# Patient Record
Sex: MALE | Race: BLACK OR AFRICAN AMERICAN | Employment: UNEMPLOYED | URBAN - METROPOLITAN AREA
[De-identification: names, ages, dates, MRNs, and addresses within clinical notes are randomized per-mention and may not be internally consistent; named-entity substitution may affect disease eponyms.]

---

## 2018-12-26 ENCOUNTER — HOSPITAL ENCOUNTER (INPATIENT)
Age: 23
LOS: 2 days | Discharge: HOME | DRG: 053 | End: 2018-12-29
Attending: EMERGENCY MEDICINE | Admitting: FAMILY MEDICINE
Payer: MEDICAID

## 2018-12-26 DIAGNOSIS — T14.90XA TRAUMA: ICD-10-CM

## 2018-12-26 DIAGNOSIS — S09.90XA CLOSED HEAD INJURY, INITIAL ENCOUNTER: Primary | ICD-10-CM

## 2018-12-26 DIAGNOSIS — R56.9 SEIZURE (HCC): ICD-10-CM

## 2018-12-26 PROCEDURE — 6810039000 HC L1 TRAUMA ALERT

## 2018-12-26 PROCEDURE — 99285 EMERGENCY DEPT VISIT HI MDM: CPT

## 2018-12-26 PROCEDURE — 6360000002 HC RX W HCPCS

## 2018-12-27 ENCOUNTER — APPOINTMENT (OUTPATIENT)
Dept: CT IMAGING | Age: 23
DRG: 053 | End: 2018-12-27
Payer: MEDICAID

## 2018-12-27 ENCOUNTER — APPOINTMENT (OUTPATIENT)
Dept: GENERAL RADIOLOGY | Age: 23
DRG: 053 | End: 2018-12-27
Payer: MEDICAID

## 2018-12-27 ENCOUNTER — APPOINTMENT (OUTPATIENT)
Dept: MRI IMAGING | Age: 23
DRG: 053 | End: 2018-12-27
Payer: MEDICAID

## 2018-12-27 ENCOUNTER — APPOINTMENT (OUTPATIENT)
Dept: NEUROLOGY | Age: 23
DRG: 053 | End: 2018-12-27
Payer: MEDICAID

## 2018-12-27 PROBLEM — R56.9 NEW ONSET SEIZURE (HCC): Status: ACTIVE | Noted: 2018-12-27

## 2018-12-27 LAB
% INHIBITION AA: 57.3 %
% INHIBITION ADP: 61.1 %
ABO/RH: NORMAL
ACETAMINOPHEN LEVEL: <5 MCG/ML (ref 10–30)
ALBUMIN SERPL-MCNC: 4.9 G/DL (ref 3.5–5.2)
ALP BLD-CCNC: 69 U/L (ref 40–129)
ALT SERPL-CCNC: 19 U/L (ref 0–40)
AMPHETAMINE SCREEN, URINE: NOT DETECTED
ANGLE (CLOT STRENGTH): 70.4 DEGREE (ref 59–74)
ANION GAP SERPL CALCULATED.3IONS-SCNC: 10 MMOL/L (ref 7–16)
ANION GAP SERPL CALCULATED.3IONS-SCNC: 11 MMOL/L (ref 7–16)
ANION GAP SERPL CALCULATED.3IONS-SCNC: 33 MMOL/L (ref 7–16)
ANTIBODY SCREEN: NORMAL
APTT: 30.5 SEC (ref 24.5–35.1)
AST SERPL-CCNC: 26 U/L (ref 0–39)
BARBITURATE SCREEN URINE: NOT DETECTED
BASOPHILS ABSOLUTE: 0.03 E9/L (ref 0–0.2)
BASOPHILS RELATIVE PERCENT: 0.2 % (ref 0–2)
BENZODIAZEPINE SCREEN, URINE: NOT DETECTED
BILIRUB SERPL-MCNC: 0.3 MG/DL (ref 0–1.2)
BUN BLDV-MCNC: 13 MG/DL (ref 6–20)
BUN BLDV-MCNC: 17 MG/DL (ref 6–20)
BUN BLDV-MCNC: 17 MG/DL (ref 6–20)
CALCIUM SERPL-MCNC: 9 MG/DL (ref 8.6–10.2)
CALCIUM SERPL-MCNC: 9.4 MG/DL (ref 8.6–10.2)
CALCIUM SERPL-MCNC: 9.7 MG/DL (ref 8.6–10.2)
CANNABINOID SCREEN URINE: NOT DETECTED
CHLORIDE BLD-SCNC: 102 MMOL/L (ref 98–107)
CHLORIDE BLD-SCNC: 103 MMOL/L (ref 98–107)
CHLORIDE BLD-SCNC: 95 MMOL/L (ref 98–107)
CO2: 10 MMOL/L (ref 22–29)
CO2: 24 MMOL/L (ref 22–29)
CO2: 25 MMOL/L (ref 22–29)
COCAINE METABOLITE SCREEN URINE: NOT DETECTED
COHB: 3.1 % (ref 0–1.5)
COMMENT: ABNORMAL
CREAT SERPL-MCNC: 1.6 MG/DL (ref 0.7–1.2)
CREAT SERPL-MCNC: 2.3 MG/DL (ref 0.7–1.2)
CREAT SERPL-MCNC: 2.5 MG/DL (ref 0.7–1.2)
CRITICAL: ABNORMAL
DATE ANALYZED: ABNORMAL
DATE OF COLLECTION: ABNORMAL
EKG ATRIAL RATE: 70 BPM
EKG P AXIS: 70 DEGREES
EKG P-R INTERVAL: 156 MS
EKG Q-T INTERVAL: 380 MS
EKG QRS DURATION: 92 MS
EKG QTC CALCULATION (BAZETT): 410 MS
EKG R AXIS: 71 DEGREES
EKG T AXIS: 61 DEGREES
EKG VENTRICULAR RATE: 70 BPM
EOSINOPHILS ABSOLUTE: 0.01 E9/L (ref 0.05–0.5)
EOSINOPHILS RELATIVE PERCENT: 0.1 % (ref 0–6)
ETHANOL: <10 MG/DL (ref 0–0.08)
G-TEG: 6.2 K D/SC (ref 4.5–11)
GFR AFRICAN AMERICAN: 38
GFR AFRICAN AMERICAN: 42
GFR AFRICAN AMERICAN: >60
GFR NON-AFRICAN AMERICAN: 38 ML/MIN/1.73
GFR NON-AFRICAN AMERICAN: 42 ML/MIN/1.73
GFR NON-AFRICAN AMERICAN: >60 ML/MIN/1.73
GLUCOSE BLD-MCNC: 101 MG/DL (ref 74–99)
GLUCOSE BLD-MCNC: 175 MG/DL (ref 74–99)
GLUCOSE BLD-MCNC: 92 MG/DL (ref 74–99)
HBA1C MFR BLD: 4.8 % (ref 4–5.6)
HCT VFR BLD CALC: 46.6 % (ref 37–54)
HCT VFR BLD CALC: 48.3 % (ref 37–54)
HEMOGLOBIN: 15.4 G/DL (ref 12.5–16.5)
HEMOGLOBIN: 15.8 G/DL (ref 12.5–16.5)
HHB: 0.5 % (ref 0–5)
IMMATURE GRANULOCYTES #: 0.05 E9/L
IMMATURE GRANULOCYTES %: 0.3 % (ref 0–5)
INR BLD: 1
K (CLOTTING TIME): 1.3 MIN (ref 1–3)
LAB: ABNORMAL
LACTIC ACID: 1 MMOL/L (ref 0.5–2.2)
LACTIC ACID: 17.3 MMOL/L (ref 0.5–2.2)
LYMPHOCYTES ABSOLUTE: 1.4 E9/L (ref 1.5–4)
LYMPHOCYTES RELATIVE PERCENT: 8.6 % (ref 20–42)
Lab: ABNORMAL
MA (MAX AMPLITUDE): 55.2 MM (ref 50–70)
MA-AA: 25.3 MM
MA-ACTIVATED: 3 MM
MA-ADP: 23.3 MM
MA-TEG BASELINE: 55.2 MM
MCH RBC QN AUTO: 29.9 PG (ref 26–35)
MCH RBC QN AUTO: 30.3 PG (ref 26–35)
MCHC RBC AUTO-ENTMCNC: 32.7 % (ref 32–34.5)
MCHC RBC AUTO-ENTMCNC: 33 % (ref 32–34.5)
MCV RBC AUTO: 90.5 FL (ref 80–99.9)
MCV RBC AUTO: 92.5 FL (ref 80–99.9)
METHADONE SCREEN, URINE: POSITIVE
METHB: 0.5 % (ref 0–1.5)
MODE: ABNORMAL
MONOCYTES ABSOLUTE: 1.52 E9/L (ref 0.1–0.95)
MONOCYTES RELATIVE PERCENT: 9.4 % (ref 2–12)
NEUTROPHILS ABSOLUTE: 13.2 E9/L (ref 1.8–7.3)
NEUTROPHILS RELATIVE PERCENT: 81.4 % (ref 43–80)
O2 CONTENT: 23.2 ML/DL
O2 SATURATION: 99.5 % (ref 92–98.5)
O2HB: 95.9 % (ref 94–97)
OPERATOR ID: ABNORMAL
OPIATE SCREEN URINE: NOT DETECTED
PATIENT TEMP: 37 C
PCO2: <15 MMHG (ref 35–45)
PDW BLD-RTO: 13.7 FL (ref 11.5–15)
PDW BLD-RTO: 14.1 FL (ref 11.5–15)
PH BLOOD GAS: 7.35 (ref 7.35–7.45)
PHENCYCLIDINE SCREEN URINE: NOT DETECTED
PLATELET # BLD: 229 E9/L (ref 130–450)
PLATELET # BLD: 259 E9/L (ref 130–450)
PMV BLD AUTO: 10.9 FL (ref 7–12)
PMV BLD AUTO: 11.1 FL (ref 7–12)
PO2: 286.6 MMHG (ref 60–100)
POTASSIUM REFLEX MAGNESIUM: 4.9 MMOL/L (ref 3.5–5)
POTASSIUM REFLEX MAGNESIUM: 5.1 MMOL/L (ref 3.5–5)
POTASSIUM SERPL-SCNC: 3.8 MMOL/L (ref 3.5–5)
POTASSIUM SERPL-SCNC: 3.82 MMOL/L (ref 3.3–5.1)
PROLACTIN: 35.34 NG/ML
PROPOXYPHENE SCREEN: NOT DETECTED
PROTHROMBIN TIME: 11.7 SEC (ref 9.3–12.4)
R (REACTION TIME): 3 MIN (ref 5–10)
RBC # BLD: 5.15 E12/L (ref 3.8–5.8)
RBC # BLD: 5.22 E12/L (ref 3.8–5.8)
SALICYLATE, SERUM: <0.3 MG/DL (ref 0–30)
SODIUM BLD-SCNC: 137 MMOL/L (ref 132–146)
SODIUM BLD-SCNC: 138 MMOL/L (ref 132–146)
SODIUM BLD-SCNC: 138 MMOL/L (ref 132–146)
SOURCE, BLOOD GAS: ABNORMAL
THB: 16.7 G/DL (ref 11.5–16.5)
TIME ANALYZED: 5
TOTAL CK: 3647 U/L (ref 20–200)
TOTAL CK: 3879 U/L (ref 20–200)
TOTAL PROTEIN: 8 G/DL (ref 6.4–8.3)
TRICYCLIC ANTIDEPRESSANTS SCREEN SERUM: NEGATIVE NG/ML
WBC # BLD: 14 E9/L (ref 4.5–11.5)
WBC # BLD: 16.2 E9/L (ref 4.5–11.5)

## 2018-12-27 PROCEDURE — 80053 COMPREHEN METABOLIC PANEL: CPT

## 2018-12-27 PROCEDURE — G8979 MOBILITY GOAL STATUS: HCPCS

## 2018-12-27 PROCEDURE — 86900 BLOOD TYPING SEROLOGIC ABO: CPT

## 2018-12-27 PROCEDURE — G0480 DRUG TEST DEF 1-7 CLASSES: HCPCS

## 2018-12-27 PROCEDURE — 85730 THROMBOPLASTIN TIME PARTIAL: CPT

## 2018-12-27 PROCEDURE — 6360000002 HC RX W HCPCS: Performed by: EMERGENCY MEDICINE

## 2018-12-27 PROCEDURE — 86901 BLOOD TYPING SEROLOGIC RH(D): CPT

## 2018-12-27 PROCEDURE — 2580000003 HC RX 258: Performed by: FAMILY MEDICINE

## 2018-12-27 PROCEDURE — 86850 RBC ANTIBODY SCREEN: CPT

## 2018-12-27 PROCEDURE — 2580000003 HC RX 258: Performed by: EMERGENCY MEDICINE

## 2018-12-27 PROCEDURE — 99253 IP/OBS CNSLTJ NEW/EST LOW 45: CPT | Performed by: PSYCHIATRY & NEUROLOGY

## 2018-12-27 PROCEDURE — 85347 COAGULATION TIME ACTIVATED: CPT

## 2018-12-27 PROCEDURE — 70553 MRI BRAIN STEM W/O & W/DYE: CPT

## 2018-12-27 PROCEDURE — 6360000004 HC RX CONTRAST MEDICATION: Performed by: FAMILY MEDICINE

## 2018-12-27 PROCEDURE — 93005 ELECTROCARDIOGRAM TRACING: CPT | Performed by: NURSE PRACTITIONER

## 2018-12-27 PROCEDURE — 94150 VITAL CAPACITY TEST: CPT

## 2018-12-27 PROCEDURE — 84146 ASSAY OF PROLACTIN: CPT

## 2018-12-27 PROCEDURE — 2140000000 HC CCU INTERMEDIATE R&B

## 2018-12-27 PROCEDURE — G8978 MOBILITY CURRENT STATUS: HCPCS

## 2018-12-27 PROCEDURE — 80048 BASIC METABOLIC PNL TOTAL CA: CPT

## 2018-12-27 PROCEDURE — 87040 BLOOD CULTURE FOR BACTERIA: CPT

## 2018-12-27 PROCEDURE — 70450 CT HEAD/BRAIN W/O DYE: CPT

## 2018-12-27 PROCEDURE — 83605 ASSAY OF LACTIC ACID: CPT

## 2018-12-27 PROCEDURE — 85610 PROTHROMBIN TIME: CPT

## 2018-12-27 PROCEDURE — 6370000000 HC RX 637 (ALT 250 FOR IP): Performed by: NURSE PRACTITIONER

## 2018-12-27 PROCEDURE — 72170 X-RAY EXAM OF PELVIS: CPT

## 2018-12-27 PROCEDURE — 85384 FIBRINOGEN ACTIVITY: CPT

## 2018-12-27 PROCEDURE — 82550 ASSAY OF CK (CPK): CPT

## 2018-12-27 PROCEDURE — 2580000003 HC RX 258

## 2018-12-27 PROCEDURE — A9579 GAD-BASE MR CONTRAST NOS,1ML: HCPCS | Performed by: FAMILY MEDICINE

## 2018-12-27 PROCEDURE — 96375 TX/PRO/DX INJ NEW DRUG ADDON: CPT

## 2018-12-27 PROCEDURE — 99406 BEHAV CHNG SMOKING 3-10 MIN: CPT

## 2018-12-27 PROCEDURE — 97161 PT EVAL LOW COMPLEX 20 MIN: CPT

## 2018-12-27 PROCEDURE — 85025 COMPLETE CBC W/AUTO DIFF WBC: CPT

## 2018-12-27 PROCEDURE — 85576 BLOOD PLATELET AGGREGATION: CPT

## 2018-12-27 PROCEDURE — 83036 HEMOGLOBIN GLYCOSYLATED A1C: CPT

## 2018-12-27 PROCEDURE — G8980 MOBILITY D/C STATUS: HCPCS

## 2018-12-27 PROCEDURE — 80307 DRUG TEST PRSMV CHEM ANLYZR: CPT

## 2018-12-27 PROCEDURE — 6360000002 HC RX W HCPCS: Performed by: STUDENT IN AN ORGANIZED HEALTH CARE EDUCATION/TRAINING PROGRAM

## 2018-12-27 PROCEDURE — 96365 THER/PROPH/DIAG IV INF INIT: CPT

## 2018-12-27 PROCEDURE — 82805 BLOOD GASES W/O2 SATURATION: CPT

## 2018-12-27 PROCEDURE — 72125 CT NECK SPINE W/O DYE: CPT

## 2018-12-27 PROCEDURE — 85027 COMPLETE CBC AUTOMATED: CPT

## 2018-12-27 PROCEDURE — 2700000000 HC OXYGEN THERAPY PER DAY

## 2018-12-27 PROCEDURE — 36415 COLL VENOUS BLD VENIPUNCTURE: CPT

## 2018-12-27 PROCEDURE — 71045 X-RAY EXAM CHEST 1 VIEW: CPT

## 2018-12-27 PROCEDURE — 95819 EEG AWAKE AND ASLEEP: CPT | Performed by: PSYCHIATRY & NEUROLOGY

## 2018-12-27 PROCEDURE — 84132 ASSAY OF SERUM POTASSIUM: CPT

## 2018-12-27 PROCEDURE — 95819 EEG AWAKE AND ASLEEP: CPT

## 2018-12-27 PROCEDURE — 99231 SBSQ HOSP IP/OBS SF/LOW 25: CPT | Performed by: SURGERY

## 2018-12-27 RX ORDER — ONDANSETRON 2 MG/ML
INJECTION INTRAMUSCULAR; INTRAVENOUS DAILY PRN
Status: COMPLETED | OUTPATIENT
Start: 2018-12-27 | End: 2018-12-27

## 2018-12-27 RX ORDER — DEXTROSE MONOHYDRATE 25 G/50ML
12.5 INJECTION, SOLUTION INTRAVENOUS PRN
Status: DISCONTINUED | OUTPATIENT
Start: 2018-12-27 | End: 2018-12-29 | Stop reason: HOSPADM

## 2018-12-27 RX ORDER — DEXTROSE MONOHYDRATE 50 MG/ML
100 INJECTION, SOLUTION INTRAVENOUS PRN
Status: DISCONTINUED | OUTPATIENT
Start: 2018-12-27 | End: 2018-12-29 | Stop reason: HOSPADM

## 2018-12-27 RX ORDER — SODIUM CHLORIDE 0.9 % (FLUSH) 0.9 %
SYRINGE (ML) INJECTION
Status: COMPLETED
Start: 2018-12-27 | End: 2018-12-27

## 2018-12-27 RX ORDER — NICOTINE POLACRILEX 4 MG
15 LOZENGE BUCCAL PRN
Status: DISCONTINUED | OUTPATIENT
Start: 2018-12-27 | End: 2018-12-29 | Stop reason: HOSPADM

## 2018-12-27 RX ORDER — LORAZEPAM 2 MG/ML
1 INJECTION INTRAMUSCULAR EVERY 4 HOURS PRN
Status: DISCONTINUED | OUTPATIENT
Start: 2018-12-27 | End: 2018-12-29 | Stop reason: HOSPADM

## 2018-12-27 RX ORDER — 0.9 % SODIUM CHLORIDE 0.9 %
10 VIAL (ML) INJECTION 2 TIMES DAILY
Status: DISCONTINUED | OUTPATIENT
Start: 2018-12-27 | End: 2018-12-29 | Stop reason: HOSPADM

## 2018-12-27 RX ORDER — SODIUM CHLORIDE 9 MG/ML
INJECTION, SOLUTION INTRAVENOUS CONTINUOUS
Status: DISCONTINUED | OUTPATIENT
Start: 2018-12-27 | End: 2018-12-29 | Stop reason: HOSPADM

## 2018-12-27 RX ORDER — LEVETIRACETAM 500 MG/1
500 TABLET ORAL 2 TIMES DAILY
Status: DISCONTINUED | OUTPATIENT
Start: 2018-12-27 | End: 2018-12-27

## 2018-12-27 RX ORDER — 0.9 % SODIUM CHLORIDE 0.9 %
1000 INTRAVENOUS SOLUTION INTRAVENOUS ONCE
Status: COMPLETED | OUTPATIENT
Start: 2018-12-27 | End: 2018-12-27

## 2018-12-27 RX ORDER — LEVETIRACETAM 5 MG/ML
500 INJECTION INTRAVASCULAR EVERY 12 HOURS
Status: DISCONTINUED | OUTPATIENT
Start: 2018-12-27 | End: 2018-12-27

## 2018-12-27 RX ORDER — LEVETIRACETAM 10 MG/ML
1000 INJECTION INTRAVASCULAR ONCE
Status: COMPLETED | OUTPATIENT
Start: 2018-12-27 | End: 2018-12-27

## 2018-12-27 RX ADMIN — ONDANSETRON HYDROCHLORIDE 4 MG: 2 INJECTION, SOLUTION INTRAMUSCULAR; INTRAVENOUS at 00:07

## 2018-12-27 RX ADMIN — SODIUM CHLORIDE: 9 INJECTION, SOLUTION INTRAVENOUS at 17:55

## 2018-12-27 RX ADMIN — SODIUM CHLORIDE: 9 INJECTION, SOLUTION INTRAVENOUS at 16:50

## 2018-12-27 RX ADMIN — GADOTERIDOL 15 ML: 279.3 INJECTION, SOLUTION INTRAVENOUS at 07:36

## 2018-12-27 RX ADMIN — Medication 10 ML: at 09:44

## 2018-12-27 RX ADMIN — LEVETIRACETAM 500 MG: 500 TABLET, FILM COATED ORAL at 13:00

## 2018-12-27 RX ADMIN — SODIUM CHLORIDE: 9 INJECTION, SOLUTION INTRAVENOUS at 10:00

## 2018-12-27 RX ADMIN — SODIUM CHLORIDE 1000 ML: 9 INJECTION, SOLUTION INTRAVENOUS at 04:23

## 2018-12-27 RX ADMIN — LEVETIRACETAM 1000 MG: 10 INJECTION INTRAVENOUS at 01:15

## 2018-12-27 ASSESSMENT — PAIN SCALES - GENERAL
PAINLEVEL_OUTOF10: 0
PAINLEVEL_OUTOF10: 0

## 2018-12-28 PROBLEM — S09.90XA CLOSED HEAD INJURY: Status: ACTIVE | Noted: 2018-12-28

## 2018-12-28 LAB
ANION GAP SERPL CALCULATED.3IONS-SCNC: 11 MMOL/L (ref 7–16)
BASOPHILS ABSOLUTE: 0.03 E9/L (ref 0–0.2)
BASOPHILS RELATIVE PERCENT: 0.3 % (ref 0–2)
BUN BLDV-MCNC: 13 MG/DL (ref 6–20)
CALCIUM SERPL-MCNC: 8.5 MG/DL (ref 8.6–10.2)
CHLORIDE BLD-SCNC: 108 MMOL/L (ref 98–107)
CO2: 21 MMOL/L (ref 22–29)
CREAT SERPL-MCNC: 2.4 MG/DL (ref 0.7–1.2)
EOSINOPHILS ABSOLUTE: 0.08 E9/L (ref 0.05–0.5)
EOSINOPHILS RELATIVE PERCENT: 0.7 % (ref 0–6)
GFR AFRICAN AMERICAN: 40
GFR NON-AFRICAN AMERICAN: 40 ML/MIN/1.73
GLUCOSE BLD-MCNC: 103 MG/DL (ref 74–99)
HCT VFR BLD CALC: 39.2 % (ref 37–54)
HEMOGLOBIN: 13.1 G/DL (ref 12.5–16.5)
IMMATURE GRANULOCYTES #: 0.04 E9/L
IMMATURE GRANULOCYTES %: 0.3 % (ref 0–5)
LYMPHOCYTES ABSOLUTE: 2.66 E9/L (ref 1.5–4)
LYMPHOCYTES RELATIVE PERCENT: 22.4 % (ref 20–42)
MCH RBC QN AUTO: 30.4 PG (ref 26–35)
MCHC RBC AUTO-ENTMCNC: 33.4 % (ref 32–34.5)
MCV RBC AUTO: 91 FL (ref 80–99.9)
MONOCYTES ABSOLUTE: 1.29 E9/L (ref 0.1–0.95)
MONOCYTES RELATIVE PERCENT: 10.8 % (ref 2–12)
NEUTROPHILS ABSOLUTE: 7.8 E9/L (ref 1.8–7.3)
NEUTROPHILS RELATIVE PERCENT: 65.5 % (ref 43–80)
PDW BLD-RTO: 13.9 FL (ref 11.5–15)
PLATELET # BLD: 198 E9/L (ref 130–450)
PMV BLD AUTO: 11.1 FL (ref 7–12)
POTASSIUM REFLEX MAGNESIUM: 3.9 MMOL/L (ref 3.5–5)
RBC # BLD: 4.31 E12/L (ref 3.8–5.8)
SODIUM BLD-SCNC: 140 MMOL/L (ref 132–146)
TOTAL CK: 3679 U/L (ref 20–200)
WBC # BLD: 11.9 E9/L (ref 4.5–11.5)

## 2018-12-28 PROCEDURE — 2580000003 HC RX 258: Performed by: FAMILY MEDICINE

## 2018-12-28 PROCEDURE — 85025 COMPLETE CBC W/AUTO DIFF WBC: CPT

## 2018-12-28 PROCEDURE — 80048 BASIC METABOLIC PNL TOTAL CA: CPT

## 2018-12-28 PROCEDURE — 97165 OT EVAL LOW COMPLEX 30 MIN: CPT

## 2018-12-28 PROCEDURE — G8989 SELF CARE D/C STATUS: HCPCS

## 2018-12-28 PROCEDURE — G8988 SELF CARE GOAL STATUS: HCPCS

## 2018-12-28 PROCEDURE — 82550 ASSAY OF CK (CPK): CPT

## 2018-12-28 PROCEDURE — 1200000000 HC SEMI PRIVATE

## 2018-12-28 PROCEDURE — G8987 SELF CARE CURRENT STATUS: HCPCS

## 2018-12-28 PROCEDURE — 36415 COLL VENOUS BLD VENIPUNCTURE: CPT

## 2018-12-28 RX ADMIN — SODIUM CHLORIDE: 9 INJECTION, SOLUTION INTRAVENOUS at 06:41

## 2018-12-28 RX ADMIN — SODIUM CHLORIDE: 9 INJECTION, SOLUTION INTRAVENOUS at 13:11

## 2018-12-28 RX ADMIN — SODIUM CHLORIDE: 9 INJECTION, SOLUTION INTRAVENOUS at 19:40

## 2018-12-28 ASSESSMENT — PAIN SCALES - GENERAL
PAINLEVEL_OUTOF10: 0

## 2018-12-29 VITALS
HEART RATE: 61 BPM | TEMPERATURE: 98.7 F | HEIGHT: 74 IN | OXYGEN SATURATION: 99 % | RESPIRATION RATE: 16 BRPM | BODY MASS INDEX: 21.28 KG/M2 | WEIGHT: 165.8 LBS | DIASTOLIC BLOOD PRESSURE: 60 MMHG | SYSTOLIC BLOOD PRESSURE: 120 MMHG

## 2018-12-29 LAB
ANION GAP SERPL CALCULATED.3IONS-SCNC: 8 MMOL/L (ref 7–16)
BASOPHILS ABSOLUTE: 0.03 E9/L (ref 0–0.2)
BASOPHILS RELATIVE PERCENT: 0.3 % (ref 0–2)
BUN BLDV-MCNC: 8 MG/DL (ref 6–20)
CALCIUM SERPL-MCNC: 8.4 MG/DL (ref 8.6–10.2)
CHLORIDE BLD-SCNC: 112 MMOL/L (ref 98–107)
CO2: 24 MMOL/L (ref 22–29)
CREAT SERPL-MCNC: 1.8 MG/DL (ref 0.7–1.2)
EOSINOPHILS ABSOLUTE: 0.15 E9/L (ref 0.05–0.5)
EOSINOPHILS RELATIVE PERCENT: 1.5 % (ref 0–6)
GFR AFRICAN AMERICAN: 56
GFR NON-AFRICAN AMERICAN: 56 ML/MIN/1.73
GLUCOSE BLD-MCNC: 94 MG/DL (ref 74–99)
HCT VFR BLD CALC: 40.9 % (ref 37–54)
HEMOGLOBIN: 13.5 G/DL (ref 12.5–16.5)
IMMATURE GRANULOCYTES #: 0.03 E9/L
IMMATURE GRANULOCYTES %: 0.3 % (ref 0–5)
LYMPHOCYTES ABSOLUTE: 2.21 E9/L (ref 1.5–4)
LYMPHOCYTES RELATIVE PERCENT: 22.5 % (ref 20–42)
MCH RBC QN AUTO: 30 PG (ref 26–35)
MCHC RBC AUTO-ENTMCNC: 33 % (ref 32–34.5)
MCV RBC AUTO: 90.9 FL (ref 80–99.9)
MONOCYTES ABSOLUTE: 0.94 E9/L (ref 0.1–0.95)
MONOCYTES RELATIVE PERCENT: 9.6 % (ref 2–12)
NEUTROPHILS ABSOLUTE: 6.46 E9/L (ref 1.8–7.3)
NEUTROPHILS RELATIVE PERCENT: 65.8 % (ref 43–80)
PDW BLD-RTO: 13.9 FL (ref 11.5–15)
PLATELET # BLD: 198 E9/L (ref 130–450)
PMV BLD AUTO: 11.3 FL (ref 7–12)
POTASSIUM REFLEX MAGNESIUM: 4 MMOL/L (ref 3.5–5)
RBC # BLD: 4.5 E12/L (ref 3.8–5.8)
SODIUM BLD-SCNC: 144 MMOL/L (ref 132–146)
TOTAL CK: 2801 U/L (ref 20–200)
WBC # BLD: 9.8 E9/L (ref 4.5–11.5)

## 2018-12-29 PROCEDURE — 80048 BASIC METABOLIC PNL TOTAL CA: CPT

## 2018-12-29 PROCEDURE — 85025 COMPLETE CBC W/AUTO DIFF WBC: CPT

## 2018-12-29 PROCEDURE — 82550 ASSAY OF CK (CPK): CPT

## 2018-12-29 PROCEDURE — 36415 COLL VENOUS BLD VENIPUNCTURE: CPT

## 2018-12-29 PROCEDURE — 93010 ELECTROCARDIOGRAM REPORT: CPT | Performed by: INTERNAL MEDICINE

## 2018-12-30 LAB
EDDP, URINE: <10 NG/ML
METHADONE, URINE: <10 NG/ML

## 2019-01-01 LAB
BLOOD CULTURE, ROUTINE: NORMAL
CULTURE, BLOOD 2: NORMAL

## 2019-03-09 ENCOUNTER — APPOINTMENT (OUTPATIENT)
Dept: CT IMAGING | Age: 24
End: 2019-03-09
Payer: COMMERCIAL

## 2019-03-09 ENCOUNTER — HOSPITAL ENCOUNTER (EMERGENCY)
Age: 24
Discharge: HOME | End: 2019-03-09
Attending: EMERGENCY MEDICINE
Payer: COMMERCIAL

## 2019-03-09 VITALS
HEART RATE: 75 BPM | OXYGEN SATURATION: 97 % | HEIGHT: 74 IN | TEMPERATURE: 97.8 F | WEIGHT: 165 LBS | RESPIRATION RATE: 14 BRPM | BODY MASS INDEX: 21.17 KG/M2 | SYSTOLIC BLOOD PRESSURE: 123 MMHG | DIASTOLIC BLOOD PRESSURE: 70 MMHG

## 2019-03-09 DIAGNOSIS — E87.6 HYPOKALEMIA: ICD-10-CM

## 2019-03-09 DIAGNOSIS — T50.905A DRUG-INDUCED SEIZURE (HCC): Primary | ICD-10-CM

## 2019-03-09 DIAGNOSIS — R56.9 DRUG-INDUCED SEIZURE (HCC): Primary | ICD-10-CM

## 2019-03-09 LAB
ACETAMINOPHEN LEVEL: <5 MCG/ML (ref 10–30)
ALBUMIN SERPL-MCNC: 4.4 G/DL (ref 3.5–5.2)
ALP BLD-CCNC: 75 U/L (ref 40–129)
ALT SERPL-CCNC: 19 U/L (ref 0–40)
AMPHETAMINE SCREEN, URINE: NOT DETECTED
ANION GAP SERPL CALCULATED.3IONS-SCNC: 21 MMOL/L (ref 7–16)
AST SERPL-CCNC: 26 U/L (ref 0–39)
BACTERIA: NORMAL /HPF
BARBITURATE SCREEN URINE: NOT DETECTED
BASOPHILS ABSOLUTE: 0.05 E9/L (ref 0–0.2)
BASOPHILS RELATIVE PERCENT: 0.3 % (ref 0–2)
BENZODIAZEPINE SCREEN, URINE: NOT DETECTED
BILIRUB SERPL-MCNC: <0.2 MG/DL (ref 0–1.2)
BILIRUBIN URINE: NEGATIVE
BLOOD, URINE: ABNORMAL
BUN BLDV-MCNC: 10 MG/DL (ref 6–20)
CALCIUM SERPL-MCNC: 9.2 MG/DL (ref 8.6–10.2)
CANNABINOID SCREEN URINE: NOT DETECTED
CHLORIDE BLD-SCNC: 100 MMOL/L (ref 98–107)
CHP ED QC CHECK: YES
CLARITY: CLEAR
CO2: 16 MMOL/L (ref 22–29)
COCAINE METABOLITE SCREEN URINE: NOT DETECTED
COLOR: YELLOW
CREAT SERPL-MCNC: 1.5 MG/DL (ref 0.7–1.2)
EKG ATRIAL RATE: 95 BPM
EKG P AXIS: 67 DEGREES
EKG P-R INTERVAL: 140 MS
EKG Q-T INTERVAL: 346 MS
EKG QRS DURATION: 106 MS
EKG QTC CALCULATION (BAZETT): 434 MS
EKG R AXIS: 72 DEGREES
EKG T AXIS: 27 DEGREES
EKG VENTRICULAR RATE: 95 BPM
EOSINOPHILS ABSOLUTE: 0.08 E9/L (ref 0.05–0.5)
EOSINOPHILS RELATIVE PERCENT: 0.5 % (ref 0–6)
ETHANOL: <10 MG/DL (ref 0–0.08)
GFR AFRICAN AMERICAN: >60
GFR NON-AFRICAN AMERICAN: >60 ML/MIN/1.73
GLUCOSE BLD-MCNC: 188 MG/DL
GLUCOSE BLD-MCNC: 229 MG/DL (ref 74–99)
GLUCOSE URINE: NEGATIVE MG/DL
HCT VFR BLD CALC: 45.3 % (ref 37–54)
HEMOGLOBIN: 15.3 G/DL (ref 12.5–16.5)
IMMATURE GRANULOCYTES #: 0.13 E9/L
IMMATURE GRANULOCYTES %: 0.8 % (ref 0–5)
KETONES, URINE: NEGATIVE MG/DL
LEUKOCYTE ESTERASE, URINE: NEGATIVE
LIPASE: 13 U/L (ref 13–60)
LYMPHOCYTES ABSOLUTE: 4.39 E9/L (ref 1.5–4)
LYMPHOCYTES RELATIVE PERCENT: 28.5 % (ref 20–42)
MAGNESIUM: 2.8 MG/DL (ref 1.6–2.6)
MCH RBC QN AUTO: 29.8 PG (ref 26–35)
MCHC RBC AUTO-ENTMCNC: 33.8 % (ref 32–34.5)
MCV RBC AUTO: 88.3 FL (ref 80–99.9)
METER GLUCOSE: 188 MG/DL (ref 74–99)
METHADONE SCREEN, URINE: POSITIVE
MONOCYTES ABSOLUTE: 0.92 E9/L (ref 0.1–0.95)
MONOCYTES RELATIVE PERCENT: 6 % (ref 2–12)
NEUTROPHILS ABSOLUTE: 9.85 E9/L (ref 1.8–7.3)
NEUTROPHILS RELATIVE PERCENT: 63.9 % (ref 43–80)
NITRITE, URINE: NEGATIVE
OPIATE SCREEN URINE: NOT DETECTED
PDW BLD-RTO: 13.5 FL (ref 11.5–15)
PH UA: 6.5 (ref 5–9)
PHENCYCLIDINE SCREEN URINE: NOT DETECTED
PLATELET # BLD: 270 E9/L (ref 130–450)
PMV BLD AUTO: 10.9 FL (ref 7–12)
POTASSIUM SERPL-SCNC: 3.2 MMOL/L (ref 3.5–5)
PROPOXYPHENE SCREEN: NOT DETECTED
PROTEIN UA: NEGATIVE MG/DL
RBC # BLD: 5.13 E12/L (ref 3.8–5.8)
RBC UA: NORMAL /HPF (ref 0–2)
SALICYLATE, SERUM: <0.3 MG/DL (ref 0–30)
SODIUM BLD-SCNC: 137 MMOL/L (ref 132–146)
SPECIFIC GRAVITY UA: 1.02 (ref 1–1.03)
TOTAL PROTEIN: 7.4 G/DL (ref 6.4–8.3)
TRICYCLIC ANTIDEPRESSANTS SCREEN SERUM: NEGATIVE NG/ML
UROBILINOGEN, URINE: 0.2 E.U./DL
WBC # BLD: 15.4 E9/L (ref 4.5–11.5)
WBC UA: NORMAL /HPF (ref 0–5)

## 2019-03-09 PROCEDURE — 80307 DRUG TEST PRSMV CHEM ANLYZR: CPT

## 2019-03-09 PROCEDURE — 81001 URINALYSIS AUTO W/SCOPE: CPT

## 2019-03-09 PROCEDURE — G0480 DRUG TEST DEF 1-7 CLASSES: HCPCS

## 2019-03-09 PROCEDURE — 96365 THER/PROPH/DIAG IV INF INIT: CPT

## 2019-03-09 PROCEDURE — 2580000003 HC RX 258: Performed by: EMERGENCY MEDICINE

## 2019-03-09 PROCEDURE — 99284 EMERGENCY DEPT VISIT MOD MDM: CPT

## 2019-03-09 PROCEDURE — 83690 ASSAY OF LIPASE: CPT

## 2019-03-09 PROCEDURE — 72125 CT NECK SPINE W/O DYE: CPT

## 2019-03-09 PROCEDURE — 80053 COMPREHEN METABOLIC PANEL: CPT

## 2019-03-09 PROCEDURE — 6370000000 HC RX 637 (ALT 250 FOR IP): Performed by: EMERGENCY MEDICINE

## 2019-03-09 PROCEDURE — 93005 ELECTROCARDIOGRAM TRACING: CPT | Performed by: EMERGENCY MEDICINE

## 2019-03-09 PROCEDURE — 83735 ASSAY OF MAGNESIUM: CPT

## 2019-03-09 PROCEDURE — 82962 GLUCOSE BLOOD TEST: CPT

## 2019-03-09 PROCEDURE — 85025 COMPLETE CBC W/AUTO DIFF WBC: CPT

## 2019-03-09 PROCEDURE — 6360000002 HC RX W HCPCS: Performed by: EMERGENCY MEDICINE

## 2019-03-09 PROCEDURE — 36415 COLL VENOUS BLD VENIPUNCTURE: CPT

## 2019-03-09 PROCEDURE — 70450 CT HEAD/BRAIN W/O DYE: CPT

## 2019-03-09 RX ORDER — 0.9 % SODIUM CHLORIDE 0.9 %
1000 INTRAVENOUS SOLUTION INTRAVENOUS ONCE
Status: COMPLETED | OUTPATIENT
Start: 2019-03-09 | End: 2019-03-09

## 2019-03-09 RX ORDER — POTASSIUM CHLORIDE 20 MEQ/1
40 TABLET, EXTENDED RELEASE ORAL ONCE
Status: COMPLETED | OUTPATIENT
Start: 2019-03-09 | End: 2019-03-09

## 2019-03-09 RX ORDER — LEVETIRACETAM 10 MG/ML
1000 INJECTION INTRAVASCULAR ONCE
Status: COMPLETED | OUTPATIENT
Start: 2019-03-09 | End: 2019-03-09

## 2019-03-09 RX ADMIN — SODIUM CHLORIDE 1000 ML: 9 INJECTION, SOLUTION INTRAVENOUS at 00:52

## 2019-03-09 RX ADMIN — LEVETIRACETAM 1000 MG: 10 INJECTION INTRAVENOUS at 02:01

## 2019-03-09 RX ADMIN — POTASSIUM CHLORIDE 40 MEQ: 20 TABLET, EXTENDED RELEASE ORAL at 05:47

## 2019-03-09 ASSESSMENT — ENCOUNTER SYMPTOMS
CHEST TIGHTNESS: 0
SHORTNESS OF BREATH: 0
SINUS PAIN: 0
EYE REDNESS: 0
COUGH: 0
TROUBLE SWALLOWING: 0
PHOTOPHOBIA: 0
CONSTIPATION: 0
EYE PAIN: 0
NAUSEA: 1
VOMITING: 1
DIARRHEA: 0
BLOOD IN STOOL: 0
VOICE CHANGE: 0
COLOR CHANGE: 0
SINUS PRESSURE: 0
ABDOMINAL PAIN: 0

## 2019-03-09 ASSESSMENT — PAIN DESCRIPTION - FREQUENCY: FREQUENCY: CONTINUOUS

## 2019-03-09 ASSESSMENT — PAIN DESCRIPTION - DESCRIPTORS: DESCRIPTORS: ACHING

## 2019-03-09 ASSESSMENT — PAIN DESCRIPTION - LOCATION: LOCATION: NECK

## 2019-03-15 LAB
EDDP, URINE: <10 NG/ML
METHADONE, URINE: <10 NG/ML

## 2019-11-09 ENCOUNTER — HOSPITAL ENCOUNTER (EMERGENCY)
Age: 24
Discharge: HOME | End: 2019-11-09
Payer: COMMERCIAL

## 2019-11-09 VITALS
RESPIRATION RATE: 18 BRPM | HEART RATE: 99 BPM | OXYGEN SATURATION: 95 % | DIASTOLIC BLOOD PRESSURE: 106 MMHG | SYSTOLIC BLOOD PRESSURE: 132 MMHG | WEIGHT: 195 LBS | BODY MASS INDEX: 25.04 KG/M2 | TEMPERATURE: 98.7 F

## 2019-11-09 DIAGNOSIS — S81.052A DOG BITE OF LEFT KNEE, INITIAL ENCOUNTER: Primary | ICD-10-CM
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
## 2019-11-09 PROCEDURE — 6360000002 HC RX W HCPCS: Performed by: NURSE PRACTITIONER

## 2019-11-09 PROCEDURE — 96372 THER/PROPH/DIAG INJ SC/IM: CPT

## 2019-11-09 PROCEDURE — 90472 IMMUNIZATION ADMIN EACH ADD: CPT | Performed by: NURSE PRACTITIONER

## 2019-11-09 PROCEDURE — 90675 RABIES VACCINE IM: CPT | Performed by: NURSE PRACTITIONER

## 2019-11-09 PROCEDURE — 90471 IMMUNIZATION ADMIN: CPT | Performed by: NURSE PRACTITIONER

## 2019-11-09 PROCEDURE — 99282 EMERGENCY DEPT VISIT SF MDM: CPT

## 2019-11-09 PROCEDURE — 90375 RABIES IG IM/SC: CPT | Performed by: NURSE PRACTITIONER

## 2019-11-09 PROCEDURE — 90715 TDAP VACCINE 7 YRS/> IM: CPT | Performed by: NURSE PRACTITIONER

## 2019-11-09 PROCEDURE — G0463 HOSPITAL OUTPT CLINIC VISIT: HCPCS

## 2019-11-09 RX ORDER — IBUPROFEN 800 MG/1
800 TABLET ORAL EVERY 6 HOURS PRN
Qty: 16 TABLET | Refills: 0 | Status: SHIPPED | OUTPATIENT
Start: 2019-11-09 | End: 2020-07-22 | Stop reason: SDUPTHER

## 2019-11-09 RX ORDER — AMOXICILLIN AND CLAVULANATE POTASSIUM 875; 125 MG/1; MG/1
1 TABLET, FILM COATED ORAL 2 TIMES DAILY
Qty: 20 TABLET | Refills: 0 | Status: SHIPPED | OUTPATIENT
Start: 2019-11-09 | End: 2019-11-19

## 2019-11-09 RX ADMIN — TETANUS TOXOID, REDUCED DIPHTHERIA TOXOID AND ACELLULAR PERTUSSIS VACCINE, ADSORBED 0.5 ML: 5; 2.5; 8; 8; 2.5 SUSPENSION INTRAMUSCULAR at 14:25

## 2019-11-09 RX ADMIN — RABIES VACCINE 1 ML: KIT at 16:12

## 2019-11-09 RX ADMIN — RABIES IMMUNE GLOBULIN (HUMAN) 1800 UNITS: 300 INJECTION, SOLUTION INFILTRATION; INTRAMUSCULAR at 16:13

## 2019-11-09 ASSESSMENT — PAIN DESCRIPTION - PAIN TYPE: TYPE: ACUTE PAIN

## 2019-11-09 ASSESSMENT — PAIN DESCRIPTION - ORIENTATION: ORIENTATION: LEFT

## 2019-11-09 ASSESSMENT — PAIN DESCRIPTION - LOCATION: LOCATION: KNEE

## 2019-11-09 ASSESSMENT — PAIN SCALES - GENERAL: PAINLEVEL_OUTOF10: 7

## 2020-04-21 ENCOUNTER — HOSPITAL ENCOUNTER (INPATIENT)
Age: 25
LOS: 35 days | Discharge: NURSING, REHAB OR LONG-TERM CARE | DRG: 004 | End: 2020-05-26
Attending: EMERGENCY MEDICINE | Admitting: SURGERY
Payer: MEDICAID

## 2020-04-21 ENCOUNTER — APPOINTMENT (OUTPATIENT)
Dept: CT IMAGING | Age: 25
DRG: 004 | End: 2020-04-21
Payer: MEDICAID

## 2020-04-21 ENCOUNTER — ANESTHESIA EVENT (OUTPATIENT)
Dept: OPERATING ROOM | Age: 25
DRG: 004 | End: 2020-04-21
Payer: MEDICAID

## 2020-04-21 ENCOUNTER — ANESTHESIA EVENT (OUTPATIENT)
Dept: EMERGENCY DEPT | Age: 25
DRG: 004 | End: 2020-04-21
Payer: MEDICAID

## 2020-04-21 ENCOUNTER — ANESTHESIA (OUTPATIENT)
Dept: EMERGENCY DEPT | Age: 25
DRG: 004 | End: 2020-04-21
Payer: MEDICAID

## 2020-04-21 ENCOUNTER — APPOINTMENT (OUTPATIENT)
Dept: GENERAL RADIOLOGY | Age: 25
DRG: 004 | End: 2020-04-21
Payer: MEDICAID

## 2020-04-21 ENCOUNTER — ANESTHESIA (OUTPATIENT)
Dept: OPERATING ROOM | Age: 25
DRG: 004 | End: 2020-04-21
Payer: MEDICAID
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
## 2020-04-21 LAB
AADO2: 329.5 MMHG
ABO/RH: NORMAL
ACETAMINOPHEN LEVEL: <5 MCG/ML (ref 10–30)
ALBUMIN SERPL-MCNC: 3.9 G/DL (ref 3.5–5.2)
ALP BLD-CCNC: 63 U/L (ref 40–129)
ALT SERPL-CCNC: 16 U/L (ref 0–40)
ANION GAP SERPL CALCULATED.3IONS-SCNC: 12 MMOL/L (ref 7–16)
ANTIBODY SCREEN: NORMAL
APTT: 22.2 SEC (ref 24.5–35.1)
AST SERPL-CCNC: 24 U/L (ref 0–39)
B.E.: -5.1 MMOL/L (ref -3–3)
BILIRUB SERPL-MCNC: 0.4 MG/DL (ref 0–1.2)
BUN BLDV-MCNC: 8 MG/DL (ref 6–20)
CALCIUM SERPL-MCNC: 8.8 MG/DL (ref 8.6–10.2)
CHLORIDE BLD-SCNC: 103 MMOL/L (ref 98–107)
CO2: 26 MMOL/L (ref 22–29)
COHB: 1.8 % (ref 0–1.5)
CREAT SERPL-MCNC: 1.7 MG/DL (ref 0.7–1.2)
CRITICAL: ABNORMAL
DATE ANALYZED: ABNORMAL
DATE OF COLLECTION: ABNORMAL
ETHANOL: <10 MG/DL (ref 0–0.08)
FIO2: 100 %
GFR AFRICAN AMERICAN: 59
GFR NON-AFRICAN AMERICAN: 59 ML/MIN/1.73
GLUCOSE BLD-MCNC: 156 MG/DL (ref 74–99)
HCO3: 21.5 MMOL/L (ref 22–26)
HCT VFR BLD CALC: 37.6 % (ref 37–54)
HEMOGLOBIN: 12.2 G/DL (ref 12.5–16.5)
HHB: 0.6 % (ref 0–5)
INR BLD: 1.2
LAB: ABNORMAL
LACTIC ACID: 3 MMOL/L (ref 0.5–2.2)
Lab: ABNORMAL
MCH RBC QN AUTO: 29.8 PG (ref 26–35)
MCHC RBC AUTO-ENTMCNC: 32.4 % (ref 32–34.5)
MCV RBC AUTO: 91.7 FL (ref 80–99.9)
METHB: 0.2 % (ref 0–1.5)
MODE: AC
O2 CONTENT: 17.6 ML/DL
O2 SATURATION: 99.4 % (ref 92–98.5)
O2HB: 97.4 % (ref 94–97)
OPERATOR ID: 187
PATIENT TEMP: 37 C
PCO2: 46 MMHG (ref 35–45)
PDW BLD-RTO: 14.8 FL (ref 11.5–15)
PEEP/CPAP: 5 CMH2O
PFO2: 3.13 MMHG/%
PH BLOOD GAS: 7.29 (ref 7.35–7.45)
PLATELET # BLD: 244 E9/L (ref 130–450)
PMV BLD AUTO: 10.6 FL (ref 7–12)
PO2: 312.5 MMHG (ref 75–100)
POTASSIUM SERPL-SCNC: 3.24 MMOL/L (ref 3.5–5)
POTASSIUM SERPL-SCNC: 3.9 MMOL/L (ref 3.5–5)
PROTHROMBIN TIME: 13 SEC (ref 9.3–12.4)
RBC # BLD: 4.1 E12/L (ref 3.8–5.8)
RI(T): 105 %
RR MECHANICAL: 16 B/MIN
SALICYLATE, SERUM: <0.3 MG/DL (ref 0–30)
SODIUM BLD-SCNC: 141 MMOL/L (ref 132–146)
SOURCE, BLOOD GAS: ABNORMAL
THB: 12.3 G/DL (ref 11.5–16.5)
TIME ANALYZED: 2242
TOTAL PROTEIN: 6.2 G/DL (ref 6.4–8.3)
TRICYCLIC ANTIDEPRESSANTS SCREEN SERUM: NEGATIVE NG/ML
VT MECHANICAL: 500 ML
WBC # BLD: 8.8 E9/L (ref 4.5–11.5)

## 2020-04-21 PROCEDURE — 2580000003 HC RX 258

## 2020-04-21 PROCEDURE — 71045 X-RAY EXAM CHEST 1 VIEW: CPT

## 2020-04-21 PROCEDURE — 2500000003 HC RX 250 WO HCPCS

## 2020-04-21 PROCEDURE — 86920 COMPATIBILITY TEST SPIN: CPT

## 2020-04-21 PROCEDURE — P9016 RBC LEUKOCYTES REDUCED: HCPCS

## 2020-04-21 PROCEDURE — 2709999900 HC NON-CHARGEABLE SUPPLY: Performed by: SURGERY

## 2020-04-21 PROCEDURE — 3600000012 HC SURGERY LEVEL 2 ADDTL 15MIN: Performed by: SURGERY

## 2020-04-21 PROCEDURE — 71275 CT ANGIOGRAPHY CHEST: CPT

## 2020-04-21 PROCEDURE — 31500 INSERT EMERGENCY AIRWAY: CPT

## 2020-04-21 PROCEDURE — 0KC30ZZ EXTIRPATION OF MATTER FROM LEFT NECK MUSCLE, OPEN APPROACH: ICD-10-PCS | Performed by: SURGERY

## 2020-04-21 PROCEDURE — 6360000002 HC RX W HCPCS

## 2020-04-21 PROCEDURE — 3700000001 HC ADD 15 MINUTES (ANESTHESIA): Performed by: SURGERY

## 2020-04-21 PROCEDURE — 2000000000 HC ICU R&B

## 2020-04-21 PROCEDURE — 3700000000 HC ANESTHESIA ATTENDED CARE: Performed by: SURGERY

## 2020-04-21 PROCEDURE — 82805 BLOOD GASES W/O2 SATURATION: CPT

## 2020-04-21 PROCEDURE — 6360000002 HC RX W HCPCS: Performed by: STUDENT IN AN ORGANIZED HEALTH CARE EDUCATION/TRAINING PROGRAM

## 2020-04-21 PROCEDURE — 31500 INSERT EMERGENCY AIRWAY: CPT | Performed by: NURSE ANESTHETIST, CERTIFIED REGISTERED

## 2020-04-21 PROCEDURE — 80307 DRUG TEST PRSMV CHEM ANLYZR: CPT

## 2020-04-21 PROCEDURE — 99285 EMERGENCY DEPT VISIT HI MDM: CPT

## 2020-04-21 PROCEDURE — G0480 DRUG TEST DEF 1-7 CLASSES: HCPCS

## 2020-04-21 PROCEDURE — 6810039001 HC L1 TRAUMA PRIORITY

## 2020-04-21 PROCEDURE — 36556 INSERT NON-TUNNEL CV CATH: CPT

## 2020-04-21 PROCEDURE — 0BH18EZ INSERTION OF ENDOTRACHEAL AIRWAY INTO TRACHEA, VIA NATURAL OR ARTIFICIAL OPENING ENDOSCOPIC: ICD-10-PCS | Performed by: ANESTHESIOLOGY

## 2020-04-21 PROCEDURE — 36600 WITHDRAWAL OF ARTERIAL BLOOD: CPT | Performed by: SURGERY

## 2020-04-21 PROCEDURE — 82803 BLOOD GASES ANY COMBINATION: CPT

## 2020-04-21 PROCEDURE — 0W9B00Z DRAINAGE OF LEFT PLEURAL CAVITY WITH DRAINAGE DEVICE, OPEN APPROACH: ICD-10-PCS | Performed by: SURGERY

## 2020-04-21 PROCEDURE — 3600000002 HC SURGERY LEVEL 2 BASE: Performed by: SURGERY

## 2020-04-21 PROCEDURE — 6360000004 HC RX CONTRAST MEDICATION: Performed by: RADIOLOGY

## 2020-04-21 PROCEDURE — 86850 RBC ANTIBODY SCREEN: CPT

## 2020-04-21 PROCEDURE — 83605 ASSAY OF LACTIC ACID: CPT

## 2020-04-21 PROCEDURE — 86901 BLOOD TYPING SEROLOGIC RH(D): CPT

## 2020-04-21 PROCEDURE — 86923 COMPATIBILITY TEST ELECTRIC: CPT

## 2020-04-21 PROCEDURE — 86900 BLOOD TYPING SEROLOGIC ABO: CPT

## 2020-04-21 PROCEDURE — 80053 COMPREHEN METABOLIC PANEL: CPT

## 2020-04-21 PROCEDURE — 70498 CT ANGIOGRAPHY NECK: CPT

## 2020-04-21 PROCEDURE — 5A1955Z RESPIRATORY VENTILATION, GREATER THAN 96 CONSECUTIVE HOURS: ICD-10-PCS | Performed by: ANESTHESIOLOGY

## 2020-04-21 PROCEDURE — 85610 PROTHROMBIN TIME: CPT

## 2020-04-21 PROCEDURE — 0W360ZZ CONTROL BLEEDING IN NECK, OPEN APPROACH: ICD-10-PCS | Performed by: SURGERY

## 2020-04-21 PROCEDURE — 0CBH0ZZ EXCISION OF LEFT SUBMAXILLARY GLAND, OPEN APPROACH: ICD-10-PCS | Performed by: SURGERY

## 2020-04-21 PROCEDURE — 84132 ASSAY OF SERUM POTASSIUM: CPT

## 2020-04-21 PROCEDURE — 85027 COMPLETE CBC AUTOMATED: CPT

## 2020-04-21 PROCEDURE — 0W9900Z DRAINAGE OF RIGHT PLEURAL CAVITY WITH DRAINAGE DEVICE, OPEN APPROACH: ICD-10-PCS | Performed by: SURGERY

## 2020-04-21 PROCEDURE — 99223 1ST HOSP IP/OBS HIGH 75: CPT | Performed by: SURGERY

## 2020-04-21 PROCEDURE — 85730 THROMBOPLASTIN TIME PARTIAL: CPT

## 2020-04-21 RX ORDER — SODIUM CHLORIDE 9 MG/ML
INJECTION, SOLUTION INTRAVENOUS CONTINUOUS PRN
Status: DISCONTINUED | OUTPATIENT
Start: 2020-04-21 | End: 2020-04-22 | Stop reason: SDUPTHER

## 2020-04-21 RX ORDER — ROCURONIUM BROMIDE 10 MG/ML
INJECTION, SOLUTION INTRAVENOUS PRN
Status: DISCONTINUED | OUTPATIENT
Start: 2020-04-21 | End: 2020-04-22 | Stop reason: SDUPTHER

## 2020-04-21 RX ORDER — PROPOFOL 10 MG/ML
INJECTION, EMULSION INTRAVENOUS
Status: COMPLETED
Start: 2020-04-21 | End: 2020-04-22

## 2020-04-21 RX ORDER — FENTANYL CITRATE 50 UG/ML
INJECTION, SOLUTION INTRAMUSCULAR; INTRAVENOUS
Status: DISCONTINUED
Start: 2020-04-21 | End: 2020-04-22 | Stop reason: WASHOUT

## 2020-04-21 RX ORDER — MORPHINE SULFATE 2 MG/ML
2 INJECTION, SOLUTION INTRAMUSCULAR; INTRAVENOUS EVERY 5 MIN PRN
Status: DISCONTINUED | OUTPATIENT
Start: 2020-04-21 | End: 2020-04-22 | Stop reason: HOSPADM

## 2020-04-21 RX ORDER — CEFAZOLIN SODIUM 2 G/50ML
2 SOLUTION INTRAVENOUS
Status: COMPLETED | OUTPATIENT
Start: 2020-04-21 | End: 2020-04-21

## 2020-04-21 RX ORDER — MORPHINE SULFATE 2 MG/ML
1 INJECTION, SOLUTION INTRAMUSCULAR; INTRAVENOUS EVERY 5 MIN PRN
Status: DISCONTINUED | OUTPATIENT
Start: 2020-04-21 | End: 2020-04-22 | Stop reason: HOSPADM

## 2020-04-21 RX ORDER — FENTANYL CITRATE 50 UG/ML
INJECTION, SOLUTION INTRAMUSCULAR; INTRAVENOUS PRN
Status: DISCONTINUED | OUTPATIENT
Start: 2020-04-21 | End: 2020-04-22 | Stop reason: SDUPTHER

## 2020-04-21 RX ADMIN — SODIUM CHLORIDE: 9 INJECTION, SOLUTION INTRAVENOUS at 23:18

## 2020-04-21 RX ADMIN — IOPAMIDOL 110 ML: 755 INJECTION, SOLUTION INTRAVENOUS at 22:55

## 2020-04-21 RX ADMIN — CEFAZOLIN SODIUM 2 G: 2 SOLUTION INTRAVENOUS at 23:42

## 2020-04-21 RX ADMIN — FENTANYL CITRATE 100 MCG: 50 INJECTION, SOLUTION INTRAMUSCULAR; INTRAVENOUS at 23:22

## 2020-04-21 RX ADMIN — SODIUM CHLORIDE: 9 INJECTION, SOLUTION INTRAVENOUS at 23:42

## 2020-04-21 RX ADMIN — ROCURONIUM BROMIDE 50 MG: 10 INJECTION, SOLUTION INTRAVENOUS at 23:22

## 2020-04-21 ASSESSMENT — PULMONARY FUNCTION TESTS
PIF_VALUE: 25
PIF_VALUE: 21
PIF_VALUE: 18
PIF_VALUE: 20
PIF_VALUE: 18
PIF_VALUE: 20
PIF_VALUE: 23
PIF_VALUE: 19
PIF_VALUE: 24
PIF_VALUE: 24
PIF_VALUE: 20
PIF_VALUE: 20
PIF_VALUE: 19
PIF_VALUE: 21
PIF_VALUE: 24
PIF_VALUE: 19
PIF_VALUE: 19
PIF_VALUE: 20
PIF_VALUE: 21
PIF_VALUE: 20
PIF_VALUE: 24
PIF_VALUE: 19
PIF_VALUE: 19
PIF_VALUE: 24
PIF_VALUE: 24
PIF_VALUE: 21
PIF_VALUE: 20
PIF_VALUE: 25
PIF_VALUE: 25
PIF_VALUE: 20
PIF_VALUE: 20
PIF_VALUE: 24
PIF_VALUE: 2
PIF_VALUE: 24
PIF_VALUE: 20
PIF_VALUE: 20
PIF_VALUE: 24
PIF_VALUE: 19

## 2020-04-22 ENCOUNTER — APPOINTMENT (OUTPATIENT)
Dept: GENERAL RADIOLOGY | Age: 25
DRG: 004 | End: 2020-04-22
Payer: MEDICAID

## 2020-04-22 VITALS
OXYGEN SATURATION: 100 % | SYSTOLIC BLOOD PRESSURE: 112 MMHG | DIASTOLIC BLOOD PRESSURE: 72 MMHG | RESPIRATION RATE: 14 BRPM

## 2020-04-22 PROBLEM — J94.2 HEMOTHORAX: Status: ACTIVE | Noted: 2020-04-22

## 2020-04-22 PROBLEM — J96.01 ACUTE RESPIRATORY FAILURE WITH HYPOXEMIA (HCC): Status: ACTIVE | Noted: 2020-04-22

## 2020-04-22 PROBLEM — S24.102A: Status: ACTIVE | Noted: 2020-04-22

## 2020-04-22 PROBLEM — G82.20 PARAPLEGIA (HCC): Status: ACTIVE | Noted: 2020-04-22

## 2020-04-22 PROBLEM — S27.322A CONTUSION OF BOTH LUNGS: Status: ACTIVE | Noted: 2020-04-22

## 2020-04-22 LAB
AADO2: 71.5 MMHG
ALBUMIN SERPL-MCNC: 3.2 G/DL (ref 3.5–5.2)
ALP BLD-CCNC: 56 U/L (ref 40–129)
ALT SERPL-CCNC: 16 U/L (ref 0–40)
ANION GAP SERPL CALCULATED.3IONS-SCNC: 12 MMOL/L (ref 7–16)
AST SERPL-CCNC: 26 U/L (ref 0–39)
B.E.: -1.5 MMOL/L (ref -3–0)
B.E.: -3.7 MMOL/L (ref -3–3)
BASOPHILS ABSOLUTE: 0.02 E9/L (ref 0–0.2)
BASOPHILS RELATIVE PERCENT: 0.2 % (ref 0–2)
BILIRUB SERPL-MCNC: 0.4 MG/DL (ref 0–1.2)
BLOOD BANK DISPENSE STATUS: NORMAL
BLOOD BANK PRODUCT CODE: NORMAL
BPU ID: NORMAL
BUN BLDV-MCNC: 8 MG/DL (ref 6–20)
CALCIUM IONIZED: 1.17 MMOL/L (ref 1.15–1.33)
CALCIUM SERPL-MCNC: 7.4 MG/DL (ref 8.6–10.2)
CARDIOPULMONARY BYPASS: NO
CHLORIDE BLD-SCNC: 109 MMOL/L (ref 98–107)
CO2: 19 MMOL/L (ref 22–29)
COHB: 0.3 % (ref 0–1.5)
CREAT SERPL-MCNC: 1.1 MG/DL (ref 0.7–1.2)
CRITICAL: ABNORMAL
DATE ANALYZED: ABNORMAL
DATE OF COLLECTION: ABNORMAL
DESCRIPTION BLOOD BANK: NORMAL
DEVICE: ABNORMAL
EOSINOPHILS ABSOLUTE: 0.05 E9/L (ref 0.05–0.5)
EOSINOPHILS RELATIVE PERCENT: 0.4 % (ref 0–6)
FIO2 ARTERIAL: 100
FIO2: 40 %
GFR AFRICAN AMERICAN: >60
GFR NON-AFRICAN AMERICAN: >60 ML/MIN/1.73
GLUCOSE BLD-MCNC: 147 MG/DL (ref 74–99)
HCO3 ARTERIAL: 24.1 MMOL/L (ref 22–26)
HCO3: 20.4 MMOL/L (ref 22–26)
HCT (EST): 27 % (ref 37–54)
HCT VFR BLD CALC: 33.1 % (ref 37–54)
HEMOGLOBIN: 11.1 G/DL (ref 12.5–16.5)
HGB, (EST): 9.1 G/DL (ref 12.5–15.5)
HHB: 1.8 % (ref 0–5)
IMMATURE GRANULOCYTES #: 0.04 E9/L
IMMATURE GRANULOCYTES %: 0.3 % (ref 0–5)
LAB: ABNORMAL
LACTIC ACID: 1.7 MMOL/L (ref 0.5–2.2)
LYMPHOCYTES ABSOLUTE: 2.24 E9/L (ref 1.5–4)
LYMPHOCYTES RELATIVE PERCENT: 18.5 % (ref 20–42)
Lab: ABNORMAL
MAGNESIUM: 1.4 MG/DL (ref 1.6–2.6)
MCH RBC QN AUTO: 29.4 PG (ref 26–35)
MCHC RBC AUTO-ENTMCNC: 33.5 % (ref 32–34.5)
MCV RBC AUTO: 87.8 FL (ref 80–99.9)
METER GLUCOSE: 130 MG/DL (ref 74–99)
METHB: 0.5 % (ref 0–1.5)
MODE: AC
MONOCYTES ABSOLUTE: 0.5 E9/L (ref 0.1–0.95)
MONOCYTES RELATIVE PERCENT: 4.1 % (ref 2–12)
NEUTROPHILS ABSOLUTE: 9.23 E9/L (ref 1.8–7.3)
NEUTROPHILS RELATIVE PERCENT: 76.5 % (ref 43–80)
O2 CONTENT: 14.5 ML/DL
O2 SATURATION: 98.2 % (ref 92–98.5)
O2 SATURATION: 99.9 % (ref 92–98.5)
O2HB: 97.4 % (ref 94–97)
OPERATOR ID: ABNORMAL
OPERATOR ID: ABNORMAL
PATIENT TEMP: 37 C
PCO2 ARTERIAL: 44 MMHG (ref 35–45)
PCO2: 33.3 MMHG (ref 35–45)
PDW BLD-RTO: 14.5 FL (ref 11.5–15)
PEEP/CPAP: 8 CMH2O
PFO2: 4.13 MMHG/%
PH BLOOD GAS: 7.35 (ref 7.35–7.45)
PH BLOOD GAS: 7.41 (ref 7.35–7.45)
PHOSPHORUS: 2 MG/DL (ref 2.5–4.5)
PLATELET # BLD: 203 E9/L (ref 130–450)
PMV BLD AUTO: 10.9 FL (ref 7–12)
PO2 ARTERIAL: 309.9 MMHG (ref 80–100)
PO2: 165.4 MMHG (ref 75–100)
POTASSIUM SERPL-SCNC: 3.7 MMOL/L (ref 3.5–5.5)
POTASSIUM SERPL-SCNC: 4.2 MMOL/L (ref 3.5–5)
RBC # BLD: 3.77 E12/L (ref 3.8–5.8)
RI(T): 0.43
RR MECHANICAL: 16 B/MIN
SODIUM BLD-SCNC: 140 MMOL/L (ref 132–146)
SOURCE, BLOOD GAS: ABNORMAL
SOURCE, BLOOD GAS: ABNORMAL
THB: 10.3 G/DL (ref 11.5–16.5)
TIME ANALYZED: 652
TOTAL PROTEIN: 5.2 G/DL (ref 6.4–8.3)
VT MECHANICAL: 500 ML
WBC # BLD: 12.1 E9/L (ref 4.5–11.5)

## 2020-04-22 PROCEDURE — 83605 ASSAY OF LACTIC ACID: CPT

## 2020-04-22 PROCEDURE — 36556 INSERT NON-TUNNEL CV CATH: CPT | Performed by: SURGERY

## 2020-04-22 PROCEDURE — 83735 ASSAY OF MAGNESIUM: CPT

## 2020-04-22 PROCEDURE — 80053 COMPREHEN METABOLIC PANEL: CPT

## 2020-04-22 PROCEDURE — 36415 COLL VENOUS BLD VENIPUNCTURE: CPT

## 2020-04-22 PROCEDURE — 88304 TISSUE EXAM BY PATHOLOGIST: CPT

## 2020-04-22 PROCEDURE — 84100 ASSAY OF PHOSPHORUS: CPT

## 2020-04-22 PROCEDURE — 71045 X-RAY EXAM CHEST 1 VIEW: CPT

## 2020-04-22 PROCEDURE — 2000000000 HC ICU R&B

## 2020-04-22 PROCEDURE — 2500000003 HC RX 250 WO HCPCS: Performed by: STUDENT IN AN ORGANIZED HEALTH CARE EDUCATION/TRAINING PROGRAM

## 2020-04-22 PROCEDURE — 82805 BLOOD GASES W/O2 SATURATION: CPT

## 2020-04-22 PROCEDURE — 6370000000 HC RX 637 (ALT 250 FOR IP): Performed by: STUDENT IN AN ORGANIZED HEALTH CARE EDUCATION/TRAINING PROGRAM

## 2020-04-22 PROCEDURE — 6360000002 HC RX W HCPCS: Performed by: STUDENT IN AN ORGANIZED HEALTH CARE EDUCATION/TRAINING PROGRAM

## 2020-04-22 PROCEDURE — 20100 EXPL PENTRG WOUND NECK: CPT | Performed by: SURGERY

## 2020-04-22 PROCEDURE — 2580000003 HC RX 258

## 2020-04-22 PROCEDURE — 2580000003 HC RX 258: Performed by: STUDENT IN AN ORGANIZED HEALTH CARE EDUCATION/TRAINING PROGRAM

## 2020-04-22 PROCEDURE — 99291 CRITICAL CARE FIRST HOUR: CPT | Performed by: SURGERY

## 2020-04-22 PROCEDURE — 7100000000 HC PACU RECOVERY - FIRST 15 MIN

## 2020-04-22 PROCEDURE — 2500000003 HC RX 250 WO HCPCS

## 2020-04-22 PROCEDURE — 85025 COMPLETE CBC W/AUTO DIFF WBC: CPT

## 2020-04-22 PROCEDURE — 94002 VENT MGMT INPAT INIT DAY: CPT

## 2020-04-22 PROCEDURE — 88311 DECALCIFY TISSUE: CPT

## 2020-04-22 PROCEDURE — 7100000001 HC PACU RECOVERY - ADDTL 15 MIN

## 2020-04-22 PROCEDURE — 99222 1ST HOSP IP/OBS MODERATE 55: CPT | Performed by: NEUROLOGICAL SURGERY

## 2020-04-22 PROCEDURE — 82330 ASSAY OF CALCIUM: CPT

## 2020-04-22 PROCEDURE — 90471 IMMUNIZATION ADMIN: CPT | Performed by: STUDENT IN AN ORGANIZED HEALTH CARE EDUCATION/TRAINING PROGRAM

## 2020-04-22 PROCEDURE — 32551 INSERTION OF CHEST TUBE: CPT | Performed by: SURGERY

## 2020-04-22 PROCEDURE — 88305 TISSUE EXAM BY PATHOLOGIST: CPT

## 2020-04-22 PROCEDURE — 87081 CULTURE SCREEN ONLY: CPT

## 2020-04-22 PROCEDURE — 06HY33Z INSERTION OF INFUSION DEVICE INTO LOWER VEIN, PERCUTANEOUS APPROACH: ICD-10-PCS | Performed by: SURGERY

## 2020-04-22 PROCEDURE — 6360000002 HC RX W HCPCS

## 2020-04-22 PROCEDURE — 90715 TDAP VACCINE 7 YRS/> IM: CPT | Performed by: STUDENT IN AN ORGANIZED HEALTH CARE EDUCATION/TRAINING PROGRAM

## 2020-04-22 PROCEDURE — 82962 GLUCOSE BLOOD TEST: CPT

## 2020-04-22 RX ORDER — SODIUM CHLORIDE 0.9 % (FLUSH) 0.9 %
10 SYRINGE (ML) INJECTION PRN
Status: DISCONTINUED | OUTPATIENT
Start: 2020-04-22 | End: 2020-04-23 | Stop reason: HOSPADM

## 2020-04-22 RX ORDER — SODIUM CHLORIDE 0.9 % (FLUSH) 0.9 %
10 SYRINGE (ML) INJECTION EVERY 12 HOURS SCHEDULED
Status: DISCONTINUED | OUTPATIENT
Start: 2020-04-22 | End: 2020-04-23 | Stop reason: HOSPADM

## 2020-04-22 RX ORDER — PROPOFOL 10 MG/ML
10 INJECTION, EMULSION INTRAVENOUS
Status: DISCONTINUED | OUTPATIENT
Start: 2020-04-22 | End: 2020-04-22 | Stop reason: SDUPTHER

## 2020-04-22 RX ORDER — 0.9 % SODIUM CHLORIDE 0.9 %
1000 INTRAVENOUS SOLUTION INTRAVENOUS ONCE
Status: COMPLETED | OUTPATIENT
Start: 2020-04-22 | End: 2020-04-22

## 2020-04-22 RX ORDER — SENNA AND DOCUSATE SODIUM 50; 8.6 MG/1; MG/1
1 TABLET, FILM COATED ORAL 2 TIMES DAILY
Status: DISCONTINUED | OUTPATIENT
Start: 2020-04-22 | End: 2020-04-24

## 2020-04-22 RX ORDER — SODIUM CHLORIDE, SODIUM LACTATE, POTASSIUM CHLORIDE, CALCIUM CHLORIDE 600; 310; 30; 20 MG/100ML; MG/100ML; MG/100ML; MG/100ML
INJECTION, SOLUTION INTRAVENOUS CONTINUOUS
Status: DISCONTINUED | OUTPATIENT
Start: 2020-04-22 | End: 2020-04-24

## 2020-04-22 RX ORDER — DEXAMETHASONE SODIUM PHOSPHATE 10 MG/ML
10 INJECTION INTRAMUSCULAR; INTRAVENOUS EVERY 8 HOURS
Status: DISCONTINUED | OUTPATIENT
Start: 2020-04-22 | End: 2020-04-24

## 2020-04-22 RX ORDER — MAGNESIUM SULFATE IN WATER 40 MG/ML
4 INJECTION, SOLUTION INTRAVENOUS ONCE
Status: COMPLETED | OUTPATIENT
Start: 2020-04-22 | End: 2020-04-22

## 2020-04-22 RX ORDER — CHLORHEXIDINE GLUCONATE 0.12 MG/ML
15 RINSE ORAL 2 TIMES DAILY
Status: DISCONTINUED | OUTPATIENT
Start: 2020-04-22 | End: 2020-05-26 | Stop reason: HOSPADM

## 2020-04-22 RX ORDER — ONDANSETRON 2 MG/ML
4 INJECTION INTRAMUSCULAR; INTRAVENOUS EVERY 6 HOURS PRN
Status: DISCONTINUED | OUTPATIENT
Start: 2020-04-22 | End: 2020-05-26 | Stop reason: HOSPADM

## 2020-04-22 RX ORDER — PROPOFOL 10 MG/ML
10 INJECTION, EMULSION INTRAVENOUS
Status: DISCONTINUED | OUTPATIENT
Start: 2020-04-22 | End: 2020-04-24

## 2020-04-22 RX ORDER — POLYETHYLENE GLYCOL 3350 17 G/17G
17 POWDER, FOR SOLUTION ORAL DAILY
Status: DISCONTINUED | OUTPATIENT
Start: 2020-04-22 | End: 2020-04-24

## 2020-04-22 RX ORDER — FAMOTIDINE 20 MG/1
20 TABLET, FILM COATED ORAL 2 TIMES DAILY
Status: DISCONTINUED | OUTPATIENT
Start: 2020-04-22 | End: 2020-05-01

## 2020-04-22 RX ORDER — SODIUM CHLORIDE 0.9 % (FLUSH) 0.9 %
10 SYRINGE (ML) INJECTION PRN
Status: DISCONTINUED | OUTPATIENT
Start: 2020-04-22 | End: 2020-04-23

## 2020-04-22 RX ORDER — SODIUM CHLORIDE 0.9 % (FLUSH) 0.9 %
10 SYRINGE (ML) INJECTION EVERY 12 HOURS SCHEDULED
Status: DISCONTINUED | OUTPATIENT
Start: 2020-04-22 | End: 2020-04-23

## 2020-04-22 RX ORDER — CEFAZOLIN SODIUM 2 G/50ML
2 SOLUTION INTRAVENOUS EVERY 8 HOURS
Status: COMPLETED | OUTPATIENT
Start: 2020-04-22 | End: 2020-04-24

## 2020-04-22 RX ORDER — SODIUM CHLORIDE, SODIUM LACTATE, POTASSIUM CHLORIDE, CALCIUM CHLORIDE 600; 310; 30; 20 MG/100ML; MG/100ML; MG/100ML; MG/100ML
INJECTION, SOLUTION INTRAVENOUS CONTINUOUS PRN
Status: DISCONTINUED | OUTPATIENT
Start: 2020-04-22 | End: 2020-04-22 | Stop reason: SDUPTHER

## 2020-04-22 RX ORDER — PROMETHAZINE HYDROCHLORIDE 25 MG/1
12.5 TABLET ORAL EVERY 6 HOURS PRN
Status: DISCONTINUED | OUTPATIENT
Start: 2020-04-22 | End: 2020-05-26 | Stop reason: HOSPADM

## 2020-04-22 RX ORDER — ACETAMINOPHEN 325 MG/1
650 TABLET ORAL EVERY 4 HOURS PRN
Status: DISCONTINUED | OUTPATIENT
Start: 2020-04-22 | End: 2020-05-02

## 2020-04-22 RX ADMIN — PROPOFOL 15 MCG/KG/MIN: 10 INJECTION, EMULSION INTRAVENOUS at 01:25

## 2020-04-22 RX ADMIN — Medication 25 MCG/HR: at 01:19

## 2020-04-22 RX ADMIN — Medication 100 MCG/HR: at 15:20

## 2020-04-22 RX ADMIN — DEXAMETHASONE SODIUM PHOSPHATE 10 MG: 10 INJECTION INTRAMUSCULAR; INTRAVENOUS at 10:44

## 2020-04-22 RX ADMIN — ENOXAPARIN SODIUM 30 MG: 30 INJECTION SUBCUTANEOUS at 20:42

## 2020-04-22 RX ADMIN — PROPOFOL INJECTABLE EMULSION 15 MCG/KG/MIN: 10 INJECTION, EMULSION INTRAVENOUS at 01:25

## 2020-04-22 RX ADMIN — DEXAMETHASONE SODIUM PHOSPHATE 10 MG: 10 INJECTION INTRAMUSCULAR; INTRAVENOUS at 17:50

## 2020-04-22 RX ADMIN — DOCUSATE SODIUM AND SENNOSIDES 1 TABLET: 50; 8.6 TABLET, FILM COATED ORAL at 20:36

## 2020-04-22 RX ADMIN — FENTANYL CITRATE 50 MCG: 50 INJECTION, SOLUTION INTRAMUSCULAR; INTRAVENOUS at 00:11

## 2020-04-22 RX ADMIN — FAMOTIDINE 20 MG: 20 TABLET, FILM COATED ORAL at 20:36

## 2020-04-22 RX ADMIN — TETANUS TOXOID, REDUCED DIPHTHERIA TOXOID AND ACELLULAR PERTUSSIS VACCINE, ADSORBED 0.5 ML: 5; 2.5; 8; 8; 2.5 SUSPENSION INTRAMUSCULAR at 04:45

## 2020-04-22 RX ADMIN — CHLORHEXIDINE GLUCONATE 0.12% ORAL RINSE 15 ML: 1.2 LIQUID ORAL at 20:36

## 2020-04-22 RX ADMIN — CEFAZOLIN SODIUM 2 G: 2 SOLUTION INTRAVENOUS at 07:47

## 2020-04-22 RX ADMIN — SODIUM CHLORIDE, POTASSIUM CHLORIDE, SODIUM LACTATE AND CALCIUM CHLORIDE: 600; 310; 30; 20 INJECTION, SOLUTION INTRAVENOUS at 02:17

## 2020-04-22 RX ADMIN — FENTANYL CITRATE 100 MCG: 50 INJECTION, SOLUTION INTRAMUSCULAR; INTRAVENOUS at 00:35

## 2020-04-22 RX ADMIN — SODIUM CHLORIDE 1000 ML: 9 INJECTION, SOLUTION INTRAVENOUS at 05:26

## 2020-04-22 RX ADMIN — CEFAZOLIN SODIUM 2 G: 2 SOLUTION INTRAVENOUS at 15:20

## 2020-04-22 RX ADMIN — CHLORHEXIDINE GLUCONATE 0.12% ORAL RINSE 15 ML: 1.2 LIQUID ORAL at 08:16

## 2020-04-22 RX ADMIN — FAMOTIDINE 20 MG: 20 TABLET, FILM COATED ORAL at 08:16

## 2020-04-22 RX ADMIN — SODIUM PHOSPHATE, MONOBASIC, MONOHYDRATE 30 MMOL: 276; 142 INJECTION, SOLUTION INTRAVENOUS at 04:45

## 2020-04-22 RX ADMIN — SODIUM CHLORIDE, PRESERVATIVE FREE 10 ML: 5 INJECTION INTRAVENOUS at 20:36

## 2020-04-22 RX ADMIN — SODIUM CHLORIDE 1000 ML: 9 INJECTION, SOLUTION INTRAVENOUS at 03:42

## 2020-04-22 RX ADMIN — POLYETHYLENE GLYCOL 3350 17 G: 17 POWDER, FOR SOLUTION ORAL at 08:16

## 2020-04-22 RX ADMIN — PROPOFOL INJECTABLE EMULSION 9.41 MCG/KG/MIN: 10 INJECTION, EMULSION INTRAVENOUS at 15:20

## 2020-04-22 RX ADMIN — SODIUM CHLORIDE, POTASSIUM CHLORIDE, SODIUM LACTATE AND CALCIUM CHLORIDE: 600; 310; 30; 20 INJECTION, SOLUTION INTRAVENOUS at 00:10

## 2020-04-22 RX ADMIN — MAGNESIUM SULFATE HEPTAHYDRATE 4 G: 40 INJECTION, SOLUTION INTRAVENOUS at 04:47

## 2020-04-22 RX ADMIN — Medication 4 MCG/MIN: at 05:16

## 2020-04-22 RX ADMIN — DOCUSATE SODIUM AND SENNOSIDES 1 TABLET: 50; 8.6 TABLET, FILM COATED ORAL at 08:16

## 2020-04-22 ASSESSMENT — PULMONARY FUNCTION TESTS
PIF_VALUE: 25
PIF_VALUE: 26
PIF_VALUE: 24
PIF_VALUE: 1
PIF_VALUE: 25
PIF_VALUE: 24
PIF_VALUE: 18
PIF_VALUE: 21
PIF_VALUE: 26
PIF_VALUE: 21
PIF_VALUE: 26
PIF_VALUE: 26
PIF_VALUE: 22
PIF_VALUE: 21
PIF_VALUE: 20
PIF_VALUE: 17
PIF_VALUE: 25
PIF_VALUE: 21
PIF_VALUE: 24
PIF_VALUE: 16
PIF_VALUE: 25
PIF_VALUE: 24
PIF_VALUE: 25
PIF_VALUE: 18
PIF_VALUE: 14
PIF_VALUE: 26
PIF_VALUE: 26
PIF_VALUE: 0
PIF_VALUE: 23
PIF_VALUE: 25
PIF_VALUE: 0
PIF_VALUE: 25
PIF_VALUE: 25
PIF_VALUE: 24
PIF_VALUE: 26
PIF_VALUE: 18
PIF_VALUE: 26
PIF_VALUE: 24
PIF_VALUE: 18
PIF_VALUE: 20
PIF_VALUE: 0
PIF_VALUE: 26
PIF_VALUE: 26
PIF_VALUE: 0
PIF_VALUE: 25
PIF_VALUE: 0
PIF_VALUE: 25
PIF_VALUE: 17
PIF_VALUE: 19
PIF_VALUE: 16
PIF_VALUE: 26
PIF_VALUE: 25
PIF_VALUE: 16
PIF_VALUE: 25
PIF_VALUE: 16
PIF_VALUE: 26
PIF_VALUE: 24
PIF_VALUE: 25
PIF_VALUE: 4
PIF_VALUE: 4
PIF_VALUE: 14
PIF_VALUE: 14
PIF_VALUE: 21
PIF_VALUE: 25
PIF_VALUE: 26
PIF_VALUE: 21
PIF_VALUE: 16
PIF_VALUE: 25
PIF_VALUE: 21
PIF_VALUE: 16
PIF_VALUE: 24
PIF_VALUE: 25
PIF_VALUE: 0
PIF_VALUE: 26
PIF_VALUE: 0
PIF_VALUE: 25
PIF_VALUE: 25
PIF_VALUE: 21
PIF_VALUE: 24
PIF_VALUE: 23
PIF_VALUE: 16
PIF_VALUE: 25
PIF_VALUE: 24
PIF_VALUE: 25
PIF_VALUE: 16
PIF_VALUE: 24
PIF_VALUE: 29

## 2020-04-22 ASSESSMENT — PAIN SCALES - GENERAL
PAINLEVEL_OUTOF10: 0
PAINLEVEL_OUTOF10: 0
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
## 2020-04-22 NOTE — PROGRESS NOTES
OCCUPATIONAL THERAPY    Date:2020  Patient Name: Silvio Mendoza  MRN: [id number]  : 1995  Room: 3802/3802-A     Chart reviewed; awaiting neurosurgery recommendations prior to evaluation. Will try back at later time.    Celestino Campbell, OTR/L [id number]
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
## 2020-04-22 NOTE — H&P
exploration  - Placement of left chest tube  - Will be admitted to ICU    Plan discussed with Dr. Layne Almonte at 4/21/2020 on 10:55 PM    Electronically signed by Emily Larsen MD on 4/21/2020 at 10:55 PM

## 2020-04-22 NOTE — ED NOTES
Sister Sneha Farah is here waiting in the ER parking lot for an update.   [address] Claude Dias, LIZA  [birth year] [id number]
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
## 2020-04-23 ENCOUNTER — APPOINTMENT (OUTPATIENT)
Dept: GENERAL RADIOLOGY | Age: 25
DRG: 004 | End: 2020-04-23
Payer: MEDICAID

## 2020-04-23 ENCOUNTER — ANESTHESIA EVENT (OUTPATIENT)
Dept: OPERATING ROOM | Age: 25
DRG: 004 | End: 2020-04-23
Payer: MEDICAID

## 2020-04-23 ENCOUNTER — ANESTHESIA (OUTPATIENT)
Dept: OPERATING ROOM | Age: 25
DRG: 004 | End: 2020-04-23
Payer: MEDICAID

## 2020-04-23 VITALS — OXYGEN SATURATION: 100 % | RESPIRATION RATE: 14 BRPM

## 2020-04-23 LAB
AADO2: 108.5 MMHG
ALBUMIN SERPL-MCNC: 2.9 G/DL (ref 3.5–5.2)
ALP BLD-CCNC: 46 U/L (ref 40–129)
ALT SERPL-CCNC: 14 U/L (ref 0–40)
ANION GAP SERPL CALCULATED.3IONS-SCNC: 7 MMOL/L (ref 7–16)
AST SERPL-CCNC: 29 U/L (ref 0–39)
B.E.: -0.7 MMOL/L (ref -3–3)
BASOPHILS ABSOLUTE: 0 E9/L (ref 0–0.2)
BASOPHILS RELATIVE PERCENT: 0 % (ref 0–2)
BILIRUB SERPL-MCNC: 0.2 MG/DL (ref 0–1.2)
BUN BLDV-MCNC: 10 MG/DL (ref 6–20)
CALCIUM IONIZED: 1.25 MMOL/L (ref 1.15–1.33)
CALCIUM SERPL-MCNC: 8.5 MG/DL (ref 8.6–10.2)
CHLORIDE BLD-SCNC: 105 MMOL/L (ref 98–107)
CO2: 24 MMOL/L (ref 22–29)
COHB: 0.3 % (ref 0–1.5)
CREAT SERPL-MCNC: 1.1 MG/DL (ref 0.7–1.2)
CRITICAL: ABNORMAL
DATE ANALYZED: ABNORMAL
DATE OF COLLECTION: ABNORMAL
EOSINOPHILS ABSOLUTE: 0 E9/L (ref 0.05–0.5)
EOSINOPHILS RELATIVE PERCENT: 0 % (ref 0–6)
FIO2: 40 %
GFR AFRICAN AMERICAN: >60
GFR NON-AFRICAN AMERICAN: >60 ML/MIN/1.73
GLUCOSE BLD-MCNC: 148 MG/DL (ref 74–99)
HCO3: 22.2 MMOL/L (ref 22–26)
HCT VFR BLD CALC: 27.8 % (ref 37–54)
HEMOGLOBIN: 9.4 G/DL (ref 12.5–16.5)
HHB: 1.8 % (ref 0–5)
IMMATURE GRANULOCYTES #: 0.07 E9/L
IMMATURE GRANULOCYTES %: 0.6 % (ref 0–5)
LAB: ABNORMAL
LYMPHOCYTES ABSOLUTE: 0.84 E9/L (ref 1.5–4)
LYMPHOCYTES RELATIVE PERCENT: 7.1 % (ref 20–42)
Lab: ABNORMAL
MAGNESIUM: 1.9 MG/DL (ref 1.6–2.6)
MCH RBC QN AUTO: 29.8 PG (ref 26–35)
MCHC RBC AUTO-ENTMCNC: 33.8 % (ref 32–34.5)
MCV RBC AUTO: 88.3 FL (ref 80–99.9)
METHB: 0.6 % (ref 0–1.5)
MODE: AC
MONOCYTES ABSOLUTE: 0.57 E9/L (ref 0.1–0.95)
MONOCYTES RELATIVE PERCENT: 4.8 % (ref 2–12)
MRSA CULTURE ONLY: NORMAL
NEUTROPHILS ABSOLUTE: 10.39 E9/L (ref 1.8–7.3)
NEUTROPHILS RELATIVE PERCENT: 87.5 % (ref 43–80)
O2 CONTENT: 14.6 ML/DL
O2 SATURATION: 98.2 % (ref 92–98.5)
O2HB: 97.3 % (ref 94–97)
OPERATOR ID: 2863
PATIENT TEMP: 37 C
PCO2: 30.3 MMHG (ref 35–45)
PDW BLD-RTO: 14.6 FL (ref 11.5–15)
PEEP/CPAP: 5 CMH2O
PFO2: 3.3 MMHG/%
PH BLOOD GAS: 7.48 (ref 7.35–7.45)
PHOSPHORUS: 1.3 MG/DL (ref 2.5–4.5)
PLATELET # BLD: 167 E9/L (ref 130–450)
PMV BLD AUTO: 10.4 FL (ref 7–12)
PO2: 131.9 MMHG (ref 75–100)
POTASSIUM SERPL-SCNC: 4.2 MMOL/L (ref 3.5–5)
RBC # BLD: 3.15 E12/L (ref 3.8–5.8)
RI(T): 0.82
RR MECHANICAL: 16 B/MIN
SODIUM BLD-SCNC: 136 MMOL/L (ref 132–146)
SOURCE, BLOOD GAS: ABNORMAL
THB: 10.5 G/DL (ref 11.5–16.5)
TIME ANALYZED: 441
TOTAL PROTEIN: 5.2 G/DL (ref 6.4–8.3)
VT MECHANICAL: 500 ML
WBC # BLD: 11.9 E9/L (ref 4.5–11.5)

## 2020-04-23 PROCEDURE — 6360000002 HC RX W HCPCS: Performed by: STUDENT IN AN ORGANIZED HEALTH CARE EDUCATION/TRAINING PROGRAM

## 2020-04-23 PROCEDURE — 3700000000 HC ANESTHESIA ATTENDED CARE: Performed by: OTOLARYNGOLOGY

## 2020-04-23 PROCEDURE — 6360000002 HC RX W HCPCS: Performed by: NURSE ANESTHETIST, CERTIFIED REGISTERED

## 2020-04-23 PROCEDURE — 02HV33Z INSERTION OF INFUSION DEVICE INTO SUPERIOR VENA CAVA, PERCUTANEOUS APPROACH: ICD-10-PCS | Performed by: SURGERY

## 2020-04-23 PROCEDURE — 2500000003 HC RX 250 WO HCPCS: Performed by: STUDENT IN AN ORGANIZED HEALTH CARE EDUCATION/TRAINING PROGRAM

## 2020-04-23 PROCEDURE — 0CJS8ZZ INSPECTION OF LARYNX, VIA NATURAL OR ARTIFICIAL OPENING ENDOSCOPIC: ICD-10-PCS | Performed by: OTOLARYNGOLOGY

## 2020-04-23 PROCEDURE — 3600000013 HC SURGERY LEVEL 3 ADDTL 15MIN: Performed by: OTOLARYNGOLOGY

## 2020-04-23 PROCEDURE — 71045 X-RAY EXAM CHEST 1 VIEW: CPT

## 2020-04-23 PROCEDURE — 2000000000 HC ICU R&B

## 2020-04-23 PROCEDURE — 2580000003 HC RX 258: Performed by: STUDENT IN AN ORGANIZED HEALTH CARE EDUCATION/TRAINING PROGRAM

## 2020-04-23 PROCEDURE — 82805 BLOOD GASES W/O2 SATURATION: CPT

## 2020-04-23 PROCEDURE — 99222 1ST HOSP IP/OBS MODERATE 55: CPT | Performed by: OTOLARYNGOLOGY

## 2020-04-23 PROCEDURE — 6360000002 HC RX W HCPCS

## 2020-04-23 PROCEDURE — 2500000003 HC RX 250 WO HCPCS: Performed by: OTOLARYNGOLOGY

## 2020-04-23 PROCEDURE — 6370000000 HC RX 637 (ALT 250 FOR IP): Performed by: STUDENT IN AN ORGANIZED HEALTH CARE EDUCATION/TRAINING PROGRAM

## 2020-04-23 PROCEDURE — 85025 COMPLETE CBC W/AUTO DIFF WBC: CPT

## 2020-04-23 PROCEDURE — 36556 INSERT NON-TUNNEL CV CATH: CPT | Performed by: SURGERY

## 2020-04-23 PROCEDURE — 84100 ASSAY OF PHOSPHORUS: CPT

## 2020-04-23 PROCEDURE — 83735 ASSAY OF MAGNESIUM: CPT

## 2020-04-23 PROCEDURE — 2709999900 HC NON-CHARGEABLE SUPPLY: Performed by: OTOLARYNGOLOGY

## 2020-04-23 PROCEDURE — 36415 COLL VENOUS BLD VENIPUNCTURE: CPT

## 2020-04-23 PROCEDURE — 0B110F4 BYPASS TRACHEA TO CUTANEOUS WITH TRACHEOSTOMY DEVICE, OPEN APPROACH: ICD-10-PCS | Performed by: OTOLARYNGOLOGY

## 2020-04-23 PROCEDURE — 94003 VENT MGMT INPAT SUBQ DAY: CPT

## 2020-04-23 PROCEDURE — 3700000001 HC ADD 15 MINUTES (ANESTHESIA): Performed by: OTOLARYNGOLOGY

## 2020-04-23 PROCEDURE — 2580000003 HC RX 258: Performed by: NURSE ANESTHETIST, CERTIFIED REGISTERED

## 2020-04-23 PROCEDURE — 99291 CRITICAL CARE FIRST HOUR: CPT | Performed by: SURGERY

## 2020-04-23 PROCEDURE — 80053 COMPREHEN METABOLIC PANEL: CPT

## 2020-04-23 PROCEDURE — 7100000001 HC PACU RECOVERY - ADDTL 15 MIN

## 2020-04-23 PROCEDURE — 7100000000 HC PACU RECOVERY - FIRST 15 MIN

## 2020-04-23 PROCEDURE — 82330 ASSAY OF CALCIUM: CPT

## 2020-04-23 PROCEDURE — 3600000003 HC SURGERY LEVEL 3 BASE: Performed by: OTOLARYNGOLOGY

## 2020-04-23 PROCEDURE — 99232 SBSQ HOSP IP/OBS MODERATE 35: CPT | Performed by: NEUROLOGICAL SURGERY

## 2020-04-23 PROCEDURE — 2500000003 HC RX 250 WO HCPCS: Performed by: NURSE ANESTHETIST, CERTIFIED REGISTERED

## 2020-04-23 RX ORDER — MIDAZOLAM HYDROCHLORIDE 1 MG/ML
INJECTION INTRAMUSCULAR; INTRAVENOUS
Status: DISPENSED
Start: 2020-04-23 | End: 2020-04-24

## 2020-04-23 RX ORDER — FENTANYL CITRATE 50 UG/ML
INJECTION, SOLUTION INTRAMUSCULAR; INTRAVENOUS PRN
Status: DISCONTINUED | OUTPATIENT
Start: 2020-04-23 | End: 2020-04-23 | Stop reason: SDUPTHER

## 2020-04-23 RX ORDER — LIDOCAINE HYDROCHLORIDE AND EPINEPHRINE 10; 10 MG/ML; UG/ML
INJECTION, SOLUTION INFILTRATION; PERINEURAL PRN
Status: DISCONTINUED | OUTPATIENT
Start: 2020-04-23 | End: 2020-04-23 | Stop reason: ALTCHOICE

## 2020-04-23 RX ORDER — MAGNESIUM SULFATE IN WATER 40 MG/ML
2 INJECTION, SOLUTION INTRAVENOUS ONCE
Status: COMPLETED | OUTPATIENT
Start: 2020-04-23 | End: 2020-04-23

## 2020-04-23 RX ORDER — ROCURONIUM BROMIDE 10 MG/ML
INJECTION, SOLUTION INTRAVENOUS PRN
Status: DISCONTINUED | OUTPATIENT
Start: 2020-04-23 | End: 2020-04-23 | Stop reason: SDUPTHER

## 2020-04-23 RX ORDER — PHENYLEPHRINE HYDROCHLORIDE 10 MG/ML
INJECTION INTRAVENOUS PRN
Status: DISCONTINUED | OUTPATIENT
Start: 2020-04-23 | End: 2020-04-23 | Stop reason: SDUPTHER

## 2020-04-23 RX ORDER — MEPERIDINE HYDROCHLORIDE 25 MG/ML
25 INJECTION INTRAMUSCULAR; INTRAVENOUS; SUBCUTANEOUS ONCE
Status: COMPLETED | OUTPATIENT
Start: 2020-04-23 | End: 2020-04-23

## 2020-04-23 RX ORDER — MIDAZOLAM HYDROCHLORIDE 1 MG/ML
INJECTION INTRAMUSCULAR; INTRAVENOUS
Status: COMPLETED
Start: 2020-04-23 | End: 2020-04-23

## 2020-04-23 RX ORDER — RISPERIDONE 1 MG/ML
1 SOLUTION ORAL 2 TIMES DAILY
Status: DISCONTINUED | OUTPATIENT
Start: 2020-04-23 | End: 2020-05-01

## 2020-04-23 RX ORDER — MIDAZOLAM HYDROCHLORIDE 1 MG/ML
2 INJECTION INTRAMUSCULAR; INTRAVENOUS ONCE
Status: COMPLETED | OUTPATIENT
Start: 2020-04-23 | End: 2020-04-23

## 2020-04-23 RX ORDER — MIDAZOLAM HYDROCHLORIDE 1 MG/ML
INJECTION INTRAMUSCULAR; INTRAVENOUS PRN
Status: DISCONTINUED | OUTPATIENT
Start: 2020-04-23 | End: 2020-04-23 | Stop reason: SDUPTHER

## 2020-04-23 RX ORDER — SODIUM CHLORIDE 9 MG/ML
INJECTION, SOLUTION INTRAVENOUS CONTINUOUS PRN
Status: DISCONTINUED | OUTPATIENT
Start: 2020-04-23 | End: 2020-04-23 | Stop reason: SDUPTHER

## 2020-04-23 RX ADMIN — Medication 125 MCG/HR: at 16:09

## 2020-04-23 RX ADMIN — SODIUM CHLORIDE, PRESERVATIVE FREE 10 ML: 5 INJECTION INTRAVENOUS at 08:22

## 2020-04-23 RX ADMIN — FAMOTIDINE 20 MG: 20 TABLET, FILM COATED ORAL at 20:35

## 2020-04-23 RX ADMIN — CEFAZOLIN SODIUM 2 G: 2 SOLUTION INTRAVENOUS at 16:32

## 2020-04-23 RX ADMIN — POLYETHYLENE GLYCOL 3350 17 G: 17 POWDER, FOR SOLUTION ORAL at 08:22

## 2020-04-23 RX ADMIN — MAGNESIUM SULFATE 2 G: 2 INJECTION INTRAVENOUS at 10:45

## 2020-04-23 RX ADMIN — POTASSIUM PHOSPHATE, MONOBASIC AND POTASSIUM PHOSPHATE, DIBASIC 30 MMOL: 224; 236 INJECTION, SOLUTION, CONCENTRATE INTRAVENOUS at 10:45

## 2020-04-23 RX ADMIN — CEFAZOLIN SODIUM 2 G: 2 SOLUTION INTRAVENOUS at 23:41

## 2020-04-23 RX ADMIN — CEFAZOLIN SODIUM 2 G: 2 SOLUTION INTRAVENOUS at 08:11

## 2020-04-23 RX ADMIN — SODIUM CHLORIDE: 9 INJECTION, SOLUTION INTRAVENOUS at 11:33

## 2020-04-23 RX ADMIN — RISPERIDONE 1 MG: 1 SOLUTION ORAL at 16:39

## 2020-04-23 RX ADMIN — SODIUM CHLORIDE, PRESERVATIVE FREE 10 ML: 5 INJECTION INTRAVENOUS at 08:12

## 2020-04-23 RX ADMIN — ROCURONIUM BROMIDE 50 MG: 10 INJECTION, SOLUTION INTRAVENOUS at 12:31

## 2020-04-23 RX ADMIN — RISPERIDONE 1 MG: 1 SOLUTION ORAL at 20:51

## 2020-04-23 RX ADMIN — PHENYLEPHRINE HYDROCHLORIDE 200 MCG: 10 INJECTION INTRAVENOUS at 12:23

## 2020-04-23 RX ADMIN — DEXAMETHASONE SODIUM PHOSPHATE 10 MG: 10 INJECTION INTRAMUSCULAR; INTRAVENOUS at 18:54

## 2020-04-23 RX ADMIN — DOCUSATE SODIUM AND SENNOSIDES 1 TABLET: 50; 8.6 TABLET, FILM COATED ORAL at 20:36

## 2020-04-23 RX ADMIN — DEXAMETHASONE SODIUM PHOSPHATE 10 MG: 10 INJECTION INTRAMUSCULAR; INTRAVENOUS at 02:59

## 2020-04-23 RX ADMIN — PHENYLEPHRINE HYDROCHLORIDE 100 MCG: 10 INJECTION INTRAVENOUS at 12:35

## 2020-04-23 RX ADMIN — Medication 125 MCG/HR: at 03:56

## 2020-04-23 RX ADMIN — SODIUM CHLORIDE, POTASSIUM CHLORIDE, SODIUM LACTATE AND CALCIUM CHLORIDE: 600; 310; 30; 20 INJECTION, SOLUTION INTRAVENOUS at 16:08

## 2020-04-23 RX ADMIN — CEFAZOLIN SODIUM 2 G: 2 SOLUTION INTRAVENOUS at 00:11

## 2020-04-23 RX ADMIN — MIDAZOLAM 2 MG: 1 INJECTION INTRAMUSCULAR; INTRAVENOUS at 12:40

## 2020-04-23 RX ADMIN — MIDAZOLAM HYDROCHLORIDE 2 MG: 1 INJECTION, SOLUTION INTRAMUSCULAR; INTRAVENOUS at 13:47

## 2020-04-23 RX ADMIN — CHLORHEXIDINE GLUCONATE 0.12% ORAL RINSE 15 ML: 1.2 LIQUID ORAL at 20:35

## 2020-04-23 RX ADMIN — MEPERIDINE HYDROCHLORIDE 25 MG: 25 INJECTION INTRAMUSCULAR; INTRAVENOUS; SUBCUTANEOUS at 17:24

## 2020-04-23 RX ADMIN — DEXAMETHASONE SODIUM PHOSPHATE 10 MG: 10 INJECTION INTRAMUSCULAR; INTRAVENOUS at 10:41

## 2020-04-23 RX ADMIN — FAMOTIDINE 20 MG: 20 TABLET, FILM COATED ORAL at 08:22

## 2020-04-23 RX ADMIN — ACETAMINOPHEN 650 MG: 325 TABLET, FILM COATED ORAL at 20:41

## 2020-04-23 RX ADMIN — PROPOFOL INJECTABLE EMULSION 20 MCG/KG/MIN: 10 INJECTION, EMULSION INTRAVENOUS at 15:09

## 2020-04-23 RX ADMIN — SODIUM CHLORIDE, POTASSIUM CHLORIDE, SODIUM LACTATE AND CALCIUM CHLORIDE: 600; 310; 30; 20 INJECTION, SOLUTION INTRAVENOUS at 01:52

## 2020-04-23 RX ADMIN — DOCUSATE SODIUM AND SENNOSIDES 1 TABLET: 50; 8.6 TABLET, FILM COATED ORAL at 08:22

## 2020-04-23 RX ADMIN — CHLORHEXIDINE GLUCONATE 0.12% ORAL RINSE 15 ML: 1.2 LIQUID ORAL at 08:21

## 2020-04-23 RX ADMIN — FENTANYL CITRATE 100 MCG: 50 INJECTION, SOLUTION INTRAMUSCULAR; INTRAVENOUS at 12:22

## 2020-04-23 RX ADMIN — MIDAZOLAM HYDROCHLORIDE 2 MG: 1 INJECTION INTRAMUSCULAR; INTRAVENOUS at 13:47

## 2020-04-23 RX ADMIN — ENOXAPARIN SODIUM 30 MG: 30 INJECTION SUBCUTANEOUS at 20:35

## 2020-04-23 RX ADMIN — PROPOFOL INJECTABLE EMULSION 20 MCG/KG/MIN: 10 INJECTION, EMULSION INTRAVENOUS at 06:07

## 2020-04-23 RX ADMIN — FENTANYL CITRATE 100 MCG: 50 INJECTION, SOLUTION INTRAMUSCULAR; INTRAVENOUS at 12:32

## 2020-04-23 ASSESSMENT — PULMONARY FUNCTION TESTS
PIF_VALUE: 21
PIF_VALUE: 20
PIF_VALUE: 21
PIF_VALUE: 20
PIF_VALUE: 26
PIF_VALUE: 20
PIF_VALUE: 20
PIF_VALUE: 25
PIF_VALUE: 26
PIF_VALUE: 24
PIF_VALUE: 20
PIF_VALUE: 23
PIF_VALUE: 20
PIF_VALUE: 23
PIF_VALUE: 23
PIF_VALUE: 20
PIF_VALUE: 19
PIF_VALUE: 23
PIF_VALUE: 20
PIF_VALUE: 21
PIF_VALUE: 26
PIF_VALUE: 19
PIF_VALUE: 23
PIF_VALUE: 23
PIF_VALUE: 20
PIF_VALUE: 20
PIF_VALUE: 21
PIF_VALUE: 23
PIF_VALUE: 20
PIF_VALUE: 26
PIF_VALUE: 33
PIF_VALUE: 20
PIF_VALUE: 21
PIF_VALUE: 20
PIF_VALUE: 23
PIF_VALUE: 37
PIF_VALUE: 41
PIF_VALUE: 27
PIF_VALUE: 26
PIF_VALUE: 20
PIF_VALUE: 23
PIF_VALUE: 20
PIF_VALUE: 41
PIF_VALUE: 20
PIF_VALUE: 12
PIF_VALUE: 23
PIF_VALUE: 20
PIF_VALUE: 23
PIF_VALUE: 20
PIF_VALUE: 23
PIF_VALUE: 27
PIF_VALUE: 20
PIF_VALUE: 23
PIF_VALUE: 23
PIF_VALUE: 20
PIF_VALUE: 13
PIF_VALUE: 23
PIF_VALUE: 19
PIF_VALUE: 29
PIF_VALUE: 19
PIF_VALUE: 21
PIF_VALUE: 20
PIF_VALUE: 38
PIF_VALUE: 20
PIF_VALUE: 19
PIF_VALUE: 21
PIF_VALUE: 23
PIF_VALUE: 23
PIF_VALUE: 26
PIF_VALUE: 20
PIF_VALUE: 19
PIF_VALUE: 5
PIF_VALUE: 20

## 2020-04-23 ASSESSMENT — PAIN SCALES - GENERAL
PAINLEVEL_OUTOF10: 0
PAINLEVEL_OUTOF10: 4

## 2020-04-23 NOTE — H&P
H&P reviewed, no changes. No issues. Questions and concerns were answered to the patient's satisfaction.  Will proceed with procedure    Will discuss with attending     Electronically signed by Perez Aaron DO on 4/23/20 at 11:33 AM EDT

## 2020-04-23 NOTE — PROGRESS NOTES
Therapies:  · Oral Diet Orders: NPO   · Additional Calories: propofol @ 9.6 ml/hr = 253 lipid kcal  · Anthropometric Measures:  · Ht: 6' (182.9 cm)   · Current Body Wt: 175 lb (79.4 kg)(bed scale 4/22)  · Usual Body Wt: (UTO, no wt hx per EMR)  · Weight Change: VERN wt changes at this time d/t lack of hx on file   · Ideal Body Wt: 178 lb (80.7 kg), % Ideal Body 98%  · BMI Classification: BMI 18.5 - 24.9 Normal Weight    Nutrition Interventions:   Continued Inpatient Monitoring, Education not appropriate at this time    Nutrition Evaluation:   · Evaluation: Goals set   · Goals: Initiation of TF w/ tolerance at goal rate   · Monitoring: Nutrition Progression, TF Intake, TF Tolerance, Skin Integrity, Wound Healing, I&O, Mental Status/Confusion, Weight, Pertinent Labs, Monitor Hemodynamic Status, Monitor Bowel Function      Electronically signed by Solomon Campo, MS, RD, LD on 4/23/20 at 1:22 PM EDT    Contact Number: [phone]

## 2020-04-23 NOTE — PLAN OF CARE
Problem: Restraint Use - Nonviolent/Non-Self-Destructive Behavior:  Goal: Absence of restraint indications  Description: Absence of restraint indications  Outcome: Not Met This Shift       Problem: Restraint Use - Nonviolent/Non-Self-Destructive Behavior:  Goal: Absence of restraint-related injury  Description: Absence of restraint-related injury  Outcome: Met This Shift
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
## 2020-04-23 NOTE — PROCEDURES
Vitals:    04/23/20 1105   BP:    Pulse: 115   Resp: (!) 35   Temp:    SpO2: 100%       Central Line  Date/Time: 4/23/2020 11:50 PM  Performed by: Ebonie Sol MD  Authorized by: Ebonie Sol MD   Consent: Verbal consent obtained. Written consent obtained. Consent given by: patient  Patient understanding: patient states understanding of the procedure being performed  Patient consent: the patient's understanding of the procedure matches consent given  Patient identity confirmed: verbally with patient and arm band  Indications: vascular access   Anesthesia:  Local Anesthetic: lidocaine 1% without epinephrine   Sedation:  Patient sedated: no       Preparation: skin prepped with ChloraPrep  Location details: right subclavian  Patient position: flat  Catheter type: triple lumen  Catheter size: 7 Fr  Ultrasound guidance: no  Sterile ultrasound techniques: sterile gel and sterile probe covers were used  Number of attempts: 3  Post-procedure: line sutured and dressing applied  Assessment: free fluid flow,  placement verified by x-ray and blood return through all ports  Comments: Performed by Indu Michaud and Ebonie Sol. Dr. Toby Colvin was present for the procedure.

## 2020-04-23 NOTE — PROGRESS NOTES
Open trach without complication  6-0 Cuffed Shiley in place   Trach change and trach sutures out 4/30
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
## 2020-04-23 NOTE — FLOWSHEET NOTE
Pt intubated and sedated. Continues to pull at lines and tubes upon agitation. Verbal re-direction unsuccessful at this time. Bilateral soft wrist restraints continued for patient safety. Will continue to monitor.

## 2020-04-23 NOTE — ANESTHESIA POSTPROCEDURE EVALUATION
Department of Anesthesiology  Postprocedure Note    Patient: Yfn Willett  MRN: [id number]  YOB: 1995  Date of evaluation: 4/24/2020  Time:  6:44 AM     Procedure Summary     Date:  04/23/20 Room / Location:  [facility] OR 08 / [facility]    Anesthesia Start:   Anesthesia Stop:      Procedure:  DIRECT LARYNGOSCOPY, OPEN TRACHEOTOMY (N/A ) Diagnosis:  (.)    Surgeon:  Jimmy Rahman DO Responsible Provider:      Anesthesia Type:  general ASA Status:  4          Anesthesia Type: general    Salina Phase I: Salina Score: 6    Salina Phase II:      Last vitals: Reviewed and per EMR flowsheets.        Anesthesia Post Evaluation    Patient location during evaluation: ICU  Level of consciousness: sedated and ventilated  Respiratory status: ventilator

## 2020-04-24 ENCOUNTER — APPOINTMENT (OUTPATIENT)
Dept: GENERAL RADIOLOGY | Age: 25
DRG: 004 | End: 2020-04-24
Payer: MEDICAID

## 2020-04-24 ENCOUNTER — APPOINTMENT (OUTPATIENT)
Dept: CT IMAGING | Age: 25
DRG: 004 | End: 2020-04-24
Payer: MEDICAID

## 2020-04-24 LAB
AADO2: 229.7 MMHG
ALBUMIN SERPL-MCNC: 2.7 G/DL (ref 3.5–5.2)
ALP BLD-CCNC: 38 U/L (ref 40–129)
ALT SERPL-CCNC: 15 U/L (ref 0–40)
ANION GAP SERPL CALCULATED.3IONS-SCNC: 10 MMOL/L (ref 7–16)
ANISOCYTOSIS: ABNORMAL
AST SERPL-CCNC: 35 U/L (ref 0–39)
B.E.: 4.4 MMOL/L (ref -3–3)
BASOPHILS ABSOLUTE: 0.01 E9/L (ref 0–0.2)
BASOPHILS RELATIVE PERCENT: 0.1 % (ref 0–2)
BILIRUB SERPL-MCNC: <0.2 MG/DL (ref 0–1.2)
BUN BLDV-MCNC: 13 MG/DL (ref 6–20)
CALCIUM IONIZED: 1.16 MMOL/L (ref 1.15–1.33)
CALCIUM SERPL-MCNC: 8 MG/DL (ref 8.6–10.2)
CHLORIDE BLD-SCNC: 101 MMOL/L (ref 98–107)
CO2: 25 MMOL/L (ref 22–29)
COHB: 0.3 % (ref 0–1.5)
CREAT SERPL-MCNC: 1.1 MG/DL (ref 0.7–1.2)
CRITICAL: ABNORMAL
DATE ANALYZED: ABNORMAL
DATE OF COLLECTION: ABNORMAL
EOSINOPHILS ABSOLUTE: 0 E9/L (ref 0.05–0.5)
EOSINOPHILS RELATIVE PERCENT: 0 % (ref 0–6)
FIO2: 50 %
GFR AFRICAN AMERICAN: >60
GFR NON-AFRICAN AMERICAN: >60 ML/MIN/1.73
GLUCOSE BLD-MCNC: 150 MG/DL (ref 74–99)
HCO3: 27.7 MMOL/L (ref 22–26)
HCT VFR BLD CALC: 23.5 % (ref 37–54)
HCT VFR BLD CALC: 25.5 % (ref 37–54)
HEMOGLOBIN: 7.7 G/DL (ref 12.5–16.5)
HEMOGLOBIN: 8.6 G/DL (ref 12.5–16.5)
HHB: 5.1 % (ref 0–5)
IMMATURE GRANULOCYTES #: 0.07 E9/L
IMMATURE GRANULOCYTES %: 0.5 % (ref 0–5)
LAB: ABNORMAL
LYMPHOCYTES ABSOLUTE: 0.59 E9/L (ref 1.5–4)
LYMPHOCYTES RELATIVE PERCENT: 4 % (ref 20–42)
Lab: ABNORMAL
MAGNESIUM: 2 MG/DL (ref 1.6–2.6)
MCH RBC QN AUTO: 29.4 PG (ref 26–35)
MCHC RBC AUTO-ENTMCNC: 32.8 % (ref 32–34.5)
MCV RBC AUTO: 89.7 FL (ref 80–99.9)
METHB: 0.7 % (ref 0–1.5)
MODE: AC
MONOCYTES ABSOLUTE: 0.49 E9/L (ref 0.1–0.95)
MONOCYTES RELATIVE PERCENT: 3.3 % (ref 2–12)
NEUTROPHILS ABSOLUTE: 13.59 E9/L (ref 1.8–7.3)
NEUTROPHILS RELATIVE PERCENT: 92.1 % (ref 43–80)
O2 CONTENT: 12 ML/DL
O2 SATURATION: 94.8 % (ref 92–98.5)
O2HB: 93.9 % (ref 94–97)
OPERATOR ID: 421
PATIENT TEMP: 37 C
PCO2: 36.2 MMHG (ref 35–45)
PDW BLD-RTO: 14.5 FL (ref 11.5–15)
PEEP/CPAP: 5 CMH2O
PFO2: 1.47 MMHG/%
PH BLOOD GAS: 7.5 (ref 7.35–7.45)
PHOSPHORUS: 2.2 MG/DL (ref 2.5–4.5)
PLATELET # BLD: 149 E9/L (ref 130–450)
PMV BLD AUTO: 10.7 FL (ref 7–12)
PO2: 73.6 MMHG (ref 75–100)
POIKILOCYTES: ABNORMAL
POLYCHROMASIA: ABNORMAL
POTASSIUM SERPL-SCNC: 4.2 MMOL/L (ref 3.5–5)
RBC # BLD: 2.62 E12/L (ref 3.8–5.8)
RI(T): 3.12
RR MECHANICAL: 16 B/MIN
SODIUM BLD-SCNC: 136 MMOL/L (ref 132–146)
SOURCE, BLOOD GAS: ABNORMAL
TARGET CELLS: ABNORMAL
THB: 9 G/DL (ref 11.5–16.5)
TIME ANALYZED: 452
TOTAL PROTEIN: 5.1 G/DL (ref 6.4–8.3)
VT MECHANICAL: 500 ML
WBC # BLD: 14.8 E9/L (ref 4.5–11.5)

## 2020-04-24 PROCEDURE — 85018 HEMOGLOBIN: CPT

## 2020-04-24 PROCEDURE — 6370000000 HC RX 637 (ALT 250 FOR IP): Performed by: STUDENT IN AN ORGANIZED HEALTH CARE EDUCATION/TRAINING PROGRAM

## 2020-04-24 PROCEDURE — 0BC38ZZ EXTIRPATION OF MATTER FROM RIGHT MAIN BRONCHUS, VIA NATURAL OR ARTIFICIAL OPENING ENDOSCOPIC: ICD-10-PCS | Performed by: SURGERY

## 2020-04-24 PROCEDURE — 80053 COMPREHEN METABOLIC PANEL: CPT

## 2020-04-24 PROCEDURE — 2709999900 HC NON-CHARGEABLE SUPPLY: Performed by: SURGERY

## 2020-04-24 PROCEDURE — 87206 SMEAR FLUORESCENT/ACID STAI: CPT

## 2020-04-24 PROCEDURE — 71045 X-RAY EXAM CHEST 1 VIEW: CPT

## 2020-04-24 PROCEDURE — 99291 CRITICAL CARE FIRST HOUR: CPT | Performed by: SURGERY

## 2020-04-24 PROCEDURE — 87070 CULTURE OTHR SPECIMN AEROBIC: CPT

## 2020-04-24 PROCEDURE — 85014 HEMATOCRIT: CPT

## 2020-04-24 PROCEDURE — 82330 ASSAY OF CALCIUM: CPT

## 2020-04-24 PROCEDURE — 6360000002 HC RX W HCPCS: Performed by: STUDENT IN AN ORGANIZED HEALTH CARE EDUCATION/TRAINING PROGRAM

## 2020-04-24 PROCEDURE — 0BJ08ZZ INSPECTION OF TRACHEOBRONCHIAL TREE, VIA NATURAL OR ARTIFICIAL OPENING ENDOSCOPIC: ICD-10-PCS | Performed by: SURGERY

## 2020-04-24 PROCEDURE — 71275 CT ANGIOGRAPHY CHEST: CPT

## 2020-04-24 PROCEDURE — 82805 BLOOD GASES W/O2 SATURATION: CPT

## 2020-04-24 PROCEDURE — 94003 VENT MGMT INPAT SUBQ DAY: CPT

## 2020-04-24 PROCEDURE — 31645 BRNCHSC W/THER ASPIR 1ST: CPT | Performed by: SURGERY

## 2020-04-24 PROCEDURE — 36430 TRANSFUSION BLD/BLD COMPNT: CPT

## 2020-04-24 PROCEDURE — 83735 ASSAY OF MAGNESIUM: CPT

## 2020-04-24 PROCEDURE — 87186 SC STD MICRODIL/AGAR DIL: CPT

## 2020-04-24 PROCEDURE — 31624 DX BRONCHOSCOPE/LAVAGE: CPT

## 2020-04-24 PROCEDURE — 6360000004 HC RX CONTRAST MEDICATION: Performed by: RADIOLOGY

## 2020-04-24 PROCEDURE — 31600 PLANNED TRACHEOSTOMY: CPT | Performed by: OTOLARYNGOLOGY

## 2020-04-24 PROCEDURE — 89220 SPUTUM SPECIMEN COLLECTION: CPT

## 2020-04-24 PROCEDURE — 36415 COLL VENOUS BLD VENIPUNCTURE: CPT

## 2020-04-24 PROCEDURE — 84100 ASSAY OF PHOSPHORUS: CPT

## 2020-04-24 PROCEDURE — 2000000000 HC ICU R&B

## 2020-04-24 PROCEDURE — 99152 MOD SED SAME PHYS/QHP 5/>YRS: CPT | Performed by: SURGERY

## 2020-04-24 PROCEDURE — 2500000003 HC RX 250 WO HCPCS: Performed by: STUDENT IN AN ORGANIZED HEALTH CARE EDUCATION/TRAINING PROGRAM

## 2020-04-24 PROCEDURE — 2580000003 HC RX 258: Performed by: STUDENT IN AN ORGANIZED HEALTH CARE EDUCATION/TRAINING PROGRAM

## 2020-04-24 PROCEDURE — 31525 DX LARYNGOSCOPY EXCL NB: CPT | Performed by: OTOLARYNGOLOGY

## 2020-04-24 PROCEDURE — 2500000003 HC RX 250 WO HCPCS: Performed by: SURGERY

## 2020-04-24 PROCEDURE — 87205 SMEAR GRAM STAIN: CPT

## 2020-04-24 PROCEDURE — 3609010900 HC BRONCHOSCOPY THERAPUTIC ASPIRATION INITIAL: Performed by: SURGERY

## 2020-04-24 PROCEDURE — 85025 COMPLETE CBC W/AUTO DIFF WBC: CPT

## 2020-04-24 PROCEDURE — 87077 CULTURE AEROBIC IDENTIFY: CPT

## 2020-04-24 PROCEDURE — 0BC78ZZ EXTIRPATION OF MATTER FROM LEFT MAIN BRONCHUS, VIA NATURAL OR ARTIFICIAL OPENING ENDOSCOPIC: ICD-10-PCS | Performed by: SURGERY

## 2020-04-24 PROCEDURE — 2580000003 HC RX 258

## 2020-04-24 RX ORDER — MIDAZOLAM HYDROCHLORIDE 1 MG/ML
4 INJECTION INTRAMUSCULAR; INTRAVENOUS ONCE
Status: COMPLETED | OUTPATIENT
Start: 2020-04-24 | End: 2020-04-24

## 2020-04-24 RX ORDER — FENTANYL CITRATE 50 UG/ML
100 INJECTION, SOLUTION INTRAMUSCULAR; INTRAVENOUS ONCE
Status: COMPLETED | OUTPATIENT
Start: 2020-04-24 | End: 2020-04-24

## 2020-04-24 RX ORDER — MIDAZOLAM HYDROCHLORIDE 1 MG/ML
INJECTION INTRAMUSCULAR; INTRAVENOUS
Status: DISPENSED
Start: 2020-04-24 | End: 2020-04-24

## 2020-04-24 RX ORDER — FENTANYL CITRATE 50 UG/ML
200 INJECTION, SOLUTION INTRAMUSCULAR; INTRAVENOUS ONCE
Status: COMPLETED | OUTPATIENT
Start: 2020-04-24 | End: 2020-04-24

## 2020-04-24 RX ORDER — OXYCODONE HCL 5 MG/5 ML
7.5 SOLUTION, ORAL ORAL
Status: DISCONTINUED | OUTPATIENT
Start: 2020-04-24 | End: 2020-04-26

## 2020-04-24 RX ORDER — PROPRANOLOL HYDROCHLORIDE 10 MG/1
10 TABLET ORAL 3 TIMES DAILY
Status: DISCONTINUED | OUTPATIENT
Start: 2020-04-24 | End: 2020-04-24

## 2020-04-24 RX ORDER — FENTANYL CITRATE 50 UG/ML
50 INJECTION, SOLUTION INTRAMUSCULAR; INTRAVENOUS
Status: DISCONTINUED | OUTPATIENT
Start: 2020-04-24 | End: 2020-04-25

## 2020-04-24 RX ORDER — SODIUM CHLORIDE 0.9 % (FLUSH) 0.9 %
SYRINGE (ML) INJECTION
Status: COMPLETED
Start: 2020-04-24 | End: 2020-04-24

## 2020-04-24 RX ORDER — 0.9 % SODIUM CHLORIDE 0.9 %
20 INTRAVENOUS SOLUTION INTRAVENOUS ONCE
Status: COMPLETED | OUTPATIENT
Start: 2020-04-24 | End: 2020-04-24

## 2020-04-24 RX ORDER — SODIUM CHLORIDE 0.9 % (FLUSH) 0.9 %
10 SYRINGE (ML) INJECTION
Status: ACTIVE | OUTPATIENT
Start: 2020-04-24 | End: 2020-04-24

## 2020-04-24 RX ORDER — VECURONIUM BROMIDE 1 MG/ML
10 INJECTION, POWDER, LYOPHILIZED, FOR SOLUTION INTRAVENOUS
Status: COMPLETED | OUTPATIENT
Start: 2020-04-24 | End: 2020-04-24

## 2020-04-24 RX ORDER — LORAZEPAM 2 MG/ML
1 CONCENTRATE ORAL EVERY 6 HOURS SCHEDULED
Status: DISCONTINUED | OUTPATIENT
Start: 2020-04-24 | End: 2020-04-27

## 2020-04-24 RX ORDER — SENNA AND DOCUSATE SODIUM 50; 8.6 MG/1; MG/1
2 TABLET, FILM COATED ORAL 2 TIMES DAILY
Status: DISCONTINUED | OUTPATIENT
Start: 2020-04-24 | End: 2020-05-23

## 2020-04-24 RX ORDER — POLYETHYLENE GLYCOL 3350 17 G/17G
17 POWDER, FOR SOLUTION ORAL 2 TIMES DAILY
Status: DISCONTINUED | OUTPATIENT
Start: 2020-04-24 | End: 2020-05-02

## 2020-04-24 RX ADMIN — OXYCODONE HYDROCHLORIDE 7.5 MG: 5 SOLUTION ORAL at 09:14

## 2020-04-24 RX ADMIN — CHLORHEXIDINE GLUCONATE 0.12% ORAL RINSE 15 ML: 1.2 LIQUID ORAL at 09:15

## 2020-04-24 RX ADMIN — ACETAMINOPHEN 650 MG: 325 TABLET, FILM COATED ORAL at 09:14

## 2020-04-24 RX ADMIN — MIDAZOLAM 4 MG: 1 INJECTION INTRAMUSCULAR; INTRAVENOUS at 21:51

## 2020-04-24 RX ADMIN — OXYCODONE HYDROCHLORIDE 7.5 MG: 5 SOLUTION ORAL at 20:23

## 2020-04-24 RX ADMIN — SENNOSIDES AND DOCUSATE SODIUM 2 TABLET: 8.6; 5 TABLET ORAL at 09:14

## 2020-04-24 RX ADMIN — Medication 50 MCG/HR: at 21:23

## 2020-04-24 RX ADMIN — SODIUM CHLORIDE 20 ML: 9 INJECTION, SOLUTION INTRAVENOUS at 08:30

## 2020-04-24 RX ADMIN — RISPERIDONE 1 MG: 1 SOLUTION ORAL at 09:15

## 2020-04-24 RX ADMIN — FENTANYL CITRATE 50 MCG: 50 INJECTION, SOLUTION INTRAMUSCULAR; INTRAVENOUS at 14:45

## 2020-04-24 RX ADMIN — DEXAMETHASONE SODIUM PHOSPHATE 10 MG: 10 INJECTION INTRAMUSCULAR; INTRAVENOUS at 03:15

## 2020-04-24 RX ADMIN — IOPAMIDOL 60 ML: 755 INJECTION, SOLUTION INTRAVENOUS at 21:12

## 2020-04-24 RX ADMIN — MIDAZOLAM HYDROCHLORIDE 2 MG: 1 INJECTION, SOLUTION INTRAMUSCULAR; INTRAVENOUS at 07:59

## 2020-04-24 RX ADMIN — FENTANYL CITRATE 100 MCG: 50 INJECTION, SOLUTION INTRAMUSCULAR; INTRAVENOUS at 09:45

## 2020-04-24 RX ADMIN — Medication 100 MCG/HR: at 07:01

## 2020-04-24 RX ADMIN — FAMOTIDINE 20 MG: 20 TABLET, FILM COATED ORAL at 09:14

## 2020-04-24 RX ADMIN — WATER 10 ML: 1 INJECTION INTRAMUSCULAR; INTRAVENOUS; SUBCUTANEOUS at 08:55

## 2020-04-24 RX ADMIN — OXYCODONE HYDROCHLORIDE 7.5 MG: 5 SOLUTION ORAL at 12:23

## 2020-04-24 RX ADMIN — FENTANYL CITRATE 50 MCG: 50 INJECTION, SOLUTION INTRAMUSCULAR; INTRAVENOUS at 09:27

## 2020-04-24 RX ADMIN — FAMOTIDINE 20 MG: 20 TABLET, FILM COATED ORAL at 20:23

## 2020-04-24 RX ADMIN — SENNOSIDES AND DOCUSATE SODIUM 2 TABLET: 8.6; 5 TABLET ORAL at 20:23

## 2020-04-24 RX ADMIN — LORAZEPAM 1 MG: 2 SOLUTION, CONCENTRATE ORAL at 12:23

## 2020-04-24 RX ADMIN — MAGNESIUM HYDROXIDE 30 ML: 2400 SUSPENSION ORAL at 10:00

## 2020-04-24 RX ADMIN — CHLORHEXIDINE GLUCONATE 0.12% ORAL RINSE 15 ML: 1.2 LIQUID ORAL at 20:28

## 2020-04-24 RX ADMIN — LORAZEPAM 1 MG: 2 SOLUTION, CONCENTRATE ORAL at 09:15

## 2020-04-24 RX ADMIN — RISPERIDONE 1 MG: 1 SOLUTION ORAL at 20:29

## 2020-04-24 RX ADMIN — MIDAZOLAM 4 MG: 1 INJECTION INTRAMUSCULAR; INTRAVENOUS at 22:08

## 2020-04-24 RX ADMIN — Medication: at 00:52

## 2020-04-24 RX ADMIN — ENOXAPARIN SODIUM 30 MG: 30 INJECTION SUBCUTANEOUS at 09:14

## 2020-04-24 RX ADMIN — FENTANYL CITRATE 200 MCG: 50 INJECTION INTRAMUSCULAR; INTRAVENOUS at 21:51

## 2020-04-24 RX ADMIN — LORAZEPAM 1 MG: 2 SOLUTION, CONCENTRATE ORAL at 18:24

## 2020-04-24 RX ADMIN — PROPOFOL INJECTABLE EMULSION 10 MCG/KG/MIN: 10 INJECTION, EMULSION INTRAVENOUS at 07:02

## 2020-04-24 RX ADMIN — VECURONIUM BROMIDE 10 MG: 10 INJECTION, POWDER, LYOPHILIZED, FOR SOLUTION INTRAVENOUS at 08:54

## 2020-04-24 RX ADMIN — POLYETHYLENE GLYCOL 3350 17 G: 17 POWDER, FOR SOLUTION ORAL at 09:14

## 2020-04-24 RX ADMIN — ENOXAPARIN SODIUM 30 MG: 30 INJECTION SUBCUTANEOUS at 20:23

## 2020-04-24 RX ADMIN — OXYCODONE HYDROCHLORIDE 7.5 MG: 5 SOLUTION ORAL at 16:33

## 2020-04-24 ASSESSMENT — PULMONARY FUNCTION TESTS
PIF_VALUE: 27
PIF_VALUE: 33
PIF_VALUE: 27
PIF_VALUE: 24
PIF_VALUE: 27
PIF_VALUE: 29
PIF_VALUE: 26
PIF_VALUE: 27
PIF_VALUE: 25
PIF_VALUE: 29
PIF_VALUE: 29
PIF_VALUE: 26
PIF_VALUE: 26
PIF_VALUE: 28
PIF_VALUE: 24
PIF_VALUE: 26
PIF_VALUE: 26
PIF_VALUE: 32
PIF_VALUE: 32
PIF_VALUE: 27
PIF_VALUE: 28
PIF_VALUE: 28
PIF_VALUE: 27
PIF_VALUE: 30

## 2020-04-24 ASSESSMENT — PAIN SCALES - GENERAL
PAINLEVEL_OUTOF10: 0

## 2020-04-24 NOTE — FLOWSHEET NOTE
Pt becomes agitated at times and reaches for trach and other critical medical equipment. Bilateral soft wrist restraints remain in place for pt safety.

## 2020-04-24 NOTE — PROCEDURES
DATE: 4/24/2020     PRE-OP DIAGNOSIS: Hypoxia, Respiratory failure, Mucus plug entire R Lung     POST-OP DIAGNOSIS: Same    SURGEON: Romayne Kobus MD    ASSISTANT: OMAR Hernandez DO PGY2    ANESTHESIA: General     OPERATION: Therapeutic Flexible Bronchoscopy - Pediatric Scope--aspiration    FINDINGS: Thick mucus plugging entire right lung    SPECIMEN: None    COMPLICATIONS: unable to suction adequately via #6 Shiley Trach requiring pediatric scope      DESCRIPTION OF PROCEDURE:     In the Surgical ICU the patient was sedated with fentanyl, propofol, versed. Heart rate, blood pressure, respiratory rate, and oxygen saturation were monitored. Due to the #6 Larna Canner a pediatric bronchoscope was required. The bronchoscope was inserted and passed through the trach. Immediately thick mucus plugging was evident. Attempted to suction but unable to adequately do so given small size of the pediatric bronchoscope even with irrigation. The bronchoscope was completely withdrawn. The patient tolerated the procedure well. Dr. Rin James was present for the procedure. PLAN:  Will need to upsize trach in order to adequately perform a therapeutic bronch. Given patient's respiratory failure and lung trauma he likely will require future bronch's. Upsizing trach to a #8 will be required.       Kim Weeks DO  Resident, PGY-2  4/24/2020  8:14 AM    Diomedes Lopes MD

## 2020-04-24 NOTE — PROGRESS NOTES
Physical Therapy    Facility/Department: [facility]      NAME: Merle Farr  : 1995  MRN: [id number]    Date of Service: 2020  Chart reviewed. Spoke with RN who reported pt is planned for procedures today and requested to hold initial evaluation. Will re-attempt as able.     Vearl Seip, PT, DPT  [id number]

## 2020-04-24 NOTE — PLAN OF CARE
Problem: Falls - Risk of:  Goal: Will remain free from falls  Description: Will remain free from falls  Outcome: Met This Shift  Goal: Absence of physical injury  Description: Absence of physical injury  Outcome: Met This Shift     Problem: Pain:  Goal: Pain level will decrease  Description: Pain level will decrease  Outcome: Met This Shift  Goal: Control of acute pain  Description: Control of acute pain  Outcome: Met This Shift     Problem: Airway Clearance - Ineffective:  Goal: Ability to maintain a clear airway will improve  Description: Ability to maintain a clear airway will improve  Outcome: Met This Shift     Problem: Anxiety/Stress:  Goal: Level of anxiety will decrease  Description: Level of anxiety will decrease  Outcome: Met This Shift     Problem: Cardiac Output - Decreased:  Goal: Hemodynamic stability will improve  Description: Hemodynamic stability will improve  Outcome: Met This Shift     Problem: Fluid Volume - Imbalance:  Goal: Absence of imbalanced fluid volume signs and symptoms  Description: Absence of imbalanced fluid volume signs and symptoms  Outcome: Met This Shift     Problem: Skin Integrity - Impaired:  Goal: Will show no infection signs and symptoms  Description: Will show no infection signs and symptoms  Outcome: Met This Shift  Goal: Absence of new skin breakdown  Description: Absence of new skin breakdown  Outcome: Met This Shift     Problem: Infection:  Goal: Will remain free from infection  Description: Will remain free from infection  Outcome: Met This Shift     Problem: Restraint Use - Nonviolent/Non-Self-Destructive Behavior:  Goal: Absence of restraint-related injury  Description: Absence of restraint-related injury  Outcome: Met This Shift    Problem: Restraint Use - Nonviolent/Non-Self-Destructive Behavior:  Goal: Absence of restraint indications  Description: Absence of restraint indications  Outcome: Not Met This Shift

## 2020-04-24 NOTE — PROGRESS NOTES
CXR this AM shows a large mucous plug          We were unable to clear the mucous plug on the right lung with a pediatric bronchoscope. the secretions were too thick. Yesterday ENT had placed a #6 Shiley. Patient needs his tracheostomy upsized a #8 Shiley in order to clear his mucous plug. We have called the ENT service. They will be in shortly in order to upsize the tracheostomy.         Electronically signed by Jesenia Quarles MD on 4/24/2020 at 8:28 AM

## 2020-04-24 NOTE — PROGRESS NOTES
OCCUPATIONAL THERAPY     Date:2020  Patient Name: Jerilyn Segovia  MRN: [id number]  : 1995  Room: [address] [place] [address]-A     Chart reviewed; per nursing, pt scheduled for procedures today. Will hold OT evaluation at this time. Will re-attempt as able.    Kandace Jara, OTR/L [id number]

## 2020-04-24 NOTE — PLAN OF CARE
restraint-related injury  Outcome: Met This Shift    Problem: Restraint Use - Nonviolent/Non-Self-Destructive Behavior:  Goal: Absence of restraint indications  Description: Absence of restraint indications   Outcome: Not Met This Shift

## 2020-04-24 NOTE — PLAN OF CARE
Problem: Restraint Use - Nonviolent/Non-Self-Destructive Behavior:  Goal: Absence of restraint-related injury  Description: Absence of restraint-related injury  4/24/2020 0651 by Margarita Enriquez RN  Outcome: Met This Shift  4/23/2020 1932 by Israel Luna RN  Outcome: Met This Shift     Problem: Restraint Use - Nonviolent/Non-Self-Destructive Behavior:  Goal: Absence of restraint indications  Description: Absence of restraint indications   4/24/2020 0651 by Margarita Enriquez RN  Outcome: Not Met This Shift  4/24/2020 0650 by Margarita Enriquez RN  Outcome: Not Met This Shift  4/23/2020 1932 by Israel Luna RN  Outcome: Not Met This Shift  4/23/2020 1932 by Israel Luna RN  Reactivated     Problem: Restraint Use - Nonviolent/Non-Self-Destructive Behavior:  Goal: Absence of restraint indications  Description: Absence of restraint indications  4/24/2020 0651 by Margarita Enriquez RN  Outcome: Not Met This Shift  4/24/2020 0650 by Margarita Enriquez RN  Outcome: Met This Shift  4/23/2020 1932 by Israel Luna RN  Outcome: Not Met This Shift
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
## 2020-04-24 NOTE — PROCEDURES
DATE: 4/24/2020     PRE-OP DIAGNOSIS: Hypoxia, Respiratory failure, mucus plug entire right lung    POST-OP DIAGNOSIS: Same    SURGEON: Suhas Barraza MD    ASSISTANT: Millie Ramirez DO PGY2    ANESTHESIA: General     OPERATION: Therapeutic Flexible Bronchoscopy with aspiration of mucous plug    FINDINGS: Large Right Main Stem Mucus Plug with Clot    SPECIMEN: None    COMPLICATIONS: None       DESCRIPTION OF PROCEDURE:   The patient was identified as Justyna Jacobsen and the procedure verified as Flexible Fiberoptic Bronchoscopy. A time-out was held and the above information confirmed. In the Surgical ICU the patient was sedated and paralyzed with fentanyl, propofol, versed. Heart rate, blood pressure, respiratory rate, and oxygen saturation were monitored. The bronchoscope was inserted and passed through the ETT, which was noted to be in good position above the jaky. First, the right main stem brochus and segmental bronchi were viewed. There was a large amount of mucus plugging and clot which was suctioned clear after thinning with saline. The secretions were suctioned until clear. Irrigation with saline was undertaken to thin out secretions. The scope was slowly withdrawn and passed into the left mainstem bronchus and segmental bronchi. There was minimal amount of thin secretions. Irrigation with saline was undertaken to thin out secretions. The bronchoscope was completely withdrawn. The patient tolerated the procedure well. Dr. Evelyn Zuniga was present for the procedure.     Melisa Ackerman DO  Resident, PGY-2  4/24/2020  9:54 AM    Shashi Bennett MD

## 2020-04-24 NOTE — FLOWSHEET NOTE
Pt becomes agitated and attempts to reach for trach/NG/critical medical equipment. Pt unable to follow verbal commands consistently, despite multiple attempts at reorientation. Bilateral soft wrist restraints remain in place for pt safety.
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
## 2020-04-25 ENCOUNTER — APPOINTMENT (OUTPATIENT)
Dept: GENERAL RADIOLOGY | Age: 25
DRG: 004 | End: 2020-04-25
Payer: MEDICAID

## 2020-04-25 LAB
AADO2: 292.5 MMHG
ALBUMIN SERPL-MCNC: 2.9 G/DL (ref 3.5–5.2)
ALP BLD-CCNC: 37 U/L (ref 40–129)
ALT SERPL-CCNC: 19 U/L (ref 0–40)
ANION GAP SERPL CALCULATED.3IONS-SCNC: 9 MMOL/L (ref 7–16)
AST SERPL-CCNC: 45 U/L (ref 0–39)
B.E.: 3.8 MMOL/L (ref -3–3)
BASOPHILS ABSOLUTE: 0.01 E9/L (ref 0–0.2)
BASOPHILS RELATIVE PERCENT: 0.1 % (ref 0–2)
BILIRUB SERPL-MCNC: 0.3 MG/DL (ref 0–1.2)
BLOOD BANK DISPENSE STATUS: NORMAL
BLOOD BANK PRODUCT CODE: NORMAL
BPU ID: NORMAL
BUN BLDV-MCNC: 16 MG/DL (ref 6–20)
CALCIUM IONIZED: 1.19 MMOL/L (ref 1.15–1.33)
CALCIUM SERPL-MCNC: 8.1 MG/DL (ref 8.6–10.2)
CHLORIDE BLD-SCNC: 99 MMOL/L (ref 98–107)
CO2: 27 MMOL/L (ref 22–29)
COHB: 0.3 % (ref 0–1.5)
CREAT SERPL-MCNC: 0.9 MG/DL (ref 0.7–1.2)
CRITICAL: ABNORMAL
DATE ANALYZED: ABNORMAL
DATE OF COLLECTION: ABNORMAL
DESCRIPTION BLOOD BANK: NORMAL
EOSINOPHILS ABSOLUTE: 0 E9/L (ref 0.05–0.5)
EOSINOPHILS RELATIVE PERCENT: 0 % (ref 0–6)
FIO2: 60 %
GFR AFRICAN AMERICAN: >60
GFR NON-AFRICAN AMERICAN: >60 ML/MIN/1.73
GLUCOSE BLD-MCNC: 119 MG/DL (ref 74–99)
GRAM STAIN ORDERABLE: NORMAL
HCO3: 28.4 MMOL/L (ref 22–26)
HCT VFR BLD CALC: 25.7 % (ref 37–54)
HEMOGLOBIN: 8.6 G/DL (ref 12.5–16.5)
HHB: 5.4 % (ref 0–5)
IMMATURE GRANULOCYTES #: 0.07 E9/L
IMMATURE GRANULOCYTES %: 0.4 % (ref 0–5)
LAB: ABNORMAL
LYMPHOCYTES ABSOLUTE: 1.41 E9/L (ref 1.5–4)
LYMPHOCYTES RELATIVE PERCENT: 8.7 % (ref 20–42)
Lab: ABNORMAL
MAGNESIUM: 2 MG/DL (ref 1.6–2.6)
MCH RBC QN AUTO: 29.9 PG (ref 26–35)
MCHC RBC AUTO-ENTMCNC: 33.5 % (ref 32–34.5)
MCV RBC AUTO: 89.2 FL (ref 80–99.9)
METHB: 0.4 % (ref 0–1.5)
MODE: AC
MONOCYTES ABSOLUTE: 1.2 E9/L (ref 0.1–0.95)
MONOCYTES RELATIVE PERCENT: 7.4 % (ref 2–12)
NEUTROPHILS ABSOLUTE: 13.59 E9/L (ref 1.8–7.3)
NEUTROPHILS RELATIVE PERCENT: 83.4 % (ref 43–80)
O2 CONTENT: 12.9 ML/DL
O2 SATURATION: 94.6 % (ref 92–98.5)
O2HB: 93.9 % (ref 94–97)
OPERATOR ID: 2593
PATIENT TEMP: 37 C
PCO2: 43.2 MMHG (ref 35–45)
PDW BLD-RTO: 14.6 FL (ref 11.5–15)
PEEP/CPAP: 10 CMH2O
PFO2: 1.21 MMHG/%
PH BLOOD GAS: 7.44 (ref 7.35–7.45)
PHOSPHORUS: 2.6 MG/DL (ref 2.5–4.5)
PLATELET # BLD: 150 E9/L (ref 130–450)
PMV BLD AUTO: 10.6 FL (ref 7–12)
PO2: 72.8 MMHG (ref 75–100)
POTASSIUM SERPL-SCNC: 3.9 MMOL/L (ref 3.5–5)
RBC # BLD: 2.88 E12/L (ref 3.8–5.8)
RI(T): 4.02
RR MECHANICAL: 16 B/MIN
SODIUM BLD-SCNC: 135 MMOL/L (ref 132–146)
SOURCE, BLOOD GAS: ABNORMAL
THB: 9.7 G/DL (ref 11.5–16.5)
TIME ANALYZED: 506
TOTAL PROTEIN: 5.3 G/DL (ref 6.4–8.3)
VT MECHANICAL: 500 ML
WBC # BLD: 16.3 E9/L (ref 4.5–11.5)

## 2020-04-25 PROCEDURE — 6360000002 HC RX W HCPCS: Performed by: STUDENT IN AN ORGANIZED HEALTH CARE EDUCATION/TRAINING PROGRAM

## 2020-04-25 PROCEDURE — 37799 UNLISTED PX VASCULAR SURGERY: CPT

## 2020-04-25 PROCEDURE — 87206 SMEAR FLUORESCENT/ACID STAI: CPT

## 2020-04-25 PROCEDURE — 85025 COMPLETE CBC W/AUTO DIFF WBC: CPT

## 2020-04-25 PROCEDURE — 6370000000 HC RX 637 (ALT 250 FOR IP): Performed by: STUDENT IN AN ORGANIZED HEALTH CARE EDUCATION/TRAINING PROGRAM

## 2020-04-25 PROCEDURE — 80053 COMPREHEN METABOLIC PANEL: CPT

## 2020-04-25 PROCEDURE — 87070 CULTURE OTHR SPECIMN AEROBIC: CPT

## 2020-04-25 PROCEDURE — 82330 ASSAY OF CALCIUM: CPT

## 2020-04-25 PROCEDURE — 87077 CULTURE AEROBIC IDENTIFY: CPT

## 2020-04-25 PROCEDURE — 71045 X-RAY EXAM CHEST 1 VIEW: CPT

## 2020-04-25 PROCEDURE — 99291 CRITICAL CARE FIRST HOUR: CPT | Performed by: SURGERY

## 2020-04-25 PROCEDURE — 2500000003 HC RX 250 WO HCPCS

## 2020-04-25 PROCEDURE — 32551 INSERTION OF CHEST TUBE: CPT

## 2020-04-25 PROCEDURE — 94003 VENT MGMT INPAT SUBQ DAY: CPT

## 2020-04-25 PROCEDURE — 31502 CHANGE OF WINDPIPE AIRWAY: CPT

## 2020-04-25 PROCEDURE — 0BC38ZZ EXTIRPATION OF MATTER FROM RIGHT MAIN BRONCHUS, VIA NATURAL OR ARTIFICIAL OPENING ENDOSCOPIC: ICD-10-PCS | Performed by: SURGERY

## 2020-04-25 PROCEDURE — 2500000003 HC RX 250 WO HCPCS: Performed by: STUDENT IN AN ORGANIZED HEALTH CARE EDUCATION/TRAINING PROGRAM

## 2020-04-25 PROCEDURE — 2580000003 HC RX 258: Performed by: STUDENT IN AN ORGANIZED HEALTH CARE EDUCATION/TRAINING PROGRAM

## 2020-04-25 PROCEDURE — 0W9930Z DRAINAGE OF RIGHT PLEURAL CAVITY WITH DRAINAGE DEVICE, PERCUTANEOUS APPROACH: ICD-10-PCS | Performed by: SURGERY

## 2020-04-25 PROCEDURE — 36415 COLL VENOUS BLD VENIPUNCTURE: CPT

## 2020-04-25 PROCEDURE — 2580000003 HC RX 258: Performed by: SURGERY

## 2020-04-25 PROCEDURE — 6360000002 HC RX W HCPCS: Performed by: SURGERY

## 2020-04-25 PROCEDURE — 87186 SC STD MICRODIL/AGAR DIL: CPT

## 2020-04-25 PROCEDURE — 6360000002 HC RX W HCPCS

## 2020-04-25 PROCEDURE — 84100 ASSAY OF PHOSPHORUS: CPT

## 2020-04-25 PROCEDURE — 32551 INSERTION OF CHEST TUBE: CPT | Performed by: SURGERY

## 2020-04-25 PROCEDURE — 83735 ASSAY OF MAGNESIUM: CPT

## 2020-04-25 PROCEDURE — 2000000000 HC ICU R&B

## 2020-04-25 PROCEDURE — 31646 BRNCHSC W/THER ASPIR SBSQ: CPT | Performed by: SURGERY

## 2020-04-25 PROCEDURE — 82805 BLOOD GASES W/O2 SATURATION: CPT

## 2020-04-25 RX ORDER — MIDAZOLAM HYDROCHLORIDE 1 MG/ML
INJECTION INTRAMUSCULAR; INTRAVENOUS
Status: COMPLETED
Start: 2020-04-25 | End: 2020-04-25

## 2020-04-25 RX ORDER — ACETAMINOPHEN 650 MG
TABLET, EXTENDED RELEASE ORAL
Status: COMPLETED
Start: 2020-04-25 | End: 2020-04-25

## 2020-04-25 RX ORDER — LIDOCAINE HYDROCHLORIDE AND EPINEPHRINE 10; 10 MG/ML; UG/ML
INJECTION, SOLUTION INFILTRATION; PERINEURAL
Status: COMPLETED
Start: 2020-04-25 | End: 2020-04-25

## 2020-04-25 RX ORDER — FENTANYL CITRATE 50 UG/ML
100 INJECTION, SOLUTION INTRAMUSCULAR; INTRAVENOUS ONCE
Status: COMPLETED | OUTPATIENT
Start: 2020-04-25 | End: 2020-04-25

## 2020-04-25 RX ORDER — MIDAZOLAM HYDROCHLORIDE 1 MG/ML
2 INJECTION INTRAMUSCULAR; INTRAVENOUS ONCE
Status: COMPLETED | OUTPATIENT
Start: 2020-04-25 | End: 2020-04-25

## 2020-04-25 RX ORDER — LIDOCAINE HYDROCHLORIDE AND EPINEPHRINE 10; 10 MG/ML; UG/ML
20 INJECTION, SOLUTION INFILTRATION; PERINEURAL ONCE
Status: COMPLETED | OUTPATIENT
Start: 2020-04-25 | End: 2020-04-25

## 2020-04-25 RX ORDER — MIDAZOLAM HYDROCHLORIDE 1 MG/ML
8 INJECTION INTRAMUSCULAR; INTRAVENOUS ONCE
Status: COMPLETED | OUTPATIENT
Start: 2020-04-25 | End: 2020-04-25

## 2020-04-25 RX ADMIN — ENOXAPARIN SODIUM 30 MG: 30 INJECTION SUBCUTANEOUS at 08:11

## 2020-04-25 RX ADMIN — FAMOTIDINE 20 MG: 20 TABLET, FILM COATED ORAL at 08:11

## 2020-04-25 RX ADMIN — POLYETHYLENE GLYCOL 3350 17 G: 17 POWDER, FOR SOLUTION ORAL at 08:11

## 2020-04-25 RX ADMIN — LORAZEPAM 1 MG: 2 SOLUTION, CONCENTRATE ORAL at 00:15

## 2020-04-25 RX ADMIN — LORAZEPAM 1 MG: 2 SOLUTION, CONCENTRATE ORAL at 12:11

## 2020-04-25 RX ADMIN — FENTANYL CITRATE 100 MCG: 50 INJECTION, SOLUTION INTRAMUSCULAR; INTRAVENOUS at 15:18

## 2020-04-25 RX ADMIN — POTASSIUM PHOSPHATE, MONOBASIC AND POTASSIUM PHOSPHATE, DIBASIC 15 MMOL: 224; 236 INJECTION, SOLUTION, CONCENTRATE INTRAVENOUS at 07:47

## 2020-04-25 RX ADMIN — LORAZEPAM 1 MG: 2 SOLUTION, CONCENTRATE ORAL at 18:18

## 2020-04-25 RX ADMIN — FENTANYL CITRATE 100 MCG: 50 INJECTION, SOLUTION INTRAMUSCULAR; INTRAVENOUS at 10:25

## 2020-04-25 RX ADMIN — ENOXAPARIN SODIUM 30 MG: 30 INJECTION SUBCUTANEOUS at 20:46

## 2020-04-25 RX ADMIN — MAGNESIUM HYDROXIDE 30 ML: 2400 SUSPENSION ORAL at 08:11

## 2020-04-25 RX ADMIN — LORAZEPAM 1 MG: 2 SOLUTION, CONCENTRATE ORAL at 06:02

## 2020-04-25 RX ADMIN — OXYCODONE HYDROCHLORIDE 7.5 MG: 5 SOLUTION ORAL at 05:05

## 2020-04-25 RX ADMIN — LIDOCAINE HYDROCHLORIDE,EPINEPHRINE BITARTRATE 20 ML: 10; .01 INJECTION, SOLUTION INFILTRATION; PERINEURAL at 15:16

## 2020-04-25 RX ADMIN — OXYCODONE HYDROCHLORIDE 7.5 MG: 5 SOLUTION ORAL at 12:11

## 2020-04-25 RX ADMIN — OXYCODONE HYDROCHLORIDE 7.5 MG: 5 SOLUTION ORAL at 00:14

## 2020-04-25 RX ADMIN — FAMOTIDINE 20 MG: 20 TABLET, FILM COATED ORAL at 20:47

## 2020-04-25 RX ADMIN — OXYCODONE HYDROCHLORIDE 7.5 MG: 5 SOLUTION ORAL at 16:43

## 2020-04-25 RX ADMIN — Medication: at 15:17

## 2020-04-25 RX ADMIN — ACETAMINOPHEN 650 MG: 325 TABLET, FILM COATED ORAL at 08:12

## 2020-04-25 RX ADMIN — MIDAZOLAM HYDROCHLORIDE 2 MG: 1 INJECTION, SOLUTION INTRAMUSCULAR; INTRAVENOUS at 15:30

## 2020-04-25 RX ADMIN — RISPERIDONE 1 MG: 1 SOLUTION ORAL at 08:12

## 2020-04-25 RX ADMIN — LIDOCAINE HYDROCHLORIDE AND EPINEPHRINE 20 ML: 10; 10 INJECTION, SOLUTION INFILTRATION; PERINEURAL at 15:16

## 2020-04-25 RX ADMIN — MIDAZOLAM HYDROCHLORIDE 2 MG: 1 INJECTION, SOLUTION INTRAMUSCULAR; INTRAVENOUS at 15:10

## 2020-04-25 RX ADMIN — SENNOSIDES AND DOCUSATE SODIUM 2 TABLET: 8.6; 5 TABLET ORAL at 20:47

## 2020-04-25 RX ADMIN — OXYCODONE HYDROCHLORIDE 7.5 MG: 5 SOLUTION ORAL at 20:46

## 2020-04-25 RX ADMIN — FENTANYL CITRATE 50 MCG: 50 INJECTION, SOLUTION INTRAMUSCULAR; INTRAVENOUS at 00:24

## 2020-04-25 RX ADMIN — SENNOSIDES AND DOCUSATE SODIUM 2 TABLET: 8.6; 5 TABLET ORAL at 08:11

## 2020-04-25 RX ADMIN — PIPERACILLIN AND TAZOBACTAM 3.38 G: 3; .375 INJECTION, POWDER, FOR SOLUTION INTRAVENOUS at 23:52

## 2020-04-25 RX ADMIN — PIPERACILLIN AND TAZOBACTAM 3.38 G: 3; .375 INJECTION, POWDER, FOR SOLUTION INTRAVENOUS at 13:29

## 2020-04-25 RX ADMIN — CHLORHEXIDINE GLUCONATE 0.12% ORAL RINSE 15 ML: 1.2 LIQUID ORAL at 08:11

## 2020-04-25 RX ADMIN — LORAZEPAM 1 MG: 2 SOLUTION, CONCENTRATE ORAL at 22:24

## 2020-04-25 RX ADMIN — POLYETHYLENE GLYCOL 3350 17 G: 17 POWDER, FOR SOLUTION ORAL at 20:47

## 2020-04-25 RX ADMIN — Medication 100 MCG/HR: at 20:52

## 2020-04-25 RX ADMIN — MIDAZOLAM HYDROCHLORIDE 2 MG: 1 INJECTION INTRAMUSCULAR; INTRAVENOUS at 15:10

## 2020-04-25 RX ADMIN — ACETAMINOPHEN 650 MG: 325 TABLET, FILM COATED ORAL at 22:24

## 2020-04-25 RX ADMIN — MIDAZOLAM 8 MG: 1 INJECTION INTRAMUSCULAR; INTRAVENOUS at 10:28

## 2020-04-25 RX ADMIN — RISPERIDONE 1 MG: 1 SOLUTION ORAL at 22:25

## 2020-04-25 RX ADMIN — CHLORHEXIDINE GLUCONATE 0.12% ORAL RINSE 15 ML: 1.2 LIQUID ORAL at 20:47

## 2020-04-25 RX ADMIN — MIDAZOLAM HYDROCHLORIDE 2 MG: 1 INJECTION INTRAMUSCULAR; INTRAVENOUS at 15:30

## 2020-04-25 RX ADMIN — OXYCODONE HYDROCHLORIDE 7.5 MG: 5 SOLUTION ORAL at 08:11

## 2020-04-25 ASSESSMENT — PAIN SCALES - GENERAL
PAINLEVEL_OUTOF10: 0
PAINLEVEL_OUTOF10: 3
PAINLEVEL_OUTOF10: 0
PAINLEVEL_OUTOF10: 3
PAINLEVEL_OUTOF10: 0

## 2020-04-25 ASSESSMENT — PULMONARY FUNCTION TESTS
PIF_VALUE: 26
PIF_VALUE: 26
PIF_VALUE: 25
PIF_VALUE: 25
PIF_VALUE: 28
PIF_VALUE: 25
PIF_VALUE: 25
PIF_VALUE: 26
PIF_VALUE: 25
PIF_VALUE: 37
PIF_VALUE: 26
PIF_VALUE: 24
PIF_VALUE: 26
PIF_VALUE: 27
PIF_VALUE: 27
PIF_VALUE: 32
PIF_VALUE: 26
PIF_VALUE: 28
PIF_VALUE: 24
PIF_VALUE: 28
PIF_VALUE: 23
PIF_VALUE: 27
PIF_VALUE: 27
PIF_VALUE: 23
PIF_VALUE: 25
PIF_VALUE: 26

## 2020-04-25 NOTE — PLAN OF CARE
This Shift  Goal: Control of acute pain  Description: Control of acute pain  4/25/2020 0044 by Khadijah Madrid RN  Outcome: Met This Shift  4/24/2020 2245 by Milady Benjamin RN  Outcome: Met This Shift  4/24/2020 1625 by Magno Huertas RN  Outcome: Met This Shift  4/24/2020 1117 by Magno Huertas RN  Outcome: Met This Shift  Goal: Control of chronic pain  Description: Control of chronic pain  Outcome: Met This Shift     Problem: Discharge Planning:  Goal: Participates in care planning  Description: Participates in care planning  Outcome: Not Met This Shift  Goal: Discharged to appropriate level of care  Description: Discharged to appropriate level of care  Outcome: Not Met This Shift     Problem: Airway Clearance - Ineffective:  Goal: Ability to maintain a clear airway will improve  Description: Ability to maintain a clear airway will improve  4/25/2020 0044 by Khadijah Madrid RN  Outcome: Not Met This Shift  4/24/2020 2245 by Milady Benjamin RN  Outcome: Not Met This Shift  4/24/2020 1625 by Magno Huertas RN  Outcome: Met This Shift  4/24/2020 1117 by Magno Huertas RN  Outcome: Met This Shift     Problem: Anxiety/Stress:  Goal: Level of anxiety will decrease  Description: Level of anxiety will decrease  4/25/2020 0044 by Khadijah Madrid RN  Outcome: Met This Shift  4/24/2020 2245 by Milady Benjamin RN  Outcome: Not Met This Shift  4/24/2020 1625 by Magno Huertas RN  Outcome: Met This Shift  4/24/2020 1117 by Magno Huertas RN  Outcome: Met This Shift     Problem: Aspiration:  Goal: Absence of aspiration  Description: Absence of aspiration  Outcome: Met This Shift     Problem: Fluid Volume - Imbalance:  Goal: Absence of imbalanced fluid volume signs and symptoms  Description: Absence of imbalanced fluid volume signs and symptoms  4/25/2020 0044 by Khadijah Madrid RN  Outcome: Met This Shift  4/24/2020 1625 by Magno Huertas RN  Outcome: Met This Shift  4/24/2020 1117 by Gaetano Aranda RN  Outcome: Met This Shift     Problem: Skin Integrity - Impaired:  Goal: Will show no infection signs and symptoms  Description: Will show no infection signs and symptoms  4/25/2020 0044 by Melissa Domínguez RN  Outcome: Met This Shift  4/24/2020 1625 by Gaetano Aranda RN  Outcome: Met This Shift  4/24/2020 1117 by Gaetano Aranda RN  Outcome: Met This Shift     Problem: Infection:  Goal: Will remain free from infection  Description: Will remain free from infection  4/25/2020 0044 by Melissa Domínguze RN  Outcome: Met This Shift  4/24/2020 1625 by Gaetano Aranda RN  Outcome: Met This Shift  4/24/2020 1117 by Gaetano Aranda RN  Outcome: Met This Shift     Problem: Daily Care:  Goal: Daily care needs are met  Description: Daily care needs are met  4/25/2020 0044 by Melissa Domínguez RN  Outcome: Met This Shift  4/24/2020 1625 by Gaetano Aranda RN  Outcome: Met This Shift     Problem: Restraint Use - Nonviolent/Non-Self-Destructive Behavior:  Goal: Absence of restraint indications  Description: Absence of restraint indications  4/25/2020 0044 by Meilssa Domínguez RN  Outcome: Not Met This Shift  4/24/2020 2245 by Giorgio Mesa RN  Outcome: Not Met This Shift  4/24/2020 1625 by Gaetano Aranda RN  Outcome: Not Met This Shift  4/24/2020 1117 by Gaetano Aranda RN  Outcome: Not Met This Shift  Goal: Absence of restraint-related injury  Description: Absence of restraint-related injury  4/25/2020 0044 by Melissa Domínguez RN  Outcome: Met This Shift  4/24/2020 2245 by Giorgio Mesa RN  Outcome: Met This Shift  4/24/2020 1625 by Gaetano Aranda RN  Outcome: Met This Shift  4/24/2020 1117 by Gaetano Aranda RN  Outcome: Met This Shift

## 2020-04-25 NOTE — PLAN OF CARE
Problem: Falls - Risk of:  Goal: Will remain free from falls  Description: Will remain free from falls  4/24/2020 2245 by Jefry Centeno RN  Outcome: Met This Shift     Problem: Falls - Risk of:  Goal: Absence of physical injury  Description: Absence of physical injury  4/24/2020 2245 by Jefry Centeno RN  Outcome: Met This Shift     Problem: Pain:  Goal: Pain level will decrease  Description: Pain level will decrease  4/24/2020 2245 by Jefry Centeno RN  Outcome: Met This Shift     Problem: Pain:  Goal: Control of acute pain  Description: Control of acute pain  4/24/2020 2245 by Jefry Centeno RN  Outcome: Met This Shift     Problem: Restraint Use - Nonviolent/Non-Self-Destructive Behavior:  Goal: Absence of restraint-related injury  Description: Absence of restraint-related injury  4/24/2020 2245 by Jefry Centeno RN  Outcome: Met This Shift     Problem: Airway Clearance - Ineffective:  Goal: Ability to maintain a clear airway will improve  Description: Ability to maintain a clear airway will improve  4/24/2020 2245 by Jefry Centeno RN  Outcome: Not Met This Shift     Problem: Anxiety/Stress:  Goal: Level of anxiety will decrease  Description: Level of anxiety will decrease  4/24/2020 2245 by Jefry Centeno RN  Outcome: Not Met This Shift     Problem: Restraint Use - Nonviolent/Non-Self-Destructive Behavior:  Goal: Absence of restraint indications  Description: Absence of restraint indications  4/24/2020 2245 by Jefry Centeno RN  Outcome: Not Met This Shift

## 2020-04-25 NOTE — FLOWSHEET NOTE
Pt becomes agitated at times and reached for trach and other critical medical equipment. Bilateral soft wist restraints remain in place for pt safety.

## 2020-04-25 NOTE — PROCEDURES
Bronchoscopy Procedure Note   Date of Operation: 4/25/2020     Pre-op Diagnosis: Hypoxia, respiratory failure, mucus plug    Post-op Diagnosis: Same    Surgeon: Dr. Malou Natarajan    Assistants: Aggie Keane PGY2    Anesthesia: [facility]    Operation: Flexible fiberoptic bronchoscopy, therapeutic with aspiration of mucous plug    Findings: R mainstem plugging    Specimen: RUL    Complications: none       Description of Procedure: The correct patient was identified and the procedure verified as Flexible Fiberoptic Bronchoscopy. A Time Out was held and the above information confirmed. Within the SICU, the patient was sedated with versed and fentanyl. The bronchoscope was inserted through the patients ETT. The bronchoscope was passed through the tracheostomy tube, which was noted to be in good position. First the right main stem brochus was viewed. The mainstem was plugged with thick purulent and bloody secretions. There were a moderate amount of thick secretions which were suctioned until clear. Irrigation with saline was undertaken to thin out secretions. The scope was slowly withdrawn and passed into the left mainstem bronchus. There were minimal secretions. The bronchoscope was completely withdrawn. The patient tolerated the procedure well with no issues with saturations.      Plan: Abx, Culture     Dr. Malou Natarajan was present for procedure    Electronically signed by Trina Larsen DO on 4/25/2020 at 10:32 AM    Nathalie Louise MD

## 2020-04-25 NOTE — PLAN OF CARE
Problem: Falls - Risk of:  Goal: Will remain free from falls  Description: Will remain free from falls  Outcome: Met This Shift  Goal: Absence of physical injury  Description: Absence of physical injury  Outcome: Met This Shift     Problem: Pain:  Goal: Pain level will decrease  Description: Pain level will decrease  Outcome: Met This Shift  Goal: Control of acute pain  Description: Control of acute pain  Outcome: Met This Shift     Problem: Discharge Planning:  Goal: Participates in care planning  Description: Participates in care planning  Outcome: Met This Shift     Problem: Airway Clearance - Ineffective:  Goal: Ability to maintain a clear airway will improve  Description: Ability to maintain a clear airway will improve  Outcome: Met This Shift     Problem: Anxiety/Stress:  Goal: Level of anxiety will decrease  Description: Level of anxiety will decrease  Outcome: Met This Shift     Problem: Aspiration:  Goal: Absence of aspiration  Description: Absence of aspiration  Outcome: Met This Shift     Problem: Cardiac Output - Decreased:  Goal: Hemodynamic stability will improve  Description: Hemodynamic stability will improve  Outcome: Met This Shift     Problem: Fluid Volume - Imbalance:  Goal: Absence of imbalanced fluid volume signs and symptoms  Description: Absence of imbalanced fluid volume signs and symptoms  Outcome: Met This Shift     Problem: Gas Exchange - Impaired:  Goal: Levels of oxygenation will improve  Description: Levels of oxygenation will improve  Outcome: Met This Shift     Problem: Skin Integrity - Impaired:  Goal: Will show no infection signs and symptoms  Description: Will show no infection signs and symptoms  Outcome: Met This Shift  Goal: Absence of new skin breakdown  Description: Absence of new skin breakdown  Outcome: Met This Shift     Problem: Infection:  Goal: Will remain free from infection  Description: Will remain free from infection  Outcome: Met This Shift     Problem: Daily Care:  Goal: Daily care needs are met  Description: Daily care needs are met  Outcome: Met This Shift     Problem: Restraint Use - Nonviolent/Non-Self-Destructive Behavior:  Goal: Absence of restraint-related injury  Description: Absence of restraint-related injury  Outcome: Met This Shift     Problem: Restraint Use - Nonviolent/Non-Self-Destructive Behavior:  Goal: Absence of restraint indications  Description: Absence of restraint indications  Outcome: Not Met This Shift

## 2020-04-26 ENCOUNTER — APPOINTMENT (OUTPATIENT)
Dept: CT IMAGING | Age: 25
DRG: 004 | End: 2020-04-26
Payer: MEDICAID

## 2020-04-26 ENCOUNTER — APPOINTMENT (OUTPATIENT)
Dept: GENERAL RADIOLOGY | Age: 25
DRG: 004 | End: 2020-04-26
Payer: MEDICAID

## 2020-04-26 LAB
AADO2: 287.6 MMHG
ALBUMIN SERPL-MCNC: 2.5 G/DL (ref 3.5–5.2)
ALP BLD-CCNC: 40 U/L (ref 40–129)
ALT SERPL-CCNC: 24 U/L (ref 0–40)
ANION GAP SERPL CALCULATED.3IONS-SCNC: 10 MMOL/L (ref 7–16)
AST SERPL-CCNC: 46 U/L (ref 0–39)
B.E.: 2.5 MMOL/L (ref -3–3)
BASOPHILS ABSOLUTE: 0.01 E9/L (ref 0–0.2)
BASOPHILS RELATIVE PERCENT: 0.1 % (ref 0–2)
BILIRUB SERPL-MCNC: 0.4 MG/DL (ref 0–1.2)
BUN BLDV-MCNC: 15 MG/DL (ref 6–20)
CALCIUM IONIZED: 1.18 MMOL/L (ref 1.15–1.33)
CALCIUM SERPL-MCNC: 7.8 MG/DL (ref 8.6–10.2)
CHLORIDE BLD-SCNC: 100 MMOL/L (ref 98–107)
CO2: 25 MMOL/L (ref 22–29)
COHB: 0.5 % (ref 0–1.5)
CREAT SERPL-MCNC: 1 MG/DL (ref 0.7–1.2)
CRITICAL: ABNORMAL
CULTURE, RESPIRATORY: ABNORMAL
CULTURE, RESPIRATORY: ABNORMAL
DATE ANALYZED: ABNORMAL
DATE OF COLLECTION: ABNORMAL
EOSINOPHILS ABSOLUTE: 0.06 E9/L (ref 0.05–0.5)
EOSINOPHILS RELATIVE PERCENT: 0.5 % (ref 0–6)
FIO2: 60 %
GFR AFRICAN AMERICAN: >60
GFR NON-AFRICAN AMERICAN: >60 ML/MIN/1.73
GLUCOSE BLD-MCNC: 119 MG/DL (ref 74–99)
HCO3: 25.9 MMOL/L (ref 22–26)
HCT VFR BLD CALC: 24.6 % (ref 37–54)
HEMOGLOBIN: 8.2 G/DL (ref 12.5–16.5)
HHB: 3.4 % (ref 0–5)
IMMATURE GRANULOCYTES #: 0.04 E9/L
IMMATURE GRANULOCYTES %: 0.3 % (ref 0–5)
LAB: ABNORMAL
LYMPHOCYTES ABSOLUTE: 1.87 E9/L (ref 1.5–4)
LYMPHOCYTES RELATIVE PERCENT: 15.2 % (ref 20–42)
Lab: ABNORMAL
MAGNESIUM: 1.8 MG/DL (ref 1.6–2.6)
MCH RBC QN AUTO: 29.5 PG (ref 26–35)
MCHC RBC AUTO-ENTMCNC: 33.3 % (ref 32–34.5)
MCV RBC AUTO: 88.5 FL (ref 80–99.9)
METHB: 0.1 % (ref 0–1.5)
MODE: AC
MONOCYTES ABSOLUTE: 0.95 E9/L (ref 0.1–0.95)
MONOCYTES RELATIVE PERCENT: 7.7 % (ref 2–12)
NEUTROPHILS ABSOLUTE: 9.35 E9/L (ref 1.8–7.3)
NEUTROPHILS RELATIVE PERCENT: 76.2 % (ref 43–80)
O2 CONTENT: 13.1 ML/DL
O2 SATURATION: 96.6 % (ref 92–98.5)
O2HB: 96 % (ref 94–97)
OPERATOR ID: 2577
ORGANISM: ABNORMAL
PATIENT TEMP: 37 C
PCO2: 35.2 MMHG (ref 35–45)
PDW BLD-RTO: 14.1 FL (ref 11.5–15)
PEEP/CPAP: 10 CMH2O
PFO2: 1.44 MMHG/%
PH BLOOD GAS: 7.48 (ref 7.35–7.45)
PHOSPHORUS: 2.4 MG/DL (ref 2.5–4.5)
PLATELET # BLD: 175 E9/L (ref 130–450)
PMV BLD AUTO: 10.1 FL (ref 7–12)
PO2: 86.5 MMHG (ref 75–100)
POTASSIUM SERPL-SCNC: 3.6 MMOL/L (ref 3.5–5)
RBC # BLD: 2.78 E12/L (ref 3.8–5.8)
RI(T): 3.32
RR MECHANICAL: 16 B/MIN
SMEAR, RESPIRATORY: ABNORMAL
SODIUM BLD-SCNC: 135 MMOL/L (ref 132–146)
SOURCE, BLOOD GAS: ABNORMAL
THB: 9.6 G/DL (ref 11.5–16.5)
TIME ANALYZED: 435
TOTAL PROTEIN: 5.3 G/DL (ref 6.4–8.3)
VT MECHANICAL: 500 ML
WBC # BLD: 12.3 E9/L (ref 4.5–11.5)

## 2020-04-26 PROCEDURE — 6360000002 HC RX W HCPCS: Performed by: SURGERY

## 2020-04-26 PROCEDURE — 82805 BLOOD GASES W/O2 SATURATION: CPT

## 2020-04-26 PROCEDURE — 6370000000 HC RX 637 (ALT 250 FOR IP): Performed by: STUDENT IN AN ORGANIZED HEALTH CARE EDUCATION/TRAINING PROGRAM

## 2020-04-26 PROCEDURE — 84100 ASSAY OF PHOSPHORUS: CPT

## 2020-04-26 PROCEDURE — 36620 INSERTION CATHETER ARTERY: CPT

## 2020-04-26 PROCEDURE — 2000000000 HC ICU R&B

## 2020-04-26 PROCEDURE — 36415 COLL VENOUS BLD VENIPUNCTURE: CPT

## 2020-04-26 PROCEDURE — 99291 CRITICAL CARE FIRST HOUR: CPT | Performed by: SURGERY

## 2020-04-26 PROCEDURE — 2500000003 HC RX 250 WO HCPCS: Performed by: STUDENT IN AN ORGANIZED HEALTH CARE EDUCATION/TRAINING PROGRAM

## 2020-04-26 PROCEDURE — 71250 CT THORAX DX C-: CPT

## 2020-04-26 PROCEDURE — 83735 ASSAY OF MAGNESIUM: CPT

## 2020-04-26 PROCEDURE — 6360000002 HC RX W HCPCS: Performed by: STUDENT IN AN ORGANIZED HEALTH CARE EDUCATION/TRAINING PROGRAM

## 2020-04-26 PROCEDURE — 80053 COMPREHEN METABOLIC PANEL: CPT

## 2020-04-26 PROCEDURE — 71045 X-RAY EXAM CHEST 1 VIEW: CPT

## 2020-04-26 PROCEDURE — 85025 COMPLETE CBC W/AUTO DIFF WBC: CPT

## 2020-04-26 PROCEDURE — 31502 CHANGE OF WINDPIPE AIRWAY: CPT

## 2020-04-26 PROCEDURE — 82330 ASSAY OF CALCIUM: CPT

## 2020-04-26 PROCEDURE — 2580000003 HC RX 258: Performed by: STUDENT IN AN ORGANIZED HEALTH CARE EDUCATION/TRAINING PROGRAM

## 2020-04-26 PROCEDURE — 2580000003 HC RX 258: Performed by: SURGERY

## 2020-04-26 PROCEDURE — 94003 VENT MGMT INPAT SUBQ DAY: CPT

## 2020-04-26 RX ORDER — BISACODYL 10 MG
10 SUPPOSITORY, RECTAL RECTAL DAILY
Status: DISCONTINUED | OUTPATIENT
Start: 2020-04-26 | End: 2020-04-26

## 2020-04-26 RX ORDER — DOCUSATE SODIUM 50 MG/5ML
100 LIQUID ORAL 2 TIMES DAILY
Status: DISCONTINUED | OUTPATIENT
Start: 2020-04-26 | End: 2020-05-02

## 2020-04-26 RX ORDER — BISACODYL 10 MG
10 SUPPOSITORY, RECTAL RECTAL EVERY 6 HOURS
Status: DISCONTINUED | OUTPATIENT
Start: 2020-04-26 | End: 2020-04-27

## 2020-04-26 RX ORDER — MAGNESIUM SULFATE IN WATER 40 MG/ML
2 INJECTION, SOLUTION INTRAVENOUS ONCE
Status: COMPLETED | OUTPATIENT
Start: 2020-04-26 | End: 2020-04-26

## 2020-04-26 RX ORDER — OXYCODONE HCL 5 MG/5 ML
10 SOLUTION, ORAL ORAL
Status: DISCONTINUED | OUTPATIENT
Start: 2020-04-26 | End: 2020-04-29

## 2020-04-26 RX ADMIN — POTASSIUM BICARBONATE 40 MEQ: 782 TABLET, EFFERVESCENT ORAL at 08:44

## 2020-04-26 RX ADMIN — CHLORHEXIDINE GLUCONATE 0.12% ORAL RINSE 15 ML: 1.2 LIQUID ORAL at 20:50

## 2020-04-26 RX ADMIN — BISACODYL 10 MG: 10 SUPPOSITORY RECTAL at 20:07

## 2020-04-26 RX ADMIN — CEFTRIAXONE SODIUM 2 G: 2 INJECTION, POWDER, FOR SOLUTION INTRAMUSCULAR; INTRAVENOUS at 09:32

## 2020-04-26 RX ADMIN — DOCUSATE SODIUM 100 MG: 50 LIQUID ORAL at 21:00

## 2020-04-26 RX ADMIN — LORAZEPAM 1 MG: 2 SOLUTION, CONCENTRATE ORAL at 18:26

## 2020-04-26 RX ADMIN — MAGNESIUM HYDROXIDE 30 ML: 2400 SUSPENSION ORAL at 08:22

## 2020-04-26 RX ADMIN — CHLORHEXIDINE GLUCONATE 0.12% ORAL RINSE 15 ML: 1.2 LIQUID ORAL at 08:20

## 2020-04-26 RX ADMIN — BISACODYL 10 MG: 10 SUPPOSITORY RECTAL at 08:20

## 2020-04-26 RX ADMIN — OXYCODONE HYDROCHLORIDE 7.5 MG: 5 SOLUTION ORAL at 04:09

## 2020-04-26 RX ADMIN — MAGNESIUM SULFATE HEPTAHYDRATE 2 G: 40 INJECTION, SOLUTION INTRAVENOUS at 08:44

## 2020-04-26 RX ADMIN — ACETAMINOPHEN 650 MG: 325 TABLET, FILM COATED ORAL at 08:20

## 2020-04-26 RX ADMIN — RISPERIDONE 1 MG: 1 SOLUTION ORAL at 20:57

## 2020-04-26 RX ADMIN — OXYCODONE HYDROCHLORIDE 10 MG: 5 SOLUTION ORAL at 20:07

## 2020-04-26 RX ADMIN — Medication 100 MCG/HR: at 06:54

## 2020-04-26 RX ADMIN — POLYETHYLENE GLYCOL 3350 17 G: 17 POWDER, FOR SOLUTION ORAL at 08:21

## 2020-04-26 RX ADMIN — POLYETHYLENE GLYCOL 3350 17 G: 17 POWDER, FOR SOLUTION ORAL at 20:55

## 2020-04-26 RX ADMIN — LORAZEPAM 1 MG: 2 SOLUTION, CONCENTRATE ORAL at 11:41

## 2020-04-26 RX ADMIN — SODIUM CHLORIDE: 9 INJECTION, SOLUTION INTRAVENOUS at 17:57

## 2020-04-26 RX ADMIN — LORAZEPAM 1 MG: 2 SOLUTION, CONCENTRATE ORAL at 06:18

## 2020-04-26 RX ADMIN — FAMOTIDINE 20 MG: 20 TABLET, FILM COATED ORAL at 20:55

## 2020-04-26 RX ADMIN — SENNOSIDES AND DOCUSATE SODIUM 2 TABLET: 8.6; 5 TABLET ORAL at 20:56

## 2020-04-26 RX ADMIN — ENOXAPARIN SODIUM 30 MG: 30 INJECTION SUBCUTANEOUS at 20:55

## 2020-04-26 RX ADMIN — RISPERIDONE 1 MG: 1 SOLUTION ORAL at 08:20

## 2020-04-26 RX ADMIN — OXYCODONE HYDROCHLORIDE 10 MG: 5 SOLUTION ORAL at 11:41

## 2020-04-26 RX ADMIN — ACETAMINOPHEN 650 MG: 325 TABLET, FILM COATED ORAL at 20:07

## 2020-04-26 RX ADMIN — SODIUM CHLORIDE: 9 INJECTION, SOLUTION INTRAVENOUS at 01:30

## 2020-04-26 RX ADMIN — BISACODYL 10 MG: 10 SUPPOSITORY RECTAL at 14:06

## 2020-04-26 RX ADMIN — DOCUSATE SODIUM 100 MG: 50 LIQUID ORAL at 08:20

## 2020-04-26 RX ADMIN — OXYCODONE HYDROCHLORIDE 7.5 MG: 5 SOLUTION ORAL at 08:20

## 2020-04-26 RX ADMIN — PIPERACILLIN AND TAZOBACTAM 3.38 G: 3; .375 INJECTION, POWDER, FOR SOLUTION INTRAVENOUS at 06:18

## 2020-04-26 RX ADMIN — SENNOSIDES AND DOCUSATE SODIUM 2 TABLET: 8.6; 5 TABLET ORAL at 08:21

## 2020-04-26 RX ADMIN — OXYCODONE HYDROCHLORIDE 7.5 MG: 5 SOLUTION ORAL at 01:30

## 2020-04-26 RX ADMIN — OXYCODONE HYDROCHLORIDE 10 MG: 5 SOLUTION ORAL at 16:16

## 2020-04-26 RX ADMIN — ACETAMINOPHEN 650 MG: 325 TABLET, FILM COATED ORAL at 14:06

## 2020-04-26 RX ADMIN — ENOXAPARIN SODIUM 30 MG: 30 INJECTION SUBCUTANEOUS at 08:20

## 2020-04-26 RX ADMIN — FAMOTIDINE 20 MG: 20 TABLET, FILM COATED ORAL at 08:20

## 2020-04-26 ASSESSMENT — PULMONARY FUNCTION TESTS
PIF_VALUE: 26
PIF_VALUE: 27
PIF_VALUE: 25
PIF_VALUE: 25
PIF_VALUE: 24
PIF_VALUE: 27
PIF_VALUE: 26
PIF_VALUE: 26
PIF_VALUE: 25
PIF_VALUE: 29
PIF_VALUE: 24
PIF_VALUE: 27
PIF_VALUE: 25
PIF_VALUE: 22
PIF_VALUE: 24
PIF_VALUE: 22
PIF_VALUE: 26
PIF_VALUE: 25
PIF_VALUE: 28
PIF_VALUE: 26
PIF_VALUE: 28
PIF_VALUE: 28
PIF_VALUE: 24
PIF_VALUE: 26

## 2020-04-26 ASSESSMENT — PAIN SCALES - GENERAL
PAINLEVEL_OUTOF10: 4
PAINLEVEL_OUTOF10: 0

## 2020-04-26 NOTE — FLOWSHEET NOTE
Pt frequently agitated. Attempts to reach for trach and other critical medical lines and equipment. Multiple attempts made to reorient/redirect/educate unsuccessful. Pt at high risk for self harm. Will continue bilateral soft wrist restraints at this time for pt safety. Will continue to monitor.
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
## 2020-04-27 ENCOUNTER — APPOINTMENT (OUTPATIENT)
Dept: GENERAL RADIOLOGY | Age: 25
DRG: 004 | End: 2020-04-27
Payer: MEDICAID

## 2020-04-27 LAB
AADO2: 272.3 MMHG
AADO2: 561.9 MMHG
ALBUMIN SERPL-MCNC: 2.7 G/DL (ref 3.5–5.2)
ALP BLD-CCNC: 50 U/L (ref 40–129)
ALT SERPL-CCNC: 27 U/L (ref 0–40)
ANION GAP SERPL CALCULATED.3IONS-SCNC: 10 MMOL/L (ref 7–16)
AST SERPL-CCNC: 42 U/L (ref 0–39)
B.E.: 1.4 MMOL/L (ref -3–3)
B.E.: 2 MMOL/L (ref -3–3)
BASOPHILS ABSOLUTE: 0.02 E9/L (ref 0–0.2)
BASOPHILS RELATIVE PERCENT: 0.1 % (ref 0–2)
BILIRUB SERPL-MCNC: 0.5 MG/DL (ref 0–1.2)
BUN BLDV-MCNC: 15 MG/DL (ref 6–20)
CALCIUM IONIZED: 1.22 MMOL/L (ref 1.15–1.33)
CALCIUM SERPL-MCNC: 8.4 MG/DL (ref 8.6–10.2)
CHLORIDE BLD-SCNC: 96 MMOL/L (ref 98–107)
CO2: 25 MMOL/L (ref 22–29)
COHB: 0.3 % (ref 0–1.5)
COHB: 0.3 % (ref 0–1.5)
CREAT SERPL-MCNC: 0.8 MG/DL (ref 0.7–1.2)
CRITICAL: ABNORMAL
CRITICAL: ABNORMAL
DATE ANALYZED: ABNORMAL
DATE ANALYZED: ABNORMAL
DATE OF COLLECTION: ABNORMAL
DATE OF COLLECTION: ABNORMAL
EOSINOPHILS ABSOLUTE: 0.08 E9/L (ref 0.05–0.5)
EOSINOPHILS RELATIVE PERCENT: 0.5 % (ref 0–6)
FIO2: 100 %
FIO2: 70 %
GFR AFRICAN AMERICAN: >60
GFR NON-AFRICAN AMERICAN: >60 ML/MIN/1.73
GLUCOSE BLD-MCNC: 129 MG/DL (ref 74–99)
HCO3: 24.6 MMOL/L (ref 22–26)
HCO3: 25.1 MMOL/L (ref 22–26)
HCT VFR BLD CALC: 27.7 % (ref 37–54)
HEMOGLOBIN: 9.2 G/DL (ref 12.5–16.5)
HHB: 1.2 % (ref 0–5)
HHB: 2.9 % (ref 0–5)
IMMATURE GRANULOCYTES #: 0.16 E9/L
IMMATURE GRANULOCYTES %: 1 % (ref 0–5)
LAB: ABNORMAL
LAB: ABNORMAL
LYMPHOCYTES ABSOLUTE: 1.03 E9/L (ref 1.5–4)
LYMPHOCYTES RELATIVE PERCENT: 6.4 % (ref 20–42)
Lab: ABNORMAL
Lab: ABNORMAL
MAGNESIUM: 2 MG/DL (ref 1.6–2.6)
MCH RBC QN AUTO: 29.5 PG (ref 26–35)
MCHC RBC AUTO-ENTMCNC: 33.2 % (ref 32–34.5)
MCV RBC AUTO: 88.8 FL (ref 80–99.9)
METHB: 0.3 % (ref 0–1.5)
METHB: 0.4 % (ref 0–1.5)
MODE: AC
MODE: AC
MONOCYTES ABSOLUTE: 1.05 E9/L (ref 0.1–0.95)
MONOCYTES RELATIVE PERCENT: 6.5 % (ref 2–12)
NEUTROPHILS ABSOLUTE: 13.79 E9/L (ref 1.8–7.3)
NEUTROPHILS RELATIVE PERCENT: 85.5 % (ref 43–80)
O2 CONTENT: 13.8 ML/DL
O2 CONTENT: 14.7 ML/DL
O2 SATURATION: 97.1 % (ref 92–98.5)
O2 SATURATION: 98.8 % (ref 92–98.5)
O2HB: 96.4 % (ref 94–97)
O2HB: 98.2 % (ref 94–97)
OPERATOR ID: 2577
OPERATOR ID: 2577
PATIENT TEMP: 37 C
PATIENT TEMP: 37 C
PCO2: 33.3 MMHG (ref 35–45)
PCO2: 33.7 MMHG (ref 35–45)
PDW BLD-RTO: 13.8 FL (ref 11.5–15)
PEEP/CPAP: 10 CMH2O
PEEP/CPAP: 10 CMH2O
PFO2: 0.93 MMHG/%
PFO2: 2.47 MMHG/%
PH BLOOD GAS: 7.49 (ref 7.35–7.45)
PH BLOOD GAS: 7.49 (ref 7.35–7.45)
PHOSPHORUS: 3.7 MG/DL (ref 2.5–4.5)
PLATELET # BLD: 215 E9/L (ref 130–450)
PMV BLD AUTO: 9.6 FL (ref 7–12)
PO2: 173.1 MMHG (ref 75–100)
PO2: 92.8 MMHG (ref 75–100)
POTASSIUM SERPL-SCNC: 4.6 MMOL/L (ref 3.5–5)
RBC # BLD: 3.12 E12/L (ref 3.8–5.8)
RI(T): 1.57
RI(T): 6.06
RR MECHANICAL: 16 B/MIN
RR MECHANICAL: 18 B/MIN
SODIUM BLD-SCNC: 131 MMOL/L (ref 132–146)
SOURCE, BLOOD GAS: ABNORMAL
SOURCE, BLOOD GAS: ABNORMAL
THB: 10.1 G/DL (ref 11.5–16.5)
THB: 10.4 G/DL (ref 11.5–16.5)
TIME ANALYZED: 25
TIME ANALYZED: 432
TOTAL PROTEIN: 6.1 G/DL (ref 6.4–8.3)
VT MECHANICAL: 500 ML
VT MECHANICAL: 500 ML
WBC # BLD: 16.1 E9/L (ref 4.5–11.5)

## 2020-04-27 PROCEDURE — 80053 COMPREHEN METABOLIC PANEL: CPT

## 2020-04-27 PROCEDURE — 82805 BLOOD GASES W/O2 SATURATION: CPT

## 2020-04-27 PROCEDURE — 6360000002 HC RX W HCPCS: Performed by: STUDENT IN AN ORGANIZED HEALTH CARE EDUCATION/TRAINING PROGRAM

## 2020-04-27 PROCEDURE — 6370000000 HC RX 637 (ALT 250 FOR IP): Performed by: STUDENT IN AN ORGANIZED HEALTH CARE EDUCATION/TRAINING PROGRAM

## 2020-04-27 PROCEDURE — 99152 MOD SED SAME PHYS/QHP 5/>YRS: CPT | Performed by: SURGERY

## 2020-04-27 PROCEDURE — 99291 CRITICAL CARE FIRST HOUR: CPT | Performed by: SURGERY

## 2020-04-27 PROCEDURE — 2709999900 HC NON-CHARGEABLE SUPPLY: Performed by: SURGERY

## 2020-04-27 PROCEDURE — 84100 ASSAY OF PHOSPHORUS: CPT

## 2020-04-27 PROCEDURE — 31646 BRNCHSC W/THER ASPIR SBSQ: CPT | Performed by: SURGERY

## 2020-04-27 PROCEDURE — 94003 VENT MGMT INPAT SUBQ DAY: CPT

## 2020-04-27 PROCEDURE — 2000000000 HC ICU R&B

## 2020-04-27 PROCEDURE — 71045 X-RAY EXAM CHEST 1 VIEW: CPT

## 2020-04-27 PROCEDURE — 85025 COMPLETE CBC W/AUTO DIFF WBC: CPT

## 2020-04-27 PROCEDURE — 0BC68ZZ EXTIRPATION OF MATTER FROM RIGHT LOWER LOBE BRONCHUS, VIA NATURAL OR ARTIFICIAL OPENING ENDOSCOPIC: ICD-10-PCS | Performed by: SURGERY

## 2020-04-27 PROCEDURE — 82330 ASSAY OF CALCIUM: CPT

## 2020-04-27 PROCEDURE — 2580000003 HC RX 258: Performed by: SURGERY

## 2020-04-27 PROCEDURE — 37799 UNLISTED PX VASCULAR SURGERY: CPT

## 2020-04-27 PROCEDURE — 83735 ASSAY OF MAGNESIUM: CPT

## 2020-04-27 PROCEDURE — 3609010900 HC BRONCHOSCOPY THERAPUTIC ASPIRATION INITIAL: Performed by: SURGERY

## 2020-04-27 PROCEDURE — 87040 BLOOD CULTURE FOR BACTERIA: CPT

## 2020-04-27 PROCEDURE — 36415 COLL VENOUS BLD VENIPUNCTURE: CPT

## 2020-04-27 RX ORDER — LANOLIN ALCOHOL/MO/W.PET/CERES
6 CREAM (GRAM) TOPICAL NIGHTLY
Status: DISCONTINUED | OUTPATIENT
Start: 2020-04-27 | End: 2020-04-27

## 2020-04-27 RX ORDER — MEPERIDINE HYDROCHLORIDE 25 MG/ML
50 INJECTION INTRAMUSCULAR; INTRAVENOUS; SUBCUTANEOUS EVERY 4 HOURS PRN
Status: DISCONTINUED | OUTPATIENT
Start: 2020-04-27 | End: 2020-04-30

## 2020-04-27 RX ORDER — FENTANYL CITRATE 50 UG/ML
100 INJECTION, SOLUTION INTRAMUSCULAR; INTRAVENOUS
Status: COMPLETED | OUTPATIENT
Start: 2020-04-27 | End: 2020-04-27

## 2020-04-27 RX ORDER — MIDAZOLAM HYDROCHLORIDE 1 MG/ML
4 INJECTION INTRAMUSCULAR; INTRAVENOUS
Status: COMPLETED | OUTPATIENT
Start: 2020-04-27 | End: 2020-04-27

## 2020-04-27 RX ORDER — LORAZEPAM 2 MG/ML
0.5 CONCENTRATE ORAL EVERY 6 HOURS SCHEDULED
Status: DISCONTINUED | OUTPATIENT
Start: 2020-04-27 | End: 2020-04-29

## 2020-04-27 RX ORDER — BISACODYL 10 MG
10 SUPPOSITORY, RECTAL RECTAL DAILY
Status: DISCONTINUED | OUTPATIENT
Start: 2020-04-28 | End: 2020-05-02

## 2020-04-27 RX ORDER — CEFAZOLIN SODIUM 2 G/50ML
2 SOLUTION INTRAVENOUS EVERY 8 HOURS
Status: DISCONTINUED | OUTPATIENT
Start: 2020-04-27 | End: 2020-04-28

## 2020-04-27 RX ORDER — LANOLIN ALCOHOL/MO/W.PET/CERES
6 CREAM (GRAM) TOPICAL NIGHTLY
Status: DISCONTINUED | OUTPATIENT
Start: 2020-04-27 | End: 2020-05-26 | Stop reason: HOSPADM

## 2020-04-27 RX ADMIN — MELATONIN 3 MG ORAL TABLET 6 MG: 3 TABLET ORAL at 03:01

## 2020-04-27 RX ADMIN — LORAZEPAM 0.5 MG: 2 CONCENTRATE ORAL at 11:37

## 2020-04-27 RX ADMIN — DOCUSATE SODIUM 100 MG: 50 LIQUID ORAL at 20:29

## 2020-04-27 RX ADMIN — OXYCODONE HYDROCHLORIDE 10 MG: 5 SOLUTION ORAL at 08:09

## 2020-04-27 RX ADMIN — SENNOSIDES AND DOCUSATE SODIUM 2 TABLET: 8.6; 5 TABLET ORAL at 08:09

## 2020-04-27 RX ADMIN — ENOXAPARIN SODIUM 30 MG: 30 INJECTION SUBCUTANEOUS at 20:28

## 2020-04-27 RX ADMIN — LORAZEPAM 0.5 MG: 2 CONCENTRATE ORAL at 23:59

## 2020-04-27 RX ADMIN — LORAZEPAM 1 MG: 2 SOLUTION, CONCENTRATE ORAL at 06:10

## 2020-04-27 RX ADMIN — LORAZEPAM 0.5 MG: 2 CONCENTRATE ORAL at 17:19

## 2020-04-27 RX ADMIN — OXYCODONE HYDROCHLORIDE 10 MG: 5 SOLUTION ORAL at 23:59

## 2020-04-27 RX ADMIN — RISPERIDONE 1 MG: 1 SOLUTION ORAL at 20:26

## 2020-04-27 RX ADMIN — MAGNESIUM HYDROXIDE 30 ML: 2400 SUSPENSION ORAL at 08:09

## 2020-04-27 RX ADMIN — FAMOTIDINE 20 MG: 20 TABLET, FILM COATED ORAL at 20:28

## 2020-04-27 RX ADMIN — Medication 1 ENEMA: at 13:41

## 2020-04-27 RX ADMIN — OXYCODONE HYDROCHLORIDE 10 MG: 5 SOLUTION ORAL at 00:12

## 2020-04-27 RX ADMIN — ACETAMINOPHEN 650 MG: 325 TABLET, FILM COATED ORAL at 11:38

## 2020-04-27 RX ADMIN — BISACODYL 10 MG: 10 SUPPOSITORY RECTAL at 04:17

## 2020-04-27 RX ADMIN — MEPERIDINE HYDROCHLORIDE 50 MG: 25 INJECTION INTRAMUSCULAR; INTRAVENOUS; SUBCUTANEOUS at 01:26

## 2020-04-27 RX ADMIN — OXYCODONE HYDROCHLORIDE 10 MG: 5 SOLUTION ORAL at 20:26

## 2020-04-27 RX ADMIN — FAMOTIDINE 20 MG: 20 TABLET, FILM COATED ORAL at 08:10

## 2020-04-27 RX ADMIN — LORAZEPAM 1 MG: 2 SOLUTION, CONCENTRATE ORAL at 00:13

## 2020-04-27 RX ADMIN — CEFAZOLIN SODIUM 2 G: 2 SOLUTION INTRAVENOUS at 08:08

## 2020-04-27 RX ADMIN — RISPERIDONE 1 MG: 1 SOLUTION ORAL at 08:09

## 2020-04-27 RX ADMIN — OXYCODONE HYDROCHLORIDE 10 MG: 5 SOLUTION ORAL at 17:19

## 2020-04-27 RX ADMIN — SENNOSIDES AND DOCUSATE SODIUM 2 TABLET: 8.6; 5 TABLET ORAL at 20:28

## 2020-04-27 RX ADMIN — MEPERIDINE HYDROCHLORIDE 50 MG: 25 INJECTION INTRAMUSCULAR; INTRAVENOUS; SUBCUTANEOUS at 09:09

## 2020-04-27 RX ADMIN — CHLORHEXIDINE GLUCONATE 0.12% ORAL RINSE 15 ML: 1.2 LIQUID ORAL at 20:26

## 2020-04-27 RX ADMIN — CEFAZOLIN SODIUM 2 G: 2 SOLUTION INTRAVENOUS at 17:19

## 2020-04-27 RX ADMIN — OXYCODONE HYDROCHLORIDE 10 MG: 5 SOLUTION ORAL at 04:17

## 2020-04-27 RX ADMIN — SODIUM CHLORIDE: 9 INJECTION, SOLUTION INTRAVENOUS at 00:22

## 2020-04-27 RX ADMIN — OXYCODONE HYDROCHLORIDE 10 MG: 5 SOLUTION ORAL at 11:36

## 2020-04-27 RX ADMIN — MIDAZOLAM 4 MG: 1 INJECTION INTRAMUSCULAR; INTRAVENOUS at 08:37

## 2020-04-27 RX ADMIN — CEFAZOLIN SODIUM 2 G: 2 SOLUTION INTRAVENOUS at 23:59

## 2020-04-27 RX ADMIN — MEPERIDINE HYDROCHLORIDE 50 MG: 25 INJECTION INTRAMUSCULAR; INTRAVENOUS; SUBCUTANEOUS at 14:45

## 2020-04-27 RX ADMIN — ACETAMINOPHEN 650 MG: 325 TABLET, FILM COATED ORAL at 00:11

## 2020-04-27 RX ADMIN — ACETAMINOPHEN 650 MG: 325 TABLET, FILM COATED ORAL at 20:26

## 2020-04-27 RX ADMIN — DOCUSATE SODIUM 100 MG: 50 LIQUID ORAL at 08:09

## 2020-04-27 RX ADMIN — FENTANYL CITRATE 100 MCG: 50 INJECTION INTRAMUSCULAR; INTRAVENOUS at 08:37

## 2020-04-27 RX ADMIN — MELATONIN 3 MG ORAL TABLET 6 MG: 3 TABLET ORAL at 20:49

## 2020-04-27 RX ADMIN — MEPERIDINE HYDROCHLORIDE 50 MG: 25 INJECTION INTRAMUSCULAR; INTRAVENOUS; SUBCUTANEOUS at 22:57

## 2020-04-27 RX ADMIN — CHLORHEXIDINE GLUCONATE 0.12% ORAL RINSE 15 ML: 1.2 LIQUID ORAL at 08:09

## 2020-04-27 RX ADMIN — ENOXAPARIN SODIUM 30 MG: 30 INJECTION SUBCUTANEOUS at 08:10

## 2020-04-27 RX ADMIN — POLYETHYLENE GLYCOL 3350 17 G: 17 POWDER, FOR SOLUTION ORAL at 08:08

## 2020-04-27 RX ADMIN — POLYETHYLENE GLYCOL 3350 17 G: 17 POWDER, FOR SOLUTION ORAL at 20:28

## 2020-04-27 ASSESSMENT — PAIN SCALES - GENERAL
PAINLEVEL_OUTOF10: 0
PAINLEVEL_OUTOF10: 6
PAINLEVEL_OUTOF10: 0
PAINLEVEL_OUTOF10: 10
PAINLEVEL_OUTOF10: 0
PAINLEVEL_OUTOF10: 6
PAINLEVEL_OUTOF10: 0
PAINLEVEL_OUTOF10: 10
PAINLEVEL_OUTOF10: 0
PAINLEVEL_OUTOF10: 6

## 2020-04-27 ASSESSMENT — PULMONARY FUNCTION TESTS
PIF_VALUE: 31
PIF_VALUE: 28
PIF_VALUE: 24
PIF_VALUE: 40
PIF_VALUE: 25
PIF_VALUE: 24
PIF_VALUE: 35
PIF_VALUE: 32
PIF_VALUE: 25
PIF_VALUE: 28
PIF_VALUE: 25
PIF_VALUE: 37
PIF_VALUE: 39
PIF_VALUE: 30
PIF_VALUE: 32
PIF_VALUE: 28
PIF_VALUE: 31
PIF_VALUE: 28
PIF_VALUE: 26
PIF_VALUE: 24
PIF_VALUE: 25
PIF_VALUE: 26
PIF_VALUE: 29
PIF_VALUE: 25
PIF_VALUE: 27
PIF_VALUE: 28
PIF_VALUE: 30
PIF_VALUE: 29
PIF_VALUE: 26
PIF_VALUE: 30
PIF_VALUE: 27
PIF_VALUE: 27

## 2020-04-27 ASSESSMENT — PAIN DESCRIPTION - ORIENTATION: ORIENTATION: MID;LEFT;RIGHT

## 2020-04-27 ASSESSMENT — PAIN DESCRIPTION - LOCATION: LOCATION: CHEST;NECK

## 2020-04-27 ASSESSMENT — PAIN DESCRIPTION - PAIN TYPE: TYPE: ACUTE PAIN;SURGICAL PAIN

## 2020-04-27 NOTE — PLAN OF CARE
Problem: Falls - Risk of:  Goal: Will remain free from falls  Description: Will remain free from falls  Outcome: Met This Shift     Problem: Falls - Risk of:  Goal: Absence of physical injury  Description: Absence of physical injury  Outcome: Met This Shift     Problem: Restraint Use - Nonviolent/Non-Self-Destructive Behavior:  Goal: Absence of restraint indications  Description: Absence of restraint indications   Outcome: Not Met This Shift     Problem: Restraint Use - Nonviolent/Non-Self-Destructive Behavior:  Goal: Absence of restraint indications  Description: Absence of restraint indications  4/27/2020 0201 by Ana Robb RN  Outcome: Not Met This Shift     Problem: Restraint Use - Nonviolent/Non-Self-Destructive Behavior:  Goal: Absence of restraint-related injury  Description: Absence of restraint-related injury  4/27/2020 0201 by Ana Robb RN  Outcome: Met This Shift     Problem: Pain:  Goal: Pain level will decrease  Description: Pain level will decrease  Outcome: Met This Shift     Problem: Pain:  Goal: Control of acute pain  Description: Control of acute pain  Outcome: Met This Shift     Problem: Pain:  Goal: Control of chronic pain  Description: Control of chronic pain  Outcome: Met This Shift     Problem: Discharge Planning:  Goal: Participates in care planning  Description: Participates in care planning  Outcome: Not Met This Shift     Problem: Discharge Planning:  Goal: Discharged to appropriate level of care  Description: Discharged to appropriate level of care  Outcome: Not Met This Shift     Problem: Airway Clearance - Ineffective:  Goal: Ability to maintain a clear airway will improve  Description: Ability to maintain a clear airway will improve  Outcome: Met This Shift     Problem: Cardiac Output - Decreased:  Goal: Hemodynamic stability will improve  Description: Hemodynamic stability will improve  Outcome: Met This Shift     Problem: Fluid Volume - Imbalance:  Goal: Absence of imbalanced fluid volume signs and symptoms  Description: Absence of imbalanced fluid volume signs and symptoms  Outcome: Met This Shift     Problem: Skin Integrity - Impaired:  Goal: Will show no infection signs and symptoms  Description: Will show no infection signs and symptoms  Outcome: Met This Shift     Problem: Skin Integrity - Impaired:  Goal: Absence of new skin breakdown  Description: Absence of new skin breakdown  Outcome: Met This Shift     Problem: Infection:  Goal: Will remain free from infection  Description: Will remain free from infection  Outcome: Ongoing     Problem: Daily Care:  Goal: Daily care needs are met  Description: Daily care needs are met  Outcome: Met This Shift

## 2020-04-27 NOTE — PROGRESS NOTES
OCCUPATIONAL THERAPY    Date:2020  Patient Name: Brandy Huff  MRN: [id number]  : 1995  Room: [facility]-A     Chart reviewed; pt on hold due to medical instability. Will re-attempt when able.    Dee Dee Freeman, OTR/L [id number]
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
## 2020-04-27 NOTE — FLOWSHEET NOTE
Pt with trach continues to pull at lines and tubes with agitation. Verbal re-direction unsuccessful at this time. Bilateral soft wrist restraints continued for patient safety. Will continue to montior.

## 2020-04-28 ENCOUNTER — ANESTHESIA (OUTPATIENT)
Dept: SURGICAL ICU | Age: 25
DRG: 004 | End: 2020-04-28
Payer: MEDICAID

## 2020-04-28 ENCOUNTER — APPOINTMENT (OUTPATIENT)
Dept: GENERAL RADIOLOGY | Age: 25
DRG: 004 | End: 2020-04-28
Payer: MEDICAID

## 2020-04-28 ENCOUNTER — ANESTHESIA EVENT (OUTPATIENT)
Dept: SURGICAL ICU | Age: 25
DRG: 004 | End: 2020-04-28
Payer: MEDICAID

## 2020-04-28 LAB
AADO2: 227.6 MMHG
ALBUMIN SERPL-MCNC: 2.5 G/DL (ref 3.5–5.2)
ALP BLD-CCNC: 55 U/L (ref 40–129)
ALT SERPL-CCNC: 22 U/L (ref 0–40)
ANION GAP SERPL CALCULATED.3IONS-SCNC: 14 MMOL/L (ref 7–16)
ANISOCYTOSIS: ABNORMAL
AST SERPL-CCNC: 23 U/L (ref 0–39)
B.E.: 4.5 MMOL/L (ref -3–3)
BASOPHILS ABSOLUTE: 0 E9/L (ref 0–0.2)
BASOPHILS RELATIVE PERCENT: 0.1 % (ref 0–2)
BILIRUB SERPL-MCNC: 0.3 MG/DL (ref 0–1.2)
BUN BLDV-MCNC: 16 MG/DL (ref 6–20)
CALCIUM IONIZED: 1.22 MMOL/L (ref 1.15–1.33)
CALCIUM SERPL-MCNC: 8.5 MG/DL (ref 8.6–10.2)
CHLORIDE BLD-SCNC: 95 MMOL/L (ref 98–107)
CO2: 25 MMOL/L (ref 22–29)
COHB: 0.3 % (ref 0–1.5)
CREAT SERPL-MCNC: 1 MG/DL (ref 0.7–1.2)
CRITICAL: ABNORMAL
CULTURE, RESPIRATORY: ABNORMAL
DATE ANALYZED: ABNORMAL
DATE OF COLLECTION: ABNORMAL
EOSINOPHILS ABSOLUTE: 0 E9/L (ref 0.05–0.5)
EOSINOPHILS RELATIVE PERCENT: 0.6 % (ref 0–6)
FIO2: 50 %
GFR AFRICAN AMERICAN: >60
GFR NON-AFRICAN AMERICAN: >60 ML/MIN/1.73
GLUCOSE BLD-MCNC: 124 MG/DL (ref 74–99)
HCO3: 28.2 MMOL/L (ref 22–26)
HCT VFR BLD CALC: 26.5 % (ref 37–54)
HEMOGLOBIN: 8.9 G/DL (ref 12.5–16.5)
HHB: 5.1 % (ref 0–5)
LAB: ABNORMAL
LYMPHOCYTES ABSOLUTE: 1.02 E9/L (ref 1.5–4)
LYMPHOCYTES RELATIVE PERCENT: 6.1 % (ref 20–42)
Lab: ABNORMAL
MAGNESIUM: 2 MG/DL (ref 1.6–2.6)
MCH RBC QN AUTO: 29.8 PG (ref 26–35)
MCHC RBC AUTO-ENTMCNC: 33.6 % (ref 32–34.5)
MCV RBC AUTO: 88.6 FL (ref 80–99.9)
METHB: 0.4 % (ref 0–1.5)
MODE: AC
MONOCYTES ABSOLUTE: 1.7 E9/L (ref 0.1–0.95)
MONOCYTES RELATIVE PERCENT: 9.6 % (ref 2–12)
NEUTROPHILS ABSOLUTE: 14.28 E9/L (ref 1.8–7.3)
NEUTROPHILS RELATIVE PERCENT: 84.3 % (ref 43–80)
O2 CONTENT: 13.3 ML/DL
O2 SATURATION: 94.9 % (ref 92–98.5)
O2HB: 94.2 % (ref 94–97)
OPERATOR ID: 1632
ORGANISM: ABNORMAL
ORGANISM: ABNORMAL
PATIENT TEMP: 37 C
PCO2: 38.6 MMHG (ref 35–45)
PDW BLD-RTO: 13.9 FL (ref 11.5–15)
PEEP/CPAP: 10 CMH2O
PFO2: 1.46 MMHG/%
PH BLOOD GAS: 7.48 (ref 7.35–7.45)
PHOSPHORUS: 3 MG/DL (ref 2.5–4.5)
PLATELET # BLD: 279 E9/L (ref 130–450)
PMV BLD AUTO: 9.4 FL (ref 7–12)
PO2: 73 MMHG (ref 75–100)
POIKILOCYTES: ABNORMAL
POLYCHROMASIA: ABNORMAL
POTASSIUM SERPL-SCNC: 4.9 MMOL/L (ref 3.5–5)
RBC # BLD: 2.99 E12/L (ref 3.8–5.8)
RI(T): 3.12
RR MECHANICAL: 16 B/MIN
SMEAR, RESPIRATORY: ABNORMAL
SODIUM BLD-SCNC: 134 MMOL/L (ref 132–146)
SOURCE, BLOOD GAS: ABNORMAL
TARGET CELLS: ABNORMAL
THB: 10 G/DL (ref 11.5–16.5)
TIME ANALYZED: 557
TOTAL PROTEIN: 6.4 G/DL (ref 6.4–8.3)
VT MECHANICAL: 500 ML
WBC # BLD: 17 E9/L (ref 4.5–11.5)

## 2020-04-28 PROCEDURE — 87206 SMEAR FLUORESCENT/ACID STAI: CPT

## 2020-04-28 PROCEDURE — 87070 CULTURE OTHR SPECIMN AEROBIC: CPT

## 2020-04-28 PROCEDURE — 87116 MYCOBACTERIA CULTURE: CPT

## 2020-04-28 PROCEDURE — 84100 ASSAY OF PHOSPHORUS: CPT

## 2020-04-28 PROCEDURE — 6370000000 HC RX 637 (ALT 250 FOR IP): Performed by: STUDENT IN AN ORGANIZED HEALTH CARE EDUCATION/TRAINING PROGRAM

## 2020-04-28 PROCEDURE — 97530 THERAPEUTIC ACTIVITIES: CPT

## 2020-04-28 PROCEDURE — 2580000003 HC RX 258: Performed by: SURGERY

## 2020-04-28 PROCEDURE — 97166 OT EVAL MOD COMPLEX 45 MIN: CPT

## 2020-04-28 PROCEDURE — 37799 UNLISTED PX VASCULAR SURGERY: CPT

## 2020-04-28 PROCEDURE — 97110 THERAPEUTIC EXERCISES: CPT

## 2020-04-28 PROCEDURE — 87102 FUNGUS ISOLATION CULTURE: CPT

## 2020-04-28 PROCEDURE — 82805 BLOOD GASES W/O2 SATURATION: CPT

## 2020-04-28 PROCEDURE — 6360000002 HC RX W HCPCS: Performed by: SURGERY

## 2020-04-28 PROCEDURE — 99291 CRITICAL CARE FIRST HOUR: CPT | Performed by: SURGERY

## 2020-04-28 PROCEDURE — 82330 ASSAY OF CALCIUM: CPT

## 2020-04-28 PROCEDURE — 99152 MOD SED SAME PHYS/QHP 5/>YRS: CPT | Performed by: SURGERY

## 2020-04-28 PROCEDURE — 87077 CULTURE AEROBIC IDENTIFY: CPT

## 2020-04-28 PROCEDURE — 87186 SC STD MICRODIL/AGAR DIL: CPT

## 2020-04-28 PROCEDURE — 36415 COLL VENOUS BLD VENIPUNCTURE: CPT

## 2020-04-28 PROCEDURE — 6360000002 HC RX W HCPCS: Performed by: STUDENT IN AN ORGANIZED HEALTH CARE EDUCATION/TRAINING PROGRAM

## 2020-04-28 PROCEDURE — 2000000000 HC ICU R&B

## 2020-04-28 PROCEDURE — 6360000002 HC RX W HCPCS

## 2020-04-28 PROCEDURE — 2709999900 HC NON-CHARGEABLE SUPPLY: Performed by: SURGERY

## 2020-04-28 PROCEDURE — 94003 VENT MGMT INPAT SUBQ DAY: CPT

## 2020-04-28 PROCEDURE — 87205 SMEAR GRAM STAIN: CPT

## 2020-04-28 PROCEDURE — 83735 ASSAY OF MAGNESIUM: CPT

## 2020-04-28 PROCEDURE — 97163 PT EVAL HIGH COMPLEX 45 MIN: CPT

## 2020-04-28 PROCEDURE — 71045 X-RAY EXAM CHEST 1 VIEW: CPT

## 2020-04-28 PROCEDURE — 87015 SPECIMEN INFECT AGNT CONCNTJ: CPT

## 2020-04-28 PROCEDURE — 3609010900 HC BRONCHOSCOPY THERAPUTIC ASPIRATION INITIAL: Performed by: SURGERY

## 2020-04-28 PROCEDURE — 31646 BRNCHSC W/THER ASPIR SBSQ: CPT | Performed by: SURGERY

## 2020-04-28 PROCEDURE — 0BC38ZZ EXTIRPATION OF MATTER FROM RIGHT MAIN BRONCHUS, VIA NATURAL OR ARTIFICIAL OPENING ENDOSCOPIC: ICD-10-PCS | Performed by: SURGERY

## 2020-04-28 PROCEDURE — 85025 COMPLETE CBC W/AUTO DIFF WBC: CPT

## 2020-04-28 PROCEDURE — 80053 COMPREHEN METABOLIC PANEL: CPT

## 2020-04-28 RX ORDER — SODIUM CHLORIDE FOR INHALATION 3 %
4 VIAL, NEBULIZER (ML) INHALATION PRN
Status: DISCONTINUED | OUTPATIENT
Start: 2020-04-28 | End: 2020-05-04

## 2020-04-28 RX ORDER — FENTANYL CITRATE 50 UG/ML
200 INJECTION, SOLUTION INTRAMUSCULAR; INTRAVENOUS ONCE
Status: COMPLETED | OUTPATIENT
Start: 2020-04-28 | End: 2020-04-28

## 2020-04-28 RX ORDER — MEPERIDINE HYDROCHLORIDE 25 MG/ML
INJECTION INTRAMUSCULAR; INTRAVENOUS; SUBCUTANEOUS
Status: DISCONTINUED
Start: 2020-04-28 | End: 2020-04-28

## 2020-04-28 RX ORDER — MIDAZOLAM HYDROCHLORIDE 1 MG/ML
4 INJECTION INTRAMUSCULAR; INTRAVENOUS ONCE
Status: COMPLETED | OUTPATIENT
Start: 2020-04-28 | End: 2020-04-28

## 2020-04-28 RX ORDER — FENTANYL CITRATE 50 UG/ML
INJECTION, SOLUTION INTRAMUSCULAR; INTRAVENOUS
Status: DISCONTINUED
Start: 2020-04-28 | End: 2020-04-28

## 2020-04-28 RX ORDER — MEPERIDINE HYDROCHLORIDE 25 MG/ML
INJECTION INTRAMUSCULAR; INTRAVENOUS; SUBCUTANEOUS
Status: COMPLETED
Start: 2020-04-28 | End: 2020-04-28

## 2020-04-28 RX ORDER — SODIUM CHLORIDE 0.9 % (FLUSH) 0.9 %
SYRINGE (ML) INJECTION
Status: DISCONTINUED
Start: 2020-04-28 | End: 2020-04-28

## 2020-04-28 RX ORDER — FENTANYL CITRATE 50 UG/ML
100 INJECTION, SOLUTION INTRAMUSCULAR; INTRAVENOUS ONCE
Status: COMPLETED | OUTPATIENT
Start: 2020-04-28 | End: 2020-04-28

## 2020-04-28 RX ADMIN — MIDAZOLAM 4 MG: 1 INJECTION INTRAMUSCULAR; INTRAVENOUS at 09:04

## 2020-04-28 RX ADMIN — MAGNESIUM HYDROXIDE 30 ML: 2400 SUSPENSION ORAL at 10:23

## 2020-04-28 RX ADMIN — FAMOTIDINE 20 MG: 20 TABLET, FILM COATED ORAL at 09:30

## 2020-04-28 RX ADMIN — POLYETHYLENE GLYCOL 3350 17 G: 17 POWDER, FOR SOLUTION ORAL at 09:30

## 2020-04-28 RX ADMIN — CEFAZOLIN SODIUM 2 G: 2 SOLUTION INTRAVENOUS at 06:50

## 2020-04-28 RX ADMIN — OXYCODONE HYDROCHLORIDE 10 MG: 5 SOLUTION ORAL at 07:47

## 2020-04-28 RX ADMIN — DOCUSATE SODIUM 100 MG: 50 LIQUID ORAL at 09:30

## 2020-04-28 RX ADMIN — ENOXAPARIN SODIUM 30 MG: 30 INJECTION SUBCUTANEOUS at 19:40

## 2020-04-28 RX ADMIN — FENTANYL CITRATE 200 MCG: 50 INJECTION INTRAMUSCULAR; INTRAVENOUS at 09:04

## 2020-04-28 RX ADMIN — HYDROMORPHONE HYDROCHLORIDE 0.5 MG: 1 INJECTION, SOLUTION INTRAMUSCULAR; INTRAVENOUS; SUBCUTANEOUS at 22:53

## 2020-04-28 RX ADMIN — DOCUSATE SODIUM 100 MG: 50 LIQUID ORAL at 19:40

## 2020-04-28 RX ADMIN — MELATONIN 3 MG ORAL TABLET 6 MG: 3 TABLET ORAL at 19:40

## 2020-04-28 RX ADMIN — FENTANYL CITRATE 100 MCG: 50 INJECTION INTRAMUSCULAR; INTRAVENOUS at 09:05

## 2020-04-28 RX ADMIN — MEPERIDINE HYDROCHLORIDE 50 MG: 25 INJECTION INTRAMUSCULAR; INTRAVENOUS; SUBCUTANEOUS at 02:49

## 2020-04-28 RX ADMIN — OXYCODONE HYDROCHLORIDE 10 MG: 5 SOLUTION ORAL at 23:56

## 2020-04-28 RX ADMIN — OXYCODONE HYDROCHLORIDE 10 MG: 5 SOLUTION ORAL at 11:43

## 2020-04-28 RX ADMIN — BISACODYL 10 MG: 10 SUPPOSITORY RECTAL at 09:30

## 2020-04-28 RX ADMIN — OXYCODONE HYDROCHLORIDE 10 MG: 5 SOLUTION ORAL at 19:41

## 2020-04-28 RX ADMIN — LORAZEPAM 0.5 MG: 2 CONCENTRATE ORAL at 05:13

## 2020-04-28 RX ADMIN — SENNOSIDES AND DOCUSATE SODIUM 2 TABLET: 8.6; 5 TABLET ORAL at 19:40

## 2020-04-28 RX ADMIN — LORAZEPAM 0.5 MG: 2 CONCENTRATE ORAL at 18:17

## 2020-04-28 RX ADMIN — CHLORHEXIDINE GLUCONATE 0.12% ORAL RINSE 15 ML: 1.2 LIQUID ORAL at 09:30

## 2020-04-28 RX ADMIN — FAMOTIDINE 20 MG: 20 TABLET, FILM COATED ORAL at 19:40

## 2020-04-28 RX ADMIN — ACETAMINOPHEN 650 MG: 325 TABLET, FILM COATED ORAL at 11:46

## 2020-04-28 RX ADMIN — ENOXAPARIN SODIUM 30 MG: 30 INJECTION SUBCUTANEOUS at 09:30

## 2020-04-28 RX ADMIN — POLYETHYLENE GLYCOL 3350 17 G: 17 POWDER, FOR SOLUTION ORAL at 19:40

## 2020-04-28 RX ADMIN — CEFTRIAXONE SODIUM 2 G: 2 INJECTION, POWDER, FOR SOLUTION INTRAMUSCULAR; INTRAVENOUS at 09:30

## 2020-04-28 RX ADMIN — LORAZEPAM 0.5 MG: 2 CONCENTRATE ORAL at 11:43

## 2020-04-28 RX ADMIN — ACETAMINOPHEN 650 MG: 325 TABLET, FILM COATED ORAL at 01:00

## 2020-04-28 RX ADMIN — ACETAMINOPHEN 650 MG: 325 TABLET, FILM COATED ORAL at 19:41

## 2020-04-28 RX ADMIN — LORAZEPAM 0.5 MG: 2 CONCENTRATE ORAL at 23:56

## 2020-04-28 RX ADMIN — RISPERIDONE 1 MG: 1 SOLUTION ORAL at 19:41

## 2020-04-28 RX ADMIN — RISPERIDONE 1 MG: 1 SOLUTION ORAL at 09:30

## 2020-04-28 RX ADMIN — FENTANYL CITRATE 100 MCG: 50 INJECTION, SOLUTION INTRAMUSCULAR; INTRAVENOUS at 09:05

## 2020-04-28 RX ADMIN — CHLORHEXIDINE GLUCONATE 0.12% ORAL RINSE 15 ML: 1.2 LIQUID ORAL at 19:40

## 2020-04-28 RX ADMIN — OXYCODONE HYDROCHLORIDE 10 MG: 5 SOLUTION ORAL at 15:37

## 2020-04-28 RX ADMIN — ACETAMINOPHEN 650 MG: 325 TABLET, FILM COATED ORAL at 23:56

## 2020-04-28 RX ADMIN — OXYCODONE HYDROCHLORIDE 10 MG: 5 SOLUTION ORAL at 04:00

## 2020-04-28 RX ADMIN — SENNOSIDES AND DOCUSATE SODIUM 2 TABLET: 8.6; 5 TABLET ORAL at 09:30

## 2020-04-28 ASSESSMENT — PULMONARY FUNCTION TESTS
PIF_VALUE: 21
PIF_VALUE: 21
PIF_VALUE: 25
PIF_VALUE: 26
PIF_VALUE: 21
PIF_VALUE: 36
PIF_VALUE: 26
PIF_VALUE: 25
PIF_VALUE: 24
PIF_VALUE: 26
PIF_VALUE: 36
PIF_VALUE: 22
PIF_VALUE: 40
PIF_VALUE: 21
PIF_VALUE: 41
PIF_VALUE: 21
PIF_VALUE: 27
PIF_VALUE: 26
PIF_VALUE: 31
PIF_VALUE: 29
PIF_VALUE: 22
PIF_VALUE: 22
PIF_VALUE: 20
PIF_VALUE: 14
PIF_VALUE: 34
PIF_VALUE: 28
PIF_VALUE: 25
PIF_VALUE: 22
PIF_VALUE: 35
PIF_VALUE: 28

## 2020-04-28 ASSESSMENT — PAIN DESCRIPTION - LOCATION: LOCATION: GENERALIZED

## 2020-04-28 ASSESSMENT — PAIN SCALES - GENERAL
PAINLEVEL_OUTOF10: 5
PAINLEVEL_OUTOF10: 0
PAINLEVEL_OUTOF10: 4
PAINLEVEL_OUTOF10: 0
PAINLEVEL_OUTOF10: 0
PAINLEVEL_OUTOF10: 5
PAINLEVEL_OUTOF10: 4
PAINLEVEL_OUTOF10: 0

## 2020-04-28 ASSESSMENT — PAIN DESCRIPTION - PAIN TYPE: TYPE: ACUTE PAIN

## 2020-04-28 NOTE — PROGRESS NOTES
Assessment:  Hand Dominance: Right []  Left [x]     ROM Strength STM goal: PRN   RUE  PROM: WFL;   A/AROM: grossly WFL; gross grasp intact; impaired FMS 3+/5 4/5     LUE PROM: WFL; facial grimacing with shoulder flexion. Demo gross elbow flexion; forearm sup/pronation; gross wrist flexion; gross grasp with cues. Impaired FMS. *noted spasm in pectoralis with attempt to flex shoulder. 2-/5 shoulder    3-/5 elbow    3-/5 forearm    3-/5 wrist    3-/5 grasp    2-/5 thumb WFL; 3+/5       Sensation: denies numbness in B UE's; finger ID WFL. Pt reports sensation in chest/ abdomen. Tone: WNL   Edema: [facility]     Functional Assessment   Initial Eval Status  Date: 4/28 Treatment Status  Date: STG=LTG  5-14  days    Feeding Dep  Monitor status and re-attempt when cleared for diet. Grooming Dep                        Mod A   while seated supported; demonstrating G  Knowledge of compensatory techniques & use of AE as needed. UB dressing/bathing Dep                        Max A       LB dressing/bathing Dep                           using AE as needed for safe reach/ energy conservation       Toileting Dep                             Bed Mobility  Supine to sit: NT    Sit to supine: NT                        Max A  in prep of ADL tasks & transfers   Functional Transfers Sit to stand: NT    Stand to sit: NT                        Max A   sit<>stand/functional bathroom transfers using AD/DME as needed for balance and safety   Functional Mobility NT                       Max A   functional/bathroom mobility using AD as needed & demonstrating G safety     Balance Sitting:     Static:  NT    Dynamic:NT  Standing: NT  Min A dynamic sitting balance; Max A dynamic standing balance  during ADL tasks & transfers   Endurance/Activity Tolerance   Fair tolerance with bed level activity. Pt tolerated HOB elevated to 45 during ROM ex's; no c/o feeling light headed; no c/o pain.   F   tolerance with moderate  activity/self care routine
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
## 2020-04-28 NOTE — FLOWSHEET NOTE
Reaches for lines and tubes when restraints released for care verbal redirection unsuccessful restraints maintained for safety
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
## 2020-04-28 NOTE — PROGRESS NOTES
Multiple, Surgical Wound     · Current Nutrition Therapies:  · Oral Diet Orders: NPO   · Tube Feeding (TF) Orders:   · Feeding Route: Orogastric  · Formula: Standard w/Fiber  · Rate (ml/hr):45 ml/hr    · Volume (ml/day): 1080 ml tv   · Duration: Continuous  · Water Flushes: 30 ml q 4 hr= 180 ml water   · Goal TF & Flush Orders Provides: 1296 kcals, 60 gm pro, 872 ml free water, 1052 ml total water   · Additional Calories: d/c     · Anthropometric Measures:  · Ht: 6' (182.9 cm)   · Current Body Wt: 186 lb (84.4 kg)(4/28 actual - wt is elevated)  · Admission Body Wt: 175 lb (79.4 kg)(4/22 first measured )  · Usual Body Wt: UTO, no wt hx per EMR  · Weight Change: CBW elevated since admit.  Unable to properly assess wt changes/hx    · Ideal Body Wt: 178 lb (80.7 kg), will not adjust for paraplegia 2/2 trauma  · % [place] Body 98%(using adm wt )  · BMI Classification: BMI 18.5 - 24.9 Normal Weight(using adm wt 23.7)    Nutrition Interventions:   Continued Inpatient Monitoring, Education not appropriate at this time    Nutrition Evaluation:   · Evaluation: Goals set(new goal)   · Goals: Pt to tolerate TF at goal rate    · Monitoring: TF Intake, TF Tolerance, Skin Integrity, Wound Healing, I&O, Weight, Pertinent Labs, Mental Status/Confusion, Monitor Bowel Function, Monitor Hemodynamic Status      Electronically signed by John Vega RD, LD on 4/28/20 at 10:30 AM EDT    Contact Number: [phone]

## 2020-04-29 ENCOUNTER — APPOINTMENT (OUTPATIENT)
Dept: CT IMAGING | Age: 25
DRG: 004 | End: 2020-04-29
Payer: MEDICAID

## 2020-04-29 ENCOUNTER — APPOINTMENT (OUTPATIENT)
Dept: GENERAL RADIOLOGY | Age: 25
DRG: 004 | End: 2020-04-29
Payer: MEDICAID

## 2020-04-29 LAB
AADO2: 259 MMHG
ALBUMIN SERPL-MCNC: 2.7 G/DL (ref 3.5–5.2)
ALP BLD-CCNC: 66 U/L (ref 40–129)
ALT SERPL-CCNC: 22 U/L (ref 0–40)
ANION GAP SERPL CALCULATED.3IONS-SCNC: 13 MMOL/L (ref 7–16)
ANISOCYTOSIS: ABNORMAL
AST SERPL-CCNC: 26 U/L (ref 0–39)
B.E.: 2.1 MMOL/L (ref -3–3)
BASOPHILS ABSOLUTE: 0 E9/L (ref 0–0.2)
BASOPHILS RELATIVE PERCENT: 0.1 % (ref 0–2)
BILIRUB SERPL-MCNC: 0.3 MG/DL (ref 0–1.2)
BUN BLDV-MCNC: 21 MG/DL (ref 6–20)
CALCIUM IONIZED: 1.2 MMOL/L (ref 1.15–1.33)
CALCIUM SERPL-MCNC: 9 MG/DL (ref 8.6–10.2)
CHLORIDE BLD-SCNC: 93 MMOL/L (ref 98–107)
CO2: 27 MMOL/L (ref 22–29)
COHB: 0.4 % (ref 0–1.5)
CREAT SERPL-MCNC: 0.8 MG/DL (ref 0.7–1.2)
CRITICAL: ABNORMAL
DATE ANALYZED: ABNORMAL
DATE OF COLLECTION: ABNORMAL
EOSINOPHILS ABSOLUTE: 0.36 E9/L (ref 0.05–0.5)
EOSINOPHILS RELATIVE PERCENT: 1.7 % (ref 0–6)
FIO2: 60 %
GFR AFRICAN AMERICAN: >60
GFR NON-AFRICAN AMERICAN: >60 ML/MIN/1.73
GLUCOSE BLD-MCNC: 111 MG/DL (ref 74–99)
GRAM STAIN ORDERABLE: NORMAL
HCO3: 25.6 MMOL/L (ref 22–26)
HCT VFR BLD CALC: 26.1 % (ref 37–54)
HEMOGLOBIN: 8.5 G/DL (ref 12.5–16.5)
HHB: 1.7 % (ref 0–5)
HYPOCHROMIA: ABNORMAL
LAB: ABNORMAL
LYMPHOCYTES ABSOLUTE: 1.25 E9/L (ref 1.5–4)
LYMPHOCYTES RELATIVE PERCENT: 6.1 % (ref 20–42)
Lab: ABNORMAL
MAGNESIUM: 2 MG/DL (ref 1.6–2.6)
MCH RBC QN AUTO: 29.1 PG (ref 26–35)
MCHC RBC AUTO-ENTMCNC: 32.6 % (ref 32–34.5)
MCV RBC AUTO: 89.4 FL (ref 80–99.9)
METHB: 0.4 % (ref 0–1.5)
MODE: ABNORMAL
MONOCYTES ABSOLUTE: 1.04 E9/L (ref 0.1–0.95)
MONOCYTES RELATIVE PERCENT: 5.2 % (ref 2–12)
NEUTROPHILS ABSOLUTE: 18.18 E9/L (ref 1.8–7.3)
NEUTROPHILS RELATIVE PERCENT: 87 % (ref 43–80)
O2 CONTENT: 13.4 ML/DL
O2 SATURATION: 98.3 % (ref 92–98.5)
O2HB: 97.5 % (ref 94–97)
OPERATOR ID: 2577
PATIENT TEMP: 37 C
PCO2: 35.2 MMHG (ref 35–45)
PDW BLD-RTO: 13.8 FL (ref 11.5–15)
PEEP/CPAP: 10 CMH2O
PFO2: 1.92 MMHG/%
PH BLOOD GAS: 7.48 (ref 7.35–7.45)
PHOSPHORUS: 3.5 MG/DL (ref 2.5–4.5)
PLATELET # BLD: 364 E9/L (ref 130–450)
PMV BLD AUTO: 9.8 FL (ref 7–12)
PO2: 115.1 MMHG (ref 75–100)
POIKILOCYTES: ABNORMAL
POLYCHROMASIA: ABNORMAL
POTASSIUM SERPL-SCNC: 4.9 MMOL/L (ref 3.5–5)
RBC # BLD: 2.92 E12/L (ref 3.8–5.8)
RI(T): 2.25
RR MECHANICAL: 16 B/MIN
SODIUM BLD-SCNC: 133 MMOL/L (ref 132–146)
SOURCE, BLOOD GAS: ABNORMAL
TARGET CELLS: ABNORMAL
THB: 9.6 G/DL (ref 11.5–16.5)
TIME ANALYZED: 524
TOTAL PROTEIN: 6.6 G/DL (ref 6.4–8.3)
VT MECHANICAL: 500 ML
WBC # BLD: 20.9 E9/L (ref 4.5–11.5)

## 2020-04-29 PROCEDURE — 2580000003 HC RX 258: Performed by: SURGERY

## 2020-04-29 PROCEDURE — 83735 ASSAY OF MAGNESIUM: CPT

## 2020-04-29 PROCEDURE — 85025 COMPLETE CBC W/AUTO DIFF WBC: CPT

## 2020-04-29 PROCEDURE — 97530 THERAPEUTIC ACTIVITIES: CPT

## 2020-04-29 PROCEDURE — 82805 BLOOD GASES W/O2 SATURATION: CPT

## 2020-04-29 PROCEDURE — 99291 CRITICAL CARE FIRST HOUR: CPT | Performed by: SURGERY

## 2020-04-29 PROCEDURE — 6370000000 HC RX 637 (ALT 250 FOR IP): Performed by: STUDENT IN AN ORGANIZED HEALTH CARE EDUCATION/TRAINING PROGRAM

## 2020-04-29 PROCEDURE — 6360000002 HC RX W HCPCS: Performed by: STUDENT IN AN ORGANIZED HEALTH CARE EDUCATION/TRAINING PROGRAM

## 2020-04-29 PROCEDURE — 94003 VENT MGMT INPAT SUBQ DAY: CPT

## 2020-04-29 PROCEDURE — 80053 COMPREHEN METABOLIC PANEL: CPT

## 2020-04-29 PROCEDURE — 36415 COLL VENOUS BLD VENIPUNCTURE: CPT

## 2020-04-29 PROCEDURE — 71045 X-RAY EXAM CHEST 1 VIEW: CPT

## 2020-04-29 PROCEDURE — 2000000000 HC ICU R&B

## 2020-04-29 PROCEDURE — 82330 ASSAY OF CALCIUM: CPT

## 2020-04-29 PROCEDURE — 6370000000 HC RX 637 (ALT 250 FOR IP): Performed by: SURGERY

## 2020-04-29 PROCEDURE — 84100 ASSAY OF PHOSPHORUS: CPT

## 2020-04-29 PROCEDURE — 71260 CT THORAX DX C+: CPT

## 2020-04-29 PROCEDURE — 6360000002 HC RX W HCPCS: Performed by: SURGERY

## 2020-04-29 PROCEDURE — 6360000004 HC RX CONTRAST MEDICATION: Performed by: RADIOLOGY

## 2020-04-29 PROCEDURE — 97110 THERAPEUTIC EXERCISES: CPT

## 2020-04-29 RX ORDER — LORAZEPAM 2 MG/ML
0.5 CONCENTRATE ORAL EVERY 8 HOURS SCHEDULED
Status: DISCONTINUED | OUTPATIENT
Start: 2020-04-29 | End: 2020-05-01

## 2020-04-29 RX ORDER — OXYCODONE HCL 5 MG/5 ML
5 SOLUTION, ORAL ORAL
Status: DISCONTINUED | OUTPATIENT
Start: 2020-04-29 | End: 2020-05-04

## 2020-04-29 RX ADMIN — DOCUSATE SODIUM 100 MG: 50 LIQUID ORAL at 20:36

## 2020-04-29 RX ADMIN — LORAZEPAM 0.5 MG: 2 CONCENTRATE ORAL at 05:03

## 2020-04-29 RX ADMIN — OXYCODONE HYDROCHLORIDE 5 MG: 5 SOLUTION ORAL at 11:48

## 2020-04-29 RX ADMIN — FAMOTIDINE 20 MG: 20 TABLET, FILM COATED ORAL at 20:35

## 2020-04-29 RX ADMIN — DOCUSATE SODIUM 100 MG: 50 LIQUID ORAL at 08:08

## 2020-04-29 RX ADMIN — OXYCODONE HYDROCHLORIDE 10 MG: 5 SOLUTION ORAL at 07:46

## 2020-04-29 RX ADMIN — ENOXAPARIN SODIUM 30 MG: 30 INJECTION SUBCUTANEOUS at 09:13

## 2020-04-29 RX ADMIN — CHLORHEXIDINE GLUCONATE 0.12% ORAL RINSE 15 ML: 1.2 LIQUID ORAL at 08:07

## 2020-04-29 RX ADMIN — MAGNESIUM HYDROXIDE 30 ML: 2400 SUSPENSION ORAL at 08:07

## 2020-04-29 RX ADMIN — ACETAMINOPHEN 650 MG: 325 TABLET, FILM COATED ORAL at 18:39

## 2020-04-29 RX ADMIN — RISPERIDONE 1 MG: 1 SOLUTION ORAL at 08:14

## 2020-04-29 RX ADMIN — CEFTRIAXONE SODIUM 2 G: 2 INJECTION, POWDER, FOR SOLUTION INTRAMUSCULAR; INTRAVENOUS at 09:13

## 2020-04-29 RX ADMIN — OXYCODONE HYDROCHLORIDE 5 MG: 5 SOLUTION ORAL at 20:12

## 2020-04-29 RX ADMIN — OXYCODONE HYDROCHLORIDE 5 MG: 5 SOLUTION ORAL at 15:53

## 2020-04-29 RX ADMIN — CHLORHEXIDINE GLUCONATE 0.12% ORAL RINSE 15 ML: 1.2 LIQUID ORAL at 20:29

## 2020-04-29 RX ADMIN — FAMOTIDINE 20 MG: 20 TABLET, FILM COATED ORAL at 08:08

## 2020-04-29 RX ADMIN — IOPAMIDOL 60 ML: 755 INJECTION, SOLUTION INTRAVENOUS at 18:24

## 2020-04-29 RX ADMIN — BISACODYL 10 MG: 10 SUPPOSITORY RECTAL at 08:08

## 2020-04-29 RX ADMIN — LORAZEPAM 0.5 MG: 2 CONCENTRATE ORAL at 14:30

## 2020-04-29 RX ADMIN — SENNOSIDES AND DOCUSATE SODIUM 2 TABLET: 8.6; 5 TABLET ORAL at 08:07

## 2020-04-29 RX ADMIN — LORAZEPAM 0.5 MG: 2 CONCENTRATE ORAL at 22:22

## 2020-04-29 RX ADMIN — RISPERIDONE 1 MG: 1 SOLUTION ORAL at 20:36

## 2020-04-29 RX ADMIN — POLYETHYLENE GLYCOL 3350 17 G: 17 POWDER, FOR SOLUTION ORAL at 20:35

## 2020-04-29 RX ADMIN — OXYCODONE HYDROCHLORIDE 10 MG: 5 SOLUTION ORAL at 05:04

## 2020-04-29 RX ADMIN — SENNOSIDES AND DOCUSATE SODIUM 2 TABLET: 8.6; 5 TABLET ORAL at 20:36

## 2020-04-29 RX ADMIN — POLYETHYLENE GLYCOL 3350 17 G: 17 POWDER, FOR SOLUTION ORAL at 08:07

## 2020-04-29 RX ADMIN — ENOXAPARIN SODIUM 30 MG: 30 INJECTION SUBCUTANEOUS at 20:36

## 2020-04-29 RX ADMIN — MELATONIN 3 MG ORAL TABLET 6 MG: 3 TABLET ORAL at 20:38

## 2020-04-29 ASSESSMENT — PULMONARY FUNCTION TESTS
PIF_VALUE: 18
PIF_VALUE: 29
PIF_VALUE: 26
PIF_VALUE: 14
PIF_VALUE: 21
PIF_VALUE: 25
PIF_VALUE: 24
PIF_VALUE: 14
PIF_VALUE: 26
PIF_VALUE: 24
PIF_VALUE: 25
PIF_VALUE: 14
PIF_VALUE: 24
PIF_VALUE: 26
PIF_VALUE: 29
PIF_VALUE: 28
PIF_VALUE: 33
PIF_VALUE: 17
PIF_VALUE: 25
PIF_VALUE: 26
PIF_VALUE: 27
PIF_VALUE: 30
PIF_VALUE: 26
PIF_VALUE: 25
PIF_VALUE: 24
PIF_VALUE: 27
PIF_VALUE: 19
PIF_VALUE: 17
PIF_VALUE: 25

## 2020-04-29 ASSESSMENT — PAIN SCALES - GENERAL
PAINLEVEL_OUTOF10: 0
PAINLEVEL_OUTOF10: 0
PAINLEVEL_OUTOF10: 5
PAINLEVEL_OUTOF10: 5
PAINLEVEL_OUTOF10: 0

## 2020-04-29 ASSESSMENT — PAIN DESCRIPTION - PAIN TYPE: TYPE: ACUTE PAIN

## 2020-04-29 NOTE — FLOWSHEET NOTE
Reaches for lines and tubes when restraints released for care verbal redirection unsuccessful restraints maintained for patient safety
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
## 2020-04-29 NOTE — PROGRESS NOTES
during ADL tasks & transfers   Endurance/Activity Tolerance    Fair tolerance with bed level activity. Pt tolerated HOB elevated to 45 during ROM ex's; no c/o feeling light headed; no c/o pain.  Poor+ with light bed level activity today. F   tolerance with moderate  activity/self care routine   Visual/  Perceptual    WFL    Pt able to scan/locate letters on communication board to spell his name.                             Vitals:  Heart Rate at rest 91 bpm Heart Rate post session 120 bpm   SpO2 at rest 98% SpO2 post session 96%   Blood Pressure at rest 135/62 mmHg Blood Pressure post session 124/84 mmHg     Treatment: OT services provided include: Instruction on precautions/medical lines prior to bed mobility to facilitate safe transfers and ADLS; B UE ROM/faciliation techniques to improve B hand function for participation in ADLs. Pt instructed on ex's to perform bedside; positioning for effective reach during activities and for pressure relief/edema control techniques. Pt provided with alphabet communication board to spell words to communicate basic needs. Pt demo fair- R hand control to point to letters of his name. Pt able to correctly identify letters and spell name. Pt can benefit from further OT to improve communication skills while on vent,  functional ROM/strength for increased independence. Comments: OK from RN to see patient. Upon arrival, patient supine in bed; lethargic and less engaged. Pt demo P+ tolerance with light bed level ex's and functional activities. Demo P+ understanding of education/techniques. At end of session, patient positioned with pillows under B UE's/PRAFOs B LE's and restraints intact. Call light within reach. Skilled monitoring of HR, O2 saturation, blood pressure and patient's response to activity performed throughout session.   Overall, pt presents with decreased ROM/strength, LE ROM, activity tolerance, dynamic balance, functional mobility limiting completion of ADLs and

## 2020-04-30 ENCOUNTER — APPOINTMENT (OUTPATIENT)
Dept: GENERAL RADIOLOGY | Age: 25
DRG: 004 | End: 2020-04-30
Payer: MEDICAID

## 2020-04-30 LAB
AADO2: 214.9 MMHG
ALBUMIN SERPL-MCNC: 2.6 G/DL (ref 3.5–5.2)
ALP BLD-CCNC: 86 U/L (ref 40–129)
ALT SERPL-CCNC: 63 U/L (ref 0–40)
ANION GAP SERPL CALCULATED.3IONS-SCNC: 13 MMOL/L (ref 7–16)
ANISOCYTOSIS: ABNORMAL
AST SERPL-CCNC: 67 U/L (ref 0–39)
B.E.: 4.1 MMOL/L (ref -3–3)
BASOPHILS ABSOLUTE: 0.04 E9/L (ref 0–0.2)
BASOPHILS RELATIVE PERCENT: 0.2 % (ref 0–2)
BILIRUB SERPL-MCNC: 0.3 MG/DL (ref 0–1.2)
BUN BLDV-MCNC: 24 MG/DL (ref 6–20)
CALCIUM IONIZED: 1.2 MMOL/L (ref 1.15–1.33)
CALCIUM SERPL-MCNC: 8.9 MG/DL (ref 8.6–10.2)
CHLORIDE BLD-SCNC: 96 MMOL/L (ref 98–107)
CO2: 28 MMOL/L (ref 22–29)
COHB: 0.3 % (ref 0–1.5)
CREAT SERPL-MCNC: 1 MG/DL (ref 0.7–1.2)
CRITICAL: ABNORMAL
DATE ANALYZED: ABNORMAL
DATE OF COLLECTION: ABNORMAL
EOSINOPHILS ABSOLUTE: 0.12 E9/L (ref 0.05–0.5)
EOSINOPHILS RELATIVE PERCENT: 0.6 % (ref 0–6)
FIO2: 60 %
GFR AFRICAN AMERICAN: >60
GFR NON-AFRICAN AMERICAN: >60 ML/MIN/1.73
GLUCOSE BLD-MCNC: 108 MG/DL (ref 74–99)
HCO3: 28.4 MMOL/L (ref 22–26)
HCT VFR BLD CALC: 25.4 % (ref 37–54)
HEMOGLOBIN: 8.2 G/DL (ref 12.5–16.5)
HHB: 0.8 % (ref 0–5)
HYPOCHROMIA: ABNORMAL
IMMATURE GRANULOCYTES #: 0.24 E9/L
IMMATURE GRANULOCYTES %: 1.2 % (ref 0–5)
LAB: ABNORMAL
LYMPHOCYTES ABSOLUTE: 1.94 E9/L (ref 1.5–4)
LYMPHOCYTES RELATIVE PERCENT: 9.7 % (ref 20–42)
Lab: ABNORMAL
MAGNESIUM: 2.2 MG/DL (ref 1.6–2.6)
MCH RBC QN AUTO: 29.1 PG (ref 26–35)
MCHC RBC AUTO-ENTMCNC: 32.3 % (ref 32–34.5)
MCV RBC AUTO: 90.1 FL (ref 80–99.9)
METHB: 0.4 % (ref 0–1.5)
MODE: AC
MONOCYTES ABSOLUTE: 2.27 E9/L (ref 0.1–0.95)
MONOCYTES RELATIVE PERCENT: 11.3 % (ref 2–12)
NEUTROPHILS ABSOLUTE: 15.45 E9/L (ref 1.8–7.3)
NEUTROPHILS RELATIVE PERCENT: 77 % (ref 43–80)
O2 CONTENT: 13.2 ML/DL
O2 SATURATION: 99.2 % (ref 92–98.5)
O2HB: 98.5 % (ref 94–97)
OPERATOR ID: 1874
PATIENT TEMP: 37 C
PCO2: 41.3 MMHG (ref 35–45)
PDW BLD-RTO: 14 FL (ref 11.5–15)
PEEP/CPAP: 10 CMH2O
PFO2: 2.54 MMHG/%
PH BLOOD GAS: 7.46 (ref 7.35–7.45)
PHOSPHORUS: 3.8 MG/DL (ref 2.5–4.5)
PLATELET # BLD: 450 E9/L (ref 130–450)
PMV BLD AUTO: 9.3 FL (ref 7–12)
PO2: 152.5 MMHG (ref 75–100)
POLYCHROMASIA: ABNORMAL
POTASSIUM SERPL-SCNC: 4.5 MMOL/L (ref 3.5–5)
RBC # BLD: 2.82 E12/L (ref 3.8–5.8)
RI(T): 141 %
RR MECHANICAL: 16 B/MIN
SODIUM BLD-SCNC: 137 MMOL/L (ref 132–146)
SOURCE, BLOOD GAS: ABNORMAL
THB: 9.3 G/DL (ref 11.5–16.5)
TIME ANALYZED: 559
TOTAL PROTEIN: 6.7 G/DL (ref 6.4–8.3)
VT MECHANICAL: 500 ML
WBC # BLD: 20.1 E9/L (ref 4.5–11.5)

## 2020-04-30 PROCEDURE — 71045 X-RAY EXAM CHEST 1 VIEW: CPT

## 2020-04-30 PROCEDURE — 94003 VENT MGMT INPAT SUBQ DAY: CPT

## 2020-04-30 PROCEDURE — 6360000002 HC RX W HCPCS

## 2020-04-30 PROCEDURE — 83735 ASSAY OF MAGNESIUM: CPT

## 2020-04-30 PROCEDURE — 36415 COLL VENOUS BLD VENIPUNCTURE: CPT

## 2020-04-30 PROCEDURE — 6360000002 HC RX W HCPCS: Performed by: STUDENT IN AN ORGANIZED HEALTH CARE EDUCATION/TRAINING PROGRAM

## 2020-04-30 PROCEDURE — 97110 THERAPEUTIC EXERCISES: CPT

## 2020-04-30 PROCEDURE — 84100 ASSAY OF PHOSPHORUS: CPT

## 2020-04-30 PROCEDURE — 6360000002 HC RX W HCPCS: Performed by: SURGERY

## 2020-04-30 PROCEDURE — 6370000000 HC RX 637 (ALT 250 FOR IP): Performed by: STUDENT IN AN ORGANIZED HEALTH CARE EDUCATION/TRAINING PROGRAM

## 2020-04-30 PROCEDURE — 97530 THERAPEUTIC ACTIVITIES: CPT

## 2020-04-30 PROCEDURE — 85025 COMPLETE CBC W/AUTO DIFF WBC: CPT

## 2020-04-30 PROCEDURE — 94667 MNPJ CHEST WALL 1ST: CPT

## 2020-04-30 PROCEDURE — 2500000003 HC RX 250 WO HCPCS: Performed by: STUDENT IN AN ORGANIZED HEALTH CARE EDUCATION/TRAINING PROGRAM

## 2020-04-30 PROCEDURE — 0BC68ZZ EXTIRPATION OF MATTER FROM RIGHT LOWER LOBE BRONCHUS, VIA NATURAL OR ARTIFICIAL OPENING ENDOSCOPIC: ICD-10-PCS | Performed by: SURGERY

## 2020-04-30 PROCEDURE — 2000000000 HC ICU R&B

## 2020-04-30 PROCEDURE — 80053 COMPREHEN METABOLIC PANEL: CPT

## 2020-04-30 PROCEDURE — 2580000003 HC RX 258

## 2020-04-30 PROCEDURE — 99291 CRITICAL CARE FIRST HOUR: CPT | Performed by: SURGERY

## 2020-04-30 PROCEDURE — 36592 COLLECT BLOOD FROM PICC: CPT

## 2020-04-30 PROCEDURE — 82805 BLOOD GASES W/O2 SATURATION: CPT

## 2020-04-30 PROCEDURE — 82330 ASSAY OF CALCIUM: CPT

## 2020-04-30 PROCEDURE — 0BC38ZZ EXTIRPATION OF MATTER FROM RIGHT MAIN BRONCHUS, VIA NATURAL OR ARTIFICIAL OPENING ENDOSCOPIC: ICD-10-PCS | Performed by: SURGERY

## 2020-04-30 PROCEDURE — 2580000003 HC RX 258: Performed by: SURGERY

## 2020-04-30 PROCEDURE — 6370000000 HC RX 637 (ALT 250 FOR IP): Performed by: SURGERY

## 2020-04-30 RX ORDER — MIDAZOLAM HYDROCHLORIDE 1 MG/ML
8 INJECTION INTRAMUSCULAR; INTRAVENOUS ONCE
Status: COMPLETED | OUTPATIENT
Start: 2020-04-30 | End: 2020-04-30

## 2020-04-30 RX ORDER — MIDAZOLAM HYDROCHLORIDE 1 MG/ML
INJECTION INTRAMUSCULAR; INTRAVENOUS
Status: DISCONTINUED
Start: 2020-04-30 | End: 2020-04-30

## 2020-04-30 RX ORDER — VECURONIUM BROMIDE 1 MG/ML
10 INJECTION, POWDER, LYOPHILIZED, FOR SOLUTION INTRAVENOUS ONCE
Status: COMPLETED | OUTPATIENT
Start: 2020-04-30 | End: 2020-04-30

## 2020-04-30 RX ORDER — FENTANYL CITRATE 50 UG/ML
INJECTION, SOLUTION INTRAMUSCULAR; INTRAVENOUS
Status: DISCONTINUED
Start: 2020-04-30 | End: 2020-04-30

## 2020-04-30 RX ORDER — SODIUM CHLORIDE 0.9 % (FLUSH) 0.9 %
SYRINGE (ML) INJECTION
Status: COMPLETED
Start: 2020-04-30 | End: 2020-04-30

## 2020-04-30 RX ORDER — FENTANYL CITRATE 50 UG/ML
200 INJECTION, SOLUTION INTRAMUSCULAR; INTRAVENOUS ONCE
Status: COMPLETED | OUTPATIENT
Start: 2020-04-30 | End: 2020-04-30

## 2020-04-30 RX ORDER — VECURONIUM BROMIDE 1 MG/ML
INJECTION, POWDER, LYOPHILIZED, FOR SOLUTION INTRAVENOUS
Status: DISPENSED
Start: 2020-04-30 | End: 2020-04-30

## 2020-04-30 RX ADMIN — CEFTRIAXONE SODIUM 2 G: 2 INJECTION, POWDER, FOR SOLUTION INTRAMUSCULAR; INTRAVENOUS at 09:04

## 2020-04-30 RX ADMIN — RISPERIDONE 1 MG: 1 SOLUTION ORAL at 20:28

## 2020-04-30 RX ADMIN — MAGNESIUM HYDROXIDE 30 ML: 2400 SUSPENSION ORAL at 08:52

## 2020-04-30 RX ADMIN — VECURONIUM BROMIDE 10 MG: 1 INJECTION, POWDER, LYOPHILIZED, FOR SOLUTION INTRAVENOUS at 00:33

## 2020-04-30 RX ADMIN — WATER: 1 INJECTION INTRAMUSCULAR; INTRAVENOUS; SUBCUTANEOUS at 00:39

## 2020-04-30 RX ADMIN — MELATONIN 3 MG ORAL TABLET 6 MG: 3 TABLET ORAL at 20:15

## 2020-04-30 RX ADMIN — OXYCODONE HYDROCHLORIDE 5 MG: 5 SOLUTION ORAL at 04:30

## 2020-04-30 RX ADMIN — Medication: at 00:39

## 2020-04-30 RX ADMIN — BISACODYL 10 MG: 10 SUPPOSITORY RECTAL at 08:54

## 2020-04-30 RX ADMIN — HYDROMORPHONE HYDROCHLORIDE 1 MG: 1 INJECTION, SOLUTION INTRAMUSCULAR; INTRAVENOUS; SUBCUTANEOUS at 03:34

## 2020-04-30 RX ADMIN — OXYCODONE HYDROCHLORIDE 5 MG: 5 SOLUTION ORAL at 12:10

## 2020-04-30 RX ADMIN — RISPERIDONE 1 MG: 1 SOLUTION ORAL at 09:04

## 2020-04-30 RX ADMIN — MIDAZOLAM 8 MG: 1 INJECTION INTRAMUSCULAR; INTRAVENOUS at 00:32

## 2020-04-30 RX ADMIN — LORAZEPAM 0.5 MG: 2 CONCENTRATE ORAL at 22:18

## 2020-04-30 RX ADMIN — SENNOSIDES AND DOCUSATE SODIUM 2 TABLET: 8.6; 5 TABLET ORAL at 20:15

## 2020-04-30 RX ADMIN — OXYCODONE HYDROCHLORIDE 5 MG: 5 SOLUTION ORAL at 08:53

## 2020-04-30 RX ADMIN — CHLORHEXIDINE GLUCONATE 0.12% ORAL RINSE 15 ML: 1.2 LIQUID ORAL at 20:15

## 2020-04-30 RX ADMIN — POLYETHYLENE GLYCOL 3350 17 G: 17 POWDER, FOR SOLUTION ORAL at 20:15

## 2020-04-30 RX ADMIN — ACETAMINOPHEN 650 MG: 325 TABLET, FILM COATED ORAL at 20:41

## 2020-04-30 RX ADMIN — FAMOTIDINE 20 MG: 20 TABLET, FILM COATED ORAL at 20:15

## 2020-04-30 RX ADMIN — DOCUSATE SODIUM 100 MG: 50 LIQUID ORAL at 08:53

## 2020-04-30 RX ADMIN — DOCUSATE SODIUM 100 MG: 50 LIQUID ORAL at 20:15

## 2020-04-30 RX ADMIN — OXYCODONE HYDROCHLORIDE 5 MG: 5 SOLUTION ORAL at 20:15

## 2020-04-30 RX ADMIN — FENTANYL CITRATE 200 MCG: 50 INJECTION INTRAMUSCULAR; INTRAVENOUS at 00:31

## 2020-04-30 RX ADMIN — FENTANYL CITRATE 200 MCG: 50 INJECTION INTRAMUSCULAR; INTRAVENOUS at 00:35

## 2020-04-30 RX ADMIN — CHLORHEXIDINE GLUCONATE 0.12% ORAL RINSE 15 ML: 1.2 LIQUID ORAL at 08:52

## 2020-04-30 RX ADMIN — ENOXAPARIN SODIUM 30 MG: 30 INJECTION SUBCUTANEOUS at 20:34

## 2020-04-30 RX ADMIN — ENOXAPARIN SODIUM 30 MG: 30 INJECTION SUBCUTANEOUS at 08:53

## 2020-04-30 RX ADMIN — FAMOTIDINE 20 MG: 20 TABLET, FILM COATED ORAL at 08:53

## 2020-04-30 RX ADMIN — SENNOSIDES AND DOCUSATE SODIUM 2 TABLET: 8.6; 5 TABLET ORAL at 08:53

## 2020-04-30 RX ADMIN — POLYETHYLENE GLYCOL 3350 17 G: 17 POWDER, FOR SOLUTION ORAL at 08:53

## 2020-04-30 RX ADMIN — LORAZEPAM 0.5 MG: 2 CONCENTRATE ORAL at 05:46

## 2020-04-30 RX ADMIN — Medication 1 MG: at 03:34

## 2020-04-30 RX ADMIN — OXYCODONE HYDROCHLORIDE 5 MG: 5 SOLUTION ORAL at 16:52

## 2020-04-30 RX ADMIN — LORAZEPAM 0.5 MG: 2 CONCENTRATE ORAL at 14:49

## 2020-04-30 ASSESSMENT — PAIN SCALES - GENERAL
PAINLEVEL_OUTOF10: 6
PAINLEVEL_OUTOF10: 0
PAINLEVEL_OUTOF10: 1
PAINLEVEL_OUTOF10: 0
PAINLEVEL_OUTOF10: 4
PAINLEVEL_OUTOF10: 0
PAINLEVEL_OUTOF10: 0
PAINLEVEL_OUTOF10: 8
PAINLEVEL_OUTOF10: 0
PAINLEVEL_OUTOF10: 5
PAINLEVEL_OUTOF10: 0
PAINLEVEL_OUTOF10: 4
PAINLEVEL_OUTOF10: 0
PAINLEVEL_OUTOF10: 0
PAINLEVEL_OUTOF10: 3
PAINLEVEL_OUTOF10: 0
PAINLEVEL_OUTOF10: 3
PAINLEVEL_OUTOF10: 4
PAINLEVEL_OUTOF10: 0
PAINLEVEL_OUTOF10: 2
PAINLEVEL_OUTOF10: 0
PAINLEVEL_OUTOF10: 4

## 2020-04-30 ASSESSMENT — PULMONARY FUNCTION TESTS
PIF_VALUE: 20
PIF_VALUE: 31
PIF_VALUE: 19
PIF_VALUE: 30
PIF_VALUE: 25
PIF_VALUE: 19
PIF_VALUE: 27
PIF_VALUE: 29
PIF_VALUE: 28
PIF_VALUE: 20
PIF_VALUE: 21
PIF_VALUE: 26
PIF_VALUE: 25
PIF_VALUE: 21
PIF_VALUE: 23
PIF_VALUE: 22
PIF_VALUE: 23
PIF_VALUE: 21
PIF_VALUE: 23
PIF_VALUE: 18
PIF_VALUE: 19
PIF_VALUE: 27
PIF_VALUE: 30
PIF_VALUE: 40
PIF_VALUE: 25
PIF_VALUE: 27
PIF_VALUE: 26

## 2020-04-30 NOTE — PROGRESS NOTES
wrist flexion; gross grasp with cues. Impaired FMS. *noted spasm in pectoralis with attempt to flex shoulder. 2-/5 shoulder     3-/5 elbow     3-/5 forearm     3-/5 wrist     3-/5 grasp     2-/5 thumb WFL; 3+/5         Sensation: denies numbness in B UE's; finger ID WFL. Pt reports sensation in upper chest. Absent below chest.  Tone: flaccid B LE's  Edema: [facility]     Functional Assessment    Initial Eval Status  Date: 4/28 Treatment Status  Date:4/30 STG=LTG  5-14  days    Feeding Dep DEP  Monitor status and re-attempt when cleared for diet.      Grooming Dep Max A/Dep  reaching up to face to wash eyes. Mod A   while seated supported; demonstrating G  Knowledge of compensatory techniques & use of AE as needed.       UB dressing/bathing Dep DEP                        Max A         LB dressing/bathing Dep DEP                           using AE as needed for safe reach/ energy conservation        Toileting Dep DEP                              Bed Mobility  Supine to sit: NT     Sit to supine: NT Scoot:Max/Dep +2  to reposition up in bed and place bed in partial chair position                               Max A  in prep of ADL tasks & transfers   Functional Transfers Sit to stand: NT     Stand to sit: NT  NT                       Max A   sit<>stand/functional bathroom transfers using AD/DME as needed for balance and safety   Functional Mobility NT NT                       Max A   functional/bathroom mobility using AD as needed & demonstrating G safety      Balance Sitting:     Static:  NT    Dynamic:NT  Standing: NT Pt tolerated bed in partial chair position to perform B UE A/AROM ex's.  Min A dynamic sitting balance; Max A dynamic standing balance  during ADL tasks & transfers   Endurance/Activity Tolerance    Fair tolerance with bed level activity. Pt tolerated HOB elevated to 45 during ROM ex's; no c/o feeling light headed; no c/o pain.  Fair with light bed level activity today.  F   tolerance
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
## 2020-04-30 NOTE — PROGRESS NOTES
Unsupported sitting  -/7   Sit to stand transfers -/7   Ambulation -/7   Total  -/35       Therapeutic Exercises:  PROM BLEs    Patient education  Pt educated on safety    Patient response to education:   Pt verbalized understanding Pt demonstrated skill Pt requires further education in this area   no partial yes     ASSESSMENT:    Comments:  RN reported pt was stable for session. Pt was supine in bed upon arrival, agreeable to treatment session. Pt was more alert and active this session which safety concerns as pt occasionally reached for medical lines. Pt was repositioned in bed and exercises completed as noted above. Pt remained in bed. All needs met and call light in reach. All lines remained intact and restraints reapplied. Education provided on medical lines and PT POC. Treatment:  Patient practiced and was instructed in the following treatment:     Skilled positioning - Pt was repositioned to comfort with pillows utilized to facilitate posture, joint and skin integrity, and interaction with environment.  Non-pharmacological treatment and prevention of ICU delirium - Pt oriented to date, time, time of day, place, and situation as well as provided with visual and auditory stimuli in order to improve cognition and combat effects of ICU delirium. PLAN:    Patient is making limited progress towards established goals. Will continue with current POC.       Time in  1400  Time out  1430    Total Treatment Time  23 minutes     CPT codes:  [] Gait training 06204 - minutes  [] Manual therapy 17036 - minutes  [x] Therapeutic activities 68619 23 minutes  [] Therapeutic exercises 48895 - minutes  [] Neuromuscular reeducation 10386 - minutes    Delia Licea PT, DPT  [id number]

## 2020-04-30 NOTE — CARE COORDINATION
Remains on vent via trach. Required bronch during the night d/t mucous plugging. Bilateral ct's in place. Planning to remove L ct today. Spoke with pt's mom. She states she mailed back medicaid appl forms to [employer]. She is requesting [place] when stable for rehab. Referral made to [facility] at [facility] that he may require ltac prior to Lisandro to be weaned from vent. Mom requested that I send ltac list to her daughter's email at [email or web address]. Also emailed phone number and email address to initiate disability process.

## 2020-05-01 ENCOUNTER — APPOINTMENT (OUTPATIENT)
Dept: GENERAL RADIOLOGY | Age: 25
DRG: 004 | End: 2020-05-01
Payer: MEDICAID

## 2020-05-01 LAB
AADO2: 365.5 MMHG
ALBUMIN SERPL-MCNC: 2.7 G/DL (ref 3.5–5.2)
ALP BLD-CCNC: 93 U/L (ref 40–129)
ALT SERPL-CCNC: 75 U/L (ref 0–40)
ANION GAP SERPL CALCULATED.3IONS-SCNC: 13 MMOL/L (ref 7–16)
ANISOCYTOSIS: ABNORMAL
AST SERPL-CCNC: 59 U/L (ref 0–39)
B.E.: 4.6 MMOL/L (ref -3–3)
BASOPHILS ABSOLUTE: 0 E9/L (ref 0–0.2)
BASOPHILS RELATIVE PERCENT: 0.1 % (ref 0–2)
BILIRUB SERPL-MCNC: 0.2 MG/DL (ref 0–1.2)
BUN BLDV-MCNC: 26 MG/DL (ref 6–20)
CALCIUM IONIZED: 1.23 MMOL/L (ref 1.15–1.33)
CALCIUM SERPL-MCNC: 8.7 MG/DL (ref 8.6–10.2)
CHLORIDE BLD-SCNC: 94 MMOL/L (ref 98–107)
CO2: 29 MMOL/L (ref 22–29)
COHB: 0.3 % (ref 0–1.5)
CREAT SERPL-MCNC: 0.9 MG/DL (ref 0.7–1.2)
CRITICAL: ABNORMAL
CULTURE, RESPIRATORY: ABNORMAL
DATE ANALYZED: ABNORMAL
DATE OF COLLECTION: ABNORMAL
EOSINOPHILS ABSOLUTE: 0.36 E9/L (ref 0.05–0.5)
EOSINOPHILS RELATIVE PERCENT: 1.7 % (ref 0–6)
FIO2: 100 %
GFR AFRICAN AMERICAN: >60
GFR NON-AFRICAN AMERICAN: >60 ML/MIN/1.73
GLUCOSE BLD-MCNC: 104 MG/DL (ref 74–99)
HCO3: 28.5 MMOL/L (ref 22–26)
HCT VFR BLD CALC: 24.4 % (ref 37–54)
HEMOGLOBIN: 8.2 G/DL (ref 12.5–16.5)
HHB: 0.5 % (ref 0–5)
HYPOCHROMIA: ABNORMAL
LAB: ABNORMAL
LYMPHOCYTES ABSOLUTE: 0.86 E9/L (ref 1.5–4)
LYMPHOCYTES RELATIVE PERCENT: 4.3 % (ref 20–42)
Lab: ABNORMAL
MAGNESIUM: 2.3 MG/DL (ref 1.6–2.6)
MCH RBC QN AUTO: 29.9 PG (ref 26–35)
MCHC RBC AUTO-ENTMCNC: 33.6 % (ref 32–34.5)
MCV RBC AUTO: 89.1 FL (ref 80–99.9)
METHB: 0.5 % (ref 0–1.5)
MODE: AC
MONOCYTES ABSOLUTE: 1.71 E9/L (ref 0.1–0.95)
MONOCYTES RELATIVE PERCENT: 7.8 % (ref 2–12)
NEUTROPHILS ABSOLUTE: 18.4 E9/L (ref 1.8–7.3)
NEUTROPHILS RELATIVE PERCENT: 86.1 % (ref 43–80)
O2 CONTENT: 13.5 ML/DL
O2 SATURATION: 99.5 % (ref 92–98.5)
O2HB: 98.7 % (ref 94–97)
OPERATOR ID: 1874
ORGANISM: ABNORMAL
ORGANISM: ABNORMAL
OVALOCYTES: ABNORMAL
PATIENT TEMP: 37 C
PCO2: 39.6 MMHG (ref 35–45)
PDW BLD-RTO: 13.9 FL (ref 11.5–15)
PEEP/CPAP: 10 CMH2O
PFO2: 2.83 MMHG/%
PH BLOOD GAS: 7.47 (ref 7.35–7.45)
PHOSPHORUS: 2.6 MG/DL (ref 2.5–4.5)
PLATELET # BLD: 525 E9/L (ref 130–450)
PMV BLD AUTO: 9.6 FL (ref 7–12)
PO2: 282.9 MMHG (ref 75–100)
POIKILOCYTES: ABNORMAL
POLYCHROMASIA: ABNORMAL
POTASSIUM SERPL-SCNC: 4.1 MMOL/L (ref 3.5–5)
RBC # BLD: 2.74 E12/L (ref 3.8–5.8)
RI(T): 129 %
RR MECHANICAL: 16 B/MIN
SMEAR, RESPIRATORY: ABNORMAL
SODIUM BLD-SCNC: 136 MMOL/L (ref 132–146)
SOURCE, BLOOD GAS: ABNORMAL
THB: 9.2 G/DL (ref 11.5–16.5)
TIME ANALYZED: 439
TOTAL PROTEIN: 6.6 G/DL (ref 6.4–8.3)
VT MECHANICAL: 500 ML
WBC # BLD: 21.4 E9/L (ref 4.5–11.5)

## 2020-05-01 PROCEDURE — 2580000003 HC RX 258: Performed by: SURGERY

## 2020-05-01 PROCEDURE — 6370000000 HC RX 637 (ALT 250 FOR IP): Performed by: STUDENT IN AN ORGANIZED HEALTH CARE EDUCATION/TRAINING PROGRAM

## 2020-05-01 PROCEDURE — 71045 X-RAY EXAM CHEST 1 VIEW: CPT

## 2020-05-01 PROCEDURE — 6360000002 HC RX W HCPCS: Performed by: STUDENT IN AN ORGANIZED HEALTH CARE EDUCATION/TRAINING PROGRAM

## 2020-05-01 PROCEDURE — 82330 ASSAY OF CALCIUM: CPT

## 2020-05-01 PROCEDURE — 83735 ASSAY OF MAGNESIUM: CPT

## 2020-05-01 PROCEDURE — 2500000003 HC RX 250 WO HCPCS: Performed by: STUDENT IN AN ORGANIZED HEALTH CARE EDUCATION/TRAINING PROGRAM

## 2020-05-01 PROCEDURE — 84100 ASSAY OF PHOSPHORUS: CPT

## 2020-05-01 PROCEDURE — 6370000000 HC RX 637 (ALT 250 FOR IP): Performed by: SURGERY

## 2020-05-01 PROCEDURE — 99291 CRITICAL CARE FIRST HOUR: CPT | Performed by: SURGERY

## 2020-05-01 PROCEDURE — 02HV33Z INSERTION OF INFUSION DEVICE INTO SUPERIOR VENA CAVA, PERCUTANEOUS APPROACH: ICD-10-PCS | Performed by: SURGERY

## 2020-05-01 PROCEDURE — 97530 THERAPEUTIC ACTIVITIES: CPT

## 2020-05-01 PROCEDURE — 2580000003 HC RX 258: Performed by: STUDENT IN AN ORGANIZED HEALTH CARE EDUCATION/TRAINING PROGRAM

## 2020-05-01 PROCEDURE — 36569 INSJ PICC 5 YR+ W/O IMAGING: CPT

## 2020-05-01 PROCEDURE — C1751 CATH, INF, PER/CENT/MIDLINE: HCPCS

## 2020-05-01 PROCEDURE — 80053 COMPREHEN METABOLIC PANEL: CPT

## 2020-05-01 PROCEDURE — 94003 VENT MGMT INPAT SUBQ DAY: CPT

## 2020-05-01 PROCEDURE — 6360000002 HC RX W HCPCS: Performed by: SURGERY

## 2020-05-01 PROCEDURE — 85025 COMPLETE CBC W/AUTO DIFF WBC: CPT

## 2020-05-01 PROCEDURE — 82805 BLOOD GASES W/O2 SATURATION: CPT

## 2020-05-01 PROCEDURE — 76937 US GUIDE VASCULAR ACCESS: CPT

## 2020-05-01 PROCEDURE — 2000000000 HC ICU R&B

## 2020-05-01 PROCEDURE — 97110 THERAPEUTIC EXERCISES: CPT

## 2020-05-01 RX ORDER — RISPERIDONE 1 MG/ML
0.5 SOLUTION ORAL 2 TIMES DAILY
Status: DISCONTINUED | OUTPATIENT
Start: 2020-05-01 | End: 2020-05-03

## 2020-05-01 RX ORDER — SODIUM CHLORIDE 0.9 % (FLUSH) 0.9 %
10 SYRINGE (ML) INJECTION PRN
Status: DISCONTINUED | OUTPATIENT
Start: 2020-05-01 | End: 2020-05-26 | Stop reason: HOSPADM

## 2020-05-01 RX ORDER — LIDOCAINE HYDROCHLORIDE 10 MG/ML
5 INJECTION, SOLUTION EPIDURAL; INFILTRATION; INTRACAUDAL; PERINEURAL ONCE
Status: COMPLETED | OUTPATIENT
Start: 2020-05-01 | End: 2020-05-01

## 2020-05-01 RX ORDER — HEPARIN SODIUM (PORCINE) LOCK FLUSH IV SOLN 100 UNIT/ML 100 UNIT/ML
3 SOLUTION INTRAVENOUS PRN
Status: DISCONTINUED | OUTPATIENT
Start: 2020-05-01 | End: 2020-05-26 | Stop reason: HOSPADM

## 2020-05-01 RX ORDER — HEPARIN SODIUM (PORCINE) LOCK FLUSH IV SOLN 100 UNIT/ML 100 UNIT/ML
3 SOLUTION INTRAVENOUS EVERY 12 HOURS SCHEDULED
Status: DISCONTINUED | OUTPATIENT
Start: 2020-05-01 | End: 2020-05-26 | Stop reason: HOSPADM

## 2020-05-01 RX ADMIN — POLYETHYLENE GLYCOL 3350 17 G: 17 POWDER, FOR SOLUTION ORAL at 08:17

## 2020-05-01 RX ADMIN — ENOXAPARIN SODIUM 30 MG: 30 INJECTION SUBCUTANEOUS at 20:34

## 2020-05-01 RX ADMIN — OXYCODONE HYDROCHLORIDE 5 MG: 5 SOLUTION ORAL at 04:28

## 2020-05-01 RX ADMIN — CHLORHEXIDINE GLUCONATE 0.12% ORAL RINSE 15 ML: 1.2 LIQUID ORAL at 20:21

## 2020-05-01 RX ADMIN — LIDOCAINE HYDROCHLORIDE 5 ML: 10 INJECTION, SOLUTION EPIDURAL; INFILTRATION; INTRACAUDAL; PERINEURAL at 17:00

## 2020-05-01 RX ADMIN — MAGNESIUM HYDROXIDE 30 ML: 2400 SUSPENSION ORAL at 08:17

## 2020-05-01 RX ADMIN — LORAZEPAM 0.5 MG: 2 CONCENTRATE ORAL at 06:21

## 2020-05-01 RX ADMIN — RISPERIDONE 1 MG: 1 SOLUTION ORAL at 08:19

## 2020-05-01 RX ADMIN — SODIUM CHLORIDE, PRESERVATIVE FREE 300 UNITS: 5 INJECTION INTRAVENOUS at 20:34

## 2020-05-01 RX ADMIN — FAMOTIDINE 20 MG: 20 TABLET, FILM COATED ORAL at 08:17

## 2020-05-01 RX ADMIN — CEFEPIME 2 G: 2 INJECTION, POWDER, FOR SOLUTION INTRAVENOUS at 20:21

## 2020-05-01 RX ADMIN — CEFTRIAXONE SODIUM 2 G: 2 INJECTION, POWDER, FOR SOLUTION INTRAMUSCULAR; INTRAVENOUS at 08:19

## 2020-05-01 RX ADMIN — DOCUSATE SODIUM 100 MG: 50 LIQUID ORAL at 08:17

## 2020-05-01 RX ADMIN — BISACODYL 10 MG: 10 SUPPOSITORY RECTAL at 08:17

## 2020-05-01 RX ADMIN — OXYCODONE HYDROCHLORIDE 5 MG: 5 SOLUTION ORAL at 08:18

## 2020-05-01 RX ADMIN — CEFEPIME 2 G: 2 INJECTION, POWDER, FOR SOLUTION INTRAVENOUS at 12:19

## 2020-05-01 RX ADMIN — RISPERIDONE 0.5 MG: 1 SOLUTION ORAL at 20:21

## 2020-05-01 RX ADMIN — OXYCODONE HYDROCHLORIDE 5 MG: 5 SOLUTION ORAL at 20:20

## 2020-05-01 RX ADMIN — OXYCODONE HYDROCHLORIDE 5 MG: 5 SOLUTION ORAL at 00:07

## 2020-05-01 RX ADMIN — ENOXAPARIN SODIUM 30 MG: 30 INJECTION SUBCUTANEOUS at 08:17

## 2020-05-01 RX ADMIN — MELATONIN 3 MG ORAL TABLET 6 MG: 3 TABLET ORAL at 20:21

## 2020-05-01 RX ADMIN — ACETAMINOPHEN 650 MG: 325 TABLET, FILM COATED ORAL at 20:29

## 2020-05-01 RX ADMIN — CHLORHEXIDINE GLUCONATE 0.12% ORAL RINSE 15 ML: 1.2 LIQUID ORAL at 08:17

## 2020-05-01 RX ADMIN — SENNOSIDES AND DOCUSATE SODIUM 2 TABLET: 8.6; 5 TABLET ORAL at 08:17

## 2020-05-01 RX ADMIN — MAGNESIUM SULFATE 1 ENEMA: 1 CRYSTAL ORAL; TOPICAL at 08:17

## 2020-05-01 RX ADMIN — OXYCODONE HYDROCHLORIDE 5 MG: 5 SOLUTION ORAL at 16:00

## 2020-05-01 RX ADMIN — OXYCODONE HYDROCHLORIDE 5 MG: 5 SOLUTION ORAL at 12:18

## 2020-05-01 RX ADMIN — Medication 20 MG: at 20:47

## 2020-05-01 ASSESSMENT — PAIN SCALES - GENERAL
PAINLEVEL_OUTOF10: 0
PAINLEVEL_OUTOF10: 0
PAINLEVEL_OUTOF10: 6
PAINLEVEL_OUTOF10: 0
PAINLEVEL_OUTOF10: 2
PAINLEVEL_OUTOF10: 0
PAINLEVEL_OUTOF10: 5
PAINLEVEL_OUTOF10: 0

## 2020-05-01 ASSESSMENT — PULMONARY FUNCTION TESTS
PIF_VALUE: 28
PIF_VALUE: 26
PIF_VALUE: 24
PIF_VALUE: 24
PIF_VALUE: 23
PIF_VALUE: 18
PIF_VALUE: 19
PIF_VALUE: 39
PIF_VALUE: 32
PIF_VALUE: 35
PIF_VALUE: 18
PIF_VALUE: 21
PIF_VALUE: 20
PIF_VALUE: 22
PIF_VALUE: 20
PIF_VALUE: 27
PIF_VALUE: 23
PIF_VALUE: 23
PIF_VALUE: 26
PIF_VALUE: 45
PIF_VALUE: 32
PIF_VALUE: 31
PIF_VALUE: 32
PIF_VALUE: 33
PIF_VALUE: 20
PIF_VALUE: 19
PIF_VALUE: 22
PIF_VALUE: 18

## 2020-05-01 ASSESSMENT — PAIN DESCRIPTION - PAIN TYPE: TYPE: ACUTE PAIN

## 2020-05-01 ASSESSMENT — PAIN DESCRIPTION - ORIENTATION: ORIENTATION: MID;LEFT

## 2020-05-01 ASSESSMENT — PAIN DESCRIPTION - LOCATION: LOCATION: GENERALIZED

## 2020-05-01 NOTE — PLAN OF CARE
Problem: Falls - Risk of:  Goal: Will remain free from falls  5/1/2020 1849 by Shabana Bhatti RN  Outcome: Met This Shift  5/1/2020 0813 by Shabana Bhatti RN  Outcome: Met This Shift     Problem: Restraint Use - Nonviolent/Non-Self-Destructive Behavior:  Goal: Absence of restraint indications  Outcome: Completed     Problem: Restraint Use - Nonviolent/Non-Self-Destructive Behavior:  Goal: Absence of restraint indications  5/1/2020 1228 by Shabana Bhatti RN  Outcome: Completed  5/1/2020 0813 by Shabana Bhatti RN  Outcome: Not Met This Shift  5/1/2020 0522 by Shamir Lyn RN  Outcome: Not Met This Shift  Goal: Absence of restraint-related injury  5/1/2020 1228 by Shabana Bhatti RN  Outcome: Completed  5/1/2020 0813 by Shabana Bhatti RN  Outcome: Met This Shift  5/1/2020 0522 by Shamir Lyn RN  Outcome: Met This Shift     Problem: Restraint Use - Nonviolent/Non-Self-Destructive Behavior:  Goal: Absence of restraint-related injury  5/1/2020 1228 by Shabana Bhatti RN  Outcome: Completed  5/1/2020 0813 by Shabana Bhatti RN  Outcome: Met This Shift  5/1/2020 0522 by Shamir Lyn RN  Outcome: Met This Shift     Problem: Pain:  Goal: Pain level will decrease  5/1/2020 1849 by Shabana Bhatti RN  Outcome: Met This Shift  5/1/2020 0813 by Shabana Bhatti RN  Outcome: Met This Shift     Problem: Discharge Planning:  Goal: Participates in care planning  Outcome: Ongoing     Problem: Airway Clearance - Ineffective:  Goal: Ability to maintain a clear airway will improve  5/1/2020 1849 by Shabana Bhatti RN  Outcome: Met This Shift  5/1/2020 0813 by Shabana Bhatti RN  Outcome: Met This Shift     Problem: Anxiety/Stress:  Goal: Level of anxiety will decrease  5/1/2020 1849 by Shabana Bhatti RN  Outcome: Met This Shift  5/1/2020 0813 by Shabana Bhatti RN  Outcome: Ongoing     Problem: Aspiration:  Goal: Absence of aspiration  5/1/2020 1849 by Shabana Bhatti RN  Outcome: Met This Shift  5/1/2020 0813 by Taryn Blackwood MITESH Garrett  Outcome: Met This Shift     Problem: Cardiac Output - Decreased:  Goal: Hemodynamic stability will improve  5/1/2020 1849 by Gely Dooley RN  Outcome: Met This Shift  5/1/2020 0813 by Gely Dooley RN  Outcome: Met This Shift     Problem: Fluid Volume - Imbalance:  Goal: Absence of imbalanced fluid volume signs and symptoms  5/1/2020 1849 by Gely Dooley RN  Outcome: Met This Shift  5/1/2020 0813 by Gely Dooley RN  Outcome: Met This Shift     Problem: Gas Exchange - Impaired:  Goal: Levels of oxygenation will improve  5/1/2020 1849 by Gely Dooley RN  Outcome: Met This Shift  5/1/2020 0813 by Gely Dooley RN  Outcome: Met This Shift     Problem: Skin Integrity - Impaired:  Goal: Will show no infection signs and symptoms  5/1/2020 1849 by Gely Dooley RN  Outcome: Met This Shift  5/1/2020 0813 by Gely Dooley RN  Outcome: Met This Shift     Problem: Infection:  Goal: Will remain free from infection  5/1/2020 1849 by Gely Dooley RN  Outcome: Met This Shift  5/1/2020 0813 by Gely Dooley RN  Outcome: Met This Shift     Problem: Daily Care:  Goal: Daily care needs are met  5/1/2020 1849 by Gely Dooley RN  Outcome: Met This Shift  5/1/2020 0813 by Gely Dooley RN  Outcome: Met This Shift     Problem: Musculor/Skeletal Functional Status  Goal: Highest potential functional level  5/1/2020 1849 by Gely Dooley RN  Outcome: Met This Shift  5/1/2020 0813 by Gely Dooley RN  Outcome: Met This Shift

## 2020-05-01 NOTE — FLOWSHEET NOTE
Pt is in 2 point soft restraints due to he will pull at trach, iv lines and ct if they were untied. Pt needs to be in them for safety and rn will continue to monitor for need.

## 2020-05-01 NOTE — FLOWSHEET NOTE
Pt trying to pull at lines, trach and tubes. Pt re-educated on safety. Restraints are needed at this time. Will continue to monitor progress.
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
## 2020-05-01 NOTE — PROGRESS NOTES
sit transfer 1/7   Unsupported sitting  2/7   Sit to stand transfers -/7   Ambulation -/7   Total  -/35       Therapeutic Exercises:  PROM BLEs    Patient education  Pt educated on safety    Patient response to education:   Pt verbalized understanding Pt demonstrated skill Pt requires further education in this area   partial partial yes     ASSESSMENT:    Comments:  RN reported pt was stable for session. Pt was supine in bed upon arrival, agreeable to treatment session. Upon rearranging lines, noted pt to be soiled. RN provided hygiene care. Pt required encouragement and reassurance to sit EOB. Pt reported dizziness with upright activity. Pt attempted to use BUEs to assist with sitting balance. Fatigued noted and pt was returned to bed with all needs met and call light in reach. Pt was soiled again and RN was notified. All lines remained intact. Treatment:  Patient practiced and was instructed in the following treatment:    · Bed mobility training - pt given verbal and tactile cues to facilitate proper sequencing and safety during supine>sit as well as provided with physical assistance to complete task   · Sitting EOB for ~5 minutes for upright tolerance, postural awareness and BLE ROM   Skilled positioning - Pt was repositioned to comfort with pillows utilized to facilitate posture, joint and skin integrity, and interaction with environment.  Non-pharmacological treatment and prevention of ICU delirium - Pt oriented to date, time, time of day, place, and situation as well as provided with visual and auditory stimuli in order to improve cognition and combat effects of ICU delirium. PLAN:    Patient is making progress towards established goals. Will continue with current POC.       Time in  1400  Time out  1425    Total Treatment Time  25 minutes     CPT codes:  [] Gait training 55631 - minutes  [] Manual therapy [id number] - minutes  [x] Therapeutic activities 88273 25 minutes  [] Therapeutic

## 2020-05-02 ENCOUNTER — APPOINTMENT (OUTPATIENT)
Dept: GENERAL RADIOLOGY | Age: 25
DRG: 004 | End: 2020-05-02
Payer: MEDICAID

## 2020-05-02 LAB
AADO2: 232.5 MMHG
ALBUMIN SERPL-MCNC: 2.9 G/DL (ref 3.5–5.2)
ALP BLD-CCNC: 102 U/L (ref 40–129)
ALT SERPL-CCNC: 94 U/L (ref 0–40)
ANION GAP SERPL CALCULATED.3IONS-SCNC: 9 MMOL/L (ref 7–16)
ANISOCYTOSIS: ABNORMAL
AST SERPL-CCNC: 63 U/L (ref 0–39)
B.E.: 5.3 MMOL/L (ref -3–3)
BASOPHILS ABSOLUTE: 0 E9/L (ref 0–0.2)
BASOPHILS RELATIVE PERCENT: 0.1 % (ref 0–2)
BILIRUB SERPL-MCNC: 0.2 MG/DL (ref 0–1.2)
BLOOD CULTURE, ROUTINE: NORMAL
BUN BLDV-MCNC: 25 MG/DL (ref 6–20)
CALCIUM IONIZED: 1.25 MMOL/L (ref 1.15–1.33)
CALCIUM SERPL-MCNC: 9 MG/DL (ref 8.6–10.2)
CHLORIDE BLD-SCNC: 98 MMOL/L (ref 98–107)
CO2: 30 MMOL/L (ref 22–29)
COHB: 0 % (ref 0–1.5)
CREAT SERPL-MCNC: 0.8 MG/DL (ref 0.7–1.2)
CRITICAL: ABNORMAL
CULTURE, BLOOD 2: NORMAL
DATE ANALYZED: ABNORMAL
DATE OF COLLECTION: ABNORMAL
EOSINOPHILS ABSOLUTE: 0.42 E9/L (ref 0.05–0.5)
EOSINOPHILS RELATIVE PERCENT: 1.7 % (ref 0–6)
FIO2: 70 %
GFR AFRICAN AMERICAN: >60
GFR NON-AFRICAN AMERICAN: >60 ML/MIN/1.73
GLUCOSE BLD-MCNC: 108 MG/DL (ref 74–99)
HCO3: 29.5 MMOL/L (ref 22–26)
HCT VFR BLD CALC: 25.7 % (ref 37–54)
HEMOGLOBIN: 8.3 G/DL (ref 12.5–16.5)
HHB: 0.4 % (ref 0–5)
HYPOCHROMIA: ABNORMAL
LAB: ABNORMAL
LYMPHOCYTES ABSOLUTE: 2.47 E9/L (ref 1.5–4)
LYMPHOCYTES RELATIVE PERCENT: 9.6 % (ref 20–42)
Lab: ABNORMAL
MAGNESIUM: 2.4 MG/DL (ref 1.6–2.6)
MCH RBC QN AUTO: 29.4 PG (ref 26–35)
MCHC RBC AUTO-ENTMCNC: 32.3 % (ref 32–34.5)
MCV RBC AUTO: 91.1 FL (ref 80–99.9)
METHB: 0.6 % (ref 0–1.5)
MODE: AC
MONOCYTES ABSOLUTE: 1.48 E9/L (ref 0.1–0.95)
MONOCYTES RELATIVE PERCENT: 6.1 % (ref 2–12)
NEUTROPHILS ABSOLUTE: 20.5 E9/L (ref 1.8–7.3)
NEUTROPHILS RELATIVE PERCENT: 82.6 % (ref 43–80)
O2 CONTENT: 11.7 ML/DL
O2 SATURATION: 99.6 % (ref 92–98.5)
O2HB: 99 % (ref 94–97)
OPERATOR ID: 789
PATIENT TEMP: 37 C
PCO2: 41.7 MMHG (ref 35–45)
PDW BLD-RTO: 13.9 FL (ref 11.5–15)
PEEP/CPAP: 10 CMH2O
PFO2: 2.92 MMHG/%
PH BLOOD GAS: 7.47 (ref 7.35–7.45)
PHOSPHORUS: 2.9 MG/DL (ref 2.5–4.5)
PLATELET # BLD: 551 E9/L (ref 130–450)
PMV BLD AUTO: 9.2 FL (ref 7–12)
PO2: 204.3 MMHG (ref 75–100)
POIKILOCYTES: ABNORMAL
POLYCHROMASIA: ABNORMAL
POTASSIUM SERPL-SCNC: 4.2 MMOL/L (ref 3.5–5)
RBC # BLD: 2.82 E12/L (ref 3.8–5.8)
RI(T): 114 %
RR MECHANICAL: 16 B/MIN
SODIUM BLD-SCNC: 137 MMOL/L (ref 132–146)
SOURCE, BLOOD GAS: ABNORMAL
TARGET CELLS: ABNORMAL
THB: 8 G/DL (ref 11.5–16.5)
TIME ANALYZED: 600
TOTAL PROTEIN: 6.9 G/DL (ref 6.4–8.3)
VT MECHANICAL: 500 ML
WBC # BLD: 24.7 E9/L (ref 4.5–11.5)

## 2020-05-02 PROCEDURE — 85025 COMPLETE CBC W/AUTO DIFF WBC: CPT

## 2020-05-02 PROCEDURE — 6360000002 HC RX W HCPCS: Performed by: STUDENT IN AN ORGANIZED HEALTH CARE EDUCATION/TRAINING PROGRAM

## 2020-05-02 PROCEDURE — 6370000000 HC RX 637 (ALT 250 FOR IP): Performed by: SURGERY

## 2020-05-02 PROCEDURE — 2000000000 HC ICU R&B

## 2020-05-02 PROCEDURE — 99291 CRITICAL CARE FIRST HOUR: CPT | Performed by: SURGERY

## 2020-05-02 PROCEDURE — 84100 ASSAY OF PHOSPHORUS: CPT

## 2020-05-02 PROCEDURE — 2500000003 HC RX 250 WO HCPCS: Performed by: STUDENT IN AN ORGANIZED HEALTH CARE EDUCATION/TRAINING PROGRAM

## 2020-05-02 PROCEDURE — 82805 BLOOD GASES W/O2 SATURATION: CPT

## 2020-05-02 PROCEDURE — 6370000000 HC RX 637 (ALT 250 FOR IP): Performed by: STUDENT IN AN ORGANIZED HEALTH CARE EDUCATION/TRAINING PROGRAM

## 2020-05-02 PROCEDURE — 80053 COMPREHEN METABOLIC PANEL: CPT

## 2020-05-02 PROCEDURE — 83735 ASSAY OF MAGNESIUM: CPT

## 2020-05-02 PROCEDURE — 2580000003 HC RX 258: Performed by: STUDENT IN AN ORGANIZED HEALTH CARE EDUCATION/TRAINING PROGRAM

## 2020-05-02 PROCEDURE — 82330 ASSAY OF CALCIUM: CPT

## 2020-05-02 PROCEDURE — 36415 COLL VENOUS BLD VENIPUNCTURE: CPT

## 2020-05-02 PROCEDURE — 94003 VENT MGMT INPAT SUBQ DAY: CPT

## 2020-05-02 PROCEDURE — 71045 X-RAY EXAM CHEST 1 VIEW: CPT

## 2020-05-02 RX ORDER — POLYETHYLENE GLYCOL 3350 17 G/17G
17 POWDER, FOR SOLUTION ORAL DAILY
Status: DISCONTINUED | OUTPATIENT
Start: 2020-05-02 | End: 2020-05-26 | Stop reason: HOSPADM

## 2020-05-02 RX ORDER — ACETAMINOPHEN 325 MG/1
650 TABLET ORAL EVERY 6 HOURS SCHEDULED
Status: DISCONTINUED | OUTPATIENT
Start: 2020-05-02 | End: 2020-05-04

## 2020-05-02 RX ADMIN — SENNOSIDES AND DOCUSATE SODIUM 2 TABLET: 8.6; 5 TABLET ORAL at 21:27

## 2020-05-02 RX ADMIN — CHLORHEXIDINE GLUCONATE 0.12% ORAL RINSE 15 ML: 1.2 LIQUID ORAL at 21:27

## 2020-05-02 RX ADMIN — RISPERIDONE 0.5 MG: 1 SOLUTION ORAL at 09:16

## 2020-05-02 RX ADMIN — OXYCODONE HYDROCHLORIDE 5 MG: 5 SOLUTION ORAL at 16:41

## 2020-05-02 RX ADMIN — MELATONIN 3 MG ORAL TABLET 6 MG: 3 TABLET ORAL at 21:27

## 2020-05-02 RX ADMIN — ENOXAPARIN SODIUM 30 MG: 30 INJECTION SUBCUTANEOUS at 21:27

## 2020-05-02 RX ADMIN — CEFEPIME 2 G: 2 INJECTION, POWDER, FOR SOLUTION INTRAVENOUS at 11:30

## 2020-05-02 RX ADMIN — SODIUM CHLORIDE, PRESERVATIVE FREE 300 UNITS: 5 INJECTION INTRAVENOUS at 09:00

## 2020-05-02 RX ADMIN — ACETAMINOPHEN 650 MG: 325 TABLET, FILM COATED ORAL at 17:57

## 2020-05-02 RX ADMIN — POLYETHYLENE GLYCOL 3350 17 G: 17 POWDER, FOR SOLUTION ORAL at 09:15

## 2020-05-02 RX ADMIN — RISPERIDONE 0.5 MG: 1 SOLUTION ORAL at 21:28

## 2020-05-02 RX ADMIN — SODIUM CHLORIDE, PRESERVATIVE FREE 300 UNITS: 5 INJECTION INTRAVENOUS at 21:27

## 2020-05-02 RX ADMIN — ACETAMINOPHEN 650 MG: 325 TABLET, FILM COATED ORAL at 03:10

## 2020-05-02 RX ADMIN — CHLORHEXIDINE GLUCONATE 0.12% ORAL RINSE 15 ML: 1.2 LIQUID ORAL at 09:15

## 2020-05-02 RX ADMIN — OXYCODONE HYDROCHLORIDE 5 MG: 5 SOLUTION ORAL at 13:01

## 2020-05-02 RX ADMIN — Medication 20 MG: at 21:30

## 2020-05-02 RX ADMIN — POTASSIUM PHOSPHATE, MONOBASIC AND POTASSIUM PHOSPHATE, DIBASIC 10 MMOL: 224; 236 INJECTION, SOLUTION, CONCENTRATE INTRAVENOUS at 09:10

## 2020-05-02 RX ADMIN — OXYCODONE HYDROCHLORIDE 5 MG: 5 SOLUTION ORAL at 20:00

## 2020-05-02 RX ADMIN — CEFEPIME 2 G: 2 INJECTION, POWDER, FOR SOLUTION INTRAVENOUS at 04:30

## 2020-05-02 RX ADMIN — ACETAMINOPHEN 650 MG: 325 TABLET, FILM COATED ORAL at 12:00

## 2020-05-02 RX ADMIN — OXYCODONE HYDROCHLORIDE 5 MG: 5 SOLUTION ORAL at 08:15

## 2020-05-02 RX ADMIN — OXYCODONE HYDROCHLORIDE 5 MG: 5 SOLUTION ORAL at 00:26

## 2020-05-02 RX ADMIN — ACETAMINOPHEN 650 MG: 325 TABLET, FILM COATED ORAL at 23:52

## 2020-05-02 RX ADMIN — OXYCODONE HYDROCHLORIDE 5 MG: 5 SOLUTION ORAL at 23:52

## 2020-05-02 RX ADMIN — ENOXAPARIN SODIUM 30 MG: 30 INJECTION SUBCUTANEOUS at 09:14

## 2020-05-02 RX ADMIN — OXYCODONE HYDROCHLORIDE 5 MG: 5 SOLUTION ORAL at 04:30

## 2020-05-02 RX ADMIN — Medication 20 MG: at 09:17

## 2020-05-02 RX ADMIN — CEFEPIME 2 G: 2 INJECTION, POWDER, FOR SOLUTION INTRAVENOUS at 20:00

## 2020-05-02 RX ADMIN — SENNOSIDES AND DOCUSATE SODIUM 2 TABLET: 8.6; 5 TABLET ORAL at 09:14

## 2020-05-02 ASSESSMENT — PULMONARY FUNCTION TESTS
PIF_VALUE: 29
PIF_VALUE: 27
PIF_VALUE: 25
PIF_VALUE: 26
PIF_VALUE: 26
PIF_VALUE: 20
PIF_VALUE: 29
PIF_VALUE: 28
PIF_VALUE: 29
PIF_VALUE: 31
PIF_VALUE: 34
PIF_VALUE: 29
PIF_VALUE: 23
PIF_VALUE: 30
PIF_VALUE: 21
PIF_VALUE: 27
PIF_VALUE: 20
PIF_VALUE: 29
PIF_VALUE: 29
PIF_VALUE: 27
PIF_VALUE: 25
PIF_VALUE: 26
PIF_VALUE: 24
PIF_VALUE: 33
PIF_VALUE: 28
PIF_VALUE: 20
PIF_VALUE: 28
PIF_VALUE: 28
PIF_VALUE: 27

## 2020-05-02 ASSESSMENT — PAIN SCALES - GENERAL
PAINLEVEL_OUTOF10: 0
PAINLEVEL_OUTOF10: 3
PAINLEVEL_OUTOF10: 0
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
## 2020-05-02 NOTE — PLAN OF CARE
improve  Description: Levels of oxygenation will improve  5/2/2020 0739 by Quoc Talavera RN  Outcome: Met This Shift     Problem: Skin Integrity - Impaired:  Goal: Will show no infection signs and symptoms  Description: Will show no infection signs and symptoms  5/2/2020 0739 by Quoc Talavera RN  Outcome: Met This Shift     Problem: Skin Integrity - Impaired:  Goal: Absence of new skin breakdown  Description: Absence of new skin breakdown  Outcome: Met This Shift     Problem: Infection:  Goal: Will remain free from infection  Description: Will remain free from infection  5/2/2020 0739 by Quoc Talavera RN  Outcome: Met This Shift     Problem: Daily Care:  Goal: Daily care needs are met  Description: Daily care needs are met  5/2/2020 0739 by Quoc Talavera RN  Outcome: Met This Shift     Problem: Musculor/Skeletal Functional Status  Goal: Highest potential functional level  5/2/2020 0739 by Quoc Talavera RN  Outcome: Met This Shift     Problem: Musculor/Skeletal Functional Status  Goal: Absence of falls  Outcome: Met This Shift     Problem: Discharge Planning:  Goal: Discharged to appropriate level of care  Description: Discharged to appropriate level of care  Outcome: Ongoing

## 2020-05-03 ENCOUNTER — APPOINTMENT (OUTPATIENT)
Dept: GENERAL RADIOLOGY | Age: 25
DRG: 004 | End: 2020-05-03
Payer: MEDICAID

## 2020-05-03 ENCOUNTER — APPOINTMENT (OUTPATIENT)
Dept: ULTRASOUND IMAGING | Age: 25
DRG: 004 | End: 2020-05-03
Payer: MEDICAID

## 2020-05-03 LAB
AADO2: 274 MMHG
ALBUMIN SERPL-MCNC: 2.6 G/DL (ref 3.5–5.2)
ALP BLD-CCNC: 122 U/L (ref 40–129)
ALT SERPL-CCNC: 170 U/L (ref 0–40)
ANION GAP SERPL CALCULATED.3IONS-SCNC: 10 MMOL/L (ref 7–16)
ANISOCYTOSIS: ABNORMAL
AST SERPL-CCNC: 147 U/L (ref 0–39)
B.E.: 2.4 MMOL/L (ref -3–3)
BASOPHILS ABSOLUTE: 0.23 E9/L (ref 0–0.2)
BASOPHILS RELATIVE PERCENT: 0.9 % (ref 0–2)
BILIRUB SERPL-MCNC: 0.2 MG/DL (ref 0–1.2)
BUN BLDV-MCNC: 24 MG/DL (ref 6–20)
CALCIUM IONIZED: 1.22 MMOL/L (ref 1.15–1.33)
CALCIUM SERPL-MCNC: 9 MG/DL (ref 8.6–10.2)
CHLORIDE BLD-SCNC: 97 MMOL/L (ref 98–107)
CO2: 27 MMOL/L (ref 22–29)
COHB: 0.3 % (ref 0–1.5)
CREAT SERPL-MCNC: 0.9 MG/DL (ref 0.7–1.2)
CRITICAL: ABNORMAL
DATE ANALYZED: ABNORMAL
DATE OF COLLECTION: ABNORMAL
EOSINOPHILS ABSOLUTE: 0.23 E9/L (ref 0.05–0.5)
EOSINOPHILS RELATIVE PERCENT: 0.9 % (ref 0–6)
FIO2: 60 %
GFR AFRICAN AMERICAN: >60
GFR NON-AFRICAN AMERICAN: >60 ML/MIN/1.73
GLUCOSE BLD-MCNC: 124 MG/DL (ref 74–99)
HCO3: 25.5 MMOL/L (ref 22–26)
HCT VFR BLD CALC: 24.9 % (ref 37–54)
HEMOGLOBIN: 8.2 G/DL (ref 12.5–16.5)
HHB: 2.1 % (ref 0–5)
HYPOCHROMIA: ABNORMAL
LAB: ABNORMAL
LYMPHOCYTES ABSOLUTE: 1.76 E9/L (ref 1.5–4)
LYMPHOCYTES RELATIVE PERCENT: 7 % (ref 20–42)
Lab: ABNORMAL
MAGNESIUM: 2.2 MG/DL (ref 1.6–2.6)
MCH RBC QN AUTO: 29.6 PG (ref 26–35)
MCHC RBC AUTO-ENTMCNC: 32.9 % (ref 32–34.5)
MCV RBC AUTO: 89.9 FL (ref 80–99.9)
METHB: 0.3 % (ref 0–1.5)
MODE: AC
MONOCYTES ABSOLUTE: 1 E9/L (ref 0.1–0.95)
MONOCYTES RELATIVE PERCENT: 4.3 % (ref 2–12)
NEUTROPHILS ABSOLUTE: 21.84 E9/L (ref 1.8–7.3)
NEUTROPHILS RELATIVE PERCENT: 87 % (ref 43–80)
O2 CONTENT: 12.4 ML/DL
O2 SATURATION: 97.9 % (ref 92–98.5)
O2HB: 97.3 % (ref 94–97)
OPERATOR ID: 1893
OVALOCYTES: ABNORMAL
PATIENT TEMP: 37 C
PCO2: 33.4 MMHG (ref 35–45)
PDW BLD-RTO: 13.9 FL (ref 11.5–15)
PEEP/CPAP: 10 CMH2O
PFO2: 1.7 MMHG/%
PH BLOOD GAS: 7.5 (ref 7.35–7.45)
PHOSPHORUS: 3.2 MG/DL (ref 2.5–4.5)
PLATELET # BLD: 547 E9/L (ref 130–450)
PMV BLD AUTO: 9 FL (ref 7–12)
PO2: 102.1 MMHG (ref 75–100)
POIKILOCYTES: ABNORMAL
POLYCHROMASIA: ABNORMAL
POTASSIUM SERPL-SCNC: 4.6 MMOL/L (ref 3.5–5)
RBC # BLD: 2.77 E12/L (ref 3.8–5.8)
RI(T): 268 %
RR MECHANICAL: 16 B/MIN
SODIUM BLD-SCNC: 134 MMOL/L (ref 132–146)
SOURCE, BLOOD GAS: ABNORMAL
TARGET CELLS: ABNORMAL
THB: 8.9 G/DL (ref 11.5–16.5)
TIME ANALYZED: 623
TOTAL PROTEIN: 6.8 G/DL (ref 6.4–8.3)
VT MECHANICAL: 500 ML
WBC # BLD: 25.1 E9/L (ref 4.5–11.5)

## 2020-05-03 PROCEDURE — 82805 BLOOD GASES W/O2 SATURATION: CPT

## 2020-05-03 PROCEDURE — 87077 CULTURE AEROBIC IDENTIFY: CPT

## 2020-05-03 PROCEDURE — 87070 CULTURE OTHR SPECIMN AEROBIC: CPT

## 2020-05-03 PROCEDURE — 6370000000 HC RX 637 (ALT 250 FOR IP): Performed by: STUDENT IN AN ORGANIZED HEALTH CARE EDUCATION/TRAINING PROGRAM

## 2020-05-03 PROCEDURE — 76705 ECHO EXAM OF ABDOMEN: CPT

## 2020-05-03 PROCEDURE — 87186 SC STD MICRODIL/AGAR DIL: CPT

## 2020-05-03 PROCEDURE — 6360000002 HC RX W HCPCS: Performed by: STUDENT IN AN ORGANIZED HEALTH CARE EDUCATION/TRAINING PROGRAM

## 2020-05-03 PROCEDURE — 99291 CRITICAL CARE FIRST HOUR: CPT | Performed by: SURGERY

## 2020-05-03 PROCEDURE — 87206 SMEAR FLUORESCENT/ACID STAI: CPT

## 2020-05-03 PROCEDURE — 2580000003 HC RX 258: Performed by: STUDENT IN AN ORGANIZED HEALTH CARE EDUCATION/TRAINING PROGRAM

## 2020-05-03 PROCEDURE — 94640 AIRWAY INHALATION TREATMENT: CPT

## 2020-05-03 PROCEDURE — 84100 ASSAY OF PHOSPHORUS: CPT

## 2020-05-03 PROCEDURE — 71045 X-RAY EXAM CHEST 1 VIEW: CPT

## 2020-05-03 PROCEDURE — 31646 BRNCHSC W/THER ASPIR SBSQ: CPT | Performed by: SURGERY

## 2020-05-03 PROCEDURE — 85025 COMPLETE CBC W/AUTO DIFF WBC: CPT

## 2020-05-03 PROCEDURE — 0BC38ZZ EXTIRPATION OF MATTER FROM RIGHT MAIN BRONCHUS, VIA NATURAL OR ARTIFICIAL OPENING ENDOSCOPIC: ICD-10-PCS | Performed by: SURGERY

## 2020-05-03 PROCEDURE — 83735 ASSAY OF MAGNESIUM: CPT

## 2020-05-03 PROCEDURE — 2000000000 HC ICU R&B

## 2020-05-03 PROCEDURE — 80053 COMPREHEN METABOLIC PANEL: CPT

## 2020-05-03 PROCEDURE — 82330 ASSAY OF CALCIUM: CPT

## 2020-05-03 PROCEDURE — 36415 COLL VENOUS BLD VENIPUNCTURE: CPT

## 2020-05-03 PROCEDURE — 94003 VENT MGMT INPAT SUBQ DAY: CPT

## 2020-05-03 PROCEDURE — 6370000000 HC RX 637 (ALT 250 FOR IP): Performed by: SURGERY

## 2020-05-03 RX ORDER — RISPERIDONE 1 MG/ML
0.5 SOLUTION ORAL NIGHTLY
Status: DISCONTINUED | OUTPATIENT
Start: 2020-05-03 | End: 2020-05-06

## 2020-05-03 RX ORDER — MIDAZOLAM HYDROCHLORIDE 1 MG/ML
6 INJECTION INTRAMUSCULAR; INTRAVENOUS ONCE
Status: COMPLETED | OUTPATIENT
Start: 2020-05-03 | End: 2020-05-03

## 2020-05-03 RX ORDER — FENTANYL CITRATE 50 UG/ML
200 INJECTION, SOLUTION INTRAMUSCULAR; INTRAVENOUS ONCE
Status: COMPLETED | OUTPATIENT
Start: 2020-05-03 | End: 2020-05-03

## 2020-05-03 RX ADMIN — SODIUM CHLORIDE, PRESERVATIVE FREE 300 UNITS: 5 INJECTION INTRAVENOUS at 09:30

## 2020-05-03 RX ADMIN — CEFEPIME 2 G: 2 INJECTION, POWDER, FOR SOLUTION INTRAVENOUS at 03:49

## 2020-05-03 RX ADMIN — FENTANYL CITRATE 200 MCG: 50 INJECTION, SOLUTION INTRAMUSCULAR; INTRAVENOUS at 08:52

## 2020-05-03 RX ADMIN — RISPERIDONE 0.5 MG: 1 SOLUTION ORAL at 20:05

## 2020-05-03 RX ADMIN — OXYCODONE HYDROCHLORIDE 5 MG: 5 SOLUTION ORAL at 16:09

## 2020-05-03 RX ADMIN — ENOXAPARIN SODIUM 30 MG: 30 INJECTION SUBCUTANEOUS at 20:05

## 2020-05-03 RX ADMIN — SODIUM CHLORIDE, PRESERVATIVE FREE 300 UNITS: 5 INJECTION INTRAVENOUS at 20:12

## 2020-05-03 RX ADMIN — OXYCODONE HYDROCHLORIDE 5 MG: 5 SOLUTION ORAL at 12:08

## 2020-05-03 RX ADMIN — POLYETHYLENE GLYCOL 3350 17 G: 17 POWDER, FOR SOLUTION ORAL at 09:36

## 2020-05-03 RX ADMIN — OXYCODONE HYDROCHLORIDE 5 MG: 5 SOLUTION ORAL at 23:56

## 2020-05-03 RX ADMIN — Medication 300 UNITS: at 20:11

## 2020-05-03 RX ADMIN — OXYCODONE HYDROCHLORIDE 5 MG: 5 SOLUTION ORAL at 20:11

## 2020-05-03 RX ADMIN — OXYCODONE HYDROCHLORIDE 5 MG: 5 SOLUTION ORAL at 07:56

## 2020-05-03 RX ADMIN — CHLORHEXIDINE GLUCONATE 0.12% ORAL RINSE 15 ML: 1.2 LIQUID ORAL at 09:36

## 2020-05-03 RX ADMIN — MELATONIN 3 MG ORAL TABLET 6 MG: 3 TABLET ORAL at 20:05

## 2020-05-03 RX ADMIN — ACETAMINOPHEN 650 MG: 325 TABLET, FILM COATED ORAL at 12:10

## 2020-05-03 RX ADMIN — ENOXAPARIN SODIUM 30 MG: 30 INJECTION SUBCUTANEOUS at 09:30

## 2020-05-03 RX ADMIN — CEFEPIME 2 G: 2 INJECTION, POWDER, FOR SOLUTION INTRAVENOUS at 12:12

## 2020-05-03 RX ADMIN — SODIUM CHLORIDE SOLN NEBU 3% 4 ML: 3 NEBU SOLN at 08:02

## 2020-05-03 RX ADMIN — MIDAZOLAM 6 MG: 1 INJECTION INTRAMUSCULAR; INTRAVENOUS at 08:52

## 2020-05-03 RX ADMIN — SENNOSIDES AND DOCUSATE SODIUM 2 TABLET: 8.6; 5 TABLET ORAL at 20:05

## 2020-05-03 RX ADMIN — Medication 20 MG: at 09:37

## 2020-05-03 RX ADMIN — CHLORHEXIDINE GLUCONATE 0.12% ORAL RINSE 15 ML: 1.2 LIQUID ORAL at 20:05

## 2020-05-03 RX ADMIN — ACETAMINOPHEN 650 MG: 325 TABLET, FILM COATED ORAL at 06:27

## 2020-05-03 RX ADMIN — Medication 20 MG: at 20:06

## 2020-05-03 RX ADMIN — SENNOSIDES AND DOCUSATE SODIUM 2 TABLET: 8.6; 5 TABLET ORAL at 09:36

## 2020-05-03 RX ADMIN — CEFEPIME 2 G: 2 INJECTION, POWDER, FOR SOLUTION INTRAVENOUS at 20:04

## 2020-05-03 RX ADMIN — ACETAMINOPHEN 650 MG: 325 TABLET, FILM COATED ORAL at 16:11

## 2020-05-03 RX ADMIN — SODIUM CHLORIDE, PRESERVATIVE FREE 10 ML: 5 INJECTION INTRAVENOUS at 03:49

## 2020-05-03 RX ADMIN — ACETAMINOPHEN 650 MG: 325 TABLET, FILM COATED ORAL at 23:56

## 2020-05-03 RX ADMIN — OXYCODONE HYDROCHLORIDE 5 MG: 5 SOLUTION ORAL at 03:49

## 2020-05-03 ASSESSMENT — PAIN SCALES - GENERAL
PAINLEVEL_OUTOF10: 0
PAINLEVEL_OUTOF10: 0
PAINLEVEL_OUTOF10: 4
PAINLEVEL_OUTOF10: 0

## 2020-05-03 ASSESSMENT — PULMONARY FUNCTION TESTS
PIF_VALUE: 32
PIF_VALUE: 34
PIF_VALUE: 26
PIF_VALUE: 25
PIF_VALUE: 28
PIF_VALUE: 29
PIF_VALUE: 31
PIF_VALUE: 27
PIF_VALUE: 26
PIF_VALUE: 29
PIF_VALUE: 28
PIF_VALUE: 26
PIF_VALUE: 31
PIF_VALUE: 27
PIF_VALUE: 26
PIF_VALUE: 27
PIF_VALUE: 29
PIF_VALUE: 28
PIF_VALUE: 24
PIF_VALUE: 28
PIF_VALUE: 27
PIF_VALUE: 29
PIF_VALUE: 27
PIF_VALUE: 29
PIF_VALUE: 22
PIF_VALUE: 27

## 2020-05-03 NOTE — PLAN OF CARE
Problem: Falls - Risk of:  Goal: Will remain free from falls  Description: Will remain free from falls  5/3/2020 1623 by Kaitlynn Kearns RN  Outcome: Met This Shift  5/3/2020 0732 by Quinton Earl RN  Outcome: Met This Shift  Goal: Absence of physical injury  Description: Absence of physical injury  Outcome: Met This Shift     Problem: Pain:  Goal: Pain level will decrease  Description: Pain level will decrease  Outcome: Met This Shift  Goal: Control of acute pain  Description: Control of acute pain  Outcome: Met This Shift  Goal: Control of chronic pain  Description: Control of chronic pain  Outcome: Met This Shift     Problem: Airway Clearance - Ineffective:  Goal: Ability to maintain a clear airway will improve  Description: Ability to maintain a clear airway will improve  Outcome: Met This Shift     Problem: Anxiety/Stress:  Goal: Level of anxiety will decrease  Description: Level of anxiety will decrease  Outcome: Met This Shift     Problem: Aspiration:  Goal: Absence of aspiration  Description: Absence of aspiration  Outcome: Met This Shift     Problem: Cardiac Output - Decreased:  Goal: Hemodynamic stability will improve  Description: Hemodynamic stability will improve  Outcome: Met This Shift
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
## 2020-05-04 ENCOUNTER — APPOINTMENT (OUTPATIENT)
Dept: GENERAL RADIOLOGY | Age: 25
DRG: 004 | End: 2020-05-04
Payer: MEDICAID

## 2020-05-04 PROBLEM — R60.0 SUBMANDIBULAR GLAND SWELLING: Status: ACTIVE | Noted: 2020-05-04

## 2020-05-04 PROBLEM — R60.9 SUBMANDIBULAR GLAND SWELLING: Status: ACTIVE | Noted: 2020-05-04

## 2020-05-04 PROBLEM — S22.43XA CLOSED FRACTURE OF MULTIPLE RIBS OF BOTH SIDES: Status: ACTIVE | Noted: 2020-05-04

## 2020-05-04 PROBLEM — S22.5XXA CLOSED FRACTURE OF MULTIPLE RIBS WITH FLAIL CHEST: Status: ACTIVE | Noted: 2020-05-04

## 2020-05-04 PROBLEM — S12.8XXA: Status: ACTIVE | Noted: 2020-05-04

## 2020-05-04 LAB
AADO2: 226.3 MMHG
ALBUMIN SERPL-MCNC: 2.4 G/DL (ref 3.5–5.2)
ALP BLD-CCNC: 106 U/L (ref 40–129)
ALT SERPL-CCNC: 164 U/L (ref 0–40)
ANION GAP SERPL CALCULATED.3IONS-SCNC: 14 MMOL/L (ref 7–16)
AST SERPL-CCNC: 105 U/L (ref 0–39)
B.E.: 2.9 MMOL/L (ref -3–3)
BASOPHILS ABSOLUTE: 0 E9/L (ref 0–0.2)
BASOPHILS RELATIVE PERCENT: 0.2 % (ref 0–2)
BILIRUB SERPL-MCNC: 0.2 MG/DL (ref 0–1.2)
BUN BLDV-MCNC: 25 MG/DL (ref 6–20)
CALCIUM IONIZED: 1.18 MMOL/L (ref 1.15–1.33)
CALCIUM SERPL-MCNC: 8.7 MG/DL (ref 8.6–10.2)
CHLORIDE BLD-SCNC: 96 MMOL/L (ref 98–107)
CO2: 26 MMOL/L (ref 22–29)
COHB: 0.3 % (ref 0–1.5)
CREAT SERPL-MCNC: 0.9 MG/DL (ref 0.7–1.2)
CRITICAL: ABNORMAL
DATE ANALYZED: ABNORMAL
DATE OF COLLECTION: ABNORMAL
EOSINOPHILS ABSOLUTE: 0 E9/L (ref 0.05–0.5)
EOSINOPHILS RELATIVE PERCENT: 1 % (ref 0–6)
FIO2: 60 %
GFR AFRICAN AMERICAN: >60
GFR NON-AFRICAN AMERICAN: >60 ML/MIN/1.73
GLUCOSE BLD-MCNC: 110 MG/DL (ref 74–99)
HCO3: 26.3 MMOL/L (ref 22–26)
HCT VFR BLD CALC: 24.6 % (ref 37–54)
HEMOGLOBIN: 7.8 G/DL (ref 12.5–16.5)
HHB: 1.1 % (ref 0–5)
LAB: ABNORMAL
LYMPHOCYTES ABSOLUTE: 2.3 E9/L (ref 1.5–4)
LYMPHOCYTES RELATIVE PERCENT: 12.2 % (ref 20–42)
Lab: ABNORMAL
MAGNESIUM: 2.2 MG/DL (ref 1.6–2.6)
MCH RBC QN AUTO: 29.1 PG (ref 26–35)
MCHC RBC AUTO-ENTMCNC: 31.7 % (ref 32–34.5)
MCV RBC AUTO: 91.8 FL (ref 80–99.9)
METAMYELOCYTES RELATIVE PERCENT: 0.9 % (ref 0–1)
METHB: 0.3 % (ref 0–1.5)
MODE: AC
MONOCYTES ABSOLUTE: 0.96 E9/L (ref 0.1–0.95)
MONOCYTES RELATIVE PERCENT: 5.2 % (ref 2–12)
NEUTROPHILS ABSOLUTE: 15.94 E9/L (ref 1.8–7.3)
NEUTROPHILS RELATIVE PERCENT: 81.7 % (ref 43–80)
O2 CONTENT: 12.2 ML/DL
O2 SATURATION: 98.9 % (ref 92–98.5)
O2HB: 98.3 % (ref 94–97)
OPERATOR ID: 1893
PATIENT TEMP: 37 C
PCO2: 35.6 MMHG (ref 35–45)
PDW BLD-RTO: 14.1 FL (ref 11.5–15)
PEEP/CPAP: 10 CMH2O
PFO2: 2.46 MMHG/%
PH BLOOD GAS: 7.49 (ref 7.35–7.45)
PHOSPHORUS: 3.1 MG/DL (ref 2.5–4.5)
PLATELET # BLD: 506 E9/L (ref 130–450)
PMV BLD AUTO: 9.2 FL (ref 7–12)
PO2: 147.3 MMHG (ref 75–100)
POIKILOCYTES: ABNORMAL
POLYCHROMASIA: ABNORMAL
POTASSIUM SERPL-SCNC: 4.3 MMOL/L (ref 3.5–5)
RBC # BLD: 2.68 E12/L (ref 3.8–5.8)
RI(T): 154 %
RR MECHANICAL: 16 B/MIN
SODIUM BLD-SCNC: 136 MMOL/L (ref 132–146)
SOURCE, BLOOD GAS: ABNORMAL
TARGET CELLS: ABNORMAL
THB: 8.6 G/DL (ref 11.5–16.5)
TIME ANALYZED: 528
TOTAL PROTEIN: 6.7 G/DL (ref 6.4–8.3)
VT MECHANICAL: 500 ML
WBC # BLD: 19.2 E9/L (ref 4.5–11.5)

## 2020-05-04 PROCEDURE — 94640 AIRWAY INHALATION TREATMENT: CPT

## 2020-05-04 PROCEDURE — 2580000003 HC RX 258: Performed by: STUDENT IN AN ORGANIZED HEALTH CARE EDUCATION/TRAINING PROGRAM

## 2020-05-04 PROCEDURE — 94003 VENT MGMT INPAT SUBQ DAY: CPT

## 2020-05-04 PROCEDURE — 94669 MECHANICAL CHEST WALL OSCILL: CPT

## 2020-05-04 PROCEDURE — 99291 CRITICAL CARE FIRST HOUR: CPT | Performed by: SURGERY

## 2020-05-04 PROCEDURE — 71045 X-RAY EXAM CHEST 1 VIEW: CPT

## 2020-05-04 PROCEDURE — 6370000000 HC RX 637 (ALT 250 FOR IP): Performed by: STUDENT IN AN ORGANIZED HEALTH CARE EDUCATION/TRAINING PROGRAM

## 2020-05-04 PROCEDURE — 85025 COMPLETE CBC W/AUTO DIFF WBC: CPT

## 2020-05-04 PROCEDURE — 2000000000 HC ICU R&B

## 2020-05-04 PROCEDURE — 31624 DX BRONCHOSCOPE/LAVAGE: CPT | Performed by: SURGERY

## 2020-05-04 PROCEDURE — 6370000000 HC RX 637 (ALT 250 FOR IP): Performed by: SURGERY

## 2020-05-04 PROCEDURE — 82330 ASSAY OF CALCIUM: CPT

## 2020-05-04 PROCEDURE — 0BC68ZZ EXTIRPATION OF MATTER FROM RIGHT LOWER LOBE BRONCHUS, VIA NATURAL OR ARTIFICIAL OPENING ENDOSCOPIC: ICD-10-PCS | Performed by: SURGERY

## 2020-05-04 PROCEDURE — 83735 ASSAY OF MAGNESIUM: CPT

## 2020-05-04 PROCEDURE — 2580000003 HC RX 258: Performed by: SURGERY

## 2020-05-04 PROCEDURE — 6360000002 HC RX W HCPCS: Performed by: STUDENT IN AN ORGANIZED HEALTH CARE EDUCATION/TRAINING PROGRAM

## 2020-05-04 PROCEDURE — 0BC38ZZ EXTIRPATION OF MATTER FROM RIGHT MAIN BRONCHUS, VIA NATURAL OR ARTIFICIAL OPENING ENDOSCOPIC: ICD-10-PCS | Performed by: SURGERY

## 2020-05-04 PROCEDURE — 84100 ASSAY OF PHOSPHORUS: CPT

## 2020-05-04 PROCEDURE — 82805 BLOOD GASES W/O2 SATURATION: CPT

## 2020-05-04 PROCEDURE — 36415 COLL VENOUS BLD VENIPUNCTURE: CPT

## 2020-05-04 PROCEDURE — 80053 COMPREHEN METABOLIC PANEL: CPT

## 2020-05-04 RX ORDER — MIDAZOLAM HYDROCHLORIDE 1 MG/ML
6 INJECTION INTRAMUSCULAR; INTRAVENOUS
Status: COMPLETED | OUTPATIENT
Start: 2020-05-04 | End: 2020-05-04

## 2020-05-04 RX ORDER — OXYCODONE HCL 5 MG/5 ML
5 SOLUTION, ORAL ORAL EVERY 4 HOURS PRN
Status: DISCONTINUED | OUTPATIENT
Start: 2020-05-04 | End: 2020-05-05

## 2020-05-04 RX ORDER — SODIUM CHLORIDE FOR INHALATION 3 %
4 VIAL, NEBULIZER (ML) INHALATION EVERY 4 HOURS
Status: DISCONTINUED | OUTPATIENT
Start: 2020-05-04 | End: 2020-05-05

## 2020-05-04 RX ORDER — ACETAMINOPHEN 160 MG/5ML
650 SOLUTION ORAL EVERY 4 HOURS PRN
Status: DISCONTINUED | OUTPATIENT
Start: 2020-05-04 | End: 2020-05-12

## 2020-05-04 RX ORDER — FENTANYL CITRATE 50 UG/ML
200 INJECTION, SOLUTION INTRAMUSCULAR; INTRAVENOUS
Status: COMPLETED | OUTPATIENT
Start: 2020-05-04 | End: 2020-05-04

## 2020-05-04 RX ORDER — GABAPENTIN 250 MG/5ML
125 SOLUTION ORAL EVERY 8 HOURS SCHEDULED
Status: DISCONTINUED | OUTPATIENT
Start: 2020-05-04 | End: 2020-05-06

## 2020-05-04 RX ADMIN — CEFEPIME 2 G: 2 INJECTION, POWDER, FOR SOLUTION INTRAVENOUS at 03:26

## 2020-05-04 RX ADMIN — SODIUM CHLORIDE SOLN NEBU 3% 4 ML: 3 NEBU SOLN at 07:54

## 2020-05-04 RX ADMIN — CEFEPIME 2 G: 2 INJECTION, POWDER, FOR SOLUTION INTRAVENOUS at 20:30

## 2020-05-04 RX ADMIN — SODIUM CHLORIDE SOLN NEBU 3% 4 ML: 3 NEBU SOLN at 11:54

## 2020-05-04 RX ADMIN — Medication 20 MG: at 08:57

## 2020-05-04 RX ADMIN — CEFEPIME 2 G: 2 INJECTION, POWDER, FOR SOLUTION INTRAVENOUS at 11:30

## 2020-05-04 RX ADMIN — SENNOSIDES AND DOCUSATE SODIUM 2 TABLET: 8.6; 5 TABLET ORAL at 08:55

## 2020-05-04 RX ADMIN — ENOXAPARIN SODIUM 30 MG: 30 INJECTION SUBCUTANEOUS at 08:55

## 2020-05-04 RX ADMIN — MELATONIN 3 MG ORAL TABLET 6 MG: 3 TABLET ORAL at 20:30

## 2020-05-04 RX ADMIN — MIDAZOLAM HYDROCHLORIDE 6 MG: 2 INJECTION, SOLUTION INTRAMUSCULAR; INTRAVENOUS at 09:06

## 2020-05-04 RX ADMIN — Medication 20 MG: at 20:38

## 2020-05-04 RX ADMIN — RISPERIDONE 0.5 MG: 1 SOLUTION ORAL at 20:30

## 2020-05-04 RX ADMIN — SODIUM CHLORIDE, PRESERVATIVE FREE 10 ML: 5 INJECTION INTRAVENOUS at 20:36

## 2020-05-04 RX ADMIN — FENTANYL CITRATE 200 MCG: 50 INJECTION, SOLUTION INTRAMUSCULAR; INTRAVENOUS at 09:07

## 2020-05-04 RX ADMIN — OXYCODONE HYDROCHLORIDE 5 MG: 5 SOLUTION ORAL at 12:22

## 2020-05-04 RX ADMIN — OXYCODONE HYDROCHLORIDE 5 MG: 5 SOLUTION ORAL at 16:24

## 2020-05-04 RX ADMIN — ACETAMINOPHEN 650 MG: 160 SOLUTION ORAL at 15:51

## 2020-05-04 RX ADMIN — SODIUM CHLORIDE, PRESERVATIVE FREE 300 UNITS: 5 INJECTION INTRAVENOUS at 08:56

## 2020-05-04 RX ADMIN — OXYCODONE HYDROCHLORIDE 5 MG: 5 SOLUTION ORAL at 08:03

## 2020-05-04 RX ADMIN — CHLORHEXIDINE GLUCONATE 0.12% ORAL RINSE 15 ML: 1.2 LIQUID ORAL at 08:55

## 2020-05-04 RX ADMIN — SODIUM CHLORIDE SOLN NEBU 3% 4 ML: 3 NEBU SOLN at 15:31

## 2020-05-04 RX ADMIN — ENOXAPARIN SODIUM 30 MG: 30 INJECTION SUBCUTANEOUS at 20:30

## 2020-05-04 RX ADMIN — SODIUM CHLORIDE SOLN NEBU 3% 4 ML: 3 NEBU SOLN at 20:18

## 2020-05-04 RX ADMIN — CHLORHEXIDINE GLUCONATE 0.12% ORAL RINSE 15 ML: 1.2 LIQUID ORAL at 20:30

## 2020-05-04 RX ADMIN — POLYETHYLENE GLYCOL 3350 17 G: 17 POWDER, FOR SOLUTION ORAL at 08:55

## 2020-05-04 RX ADMIN — SODIUM CHLORIDE, PRESERVATIVE FREE 300 UNITS: 5 INJECTION INTRAVENOUS at 20:36

## 2020-05-04 RX ADMIN — OXYCODONE HYDROCHLORIDE 5 MG: 5 SOLUTION ORAL at 03:26

## 2020-05-04 RX ADMIN — SENNOSIDES AND DOCUSATE SODIUM 2 TABLET: 8.6; 5 TABLET ORAL at 20:30

## 2020-05-04 ASSESSMENT — PULMONARY FUNCTION TESTS
PIF_VALUE: 27
PIF_VALUE: 25
PIF_VALUE: 26
PIF_VALUE: 30
PIF_VALUE: 23
PIF_VALUE: 22
PIF_VALUE: 25
PIF_VALUE: 28
PIF_VALUE: 35
PIF_VALUE: 29
PIF_VALUE: 41
PIF_VALUE: 27
PIF_VALUE: 31
PIF_VALUE: 32
PIF_VALUE: 22
PIF_VALUE: 24
PIF_VALUE: 31
PIF_VALUE: 28
PIF_VALUE: 26
PIF_VALUE: 24
PIF_VALUE: 24
PIF_VALUE: 27
PIF_VALUE: 32
PIF_VALUE: 27
PIF_VALUE: 20
PIF_VALUE: 26
PIF_VALUE: 23
PIF_VALUE: 26
PIF_VALUE: 23
PIF_VALUE: 41
PIF_VALUE: 22
PIF_VALUE: 25
PIF_VALUE: 26
PIF_VALUE: 21
PIF_VALUE: 31

## 2020-05-04 ASSESSMENT — PAIN SCALES - GENERAL
PAINLEVEL_OUTOF10: 0
PAINLEVEL_OUTOF10: 2
PAINLEVEL_OUTOF10: 8
PAINLEVEL_OUTOF10: 0
PAINLEVEL_OUTOF10: 0
PAINLEVEL_OUTOF10: 2
PAINLEVEL_OUTOF10: 7
PAINLEVEL_OUTOF10: 2
PAINLEVEL_OUTOF10: 2
PAINLEVEL_OUTOF10: 7
PAINLEVEL_OUTOF10: 0

## 2020-05-04 ASSESSMENT — PAIN DESCRIPTION - ORIENTATION: ORIENTATION: MID;UPPER

## 2020-05-04 ASSESSMENT — PAIN DESCRIPTION - PROGRESSION
CLINICAL_PROGRESSION: GRADUALLY IMPROVING
CLINICAL_PROGRESSION: GRADUALLY WORSENING

## 2020-05-04 ASSESSMENT — PAIN DESCRIPTION - LOCATION: LOCATION: CHEST;ABDOMEN

## 2020-05-04 ASSESSMENT — PAIN DESCRIPTION - FREQUENCY: FREQUENCY: INTERMITTENT

## 2020-05-04 ASSESSMENT — PAIN DESCRIPTION - PAIN TYPE: TYPE: ACUTE PAIN

## 2020-05-04 ASSESSMENT — PAIN DESCRIPTION - ONSET: ONSET: ON-GOING

## 2020-05-04 NOTE — PROGRESS NOTES
OCCUPATIONAL THERAPY     Date:2020  Patient Name: King Patel  MRN: [id number]  : 1995  Room: [facility]2-A     Chart reviewed; pt on hold due to high vent settings. Will monitor tolerance for activity & try back at later time.    Leisa Nava, OTR/L [id number]
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
## 2020-05-04 NOTE — PROGRESS NOTES
Bowel regimen: colace, senna, glycolax, dulcolax   Pain control/Sedation: fent, ativan, risperdal. Change scheduled oxycodone to prn  DVT prophylaxis: PCDs, lovenox  GI: pepcid  Glucose protocol: monitor BG  Mouth/Eye care: peridex, tears.    Sanabria: in place    Code status:    Full Code     Electronically signed by Geovanny Cade MD on 5/4/2020 at 4:55 PM

## 2020-05-05 ENCOUNTER — APPOINTMENT (OUTPATIENT)
Dept: CT IMAGING | Age: 25
DRG: 004 | End: 2020-05-05
Payer: MEDICAID

## 2020-05-05 ENCOUNTER — APPOINTMENT (OUTPATIENT)
Dept: GENERAL RADIOLOGY | Age: 25
DRG: 004 | End: 2020-05-05
Payer: MEDICAID

## 2020-05-05 LAB
AADO2: 176.7 MMHG
ALBUMIN SERPL-MCNC: 2.4 G/DL (ref 3.5–5.2)
ALP BLD-CCNC: 108 U/L (ref 40–129)
ALT SERPL-CCNC: 182 U/L (ref 0–40)
ANION GAP SERPL CALCULATED.3IONS-SCNC: 14 MMOL/L (ref 7–16)
AST SERPL-CCNC: 107 U/L (ref 0–39)
B.E.: 3.2 MMOL/L (ref -3–3)
BASOPHILIC STIPPLING: ABNORMAL
BASOPHILS ABSOLUTE: 0.05 E9/L (ref 0–0.2)
BASOPHILS RELATIVE PERCENT: 0.2 % (ref 0–2)
BILIRUB SERPL-MCNC: 0.2 MG/DL (ref 0–1.2)
BUN BLDV-MCNC: 23 MG/DL (ref 6–20)
CALCIUM IONIZED: 1.22 MMOL/L (ref 1.15–1.33)
CALCIUM SERPL-MCNC: 8.6 MG/DL (ref 8.6–10.2)
CHLORIDE BLD-SCNC: 97 MMOL/L (ref 98–107)
CO2: 27 MMOL/L (ref 22–29)
COHB: 0 % (ref 0–1.5)
CREAT SERPL-MCNC: 0.8 MG/DL (ref 0.7–1.2)
CRITICAL: ABNORMAL
CULTURE, RESPIRATORY: ABNORMAL
CULTURE, RESPIRATORY: ABNORMAL
DATE ANALYZED: ABNORMAL
DATE OF COLLECTION: ABNORMAL
EOSINOPHILS ABSOLUTE: 0.27 E9/L (ref 0.05–0.5)
EOSINOPHILS RELATIVE PERCENT: 1.2 % (ref 0–6)
FIO2: 50 %
GFR AFRICAN AMERICAN: >60
GFR NON-AFRICAN AMERICAN: >60 ML/MIN/1.73
GLUCOSE BLD-MCNC: 115 MG/DL (ref 74–99)
HCO3: 26.3 MMOL/L (ref 22–26)
HCT VFR BLD CALC: 24.2 % (ref 37–54)
HEMOGLOBIN: 7.6 G/DL (ref 12.5–16.5)
HHB: 1.8 % (ref 0–5)
IMMATURE GRANULOCYTES #: 0.33 E9/L
IMMATURE GRANULOCYTES %: 1.5 % (ref 0–5)
LAB: ABNORMAL
LYMPHOCYTES ABSOLUTE: 2.5 E9/L (ref 1.5–4)
LYMPHOCYTES RELATIVE PERCENT: 11.5 % (ref 20–42)
Lab: ABNORMAL
MAGNESIUM: 2.1 MG/DL (ref 1.6–2.6)
MCH RBC QN AUTO: 29 PG (ref 26–35)
MCHC RBC AUTO-ENTMCNC: 31.4 % (ref 32–34.5)
MCV RBC AUTO: 92.4 FL (ref 80–99.9)
METHB: 0.3 % (ref 0–1.5)
MODE: AC
MONOCYTES ABSOLUTE: 1.74 E9/L (ref 0.1–0.95)
MONOCYTES RELATIVE PERCENT: 8 % (ref 2–12)
NEUTROPHILS ABSOLUTE: 16.88 E9/L (ref 1.8–7.3)
NEUTROPHILS RELATIVE PERCENT: 77.6 % (ref 43–80)
O2 CONTENT: 12.7 ML/DL
O2 SATURATION: 98.2 % (ref 92–98.5)
O2HB: 97.9 % (ref 94–97)
OPERATOR ID: ABNORMAL
ORGANISM: ABNORMAL
PATIENT TEMP: 37 C
PCO2: 34 MMHG (ref 35–45)
PDW BLD-RTO: 14.1 FL (ref 11.5–15)
PEEP/CPAP: 10 CMH2O
PFO2: 2.58 MMHG/%
PH BLOOD GAS: 7.51 (ref 7.35–7.45)
PHOSPHORUS: 3.1 MG/DL (ref 2.5–4.5)
PLATELET # BLD: 482 E9/L (ref 130–450)
PMV BLD AUTO: 9.3 FL (ref 7–12)
PO2: 129.1 MMHG (ref 75–100)
POLYCHROMASIA: ABNORMAL
POTASSIUM SERPL-SCNC: 4.2 MMOL/L (ref 3.5–5)
RBC # BLD: 2.62 E12/L (ref 3.8–5.8)
RI(T): 1.37
RR MECHANICAL: 16 B/MIN
SMEAR, RESPIRATORY: ABNORMAL
SODIUM BLD-SCNC: 138 MMOL/L (ref 132–146)
SOURCE, BLOOD GAS: ABNORMAL
THB: 9 G/DL (ref 11.5–16.5)
TIME ANALYZED: 701
TOTAL PROTEIN: 6.6 G/DL (ref 6.4–8.3)
VT MECHANICAL: 500 ML
WBC # BLD: 21.8 E9/L (ref 4.5–11.5)

## 2020-05-05 PROCEDURE — 6360000002 HC RX W HCPCS: Performed by: SURGERY

## 2020-05-05 PROCEDURE — 6370000000 HC RX 637 (ALT 250 FOR IP): Performed by: STUDENT IN AN ORGANIZED HEALTH CARE EDUCATION/TRAINING PROGRAM

## 2020-05-05 PROCEDURE — 94003 VENT MGMT INPAT SUBQ DAY: CPT

## 2020-05-05 PROCEDURE — 2580000003 HC RX 258: Performed by: STUDENT IN AN ORGANIZED HEALTH CARE EDUCATION/TRAINING PROGRAM

## 2020-05-05 PROCEDURE — 82330 ASSAY OF CALCIUM: CPT

## 2020-05-05 PROCEDURE — 2580000003 HC RX 258: Performed by: EMERGENCY MEDICINE

## 2020-05-05 PROCEDURE — 84100 ASSAY OF PHOSPHORUS: CPT

## 2020-05-05 PROCEDURE — 71045 X-RAY EXAM CHEST 1 VIEW: CPT

## 2020-05-05 PROCEDURE — 82805 BLOOD GASES W/O2 SATURATION: CPT

## 2020-05-05 PROCEDURE — 2000000000 HC ICU R&B

## 2020-05-05 PROCEDURE — 83735 ASSAY OF MAGNESIUM: CPT

## 2020-05-05 PROCEDURE — 6360000002 HC RX W HCPCS: Performed by: STUDENT IN AN ORGANIZED HEALTH CARE EDUCATION/TRAINING PROGRAM

## 2020-05-05 PROCEDURE — 36415 COLL VENOUS BLD VENIPUNCTURE: CPT

## 2020-05-05 PROCEDURE — 2580000003 HC RX 258: Performed by: SURGERY

## 2020-05-05 PROCEDURE — 71250 CT THORAX DX C-: CPT

## 2020-05-05 PROCEDURE — 99291 CRITICAL CARE FIRST HOUR: CPT | Performed by: SURGERY

## 2020-05-05 PROCEDURE — 85025 COMPLETE CBC W/AUTO DIFF WBC: CPT

## 2020-05-05 PROCEDURE — 6360000002 HC RX W HCPCS: Performed by: EMERGENCY MEDICINE

## 2020-05-05 PROCEDURE — 94640 AIRWAY INHALATION TREATMENT: CPT

## 2020-05-05 PROCEDURE — 36592 COLLECT BLOOD FROM PICC: CPT

## 2020-05-05 PROCEDURE — 94669 MECHANICAL CHEST WALL OSCILL: CPT

## 2020-05-05 PROCEDURE — 6370000000 HC RX 637 (ALT 250 FOR IP): Performed by: SURGERY

## 2020-05-05 PROCEDURE — 80053 COMPREHEN METABOLIC PANEL: CPT

## 2020-05-05 RX ORDER — MORPHINE SULFATE 2 MG/ML
2 INJECTION, SOLUTION INTRAMUSCULAR; INTRAVENOUS ONCE
Status: COMPLETED | OUTPATIENT
Start: 2020-05-05 | End: 2020-05-05

## 2020-05-05 RX ORDER — SODIUM CHLORIDE FOR INHALATION 3 %
4 VIAL, NEBULIZER (ML) INHALATION
Status: DISCONTINUED | OUTPATIENT
Start: 2020-05-05 | End: 2020-05-26 | Stop reason: HOSPADM

## 2020-05-05 RX ORDER — OXYCODONE HCL 5 MG/5 ML
7.5 SOLUTION, ORAL ORAL EVERY 4 HOURS PRN
Status: DISCONTINUED | OUTPATIENT
Start: 2020-05-05 | End: 2020-05-11

## 2020-05-05 RX ADMIN — SODIUM CHLORIDE SOLN NEBU 3% 4 ML: 3 NEBU SOLN at 19:25

## 2020-05-05 RX ADMIN — GABAPENTIN 125 MG: 250 SOLUTION ORAL at 20:30

## 2020-05-05 RX ADMIN — ENOXAPARIN SODIUM 30 MG: 30 INJECTION SUBCUTANEOUS at 08:33

## 2020-05-05 RX ADMIN — SODIUM CHLORIDE SOLN NEBU 3% 4 ML: 3 NEBU SOLN at 12:31

## 2020-05-05 RX ADMIN — OXYCODONE HYDROCHLORIDE 5 MG: 5 SOLUTION ORAL at 17:46

## 2020-05-05 RX ADMIN — OXYCODONE HYDROCHLORIDE 5 MG: 5 SOLUTION ORAL at 01:40

## 2020-05-05 RX ADMIN — ACETAMINOPHEN 650 MG: 160 SOLUTION ORAL at 15:42

## 2020-05-05 RX ADMIN — CHLORHEXIDINE GLUCONATE 0.12% ORAL RINSE 15 ML: 1.2 LIQUID ORAL at 08:42

## 2020-05-05 RX ADMIN — SODIUM CHLORIDE, PRESERVATIVE FREE 300 UNITS: 5 INJECTION INTRAVENOUS at 08:33

## 2020-05-05 RX ADMIN — MORPHINE SULFATE 2 MG: 2 INJECTION, SOLUTION INTRAMUSCULAR; INTRAVENOUS at 20:29

## 2020-05-05 RX ADMIN — CEFEPIME 2 G: 2 INJECTION, POWDER, FOR SOLUTION INTRAVENOUS at 05:00

## 2020-05-05 RX ADMIN — POLYETHYLENE GLYCOL 3350 17 G: 17 POWDER, FOR SOLUTION ORAL at 08:35

## 2020-05-05 RX ADMIN — CEFEPIME 2 G: 2 INJECTION, POWDER, FOR SOLUTION INTRAVENOUS at 12:00

## 2020-05-05 RX ADMIN — MELATONIN 3 MG ORAL TABLET 6 MG: 3 TABLET ORAL at 20:30

## 2020-05-05 RX ADMIN — ENOXAPARIN SODIUM 30 MG: 30 INJECTION SUBCUTANEOUS at 20:30

## 2020-05-05 RX ADMIN — SENNOSIDES AND DOCUSATE SODIUM 2 TABLET: 8.6; 5 TABLET ORAL at 08:32

## 2020-05-05 RX ADMIN — CHLORHEXIDINE GLUCONATE 0.12% ORAL RINSE 15 ML: 1.2 LIQUID ORAL at 20:30

## 2020-05-05 RX ADMIN — Medication 20 MG: at 20:30

## 2020-05-05 RX ADMIN — ACETAMINOPHEN 650 MG: 160 SOLUTION ORAL at 01:40

## 2020-05-05 RX ADMIN — RISPERIDONE 0.5 MG: 1 SOLUTION ORAL at 20:31

## 2020-05-05 RX ADMIN — SODIUM CHLORIDE SOLN NEBU 3% 4 ML: 3 NEBU SOLN at 07:55

## 2020-05-05 RX ADMIN — GABAPENTIN 125 MG: 250 SOLUTION ORAL at 14:01

## 2020-05-05 RX ADMIN — CEFEPIME 2 G: 2 INJECTION, POWDER, FOR SOLUTION INTRAVENOUS at 20:04

## 2020-05-05 RX ADMIN — SODIUM CHLORIDE SOLN NEBU 3% 4 ML: 3 NEBU SOLN at 15:43

## 2020-05-05 RX ADMIN — SENNOSIDES AND DOCUSATE SODIUM 2 TABLET: 8.6; 5 TABLET ORAL at 20:30

## 2020-05-05 RX ADMIN — GABAPENTIN 125 MG: 250 SOLUTION ORAL at 06:00

## 2020-05-05 RX ADMIN — Medication 20 MG: at 08:33

## 2020-05-05 ASSESSMENT — PAIN SCALES - GENERAL
PAINLEVEL_OUTOF10: 10
PAINLEVEL_OUTOF10: 0
PAINLEVEL_OUTOF10: 0
PAINLEVEL_OUTOF10: 7
PAINLEVEL_OUTOF10: 5
PAINLEVEL_OUTOF10: 0
PAINLEVEL_OUTOF10: 10
PAINLEVEL_OUTOF10: 0
PAINLEVEL_OUTOF10: 8

## 2020-05-05 ASSESSMENT — PAIN DESCRIPTION - LOCATION
LOCATION: GENERALIZED
LOCATION: CHEST;BACK

## 2020-05-05 ASSESSMENT — PULMONARY FUNCTION TESTS
PIF_VALUE: 18
PIF_VALUE: 17
PIF_VALUE: 18
PIF_VALUE: 35
PIF_VALUE: 27
PIF_VALUE: 17
PIF_VALUE: 26
PIF_VALUE: 30
PIF_VALUE: 23
PIF_VALUE: 17
PIF_VALUE: 22
PIF_VALUE: 28
PIF_VALUE: 44
PIF_VALUE: 17
PIF_VALUE: 28
PIF_VALUE: 20
PIF_VALUE: 22
PIF_VALUE: 28
PIF_VALUE: 27
PIF_VALUE: 28
PIF_VALUE: 17
PIF_VALUE: 27
PIF_VALUE: 29
PIF_VALUE: 23
PIF_VALUE: 26
PIF_VALUE: 30
PIF_VALUE: 27

## 2020-05-05 ASSESSMENT — PAIN DESCRIPTION - DESCRIPTORS: DESCRIPTORS: ACHING;DISCOMFORT

## 2020-05-05 ASSESSMENT — PAIN DESCRIPTION - PAIN TYPE: TYPE: ACUTE PAIN

## 2020-05-05 NOTE — PROGRESS NOTES
OCCUPATIONAL THERAPY     Date:2020  Patient Name: Yolande Nageotte  MRN: [id number]  : 1995  Room: [facility]-A     Attempted OT session this pm; pt off unit for testing. Will try back at later time.    Burgess Winslow, OTR/L [id number]
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
## 2020-05-06 ENCOUNTER — APPOINTMENT (OUTPATIENT)
Dept: GENERAL RADIOLOGY | Age: 25
DRG: 004 | End: 2020-05-06
Payer: MEDICAID

## 2020-05-06 LAB
ALBUMIN SERPL-MCNC: 2.2 G/DL (ref 3.5–5.2)
ALP BLD-CCNC: 105 U/L (ref 40–129)
ALT SERPL-CCNC: 189 U/L (ref 0–40)
ANION GAP SERPL CALCULATED.3IONS-SCNC: 14 MMOL/L (ref 7–16)
AST SERPL-CCNC: 106 U/L (ref 0–39)
BASOPHILS ABSOLUTE: 0.05 E9/L (ref 0–0.2)
BASOPHILS RELATIVE PERCENT: 0.2 % (ref 0–2)
BILIRUB SERPL-MCNC: 0.3 MG/DL (ref 0–1.2)
BUN BLDV-MCNC: 24 MG/DL (ref 6–20)
CALCIUM IONIZED: 1.19 MMOL/L (ref 1.15–1.33)
CALCIUM SERPL-MCNC: 8.9 MG/DL (ref 8.6–10.2)
CHLORIDE BLD-SCNC: 97 MMOL/L (ref 98–107)
CO2: 25 MMOL/L (ref 22–29)
CREAT SERPL-MCNC: 0.8 MG/DL (ref 0.7–1.2)
EOSINOPHILS ABSOLUTE: 0.25 E9/L (ref 0.05–0.5)
EOSINOPHILS RELATIVE PERCENT: 1.2 % (ref 0–6)
GFR AFRICAN AMERICAN: >60
GFR NON-AFRICAN AMERICAN: >60 ML/MIN/1.73
GLUCOSE BLD-MCNC: 93 MG/DL (ref 74–99)
HCT VFR BLD CALC: 23.7 % (ref 37–54)
HEMOGLOBIN: 7.5 G/DL (ref 12.5–16.5)
IMMATURE GRANULOCYTES #: 0.34 E9/L
IMMATURE GRANULOCYTES %: 1.6 % (ref 0–5)
LYMPHOCYTES ABSOLUTE: 2.51 E9/L (ref 1.5–4)
LYMPHOCYTES RELATIVE PERCENT: 11.8 % (ref 20–42)
MAGNESIUM: 2 MG/DL (ref 1.6–2.6)
MCH RBC QN AUTO: 28.8 PG (ref 26–35)
MCHC RBC AUTO-ENTMCNC: 31.6 % (ref 32–34.5)
MCV RBC AUTO: 91.2 FL (ref 80–99.9)
MONOCYTES ABSOLUTE: 1.46 E9/L (ref 0.1–0.95)
MONOCYTES RELATIVE PERCENT: 6.9 % (ref 2–12)
NEUTROPHILS ABSOLUTE: 16.58 E9/L (ref 1.8–7.3)
NEUTROPHILS RELATIVE PERCENT: 78.3 % (ref 43–80)
PDW BLD-RTO: 14.1 FL (ref 11.5–15)
PHOSPHORUS: 3.4 MG/DL (ref 2.5–4.5)
PLATELET # BLD: 487 E9/L (ref 130–450)
PMV BLD AUTO: 9.6 FL (ref 7–12)
POTASSIUM SERPL-SCNC: 4.3 MMOL/L (ref 3.5–5)
RBC # BLD: 2.6 E12/L (ref 3.8–5.8)
SODIUM BLD-SCNC: 136 MMOL/L (ref 132–146)
TOTAL PROTEIN: 6.5 G/DL (ref 6.4–8.3)
WBC # BLD: 21.2 E9/L (ref 4.5–11.5)

## 2020-05-06 PROCEDURE — 6370000000 HC RX 637 (ALT 250 FOR IP): Performed by: SURGERY

## 2020-05-06 PROCEDURE — 6360000002 HC RX W HCPCS: Performed by: EMERGENCY MEDICINE

## 2020-05-06 PROCEDURE — 2000000000 HC ICU R&B

## 2020-05-06 PROCEDURE — 6370000000 HC RX 637 (ALT 250 FOR IP): Performed by: STUDENT IN AN ORGANIZED HEALTH CARE EDUCATION/TRAINING PROGRAM

## 2020-05-06 PROCEDURE — 85025 COMPLETE CBC W/AUTO DIFF WBC: CPT

## 2020-05-06 PROCEDURE — 2580000003 HC RX 258: Performed by: STUDENT IN AN ORGANIZED HEALTH CARE EDUCATION/TRAINING PROGRAM

## 2020-05-06 PROCEDURE — 83735 ASSAY OF MAGNESIUM: CPT

## 2020-05-06 PROCEDURE — 94669 MECHANICAL CHEST WALL OSCILL: CPT

## 2020-05-06 PROCEDURE — 0BC38ZZ EXTIRPATION OF MATTER FROM RIGHT MAIN BRONCHUS, VIA NATURAL OR ARTIFICIAL OPENING ENDOSCOPIC: ICD-10-PCS | Performed by: SURGERY

## 2020-05-06 PROCEDURE — 82330 ASSAY OF CALCIUM: CPT

## 2020-05-06 PROCEDURE — 97530 THERAPEUTIC ACTIVITIES: CPT

## 2020-05-06 PROCEDURE — 94003 VENT MGMT INPAT SUBQ DAY: CPT

## 2020-05-06 PROCEDURE — 84100 ASSAY OF PHOSPHORUS: CPT

## 2020-05-06 PROCEDURE — 2580000003 HC RX 258: Performed by: SURGERY

## 2020-05-06 PROCEDURE — 80053 COMPREHEN METABOLIC PANEL: CPT

## 2020-05-06 PROCEDURE — 99291 CRITICAL CARE FIRST HOUR: CPT | Performed by: SURGERY

## 2020-05-06 PROCEDURE — 71045 X-RAY EXAM CHEST 1 VIEW: CPT

## 2020-05-06 PROCEDURE — 6360000002 HC RX W HCPCS: Performed by: STUDENT IN AN ORGANIZED HEALTH CARE EDUCATION/TRAINING PROGRAM

## 2020-05-06 PROCEDURE — 2580000003 HC RX 258: Performed by: EMERGENCY MEDICINE

## 2020-05-06 PROCEDURE — 94640 AIRWAY INHALATION TREATMENT: CPT

## 2020-05-06 PROCEDURE — 97110 THERAPEUTIC EXERCISES: CPT

## 2020-05-06 PROCEDURE — 6360000002 HC RX W HCPCS

## 2020-05-06 PROCEDURE — 36415 COLL VENOUS BLD VENIPUNCTURE: CPT

## 2020-05-06 RX ORDER — MIDAZOLAM HYDROCHLORIDE 1 MG/ML
INJECTION INTRAMUSCULAR; INTRAVENOUS
Status: COMPLETED
Start: 2020-05-06 | End: 2020-05-06

## 2020-05-06 RX ORDER — FENTANYL CITRATE 50 UG/ML
100 INJECTION, SOLUTION INTRAMUSCULAR; INTRAVENOUS
Status: COMPLETED | OUTPATIENT
Start: 2020-05-06 | End: 2020-05-06

## 2020-05-06 RX ORDER — RISPERIDONE 1 MG/ML
0.25 SOLUTION ORAL NIGHTLY
Status: COMPLETED | OUTPATIENT
Start: 2020-05-06 | End: 2020-05-08

## 2020-05-06 RX ORDER — MIDAZOLAM HYDROCHLORIDE 1 MG/ML
4 INJECTION INTRAMUSCULAR; INTRAVENOUS
Status: COMPLETED | OUTPATIENT
Start: 2020-05-06 | End: 2020-05-06

## 2020-05-06 RX ORDER — FENTANYL CITRATE 50 UG/ML
INJECTION, SOLUTION INTRAMUSCULAR; INTRAVENOUS
Status: COMPLETED
Start: 2020-05-06 | End: 2020-05-06

## 2020-05-06 RX ORDER — GABAPENTIN 250 MG/5ML
250 SOLUTION ORAL EVERY 8 HOURS SCHEDULED
Status: DISCONTINUED | OUTPATIENT
Start: 2020-05-06 | End: 2020-05-21

## 2020-05-06 RX ORDER — MIDAZOLAM HYDROCHLORIDE 1 MG/ML
2 INJECTION INTRAMUSCULAR; INTRAVENOUS ONCE
Status: COMPLETED | OUTPATIENT
Start: 2020-05-06 | End: 2020-05-06

## 2020-05-06 RX ADMIN — SODIUM CHLORIDE SOLN NEBU 3% 4 ML: 3 NEBU SOLN at 20:18

## 2020-05-06 RX ADMIN — ENOXAPARIN SODIUM 30 MG: 30 INJECTION SUBCUTANEOUS at 20:50

## 2020-05-06 RX ADMIN — GABAPENTIN 125 MG: 250 SOLUTION ORAL at 14:52

## 2020-05-06 RX ADMIN — FENTANYL CITRATE 100 MCG: 50 INJECTION, SOLUTION INTRAMUSCULAR; INTRAVENOUS at 03:55

## 2020-05-06 RX ADMIN — MIDAZOLAM HYDROCHLORIDE 2 MG: 1 INJECTION INTRAMUSCULAR; INTRAVENOUS at 03:54

## 2020-05-06 RX ADMIN — CHLORHEXIDINE GLUCONATE 0.12% ORAL RINSE 15 ML: 1.2 LIQUID ORAL at 08:51

## 2020-05-06 RX ADMIN — MIDAZOLAM HYDROCHLORIDE 4 MG: 1 INJECTION INTRAMUSCULAR; INTRAVENOUS at 03:54

## 2020-05-06 RX ADMIN — GABAPENTIN 125 MG: 250 SOLUTION ORAL at 06:00

## 2020-05-06 RX ADMIN — GABAPENTIN 250 MG: 250 SUSPENSION ORAL at 20:52

## 2020-05-06 RX ADMIN — SODIUM CHLORIDE, PRESERVATIVE FREE 10 ML: 5 INJECTION INTRAVENOUS at 08:52

## 2020-05-06 RX ADMIN — CEFEPIME 2 G: 2 INJECTION, POWDER, FOR SOLUTION INTRAVENOUS at 12:30

## 2020-05-06 RX ADMIN — Medication 20 MG: at 08:58

## 2020-05-06 RX ADMIN — CEFEPIME 2 G: 2 INJECTION, POWDER, FOR SOLUTION INTRAVENOUS at 03:57

## 2020-05-06 RX ADMIN — OXYCODONE HYDROCHLORIDE 7.5 MG: 5 SOLUTION ORAL at 00:05

## 2020-05-06 RX ADMIN — RISPERIDONE 0.25 MG: 1 SOLUTION ORAL at 20:50

## 2020-05-06 RX ADMIN — ACETAMINOPHEN 650 MG: 160 SOLUTION ORAL at 22:05

## 2020-05-06 RX ADMIN — SODIUM CHLORIDE, PRESERVATIVE FREE 300 UNITS: 5 INJECTION INTRAVENOUS at 08:52

## 2020-05-06 RX ADMIN — SENNOSIDES AND DOCUSATE SODIUM 2 TABLET: 8.6; 5 TABLET ORAL at 08:51

## 2020-05-06 RX ADMIN — OXYCODONE HYDROCHLORIDE 7.5 MG: 5 SOLUTION ORAL at 20:58

## 2020-05-06 RX ADMIN — MIDAZOLAM 4 MG: 1 INJECTION INTRAMUSCULAR; INTRAVENOUS at 03:54

## 2020-05-06 RX ADMIN — POLYETHYLENE GLYCOL 3350 17 G: 17 POWDER, FOR SOLUTION ORAL at 08:51

## 2020-05-06 RX ADMIN — ENOXAPARIN SODIUM 30 MG: 30 INJECTION SUBCUTANEOUS at 08:52

## 2020-05-06 RX ADMIN — MIDAZOLAM 2 MG: 1 INJECTION INTRAMUSCULAR; INTRAVENOUS at 03:54

## 2020-05-06 RX ADMIN — SODIUM CHLORIDE SOLN NEBU 3% 4 ML: 3 NEBU SOLN at 17:51

## 2020-05-06 RX ADMIN — OXYCODONE HYDROCHLORIDE 7.5 MG: 5 SOLUTION ORAL at 15:52

## 2020-05-06 RX ADMIN — ACETAMINOPHEN 650 MG: 160 SOLUTION ORAL at 02:57

## 2020-05-06 RX ADMIN — CHLORHEXIDINE GLUCONATE 0.12% ORAL RINSE 15 ML: 1.2 LIQUID ORAL at 20:49

## 2020-05-06 RX ADMIN — Medication 20 MG: at 20:50

## 2020-05-06 RX ADMIN — SODIUM CHLORIDE SOLN NEBU 3% 4 ML: 3 NEBU SOLN at 14:26

## 2020-05-06 RX ADMIN — SODIUM CHLORIDE SOLN NEBU 3% 4 ML: 3 NEBU SOLN at 09:31

## 2020-05-06 RX ADMIN — MELATONIN 3 MG ORAL TABLET 6 MG: 3 TABLET ORAL at 20:50

## 2020-05-06 RX ADMIN — CEFEPIME 2 G: 2 INJECTION, POWDER, FOR SOLUTION INTRAVENOUS at 20:48

## 2020-05-06 ASSESSMENT — PAIN SCALES - GENERAL
PAINLEVEL_OUTOF10: 0
PAINLEVEL_OUTOF10: 6
PAINLEVEL_OUTOF10: 0
PAINLEVEL_OUTOF10: 0
PAINLEVEL_OUTOF10: 8
PAINLEVEL_OUTOF10: 0
PAINLEVEL_OUTOF10: 8
PAINLEVEL_OUTOF10: 0
PAINLEVEL_OUTOF10: 8

## 2020-05-06 ASSESSMENT — PULMONARY FUNCTION TESTS
PIF_VALUE: 29
PIF_VALUE: 24
PIF_VALUE: 24
PIF_VALUE: 50
PIF_VALUE: 25
PIF_VALUE: 40
PIF_VALUE: 23
PIF_VALUE: 25
PIF_VALUE: 18
PIF_VALUE: 24
PIF_VALUE: 26
PIF_VALUE: 19
PIF_VALUE: 30
PIF_VALUE: 25
PIF_VALUE: 28
PIF_VALUE: 36
PIF_VALUE: 40
PIF_VALUE: 24
PIF_VALUE: 26
PIF_VALUE: 24
PIF_VALUE: 28
PIF_VALUE: 24
PIF_VALUE: 34
PIF_VALUE: 25
PIF_VALUE: 25
PIF_VALUE: 27
PIF_VALUE: 27
PIF_VALUE: 18
PIF_VALUE: 25
PIF_VALUE: 29

## 2020-05-06 ASSESSMENT — PAIN DESCRIPTION - PAIN TYPE: TYPE: ACUTE PAIN

## 2020-05-06 ASSESSMENT — PAIN DESCRIPTION - DESCRIPTORS: DESCRIPTORS: ACHING;DISCOMFORT

## 2020-05-06 ASSESSMENT — PAIN DESCRIPTION - LOCATION: LOCATION: CHEST
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
## 2020-05-06 NOTE — PROGRESS NOTES
below T6- spinal precautions   · Heme: No acute issues. MwF labs    Bowel regimen: colace, senna, glycolax, dulcolax   Pain control/Sedation: prn oxycodone  DVT prophylaxis: PCDs, lovenox  GI: pepcid  Glucose protocol: monitor BG  Mouth/Eye care: peridex, tears.    Sanabria: in place    Code status:    Full Code    Disposition: accepted at [facility] still requiring nearly daily bronchoscopy     Electronically signed by Aiden Mckenzie MD on 5/6/2020 at 3:34 PM
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
## 2020-05-06 NOTE — PROCEDURES
[address] [place] [address]  BRONCHOSCOPY PROCEDURE NOTE    Procedure Date: 5/6/2020    Pre-op Diagnosis: Respiratory failure, mucous plugging    Post-op Diagnosis: Same    Procedure:  Flexible bronchoscopy, therapeutic    Attending: Veronica Da Silva MD     Assistant: Antoni Carr    Specimen: None    Complications: None    Condition: Critical    Procedure: At the bedside in the ICU, the patient was sedated with 6 mg of Versed and 100 mg of fentanyl. Heart rate, blood pressure, respiratory rate, and oxygen saturation were monitored. The bronchoscope was inserted via the 8 Shiley tracheostomy. First the right main stem and segmental bronchi were viewed. There were noted to be thick mucus plugs in the right side segmental bronchi. Saline irrigation was used to thin the secretions and suction irrigated. The scope was slowly withdrawn and passed into the left mainstem and segmental bronchi which were noted to be fairly clear. .  The bronchoscope was completely withdrawn. The patient tolerated the procedure well with no readily apparent complications.       Antoni Carr
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
## 2020-05-06 NOTE — CARE COORDINATION
[facility] can accept when pt is medically stable. Face sheet, ambulance form in envelope in soft chart.

## 2020-05-07 ENCOUNTER — APPOINTMENT (OUTPATIENT)
Dept: GENERAL RADIOLOGY | Age: 25
DRG: 004 | End: 2020-05-07
Payer: MEDICAID

## 2020-05-07 LAB
AADO2: 230.6 MMHG
B.E.: 2.6 MMOL/L (ref -3–3)
COHB: 0.3 % (ref 0–1.5)
CRITICAL: ABNORMAL
DATE ANALYZED: ABNORMAL
DATE OF COLLECTION: ABNORMAL
FIO2: 50 %
HCO3: 26.5 MMOL/L (ref 22–26)
HHB: 6.2 % (ref 0–5)
LAB: ABNORMAL
Lab: ABNORMAL
METHB: 0.3 % (ref 0–1.5)
MODE: AC
O2 CONTENT: 12 ML/DL
O2 SATURATION: 93.8 % (ref 92–98.5)
O2HB: 93.2 % (ref 94–97)
OPERATOR ID: 2593
PATIENT TEMP: 37 C
PCO2: 38.1 MMHG (ref 35–45)
PEEP/CPAP: 10 CMH2O
PFO2: 1.41 MMHG/%
PH BLOOD GAS: 7.46 (ref 7.35–7.45)
PO2: 70.5 MMHG (ref 75–100)
RI(T): 3.27
RR MECHANICAL: 16 B/MIN
SOURCE, BLOOD GAS: ABNORMAL
THB: 9.1 G/DL (ref 11.5–16.5)
TIME ANALYZED: 630
VT MECHANICAL: 500 ML

## 2020-05-07 PROCEDURE — 6360000002 HC RX W HCPCS: Performed by: EMERGENCY MEDICINE

## 2020-05-07 PROCEDURE — 82805 BLOOD GASES W/O2 SATURATION: CPT

## 2020-05-07 PROCEDURE — 71045 X-RAY EXAM CHEST 1 VIEW: CPT

## 2020-05-07 PROCEDURE — 6370000000 HC RX 637 (ALT 250 FOR IP): Performed by: STUDENT IN AN ORGANIZED HEALTH CARE EDUCATION/TRAINING PROGRAM

## 2020-05-07 PROCEDURE — 94003 VENT MGMT INPAT SUBQ DAY: CPT

## 2020-05-07 PROCEDURE — 2000000000 HC ICU R&B

## 2020-05-07 PROCEDURE — 6360000002 HC RX W HCPCS: Performed by: STUDENT IN AN ORGANIZED HEALTH CARE EDUCATION/TRAINING PROGRAM

## 2020-05-07 PROCEDURE — 94669 MECHANICAL CHEST WALL OSCILL: CPT

## 2020-05-07 PROCEDURE — 6360000002 HC RX W HCPCS: Performed by: SURGERY

## 2020-05-07 PROCEDURE — 94640 AIRWAY INHALATION TREATMENT: CPT

## 2020-05-07 PROCEDURE — 2580000003 HC RX 258: Performed by: STUDENT IN AN ORGANIZED HEALTH CARE EDUCATION/TRAINING PROGRAM

## 2020-05-07 PROCEDURE — 99291 CRITICAL CARE FIRST HOUR: CPT | Performed by: SURGERY

## 2020-05-07 PROCEDURE — 2580000003 HC RX 258: Performed by: EMERGENCY MEDICINE

## 2020-05-07 PROCEDURE — 2580000003 HC RX 258: Performed by: SURGERY

## 2020-05-07 PROCEDURE — 94644 CONT INHLJ TX 1ST HOUR: CPT

## 2020-05-07 PROCEDURE — 36592 COLLECT BLOOD FROM PICC: CPT

## 2020-05-07 PROCEDURE — 6370000000 HC RX 637 (ALT 250 FOR IP): Performed by: SURGERY

## 2020-05-07 RX ORDER — MIDAZOLAM HYDROCHLORIDE 1 MG/ML
2 INJECTION INTRAMUSCULAR; INTRAVENOUS ONCE
Status: COMPLETED | OUTPATIENT
Start: 2020-05-07 | End: 2020-05-07

## 2020-05-07 RX ADMIN — Medication 20 MG: at 22:21

## 2020-05-07 RX ADMIN — CHLORHEXIDINE GLUCONATE 0.12% ORAL RINSE 15 ML: 1.2 LIQUID ORAL at 09:24

## 2020-05-07 RX ADMIN — POLYETHYLENE GLYCOL 3350 17 G: 17 POWDER, FOR SOLUTION ORAL at 09:25

## 2020-05-07 RX ADMIN — ACETAMINOPHEN 650 MG: 160 SOLUTION ORAL at 09:50

## 2020-05-07 RX ADMIN — CHLORHEXIDINE GLUCONATE 0.12% ORAL RINSE 15 ML: 1.2 LIQUID ORAL at 21:41

## 2020-05-07 RX ADMIN — OXYCODONE HYDROCHLORIDE 7.5 MG: 5 SOLUTION ORAL at 06:07

## 2020-05-07 RX ADMIN — CEFEPIME 2 G: 2 INJECTION, POWDER, FOR SOLUTION INTRAVENOUS at 19:44

## 2020-05-07 RX ADMIN — CEFEPIME 2 G: 2 INJECTION, POWDER, FOR SOLUTION INTRAVENOUS at 12:30

## 2020-05-07 RX ADMIN — GABAPENTIN 250 MG: 250 SUSPENSION ORAL at 09:29

## 2020-05-07 RX ADMIN — ENOXAPARIN SODIUM 30 MG: 30 INJECTION SUBCUTANEOUS at 21:41

## 2020-05-07 RX ADMIN — OXYCODONE HYDROCHLORIDE 7.5 MG: 5 SOLUTION ORAL at 21:44

## 2020-05-07 RX ADMIN — SODIUM CHLORIDE, PRESERVATIVE FREE 300 UNITS: 5 INJECTION INTRAVENOUS at 21:45

## 2020-05-07 RX ADMIN — MIDAZOLAM 2 MG: 1 INJECTION INTRAMUSCULAR; INTRAVENOUS at 00:25

## 2020-05-07 RX ADMIN — SODIUM CHLORIDE, PRESERVATIVE FREE 300 UNITS: 5 INJECTION INTRAVENOUS at 09:25

## 2020-05-07 RX ADMIN — SODIUM CHLORIDE SOLN NEBU 3% 4 ML: 3 NEBU SOLN at 19:15

## 2020-05-07 RX ADMIN — RISPERIDONE 0.25 MG: 1 SOLUTION ORAL at 21:40

## 2020-05-07 RX ADMIN — SODIUM CHLORIDE SOLN NEBU 3% 4 ML: 3 NEBU SOLN at 10:00

## 2020-05-07 RX ADMIN — OXYCODONE HYDROCHLORIDE 7.5 MG: 5 SOLUTION ORAL at 14:45

## 2020-05-07 RX ADMIN — SENNOSIDES AND DOCUSATE SODIUM 2 TABLET: 8.6; 5 TABLET ORAL at 21:46

## 2020-05-07 RX ADMIN — SODIUM CHLORIDE SOLN NEBU 3% 4 ML: 3 NEBU SOLN at 15:54

## 2020-05-07 RX ADMIN — ONDANSETRON 4 MG: 2 INJECTION INTRAMUSCULAR; INTRAVENOUS at 03:15

## 2020-05-07 RX ADMIN — MELATONIN 3 MG ORAL TABLET 6 MG: 3 TABLET ORAL at 21:46

## 2020-05-07 RX ADMIN — SODIUM CHLORIDE, PRESERVATIVE FREE 10 ML: 5 INJECTION INTRAVENOUS at 09:25

## 2020-05-07 RX ADMIN — CEFEPIME 2 G: 2 INJECTION, POWDER, FOR SOLUTION INTRAVENOUS at 05:00

## 2020-05-07 RX ADMIN — ACETAMINOPHEN 650 MG: 160 SOLUTION ORAL at 19:53

## 2020-05-07 RX ADMIN — SENNOSIDES AND DOCUSATE SODIUM 2 TABLET: 8.6; 5 TABLET ORAL at 09:25

## 2020-05-07 RX ADMIN — GABAPENTIN 250 MG: 250 SUSPENSION ORAL at 21:46

## 2020-05-07 RX ADMIN — GABAPENTIN 250 MG: 250 SUSPENSION ORAL at 14:46

## 2020-05-07 RX ADMIN — ENOXAPARIN SODIUM 30 MG: 30 INJECTION SUBCUTANEOUS at 09:41

## 2020-05-07 RX ADMIN — Medication 20 MG: at 09:25

## 2020-05-07 ASSESSMENT — PAIN SCALES - GENERAL
PAINLEVEL_OUTOF10: 0
PAINLEVEL_OUTOF10: 6
PAINLEVEL_OUTOF10: 7
PAINLEVEL_OUTOF10: 8
PAINLEVEL_OUTOF10: 2
PAINLEVEL_OUTOF10: 5
PAINLEVEL_OUTOF10: 0
PAINLEVEL_OUTOF10: 10
PAINLEVEL_OUTOF10: 0
PAINLEVEL_OUTOF10: 4

## 2020-05-07 ASSESSMENT — PULMONARY FUNCTION TESTS
PIF_VALUE: 36
PIF_VALUE: 28
PIF_VALUE: 25
PIF_VALUE: 26
PIF_VALUE: 25
PIF_VALUE: 24
PIF_VALUE: 26
PIF_VALUE: 28
PIF_VALUE: 23
PIF_VALUE: 29
PIF_VALUE: 32
PIF_VALUE: 27
PIF_VALUE: 42
PIF_VALUE: 40
PIF_VALUE: 31
PIF_VALUE: 27
PIF_VALUE: 24
PIF_VALUE: 26
PIF_VALUE: 25
PIF_VALUE: 23
PIF_VALUE: 29
PIF_VALUE: 31
PIF_VALUE: 25
PIF_VALUE: 30

## 2020-05-07 ASSESSMENT — PAIN DESCRIPTION - PAIN TYPE: TYPE: CHRONIC PAIN

## 2020-05-07 ASSESSMENT — PAIN DESCRIPTION - LOCATION: LOCATION: GENERALIZED

## 2020-05-07 NOTE — CARE COORDINATION
Pt remains too unstable for ltac. Will plan transfer to [address] [place] [address] when resp status more stable and pt doesn't require a bronch for at least 2 days.

## 2020-05-07 NOTE — PROGRESS NOTES
MWF labs  · Endocrine: no acute issues  · MSK: paraplegic below T6- spinal precautions, turn, reposition  · Heme: No acute issues. MWF labs    Bowel regimen: colace, senna, glycolax, dulcolax   Pain control/Sedation: prn oxycodone  DVT prophylaxis: PCDs, lovenox  GI: pepcid  Glucose protocol: monitor BG  Mouth/Eye care: peridex, tears. Sanabria: in place    Code status:    Full Code    Disposition: accepted at [facility] when more stable from respiratory standpoint.      Electronically signed by Geovanny Cade MD on 5/7/2020 at 5:39 AM

## 2020-05-08 ENCOUNTER — APPOINTMENT (OUTPATIENT)
Dept: GENERAL RADIOLOGY | Age: 25
DRG: 004 | End: 2020-05-08
Payer: MEDICAID

## 2020-05-08 LAB
ALBUMIN SERPL-MCNC: 2.9 G/DL (ref 3.5–5.2)
ALP BLD-CCNC: 108 U/L (ref 40–129)
ALT SERPL-CCNC: 163 U/L (ref 0–40)
ANION GAP SERPL CALCULATED.3IONS-SCNC: 14 MMOL/L (ref 7–16)
AST SERPL-CCNC: 74 U/L (ref 0–39)
BASOPHILS ABSOLUTE: 0.23 E9/L (ref 0–0.2)
BASOPHILS RELATIVE PERCENT: 0.9 % (ref 0–2)
BILIRUB SERPL-MCNC: 0.3 MG/DL (ref 0–1.2)
BUN BLDV-MCNC: 25 MG/DL (ref 6–20)
CALCIUM IONIZED: 1.21 MMOL/L (ref 1.15–1.33)
CALCIUM SERPL-MCNC: 8.6 MG/DL (ref 8.6–10.2)
CHLORIDE BLD-SCNC: 96 MMOL/L (ref 98–107)
CO2: 25 MMOL/L (ref 22–29)
CREAT SERPL-MCNC: 0.8 MG/DL (ref 0.7–1.2)
EOSINOPHILS ABSOLUTE: 0.44 E9/L (ref 0.05–0.5)
EOSINOPHILS RELATIVE PERCENT: 1.7 % (ref 0–6)
GFR AFRICAN AMERICAN: >60
GFR NON-AFRICAN AMERICAN: >60 ML/MIN/1.73
GLUCOSE BLD-MCNC: 98 MG/DL (ref 74–99)
HCT VFR BLD CALC: 26.1 % (ref 37–54)
HEMOGLOBIN: 8.1 G/DL (ref 12.5–16.5)
HYPOCHROMIA: ABNORMAL
LYMPHOCYTES ABSOLUTE: 1.03 E9/L (ref 1.5–4)
LYMPHOCYTES RELATIVE PERCENT: 3.5 % (ref 20–42)
MAGNESIUM: 1.9 MG/DL (ref 1.6–2.6)
MCH RBC QN AUTO: 28.6 PG (ref 26–35)
MCHC RBC AUTO-ENTMCNC: 31 % (ref 32–34.5)
MCV RBC AUTO: 92.2 FL (ref 80–99.9)
MONOCYTES ABSOLUTE: 1.81 E9/L (ref 0.1–0.95)
MONOCYTES RELATIVE PERCENT: 7 % (ref 2–12)
NEUTROPHILS ABSOLUTE: 22.45 E9/L (ref 1.8–7.3)
NEUTROPHILS RELATIVE PERCENT: 87 % (ref 43–80)
PDW BLD-RTO: 13.8 FL (ref 11.5–15)
PHOSPHORUS: 3.7 MG/DL (ref 2.5–4.5)
PLATELET # BLD: 422 E9/L (ref 130–450)
PMV BLD AUTO: 10.1 FL (ref 7–12)
POIKILOCYTES: ABNORMAL
POLYCHROMASIA: ABNORMAL
POTASSIUM SERPL-SCNC: 3.5 MMOL/L (ref 3.5–5)
PROCALCITONIN: 0.26 NG/ML (ref 0–0.08)
RBC # BLD: 2.83 E12/L (ref 3.8–5.8)
SODIUM BLD-SCNC: 135 MMOL/L (ref 132–146)
TARGET CELLS: ABNORMAL
TOTAL PROTEIN: 6.9 G/DL (ref 6.4–8.3)
WBC # BLD: 25.8 E9/L (ref 4.5–11.5)

## 2020-05-08 PROCEDURE — 84100 ASSAY OF PHOSPHORUS: CPT

## 2020-05-08 PROCEDURE — 3609010900 HC BRONCHOSCOPY THERAPUTIC ASPIRATION INITIAL: Performed by: SURGERY

## 2020-05-08 PROCEDURE — 31645 BRNCHSC W/THER ASPIR 1ST: CPT | Performed by: SURGERY

## 2020-05-08 PROCEDURE — 71045 X-RAY EXAM CHEST 1 VIEW: CPT

## 2020-05-08 PROCEDURE — 6370000000 HC RX 637 (ALT 250 FOR IP): Performed by: STUDENT IN AN ORGANIZED HEALTH CARE EDUCATION/TRAINING PROGRAM

## 2020-05-08 PROCEDURE — 2580000003 HC RX 258: Performed by: SURGERY

## 2020-05-08 PROCEDURE — 0BC78ZZ EXTIRPATION OF MATTER FROM LEFT MAIN BRONCHUS, VIA NATURAL OR ARTIFICIAL OPENING ENDOSCOPIC: ICD-10-PCS | Performed by: SURGERY

## 2020-05-08 PROCEDURE — 94003 VENT MGMT INPAT SUBQ DAY: CPT

## 2020-05-08 PROCEDURE — 3609013300 HC EGD TUBE PLACEMENT: Performed by: SURGERY

## 2020-05-08 PROCEDURE — 0BC38ZZ EXTIRPATION OF MATTER FROM RIGHT MAIN BRONCHUS, VIA NATURAL OR ARTIFICIAL OPENING ENDOSCOPIC: ICD-10-PCS | Performed by: SURGERY

## 2020-05-08 PROCEDURE — 85025 COMPLETE CBC W/AUTO DIFF WBC: CPT

## 2020-05-08 PROCEDURE — 94640 AIRWAY INHALATION TREATMENT: CPT

## 2020-05-08 PROCEDURE — 83735 ASSAY OF MAGNESIUM: CPT

## 2020-05-08 PROCEDURE — 2000000000 HC ICU R&B

## 2020-05-08 PROCEDURE — 99152 MOD SED SAME PHYS/QHP 5/>YRS: CPT | Performed by: SURGERY

## 2020-05-08 PROCEDURE — 0DH63UZ INSERTION OF FEEDING DEVICE INTO STOMACH, PERCUTANEOUS APPROACH: ICD-10-PCS | Performed by: SURGERY

## 2020-05-08 PROCEDURE — 94669 MECHANICAL CHEST WALL OSCILL: CPT

## 2020-05-08 PROCEDURE — 2580000003 HC RX 258: Performed by: STUDENT IN AN ORGANIZED HEALTH CARE EDUCATION/TRAINING PROGRAM

## 2020-05-08 PROCEDURE — 2709999900 HC NON-CHARGEABLE SUPPLY: Performed by: SURGERY

## 2020-05-08 PROCEDURE — 2500000003 HC RX 250 WO HCPCS: Performed by: SURGERY

## 2020-05-08 PROCEDURE — 6360000002 HC RX W HCPCS: Performed by: STUDENT IN AN ORGANIZED HEALTH CARE EDUCATION/TRAINING PROGRAM

## 2020-05-08 PROCEDURE — 99291 CRITICAL CARE FIRST HOUR: CPT | Performed by: SURGERY

## 2020-05-08 PROCEDURE — 84145 PROCALCITONIN (PCT): CPT

## 2020-05-08 PROCEDURE — 82330 ASSAY OF CALCIUM: CPT

## 2020-05-08 PROCEDURE — 43246 EGD PLACE GASTROSTOMY TUBE: CPT | Performed by: SURGERY

## 2020-05-08 PROCEDURE — 99221 1ST HOSP IP/OBS SF/LOW 40: CPT | Performed by: THORACIC SURGERY (CARDIOTHORACIC VASCULAR SURGERY)

## 2020-05-08 PROCEDURE — 6360000002 HC RX W HCPCS

## 2020-05-08 PROCEDURE — 80053 COMPREHEN METABOLIC PANEL: CPT

## 2020-05-08 PROCEDURE — 99153 MOD SED SAME PHYS/QHP EA: CPT | Performed by: SURGERY

## 2020-05-08 PROCEDURE — 6370000000 HC RX 637 (ALT 250 FOR IP): Performed by: SURGERY

## 2020-05-08 RX ORDER — GUAIFENESIN 400 MG/1
400 TABLET ORAL 2 TIMES DAILY
Status: DISCONTINUED | OUTPATIENT
Start: 2020-05-08 | End: 2020-05-26 | Stop reason: HOSPADM

## 2020-05-08 RX ORDER — BISACODYL 10 MG
10 SUPPOSITORY, RECTAL RECTAL ONCE
Status: DISCONTINUED | OUTPATIENT
Start: 2020-05-08 | End: 2020-05-09

## 2020-05-08 RX ORDER — LIDOCAINE HYDROCHLORIDE 10 MG/ML
INJECTION, SOLUTION EPIDURAL; INFILTRATION; INTRACAUDAL; PERINEURAL PRN
Status: DISCONTINUED | OUTPATIENT
Start: 2020-05-08 | End: 2020-05-08 | Stop reason: ALTCHOICE

## 2020-05-08 RX ORDER — MIDAZOLAM HYDROCHLORIDE 1 MG/ML
8 INJECTION INTRAMUSCULAR; INTRAVENOUS
Status: COMPLETED | OUTPATIENT
Start: 2020-05-08 | End: 2020-05-08

## 2020-05-08 RX ORDER — PROPOFOL 10 MG/ML
100 INJECTION, EMULSION INTRAVENOUS
Status: COMPLETED | OUTPATIENT
Start: 2020-05-08 | End: 2020-05-08

## 2020-05-08 RX ORDER — CEFAZOLIN SODIUM 2 G/50ML
2 SOLUTION INTRAVENOUS SEE ADMIN INSTRUCTIONS
Status: COMPLETED | OUTPATIENT
Start: 2020-05-08 | End: 2020-05-08

## 2020-05-08 RX ORDER — FENTANYL CITRATE 50 UG/ML
200 INJECTION, SOLUTION INTRAMUSCULAR; INTRAVENOUS
Status: COMPLETED | OUTPATIENT
Start: 2020-05-08 | End: 2020-05-08

## 2020-05-08 RX ORDER — PROPOFOL 10 MG/ML
INJECTION, EMULSION INTRAVENOUS
Status: COMPLETED
Start: 2020-05-08 | End: 2020-05-08

## 2020-05-08 RX ADMIN — GUAIFENESIN 400 MG: 400 TABLET, FILM COATED ORAL at 09:39

## 2020-05-08 RX ADMIN — PROPOFOL 15 MG: 10 INJECTION, EMULSION INTRAVENOUS at 12:01

## 2020-05-08 RX ADMIN — ENOXAPARIN SODIUM 30 MG: 30 INJECTION SUBCUTANEOUS at 07:59

## 2020-05-08 RX ADMIN — SODIUM CHLORIDE, PRESERVATIVE FREE 300 UNITS: 5 INJECTION INTRAVENOUS at 07:59

## 2020-05-08 RX ADMIN — SENNOSIDES AND DOCUSATE SODIUM 2 TABLET: 8.6; 5 TABLET ORAL at 20:19

## 2020-05-08 RX ADMIN — RISPERIDONE 0.25 MG: 1 SOLUTION ORAL at 20:18

## 2020-05-08 RX ADMIN — POLYETHYLENE GLYCOL 3350 17 G: 17 POWDER, FOR SOLUTION ORAL at 07:58

## 2020-05-08 RX ADMIN — Medication 20 MG: at 07:58

## 2020-05-08 RX ADMIN — Medication 20 MG: at 20:20

## 2020-05-08 RX ADMIN — SODIUM CHLORIDE SOLN NEBU 3% 4 ML: 3 NEBU SOLN at 15:39

## 2020-05-08 RX ADMIN — SODIUM CHLORIDE SOLN NEBU 3% 4 ML: 3 NEBU SOLN at 19:07

## 2020-05-08 RX ADMIN — MIDAZOLAM 8 MG: 1 INJECTION INTRAMUSCULAR; INTRAVENOUS at 11:02

## 2020-05-08 RX ADMIN — ACETAMINOPHEN 650 MG: 160 SOLUTION ORAL at 04:50

## 2020-05-08 RX ADMIN — CHLORHEXIDINE GLUCONATE 0.12% ORAL RINSE 15 ML: 1.2 LIQUID ORAL at 20:19

## 2020-05-08 RX ADMIN — GABAPENTIN 250 MG: 250 SUSPENSION ORAL at 05:34

## 2020-05-08 RX ADMIN — SODIUM CHLORIDE, PRESERVATIVE FREE 10 ML: 5 INJECTION INTRAVENOUS at 07:59

## 2020-05-08 RX ADMIN — CHLORHEXIDINE GLUCONATE 0.12% ORAL RINSE 15 ML: 1.2 LIQUID ORAL at 07:58

## 2020-05-08 RX ADMIN — OXYCODONE HYDROCHLORIDE 7.5 MG: 5 SOLUTION ORAL at 15:42

## 2020-05-08 RX ADMIN — SENNOSIDES AND DOCUSATE SODIUM 2 TABLET: 8.6; 5 TABLET ORAL at 07:58

## 2020-05-08 RX ADMIN — ENOXAPARIN SODIUM 30 MG: 30 INJECTION SUBCUTANEOUS at 20:30

## 2020-05-08 RX ADMIN — FENTANYL CITRATE 200 MCG: 50 INJECTION, SOLUTION INTRAMUSCULAR; INTRAVENOUS at 10:52

## 2020-05-08 RX ADMIN — MELATONIN 3 MG ORAL TABLET 6 MG: 3 TABLET ORAL at 20:19

## 2020-05-08 RX ADMIN — SODIUM CHLORIDE, PRESERVATIVE FREE 300 UNITS: 5 INJECTION INTRAVENOUS at 20:30

## 2020-05-08 RX ADMIN — OXYCODONE HYDROCHLORIDE 7.5 MG: 5 SOLUTION ORAL at 04:50

## 2020-05-08 RX ADMIN — GUAIFENESIN 400 MG: 400 TABLET, FILM COATED ORAL at 20:20

## 2020-05-08 RX ADMIN — SODIUM CHLORIDE SOLN NEBU 3% 4 ML: 3 NEBU SOLN at 08:00

## 2020-05-08 RX ADMIN — CEFAZOLIN SODIUM 2 G: 2 SOLUTION INTRAVENOUS at 10:41

## 2020-05-08 RX ADMIN — GABAPENTIN 250 MG: 250 SUSPENSION ORAL at 21:32

## 2020-05-08 RX ADMIN — ACETAMINOPHEN 650 MG: 160 SOLUTION ORAL at 15:42

## 2020-05-08 RX ADMIN — ACETAMINOPHEN 650 MG: 160 SOLUTION ORAL at 08:50

## 2020-05-08 RX ADMIN — GABAPENTIN 250 MG: 250 SUSPENSION ORAL at 14:48

## 2020-05-08 RX ADMIN — ACETAMINOPHEN 650 MG: 160 SOLUTION ORAL at 20:17

## 2020-05-08 ASSESSMENT — PAIN DESCRIPTION - DESCRIPTORS: DESCRIPTORS: ACHING;SORE

## 2020-05-08 ASSESSMENT — PULMONARY FUNCTION TESTS
PIF_VALUE: 26
PIF_VALUE: 30
PIF_VALUE: 27
PIF_VALUE: 27
PIF_VALUE: 34
PIF_VALUE: 29
PIF_VALUE: 24
PIF_VALUE: 27
PIF_VALUE: 24
PIF_VALUE: 23
PIF_VALUE: 25
PIF_VALUE: 23
PIF_VALUE: 24
PIF_VALUE: 26
PIF_VALUE: 24
PIF_VALUE: 24
PIF_VALUE: 25
PIF_VALUE: 53
PIF_VALUE: 25
PIF_VALUE: 28
PIF_VALUE: 30
PIF_VALUE: 42
PIF_VALUE: 26
PIF_VALUE: 25
PIF_VALUE: 25
PIF_VALUE: 29
PIF_VALUE: 27
PIF_VALUE: 24
PIF_VALUE: 27
PIF_VALUE: 23
PIF_VALUE: 25
PIF_VALUE: 39

## 2020-05-08 ASSESSMENT — PAIN SCALES - GENERAL
PAINLEVEL_OUTOF10: 0
PAINLEVEL_OUTOF10: 10
PAINLEVEL_OUTOF10: 0
PAINLEVEL_OUTOF10: 0
PAINLEVEL_OUTOF10: 7
PAINLEVEL_OUTOF10: 2

## 2020-05-08 ASSESSMENT — PAIN DESCRIPTION - LOCATION
LOCATION: GENERALIZED
LOCATION: ABDOMEN

## 2020-05-08 ASSESSMENT — PAIN DESCRIPTION - ORIENTATION: ORIENTATION: MID

## 2020-05-08 ASSESSMENT — PAIN DESCRIPTION - PAIN TYPE: TYPE: ACUTE PAIN;SURGICAL PAIN
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
## 2020-05-08 NOTE — PROGRESS NOTES
acute issues. MWF labs    Bowel regimen: colace, senna, glycolax, dulcolax   Pain control/Sedation: prn oxycodone  DVT prophylaxis: PCDs, lovenox  GI: pepcid  Glucose protocol: monitor BG  Mouth/Eye care: peridex, tears.    Sanabria: in place    Code status:    Full Code    Disposition: possible transfer from SICU today     Electronically signed by Annie Lamb MD on 5/8/2020 at 6:12 AM

## 2020-05-08 NOTE — PLAN OF CARE
Problem: Cardiac Output - Decreased:  Goal: Hemodynamic stability will improve  Description: Hemodynamic stability will improve  Outcome: Met This Shift     Problem: Skin Integrity - Impaired:  Goal: Will show no infection signs and symptoms  Description: Will show no infection signs and symptoms  Outcome: Met This Shift  Goal: Absence of new skin breakdown  Description: Absence of new skin breakdown  Outcome: Met This Shift     Problem: Daily Care:  Goal: Daily care needs are met  Description: Daily care needs are met  Outcome: Met This Shift     Problem: Musculor/Skeletal Functional Status  Goal: Highest potential functional level  Outcome: Met This Shift  Goal: Absence of falls  Outcome: Met This Shift     Problem: Falls - Risk of:  Goal: Will remain free from falls  Description: Will remain free from falls  5/7/2020 2101 by Diego Junior  Outcome: Ongoing  5/7/2020 1632 by Perfecto Sood RN  Outcome: Met This Shift  Goal: Absence of physical injury  Description: Absence of physical injury  5/7/2020 2101 by Diego Junior  Outcome: Ongoing  5/7/2020 1632 by Perfecto Sood RN  Outcome: Met This Shift     Problem: Pain:  Goal: Pain level will decrease  Description: Pain level will decrease  5/7/2020 2101 by Diego Junior  Outcome: Ongoing  5/7/2020 1632 by Perfecto Sood RN  Outcome: Met This Shift  Goal: Control of chronic pain  Description: Control of chronic pain  Outcome: Ongoing     Problem: Discharge Planning:  Goal: Participates in care planning  Description: Participates in care planning  Outcome: Ongoing  Goal: Discharged to appropriate level of care  Description: Discharged to appropriate level of care  Outcome: Ongoing     Problem: Airway Clearance - Ineffective:  Goal: Ability to maintain a clear airway will improve  Description: Ability to maintain a clear airway will improve  5/7/2020 2101 by Diego Junior  Outcome: Ongoing  5/7/2020 1632 by Perfecto Sood RN  Outcome: Met This Shift Problem: Anxiety/Stress:  Goal: Level of anxiety will decrease  Description: Level of anxiety will decrease  5/7/2020 2101 by Ximena Quinones  Outcome: Ongoing  5/7/2020 1632 by Esther Fish RN  Outcome: Ongoing     Problem: Aspiration:  Goal: Absence of aspiration  Description: Absence of aspiration  Outcome: Ongoing     Problem: Gas Exchange - Impaired:  Goal: Levels of oxygenation will improve  Description: Levels of oxygenation will improve  Outcome: Ongoing     Problem: Infection:  Goal: Will remain free from infection  Description: Will remain free from infection  Outcome: Ongoing     Problem: Pain:  Goal: Control of acute pain  Description: Control of acute pain  5/7/2020 2101 by Ximena Quinones  Outcome: Completed  5/7/2020 1632 by Esther Fish RN  Outcome: Met This Shift

## 2020-05-09 ENCOUNTER — APPOINTMENT (OUTPATIENT)
Dept: GENERAL RADIOLOGY | Age: 25
DRG: 004 | End: 2020-05-09
Payer: MEDICAID

## 2020-05-09 PROCEDURE — 94640 AIRWAY INHALATION TREATMENT: CPT

## 2020-05-09 PROCEDURE — 94669 MECHANICAL CHEST WALL OSCILL: CPT

## 2020-05-09 PROCEDURE — 87206 SMEAR FLUORESCENT/ACID STAI: CPT

## 2020-05-09 PROCEDURE — 89220 SPUTUM SPECIMEN COLLECTION: CPT

## 2020-05-09 PROCEDURE — 94003 VENT MGMT INPAT SUBQ DAY: CPT

## 2020-05-09 PROCEDURE — 6370000000 HC RX 637 (ALT 250 FOR IP): Performed by: STUDENT IN AN ORGANIZED HEALTH CARE EDUCATION/TRAINING PROGRAM

## 2020-05-09 PROCEDURE — 87040 BLOOD CULTURE FOR BACTERIA: CPT

## 2020-05-09 PROCEDURE — 31502 CHANGE OF WINDPIPE AIRWAY: CPT

## 2020-05-09 PROCEDURE — 99291 CRITICAL CARE FIRST HOUR: CPT | Performed by: SURGERY

## 2020-05-09 PROCEDURE — 2580000003 HC RX 258: Performed by: SURGERY

## 2020-05-09 PROCEDURE — 87186 SC STD MICRODIL/AGAR DIL: CPT

## 2020-05-09 PROCEDURE — 6360000002 HC RX W HCPCS: Performed by: INTERNAL MEDICINE

## 2020-05-09 PROCEDURE — 36415 COLL VENOUS BLD VENIPUNCTURE: CPT

## 2020-05-09 PROCEDURE — 2580000003 HC RX 258: Performed by: INTERNAL MEDICINE

## 2020-05-09 PROCEDURE — 99232 SBSQ HOSP IP/OBS MODERATE 35: CPT | Performed by: THORACIC SURGERY (CARDIOTHORACIC VASCULAR SURGERY)

## 2020-05-09 PROCEDURE — 2580000003 HC RX 258: Performed by: STUDENT IN AN ORGANIZED HEALTH CARE EDUCATION/TRAINING PROGRAM

## 2020-05-09 PROCEDURE — 6370000000 HC RX 637 (ALT 250 FOR IP): Performed by: SURGERY

## 2020-05-09 PROCEDURE — 87070 CULTURE OTHR SPECIMN AEROBIC: CPT

## 2020-05-09 PROCEDURE — 31646 BRNCHSC W/THER ASPIR SBSQ: CPT | Performed by: SURGERY

## 2020-05-09 PROCEDURE — 6360000002 HC RX W HCPCS: Performed by: STUDENT IN AN ORGANIZED HEALTH CARE EDUCATION/TRAINING PROGRAM

## 2020-05-09 PROCEDURE — 0BC38ZZ EXTIRPATION OF MATTER FROM RIGHT MAIN BRONCHUS, VIA NATURAL OR ARTIFICIAL OPENING ENDOSCOPIC: ICD-10-PCS | Performed by: SURGERY

## 2020-05-09 PROCEDURE — 71045 X-RAY EXAM CHEST 1 VIEW: CPT

## 2020-05-09 PROCEDURE — 2000000000 HC ICU R&B

## 2020-05-09 PROCEDURE — 87077 CULTURE AEROBIC IDENTIFY: CPT

## 2020-05-09 RX ORDER — BISACODYL 10 MG
10 SUPPOSITORY, RECTAL RECTAL DAILY
Status: DISCONTINUED | OUTPATIENT
Start: 2020-05-09 | End: 2020-05-14

## 2020-05-09 RX ORDER — FENTANYL CITRATE 50 UG/ML
200 INJECTION, SOLUTION INTRAMUSCULAR; INTRAVENOUS ONCE
Status: COMPLETED | OUTPATIENT
Start: 2020-05-09 | End: 2020-05-09

## 2020-05-09 RX ORDER — MIDAZOLAM HYDROCHLORIDE 1 MG/ML
8 INJECTION INTRAMUSCULAR; INTRAVENOUS ONCE
Status: COMPLETED | OUTPATIENT
Start: 2020-05-09 | End: 2020-05-09

## 2020-05-09 RX ADMIN — GABAPENTIN 250 MG: 250 SUSPENSION ORAL at 15:11

## 2020-05-09 RX ADMIN — ACETAMINOPHEN 650 MG: 160 SOLUTION ORAL at 17:51

## 2020-05-09 RX ADMIN — ENOXAPARIN SODIUM 30 MG: 30 INJECTION SUBCUTANEOUS at 08:11

## 2020-05-09 RX ADMIN — OXYCODONE HYDROCHLORIDE 7.5 MG: 5 SOLUTION ORAL at 14:20

## 2020-05-09 RX ADMIN — Medication 20 MG: at 08:10

## 2020-05-09 RX ADMIN — MELATONIN 3 MG ORAL TABLET 6 MG: 3 TABLET ORAL at 21:30

## 2020-05-09 RX ADMIN — CHLORHEXIDINE GLUCONATE 0.12% ORAL RINSE 15 ML: 1.2 LIQUID ORAL at 21:31

## 2020-05-09 RX ADMIN — SODIUM CHLORIDE SOLN NEBU 3% 4 ML: 3 NEBU SOLN at 07:35

## 2020-05-09 RX ADMIN — BISACODYL 10 MG: 10 SUPPOSITORY RECTAL at 11:08

## 2020-05-09 RX ADMIN — Medication 20 MG: at 21:37

## 2020-05-09 RX ADMIN — SODIUM CHLORIDE SOLN NEBU 3% 4 ML: 3 NEBU SOLN at 15:50

## 2020-05-09 RX ADMIN — OXYCODONE HYDROCHLORIDE 7.5 MG: 5 SOLUTION ORAL at 22:41

## 2020-05-09 RX ADMIN — SODIUM CHLORIDE, PRESERVATIVE FREE 10 ML: 5 INJECTION INTRAVENOUS at 08:11

## 2020-05-09 RX ADMIN — SODIUM CHLORIDE: 9 INJECTION, SOLUTION INTRAVENOUS at 15:11

## 2020-05-09 RX ADMIN — MIDAZOLAM 8 MG: 1 INJECTION INTRAMUSCULAR; INTRAVENOUS at 09:49

## 2020-05-09 RX ADMIN — GABAPENTIN 250 MG: 250 SUSPENSION ORAL at 21:37

## 2020-05-09 RX ADMIN — CHLORHEXIDINE GLUCONATE 0.12% ORAL RINSE 15 ML: 1.2 LIQUID ORAL at 08:10

## 2020-05-09 RX ADMIN — POLYETHYLENE GLYCOL 3350 17 G: 17 POWDER, FOR SOLUTION ORAL at 08:11

## 2020-05-09 RX ADMIN — MEROPENEM 1 G: 1 INJECTION, POWDER, FOR SOLUTION INTRAVENOUS at 18:35

## 2020-05-09 RX ADMIN — SODIUM CHLORIDE SOLN NEBU 3% 4 ML: 3 NEBU SOLN at 11:50

## 2020-05-09 RX ADMIN — SODIUM CHLORIDE, PRESERVATIVE FREE 300 UNITS: 5 INJECTION INTRAVENOUS at 08:11

## 2020-05-09 RX ADMIN — SENNOSIDES AND DOCUSATE SODIUM 2 TABLET: 8.6; 5 TABLET ORAL at 21:30

## 2020-05-09 RX ADMIN — GUAIFENESIN 400 MG: 400 TABLET, FILM COATED ORAL at 21:30

## 2020-05-09 RX ADMIN — MEROPENEM 1 G: 1 INJECTION, POWDER, FOR SOLUTION INTRAVENOUS at 11:24

## 2020-05-09 RX ADMIN — ACETAMINOPHEN 650 MG: 160 SOLUTION ORAL at 10:23

## 2020-05-09 RX ADMIN — SENNOSIDES AND DOCUSATE SODIUM 2 TABLET: 8.6; 5 TABLET ORAL at 08:11

## 2020-05-09 RX ADMIN — GUAIFENESIN 400 MG: 400 TABLET, FILM COATED ORAL at 08:10

## 2020-05-09 RX ADMIN — ENOXAPARIN SODIUM 30 MG: 30 INJECTION SUBCUTANEOUS at 21:30

## 2020-05-09 RX ADMIN — SODIUM CHLORIDE SOLN NEBU 3% 4 ML: 3 NEBU SOLN at 19:22

## 2020-05-09 RX ADMIN — OXYCODONE HYDROCHLORIDE 7.5 MG: 5 SOLUTION ORAL at 03:28

## 2020-05-09 RX ADMIN — FENTANYL CITRATE 200 MCG: 50 INJECTION, SOLUTION INTRAMUSCULAR; INTRAVENOUS at 09:48

## 2020-05-09 RX ADMIN — GABAPENTIN 250 MG: 250 SUSPENSION ORAL at 06:58

## 2020-05-09 RX ADMIN — SODIUM CHLORIDE, PRESERVATIVE FREE 300 UNITS: 5 INJECTION INTRAVENOUS at 21:30

## 2020-05-09 ASSESSMENT — PAIN SCALES - GENERAL
PAINLEVEL_OUTOF10: 0
PAINLEVEL_OUTOF10: 7
PAINLEVEL_OUTOF10: 0
PAINLEVEL_OUTOF10: 7
PAINLEVEL_OUTOF10: 0
PAINLEVEL_OUTOF10: 7

## 2020-05-09 ASSESSMENT — PULMONARY FUNCTION TESTS
PIF_VALUE: 23
PIF_VALUE: 48
PIF_VALUE: 26
PIF_VALUE: 26
PIF_VALUE: 24
PIF_VALUE: 28
PIF_VALUE: 25
PIF_VALUE: 24
PIF_VALUE: 24
PIF_VALUE: 25
PIF_VALUE: 24
PIF_VALUE: 24
PIF_VALUE: 23
PIF_VALUE: 26
PIF_VALUE: 28
PIF_VALUE: 24
PIF_VALUE: 27
PIF_VALUE: 28
PIF_VALUE: 25
PIF_VALUE: 26
PIF_VALUE: 25
PIF_VALUE: 24
PIF_VALUE: 25
PIF_VALUE: 24
PIF_VALUE: 26
PIF_VALUE: 41
PIF_VALUE: 25
PIF_VALUE: 27
PIF_VALUE: 25
PIF_VALUE: 25
PIF_VALUE: 29
PIF_VALUE: 28
PIF_VALUE: 22

## 2020-05-09 ASSESSMENT — PAIN DESCRIPTION - PAIN TYPE: TYPE: ACUTE PAIN

## 2020-05-09 ASSESSMENT — PAIN DESCRIPTION - LOCATION: LOCATION: BACK

## 2020-05-09 NOTE — PROGRESS NOTES
Physical Therapy  Physical Therapy Attempt    Name: Jaqueline Mcgill  : 1995  MRN: [id number]      Date of Service: 2020  Chart reviewed. Attempted treatment session this afternoon; however, pt refused due to fatigue. Will re-attempt as able.     Danis Lopez, PT, DPT  [id number]

## 2020-05-10 ENCOUNTER — APPOINTMENT (OUTPATIENT)
Dept: GENERAL RADIOLOGY | Age: 25
DRG: 004 | End: 2020-05-10
Payer: MEDICAID

## 2020-05-10 LAB
ABO/RH: NORMAL
ANION GAP SERPL CALCULATED.3IONS-SCNC: 11 MMOL/L (ref 7–16)
ANTIBODY SCREEN: NORMAL
APTT: 27.9 SEC (ref 24.5–35.1)
BUN BLDV-MCNC: 24 MG/DL (ref 6–20)
CALCIUM SERPL-MCNC: 8.8 MG/DL (ref 8.6–10.2)
CHLORIDE BLD-SCNC: 102 MMOL/L (ref 98–107)
CO2: 27 MMOL/L (ref 22–29)
CREAT SERPL-MCNC: 0.6 MG/DL (ref 0.7–1.2)
GFR AFRICAN AMERICAN: >60
GFR NON-AFRICAN AMERICAN: >60 ML/MIN/1.73
GLUCOSE BLD-MCNC: 122 MG/DL (ref 74–99)
HCT VFR BLD CALC: 23.9 % (ref 37–54)
HEMOGLOBIN: 7.5 G/DL (ref 12.5–16.5)
INR BLD: 1.3
MCH RBC QN AUTO: 29.1 PG (ref 26–35)
MCHC RBC AUTO-ENTMCNC: 31.4 % (ref 32–34.5)
MCV RBC AUTO: 92.6 FL (ref 80–99.9)
PDW BLD-RTO: 14 FL (ref 11.5–15)
PLATELET # BLD: 413 E9/L (ref 130–450)
PMV BLD AUTO: 9.5 FL (ref 7–12)
POTASSIUM SERPL-SCNC: 4 MMOL/L (ref 3.5–5)
PROTHROMBIN TIME: 15 SEC (ref 9.3–12.4)
RBC # BLD: 2.58 E12/L (ref 3.8–5.8)
SODIUM BLD-SCNC: 140 MMOL/L (ref 132–146)
WBC # BLD: 17 E9/L (ref 4.5–11.5)

## 2020-05-10 PROCEDURE — 71045 X-RAY EXAM CHEST 1 VIEW: CPT

## 2020-05-10 PROCEDURE — 86901 BLOOD TYPING SEROLOGIC RH(D): CPT

## 2020-05-10 PROCEDURE — 0BC58ZZ EXTIRPATION OF MATTER FROM RIGHT MIDDLE LOBE BRONCHUS, VIA NATURAL OR ARTIFICIAL OPENING ENDOSCOPIC: ICD-10-PCS | Performed by: SURGERY

## 2020-05-10 PROCEDURE — 80048 BASIC METABOLIC PNL TOTAL CA: CPT

## 2020-05-10 PROCEDURE — 6360000002 HC RX W HCPCS: Performed by: STUDENT IN AN ORGANIZED HEALTH CARE EDUCATION/TRAINING PROGRAM

## 2020-05-10 PROCEDURE — 99291 CRITICAL CARE FIRST HOUR: CPT | Performed by: SURGERY

## 2020-05-10 PROCEDURE — 6360000002 HC RX W HCPCS: Performed by: INTERNAL MEDICINE

## 2020-05-10 PROCEDURE — 0BC68ZZ EXTIRPATION OF MATTER FROM RIGHT LOWER LOBE BRONCHUS, VIA NATURAL OR ARTIFICIAL OPENING ENDOSCOPIC: ICD-10-PCS | Performed by: SURGERY

## 2020-05-10 PROCEDURE — 94003 VENT MGMT INPAT SUBQ DAY: CPT

## 2020-05-10 PROCEDURE — 6370000000 HC RX 637 (ALT 250 FOR IP): Performed by: STUDENT IN AN ORGANIZED HEALTH CARE EDUCATION/TRAINING PROGRAM

## 2020-05-10 PROCEDURE — 2000000000 HC ICU R&B

## 2020-05-10 PROCEDURE — 86900 BLOOD TYPING SEROLOGIC ABO: CPT

## 2020-05-10 PROCEDURE — 0BC48ZZ EXTIRPATION OF MATTER FROM RIGHT UPPER LOBE BRONCHUS, VIA NATURAL OR ARTIFICIAL OPENING ENDOSCOPIC: ICD-10-PCS | Performed by: SURGERY

## 2020-05-10 PROCEDURE — 0BC38ZZ EXTIRPATION OF MATTER FROM RIGHT MAIN BRONCHUS, VIA NATURAL OR ARTIFICIAL OPENING ENDOSCOPIC: ICD-10-PCS | Performed by: SURGERY

## 2020-05-10 PROCEDURE — 86923 COMPATIBILITY TEST ELECTRIC: CPT

## 2020-05-10 PROCEDURE — 2580000003 HC RX 258: Performed by: SURGERY

## 2020-05-10 PROCEDURE — 94640 AIRWAY INHALATION TREATMENT: CPT

## 2020-05-10 PROCEDURE — 85027 COMPLETE CBC AUTOMATED: CPT

## 2020-05-10 PROCEDURE — 85610 PROTHROMBIN TIME: CPT

## 2020-05-10 PROCEDURE — 6370000000 HC RX 637 (ALT 250 FOR IP): Performed by: SURGERY

## 2020-05-10 PROCEDURE — 36415 COLL VENOUS BLD VENIPUNCTURE: CPT

## 2020-05-10 PROCEDURE — 2580000003 HC RX 258: Performed by: INTERNAL MEDICINE

## 2020-05-10 PROCEDURE — 31646 BRNCHSC W/THER ASPIR SBSQ: CPT | Performed by: SURGERY

## 2020-05-10 PROCEDURE — 86850 RBC ANTIBODY SCREEN: CPT

## 2020-05-10 PROCEDURE — 94669 MECHANICAL CHEST WALL OSCILL: CPT

## 2020-05-10 PROCEDURE — 6360000002 HC RX W HCPCS: Performed by: NURSE PRACTITIONER

## 2020-05-10 PROCEDURE — 2580000003 HC RX 258: Performed by: STUDENT IN AN ORGANIZED HEALTH CARE EDUCATION/TRAINING PROGRAM

## 2020-05-10 PROCEDURE — 85730 THROMBOPLASTIN TIME PARTIAL: CPT

## 2020-05-10 RX ORDER — FENTANYL CITRATE 50 UG/ML
200 INJECTION, SOLUTION INTRAMUSCULAR; INTRAVENOUS
Status: COMPLETED | OUTPATIENT
Start: 2020-05-10 | End: 2020-05-10

## 2020-05-10 RX ORDER — MIDAZOLAM HYDROCHLORIDE 1 MG/ML
6 INJECTION INTRAMUSCULAR; INTRAVENOUS
Status: COMPLETED | OUTPATIENT
Start: 2020-05-10 | End: 2020-05-10

## 2020-05-10 RX ORDER — CEFAZOLIN SODIUM 2 G/50ML
2 SOLUTION INTRAVENOUS
Status: DISCONTINUED | OUTPATIENT
Start: 2020-05-11 | End: 2020-05-11

## 2020-05-10 RX ADMIN — MEROPENEM 1 G: 1 INJECTION, POWDER, FOR SOLUTION INTRAVENOUS at 18:30

## 2020-05-10 RX ADMIN — CHLORHEXIDINE GLUCONATE 0.12% ORAL RINSE 15 ML: 1.2 LIQUID ORAL at 09:00

## 2020-05-10 RX ADMIN — GUAIFENESIN 400 MG: 400 TABLET, FILM COATED ORAL at 09:00

## 2020-05-10 RX ADMIN — ACETAMINOPHEN 650 MG: 160 SOLUTION ORAL at 10:18

## 2020-05-10 RX ADMIN — MELATONIN 3 MG ORAL TABLET 6 MG: 3 TABLET ORAL at 21:09

## 2020-05-10 RX ADMIN — ACETAMINOPHEN 650 MG: 160 SOLUTION ORAL at 01:58

## 2020-05-10 RX ADMIN — FENTANYL CITRATE 200 MCG: 50 INJECTION, SOLUTION INTRAMUSCULAR; INTRAVENOUS at 09:13

## 2020-05-10 RX ADMIN — SODIUM CHLORIDE, PRESERVATIVE FREE 10 ML: 5 INJECTION INTRAVENOUS at 21:09

## 2020-05-10 RX ADMIN — SODIUM CHLORIDE SOLN NEBU 3% 4 ML: 3 NEBU SOLN at 15:50

## 2020-05-10 RX ADMIN — SODIUM CHLORIDE, PRESERVATIVE FREE 300 UNITS: 5 INJECTION INTRAVENOUS at 09:00

## 2020-05-10 RX ADMIN — SODIUM CHLORIDE SOLN NEBU 3% 4 ML: 3 NEBU SOLN at 07:52

## 2020-05-10 RX ADMIN — MIDAZOLAM 6 MG: 1 INJECTION INTRAMUSCULAR; INTRAVENOUS at 09:13

## 2020-05-10 RX ADMIN — POLYETHYLENE GLYCOL 3350 17 G: 17 POWDER, FOR SOLUTION ORAL at 09:00

## 2020-05-10 RX ADMIN — GABAPENTIN 250 MG: 250 SUSPENSION ORAL at 21:08

## 2020-05-10 RX ADMIN — OXYCODONE HYDROCHLORIDE 7.5 MG: 5 SOLUTION ORAL at 23:51

## 2020-05-10 RX ADMIN — GABAPENTIN 250 MG: 250 SUSPENSION ORAL at 13:39

## 2020-05-10 RX ADMIN — SODIUM CHLORIDE SOLN NEBU 3% 4 ML: 3 NEBU SOLN at 11:38

## 2020-05-10 RX ADMIN — OXYCODONE HYDROCHLORIDE 7.5 MG: 5 SOLUTION ORAL at 19:49

## 2020-05-10 RX ADMIN — MEROPENEM 1 G: 1 INJECTION, POWDER, FOR SOLUTION INTRAVENOUS at 02:52

## 2020-05-10 RX ADMIN — MEROPENEM 1 G: 1 INJECTION, POWDER, FOR SOLUTION INTRAVENOUS at 11:10

## 2020-05-10 RX ADMIN — ENOXAPARIN SODIUM 30 MG: 30 INJECTION SUBCUTANEOUS at 21:09

## 2020-05-10 RX ADMIN — Medication 20 MG: at 21:08

## 2020-05-10 RX ADMIN — SENNOSIDES AND DOCUSATE SODIUM 2 TABLET: 8.6; 5 TABLET ORAL at 21:08

## 2020-05-10 RX ADMIN — Medication 20 MG: at 09:00

## 2020-05-10 RX ADMIN — CHLORHEXIDINE GLUCONATE 0.12% ORAL RINSE 15 ML: 1.2 LIQUID ORAL at 19:51

## 2020-05-10 RX ADMIN — GABAPENTIN 250 MG: 250 SUSPENSION ORAL at 05:21

## 2020-05-10 RX ADMIN — SODIUM CHLORIDE, PRESERVATIVE FREE 10 ML: 5 INJECTION INTRAVENOUS at 09:00

## 2020-05-10 RX ADMIN — OXYCODONE HYDROCHLORIDE 7.5 MG: 5 SOLUTION ORAL at 13:39

## 2020-05-10 RX ADMIN — SODIUM CHLORIDE SOLN NEBU 3% 4 ML: 3 NEBU SOLN at 19:27

## 2020-05-10 RX ADMIN — SENNOSIDES AND DOCUSATE SODIUM 2 TABLET: 8.6; 5 TABLET ORAL at 09:00

## 2020-05-10 RX ADMIN — GUAIFENESIN 400 MG: 400 TABLET, FILM COATED ORAL at 21:09

## 2020-05-10 RX ADMIN — ENOXAPARIN SODIUM 30 MG: 30 INJECTION SUBCUTANEOUS at 09:00

## 2020-05-10 ASSESSMENT — PULMONARY FUNCTION TESTS
PIF_VALUE: 27
PIF_VALUE: 23
PIF_VALUE: 27
PIF_VALUE: 26
PIF_VALUE: 29
PIF_VALUE: 26
PIF_VALUE: 28
PIF_VALUE: 26
PIF_VALUE: 25
PIF_VALUE: 29
PIF_VALUE: 25
PIF_VALUE: 28
PIF_VALUE: 42
PIF_VALUE: 27
PIF_VALUE: 28
PIF_VALUE: 23
PIF_VALUE: 29
PIF_VALUE: 25
PIF_VALUE: 28
PIF_VALUE: 24
PIF_VALUE: 41
PIF_VALUE: 26
PIF_VALUE: 26
PEFR_L/MIN: 65
PIF_VALUE: 26
PIF_VALUE: 23

## 2020-05-10 ASSESSMENT — PAIN SCALES - GENERAL
PAINLEVEL_OUTOF10: 2
PAINLEVEL_OUTOF10: 7
PAINLEVEL_OUTOF10: 0
PAINLEVEL_OUTOF10: 8
PAINLEVEL_OUTOF10: 0
PAINLEVEL_OUTOF10: 8
PAINLEVEL_OUTOF10: 0
PAINLEVEL_OUTOF10: 4
PAINLEVEL_OUTOF10: 0

## 2020-05-10 ASSESSMENT — PAIN DESCRIPTION - LOCATION
LOCATION: BACK
LOCATION: BACK

## 2020-05-10 ASSESSMENT — PAIN DESCRIPTION - PROGRESSION: CLINICAL_PROGRESSION: GRADUALLY WORSENING

## 2020-05-10 ASSESSMENT — PAIN DESCRIPTION - PAIN TYPE
TYPE: ACUTE PAIN
TYPE: ACUTE PAIN

## 2020-05-10 ASSESSMENT — PAIN DESCRIPTION - ORIENTATION
ORIENTATION: LOWER
ORIENTATION: MID

## 2020-05-10 ASSESSMENT — PAIN DESCRIPTION - FREQUENCY
FREQUENCY: INTERMITTENT
FREQUENCY: INTERMITTENT

## 2020-05-10 ASSESSMENT — PAIN DESCRIPTION - ONSET: ONSET: GRADUAL

## 2020-05-10 ASSESSMENT — PAIN - FUNCTIONAL ASSESSMENT: PAIN_FUNCTIONAL_ASSESSMENT: PREVENTS OR INTERFERES SOME ACTIVE ACTIVITIES AND ADLS

## 2020-05-10 ASSESSMENT — PAIN DESCRIPTION - DESCRIPTORS: DESCRIPTORS: ACHING
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
## 2020-05-11 ENCOUNTER — APPOINTMENT (OUTPATIENT)
Dept: GENERAL RADIOLOGY | Age: 25
DRG: 004 | End: 2020-05-11
Payer: MEDICAID

## 2020-05-11 ENCOUNTER — ANESTHESIA EVENT (OUTPATIENT)
Dept: OPERATING ROOM | Age: 25
DRG: 004 | End: 2020-05-11
Payer: MEDICAID

## 2020-05-11 ENCOUNTER — ANESTHESIA (OUTPATIENT)
Dept: OPERATING ROOM | Age: 25
DRG: 004 | End: 2020-05-11
Payer: MEDICAID

## 2020-05-11 VITALS
DIASTOLIC BLOOD PRESSURE: 45 MMHG | OXYGEN SATURATION: 97 % | RESPIRATION RATE: 12 BRPM | SYSTOLIC BLOOD PRESSURE: 120 MMHG | TEMPERATURE: 98 F

## 2020-05-11 LAB
ALBUMIN SERPL-MCNC: 2.7 G/DL (ref 3.5–5.2)
ALP BLD-CCNC: 90 U/L (ref 40–129)
ALT SERPL-CCNC: 90 U/L (ref 0–40)
ANION GAP SERPL CALCULATED.3IONS-SCNC: 12 MMOL/L (ref 7–16)
ANION GAP SERPL CALCULATED.3IONS-SCNC: 13 MMOL/L (ref 7–16)
AST SERPL-CCNC: 41 U/L (ref 0–39)
BASOPHILS ABSOLUTE: 0.03 E9/L (ref 0–0.2)
BASOPHILS RELATIVE PERCENT: 0.2 % (ref 0–2)
BILIRUB SERPL-MCNC: 0.2 MG/DL (ref 0–1.2)
BUN BLDV-MCNC: 20 MG/DL (ref 6–20)
BUN BLDV-MCNC: 21 MG/DL (ref 6–20)
CALCIUM IONIZED: 1.29 MMOL/L (ref 1.15–1.33)
CALCIUM SERPL-MCNC: 8.6 MG/DL (ref 8.6–10.2)
CALCIUM SERPL-MCNC: 8.8 MG/DL (ref 8.6–10.2)
CHLORIDE BLD-SCNC: 100 MMOL/L (ref 98–107)
CHLORIDE BLD-SCNC: 100 MMOL/L (ref 98–107)
CO2: 24 MMOL/L (ref 22–29)
CO2: 28 MMOL/L (ref 22–29)
CREAT SERPL-MCNC: 0.7 MG/DL (ref 0.7–1.2)
CREAT SERPL-MCNC: 0.8 MG/DL (ref 0.7–1.2)
EOSINOPHILS ABSOLUTE: 0.18 E9/L (ref 0.05–0.5)
EOSINOPHILS RELATIVE PERCENT: 1.3 % (ref 0–6)
GFR AFRICAN AMERICAN: >60
GFR AFRICAN AMERICAN: >60
GFR NON-AFRICAN AMERICAN: >60 ML/MIN/1.73
GFR NON-AFRICAN AMERICAN: >60 ML/MIN/1.73
GLUCOSE BLD-MCNC: 129 MG/DL (ref 74–99)
GLUCOSE BLD-MCNC: 96 MG/DL (ref 74–99)
HCT VFR BLD CALC: 24.4 % (ref 37–54)
HCT VFR BLD CALC: 26.6 % (ref 37–54)
HEMOGLOBIN: 7.5 G/DL (ref 12.5–16.5)
HEMOGLOBIN: 8.4 G/DL (ref 12.5–16.5)
IMMATURE GRANULOCYTES #: 0.06 E9/L
IMMATURE GRANULOCYTES %: 0.4 % (ref 0–5)
LYMPHOCYTES ABSOLUTE: 2.19 E9/L (ref 1.5–4)
LYMPHOCYTES RELATIVE PERCENT: 16.2 % (ref 20–42)
MAGNESIUM: 2 MG/DL (ref 1.6–2.6)
MAGNESIUM: 2.1 MG/DL (ref 1.6–2.6)
MCH RBC QN AUTO: 28.6 PG (ref 26–35)
MCH RBC QN AUTO: 29.1 PG (ref 26–35)
MCHC RBC AUTO-ENTMCNC: 30.7 % (ref 32–34.5)
MCHC RBC AUTO-ENTMCNC: 31.6 % (ref 32–34.5)
MCV RBC AUTO: 92 FL (ref 80–99.9)
MCV RBC AUTO: 93.1 FL (ref 80–99.9)
MONOCYTES ABSOLUTE: 0.93 E9/L (ref 0.1–0.95)
MONOCYTES RELATIVE PERCENT: 6.9 % (ref 2–12)
NEUTROPHILS ABSOLUTE: 10.15 E9/L (ref 1.8–7.3)
NEUTROPHILS RELATIVE PERCENT: 75 % (ref 43–80)
PDW BLD-RTO: 14 FL (ref 11.5–15)
PDW BLD-RTO: 14 FL (ref 11.5–15)
PHOSPHORUS: 2.8 MG/DL (ref 2.5–4.5)
PLATELET # BLD: 429 E9/L (ref 130–450)
PLATELET # BLD: 464 E9/L (ref 130–450)
PMV BLD AUTO: 9.6 FL (ref 7–12)
PMV BLD AUTO: 9.7 FL (ref 7–12)
POTASSIUM SERPL-SCNC: 4 MMOL/L (ref 3.5–5)
POTASSIUM SERPL-SCNC: 4.3 MMOL/L (ref 3.5–5)
RBC # BLD: 2.62 E12/L (ref 3.8–5.8)
RBC # BLD: 2.89 E12/L (ref 3.8–5.8)
SODIUM BLD-SCNC: 136 MMOL/L (ref 132–146)
SODIUM BLD-SCNC: 141 MMOL/L (ref 132–146)
TOTAL PROTEIN: 6.6 G/DL (ref 6.4–8.3)
WBC # BLD: 13.5 E9/L (ref 4.5–11.5)
WBC # BLD: 18.5 E9/L (ref 4.5–11.5)

## 2020-05-11 PROCEDURE — 87070 CULTURE OTHR SPECIMN AEROBIC: CPT

## 2020-05-11 PROCEDURE — 82330 ASSAY OF CALCIUM: CPT

## 2020-05-11 PROCEDURE — 2500000003 HC RX 250 WO HCPCS: Performed by: THORACIC SURGERY (CARDIOTHORACIC VASCULAR SURGERY)

## 2020-05-11 PROCEDURE — 31624 DX BRONCHOSCOPE/LAVAGE: CPT | Performed by: THORACIC SURGERY (CARDIOTHORACIC VASCULAR SURGERY)

## 2020-05-11 PROCEDURE — 85025 COMPLETE CBC W/AUTO DIFF WBC: CPT

## 2020-05-11 PROCEDURE — 2580000003 HC RX 258: Performed by: NURSE PRACTITIONER

## 2020-05-11 PROCEDURE — 6370000000 HC RX 637 (ALT 250 FOR IP): Performed by: NURSE PRACTITIONER

## 2020-05-11 PROCEDURE — 2709999900 HC NON-CHARGEABLE SUPPLY: Performed by: THORACIC SURGERY (CARDIOTHORACIC VASCULAR SURGERY)

## 2020-05-11 PROCEDURE — 80048 BASIC METABOLIC PNL TOTAL CA: CPT

## 2020-05-11 PROCEDURE — 6370000000 HC RX 637 (ALT 250 FOR IP): Performed by: SURGERY

## 2020-05-11 PROCEDURE — 6370000000 HC RX 637 (ALT 250 FOR IP): Performed by: STUDENT IN AN ORGANIZED HEALTH CARE EDUCATION/TRAINING PROGRAM

## 2020-05-11 PROCEDURE — 87205 SMEAR GRAM STAIN: CPT

## 2020-05-11 PROCEDURE — 6360000002 HC RX W HCPCS: Performed by: INTERNAL MEDICINE

## 2020-05-11 PROCEDURE — 32656 THORACOSCOPY W/PLEURECTOMY: CPT | Performed by: THORACIC SURGERY (CARDIOTHORACIC VASCULAR SURGERY)

## 2020-05-11 PROCEDURE — 87186 SC STD MICRODIL/AGAR DIL: CPT

## 2020-05-11 PROCEDURE — 0BNK4ZZ RELEASE RIGHT LUNG, PERCUTANEOUS ENDOSCOPIC APPROACH: ICD-10-PCS | Performed by: THORACIC SURGERY (CARDIOTHORACIC VASCULAR SURGERY)

## 2020-05-11 PROCEDURE — 87075 CULTR BACTERIA EXCEPT BLOOD: CPT

## 2020-05-11 PROCEDURE — 2580000003 HC RX 258: Performed by: STUDENT IN AN ORGANIZED HEALTH CARE EDUCATION/TRAINING PROGRAM

## 2020-05-11 PROCEDURE — 71045 X-RAY EXAM CHEST 1 VIEW: CPT

## 2020-05-11 PROCEDURE — 99291 CRITICAL CARE FIRST HOUR: CPT | Performed by: SURGERY

## 2020-05-11 PROCEDURE — 87077 CULTURE AEROBIC IDENTIFY: CPT

## 2020-05-11 PROCEDURE — 94669 MECHANICAL CHEST WALL OSCILL: CPT

## 2020-05-11 PROCEDURE — 6360000002 HC RX W HCPCS

## 2020-05-11 PROCEDURE — 0BBN4ZZ EXCISION OF RIGHT PLEURA, PERCUTANEOUS ENDOSCOPIC APPROACH: ICD-10-PCS | Performed by: THORACIC SURGERY (CARDIOTHORACIC VASCULAR SURGERY)

## 2020-05-11 PROCEDURE — 3700000000 HC ANESTHESIA ATTENDED CARE: Performed by: THORACIC SURGERY (CARDIOTHORACIC VASCULAR SURGERY)

## 2020-05-11 PROCEDURE — 3E0L4GC INTRODUCTION OF OTHER THERAPEUTIC SUBSTANCE INTO PLEURAL CAVITY, PERCUTANEOUS ENDOSCOPIC APPROACH: ICD-10-PCS | Performed by: THORACIC SURGERY (CARDIOTHORACIC VASCULAR SURGERY)

## 2020-05-11 PROCEDURE — 32651 THORACOSCOPY REMOVE CORTEX: CPT | Performed by: THORACIC SURGERY (CARDIOTHORACIC VASCULAR SURGERY)

## 2020-05-11 PROCEDURE — 87102 FUNGUS ISOLATION CULTURE: CPT

## 2020-05-11 PROCEDURE — 88305 TISSUE EXAM BY PATHOLOGIST: CPT

## 2020-05-11 PROCEDURE — 2580000003 HC RX 258: Performed by: SURGERY

## 2020-05-11 PROCEDURE — 32651 THORACOSCOPY REMOVE CORTEX: CPT | Performed by: PHYSICIAN ASSISTANT

## 2020-05-11 PROCEDURE — 6360000002 HC RX W HCPCS: Performed by: NURSE PRACTITIONER

## 2020-05-11 PROCEDURE — 83735 ASSAY OF MAGNESIUM: CPT

## 2020-05-11 PROCEDURE — 6360000002 HC RX W HCPCS: Performed by: STUDENT IN AN ORGANIZED HEALTH CARE EDUCATION/TRAINING PROGRAM

## 2020-05-11 PROCEDURE — 32656 THORACOSCOPY W/PLEURECTOMY: CPT | Performed by: PHYSICIAN ASSISTANT

## 2020-05-11 PROCEDURE — 32650 THORACOSCOPY W/PLEURODESIS: CPT | Performed by: PHYSICIAN ASSISTANT

## 2020-05-11 PROCEDURE — 84100 ASSAY OF PHOSPHORUS: CPT

## 2020-05-11 PROCEDURE — 32650 THORACOSCOPY W/PLEURODESIS: CPT | Performed by: THORACIC SURGERY (CARDIOTHORACIC VASCULAR SURGERY)

## 2020-05-11 PROCEDURE — 85027 COMPLETE CBC AUTOMATED: CPT

## 2020-05-11 PROCEDURE — 80053 COMPREHEN METABOLIC PANEL: CPT

## 2020-05-11 PROCEDURE — 3700000001 HC ADD 15 MINUTES (ANESTHESIA): Performed by: THORACIC SURGERY (CARDIOTHORACIC VASCULAR SURGERY)

## 2020-05-11 PROCEDURE — 3600000004 HC SURGERY LEVEL 4 BASE: Performed by: THORACIC SURGERY (CARDIOTHORACIC VASCULAR SURGERY)

## 2020-05-11 PROCEDURE — 2580000003 HC RX 258: Performed by: INTERNAL MEDICINE

## 2020-05-11 PROCEDURE — 94003 VENT MGMT INPAT SUBQ DAY: CPT

## 2020-05-11 PROCEDURE — 0B9F8ZX DRAINAGE OF RIGHT LOWER LUNG LOBE, VIA NATURAL OR ARTIFICIAL OPENING ENDOSCOPIC, DIAGNOSTIC: ICD-10-PCS | Performed by: THORACIC SURGERY (CARDIOTHORACIC VASCULAR SURGERY)

## 2020-05-11 PROCEDURE — 32551 INSERTION OF CHEST TUBE: CPT

## 2020-05-11 PROCEDURE — 94640 AIRWAY INHALATION TREATMENT: CPT

## 2020-05-11 PROCEDURE — 36415 COLL VENOUS BLD VENIPUNCTURE: CPT

## 2020-05-11 PROCEDURE — 2000000000 HC ICU R&B

## 2020-05-11 PROCEDURE — 3E0T3BZ INTRODUCTION OF ANESTHETIC AGENT INTO PERIPHERAL NERVES AND PLEXI, PERCUTANEOUS APPROACH: ICD-10-PCS | Performed by: THORACIC SURGERY (CARDIOTHORACIC VASCULAR SURGERY)

## 2020-05-11 PROCEDURE — 2500000003 HC RX 250 WO HCPCS

## 2020-05-11 PROCEDURE — 3600000014 HC SURGERY LEVEL 4 ADDTL 15MIN: Performed by: THORACIC SURGERY (CARDIOTHORACIC VASCULAR SURGERY)

## 2020-05-11 PROCEDURE — 2580000003 HC RX 258

## 2020-05-11 RX ORDER — BUPIVACAINE HYDROCHLORIDE AND EPINEPHRINE 5; 5 MG/ML; UG/ML
INJECTION, SOLUTION EPIDURAL; INTRACAUDAL; PERINEURAL PRN
Status: DISCONTINUED | OUTPATIENT
Start: 2020-05-11 | End: 2020-05-11 | Stop reason: HOSPADM

## 2020-05-11 RX ORDER — IPRATROPIUM BROMIDE AND ALBUTEROL SULFATE 2.5; .5 MG/3ML; MG/3ML
1 SOLUTION RESPIRATORY (INHALATION)
Status: DISCONTINUED | OUTPATIENT
Start: 2020-05-11 | End: 2020-05-19

## 2020-05-11 RX ORDER — OXYCODONE HYDROCHLORIDE 5 MG/1
5 TABLET ORAL EVERY 4 HOURS PRN
Status: DISCONTINUED | OUTPATIENT
Start: 2020-05-11 | End: 2020-05-12

## 2020-05-11 RX ORDER — SODIUM CHLORIDE 9 MG/ML
INJECTION, SOLUTION INTRAVENOUS CONTINUOUS PRN
Status: DISCONTINUED | OUTPATIENT
Start: 2020-05-11 | End: 2020-05-11 | Stop reason: SDUPTHER

## 2020-05-11 RX ORDER — MORPHINE SULFATE 2 MG/ML
1 INJECTION, SOLUTION INTRAMUSCULAR; INTRAVENOUS EVERY 5 MIN PRN
Status: DISCONTINUED | OUTPATIENT
Start: 2020-05-11 | End: 2020-05-11 | Stop reason: HOSPADM

## 2020-05-11 RX ORDER — LIDOCAINE HYDROCHLORIDE 20 MG/ML
INJECTION, SOLUTION INTRAVENOUS PRN
Status: DISCONTINUED | OUTPATIENT
Start: 2020-05-11 | End: 2020-05-11 | Stop reason: SDUPTHER

## 2020-05-11 RX ORDER — FENTANYL CITRATE 50 UG/ML
INJECTION, SOLUTION INTRAMUSCULAR; INTRAVENOUS PRN
Status: DISCONTINUED | OUTPATIENT
Start: 2020-05-11 | End: 2020-05-11 | Stop reason: SDUPTHER

## 2020-05-11 RX ORDER — ONDANSETRON 2 MG/ML
4 INJECTION INTRAMUSCULAR; INTRAVENOUS
Status: DISCONTINUED | OUTPATIENT
Start: 2020-05-11 | End: 2020-05-11 | Stop reason: HOSPADM

## 2020-05-11 RX ORDER — METHOCARBAMOL 500 MG/1
1000 TABLET, FILM COATED ORAL 4 TIMES DAILY
Status: DISCONTINUED | OUTPATIENT
Start: 2020-05-11 | End: 2020-05-18

## 2020-05-11 RX ORDER — MORPHINE SULFATE 2 MG/ML
2 INJECTION, SOLUTION INTRAMUSCULAR; INTRAVENOUS EVERY 5 MIN PRN
Status: DISCONTINUED | OUTPATIENT
Start: 2020-05-11 | End: 2020-05-11 | Stop reason: HOSPADM

## 2020-05-11 RX ORDER — DEXAMETHASONE SODIUM PHOSPHATE 10 MG/ML
INJECTION INTRAMUSCULAR; INTRAVENOUS PRN
Status: DISCONTINUED | OUTPATIENT
Start: 2020-05-11 | End: 2020-05-11 | Stop reason: SDUPTHER

## 2020-05-11 RX ORDER — MIDAZOLAM HYDROCHLORIDE 1 MG/ML
INJECTION INTRAMUSCULAR; INTRAVENOUS PRN
Status: DISCONTINUED | OUTPATIENT
Start: 2020-05-11 | End: 2020-05-11 | Stop reason: SDUPTHER

## 2020-05-11 RX ORDER — ROCURONIUM BROMIDE 10 MG/ML
INJECTION, SOLUTION INTRAVENOUS PRN
Status: DISCONTINUED | OUTPATIENT
Start: 2020-05-11 | End: 2020-05-11 | Stop reason: SDUPTHER

## 2020-05-11 RX ORDER — LIDOCAINE 4 G/G
1 PATCH TOPICAL DAILY
Status: DISCONTINUED | OUTPATIENT
Start: 2020-05-11 | End: 2020-05-18

## 2020-05-11 RX ORDER — ONDANSETRON 2 MG/ML
INJECTION INTRAMUSCULAR; INTRAVENOUS PRN
Status: DISCONTINUED | OUTPATIENT
Start: 2020-05-11 | End: 2020-05-11 | Stop reason: SDUPTHER

## 2020-05-11 RX ORDER — OXYCODONE HYDROCHLORIDE AND ACETAMINOPHEN 5; 325 MG/1; MG/1
1 TABLET ORAL PRN
Status: DISCONTINUED | OUTPATIENT
Start: 2020-05-11 | End: 2020-05-11 | Stop reason: HOSPADM

## 2020-05-11 RX ORDER — TALC 100 %
POWDER (GRAM) MISCELLANEOUS PRN
Status: DISCONTINUED | OUTPATIENT
Start: 2020-05-11 | End: 2020-05-11 | Stop reason: HOSPADM

## 2020-05-11 RX ORDER — NEOSTIGMINE METHYLSULFATE 1 MG/ML
INJECTION, SOLUTION INTRAVENOUS PRN
Status: DISCONTINUED | OUTPATIENT
Start: 2020-05-11 | End: 2020-05-11 | Stop reason: SDUPTHER

## 2020-05-11 RX ORDER — OXYCODONE HYDROCHLORIDE AND ACETAMINOPHEN 5; 325 MG/1; MG/1
2 TABLET ORAL PRN
Status: DISCONTINUED | OUTPATIENT
Start: 2020-05-11 | End: 2020-05-11 | Stop reason: HOSPADM

## 2020-05-11 RX ORDER — GLYCOPYRROLATE 1 MG/5 ML
SYRINGE (ML) INTRAVENOUS PRN
Status: DISCONTINUED | OUTPATIENT
Start: 2020-05-11 | End: 2020-05-11 | Stop reason: SDUPTHER

## 2020-05-11 RX ORDER — PROPOFOL 10 MG/ML
INJECTION, EMULSION INTRAVENOUS PRN
Status: DISCONTINUED | OUTPATIENT
Start: 2020-05-11 | End: 2020-05-11 | Stop reason: SDUPTHER

## 2020-05-11 RX ORDER — OXYCODONE HYDROCHLORIDE 10 MG/1
10 TABLET ORAL EVERY 4 HOURS PRN
Status: DISCONTINUED | OUTPATIENT
Start: 2020-05-11 | End: 2020-05-12

## 2020-05-11 RX ORDER — MEPERIDINE HYDROCHLORIDE 25 MG/ML
12.5 INJECTION INTRAMUSCULAR; INTRAVENOUS; SUBCUTANEOUS
Status: DISCONTINUED | OUTPATIENT
Start: 2020-05-11 | End: 2020-05-11 | Stop reason: HOSPADM

## 2020-05-11 RX ADMIN — ONDANSETRON HYDROCHLORIDE 4 MG: 2 INJECTION, SOLUTION INTRAMUSCULAR; INTRAVENOUS at 12:07

## 2020-05-11 RX ADMIN — FENTANYL CITRATE 100 MCG: 50 INJECTION, SOLUTION INTRAMUSCULAR; INTRAVENOUS at 12:59

## 2020-05-11 RX ADMIN — SODIUM CHLORIDE 33.3 ML: 9 INJECTION, SOLUTION INTRAVENOUS at 22:26

## 2020-05-11 RX ADMIN — CHLORHEXIDINE GLUCONATE 0.12% ORAL RINSE 15 ML: 1.2 LIQUID ORAL at 08:14

## 2020-05-11 RX ADMIN — SODIUM CHLORIDE, PRESERVATIVE FREE 10 ML: 5 INJECTION INTRAVENOUS at 08:25

## 2020-05-11 RX ADMIN — OXYCODONE HYDROCHLORIDE 7.5 MG: 5 SOLUTION ORAL at 08:14

## 2020-05-11 RX ADMIN — SENNOSIDES AND DOCUSATE SODIUM 2 TABLET: 8.6; 5 TABLET ORAL at 08:17

## 2020-05-11 RX ADMIN — SODIUM CHLORIDE 33.3 ML: 9 INJECTION, SOLUTION INTRAVENOUS at 14:07

## 2020-05-11 RX ADMIN — OXYCODONE HYDROCHLORIDE 10 MG: 10 TABLET ORAL at 19:06

## 2020-05-11 RX ADMIN — SODIUM CHLORIDE, PRESERVATIVE FREE 300 UNITS: 5 INJECTION INTRAVENOUS at 20:46

## 2020-05-11 RX ADMIN — ROCURONIUM BROMIDE 20 MG: 10 INJECTION, SOLUTION INTRAVENOUS at 12:22

## 2020-05-11 RX ADMIN — Medication 2 MG: at 13:08

## 2020-05-11 RX ADMIN — ENOXAPARIN SODIUM 30 MG: 30 INJECTION SUBCUTANEOUS at 20:45

## 2020-05-11 RX ADMIN — FENTANYL CITRATE 100 MCG: 50 INJECTION, SOLUTION INTRAMUSCULAR; INTRAVENOUS at 12:07

## 2020-05-11 RX ADMIN — MIDAZOLAM 2 MG: 1 INJECTION INTRAMUSCULAR; INTRAVENOUS at 12:05

## 2020-05-11 RX ADMIN — MELATONIN 3 MG ORAL TABLET 6 MG: 3 TABLET ORAL at 20:45

## 2020-05-11 RX ADMIN — SODIUM CHLORIDE, PRESERVATIVE FREE 10 ML: 5 INJECTION INTRAVENOUS at 20:45

## 2020-05-11 RX ADMIN — SODIUM CHLORIDE SOLN NEBU 3% 4 ML: 3 NEBU SOLN at 19:55

## 2020-05-11 RX ADMIN — HYDROMORPHONE HYDROCHLORIDE 0.5 MG: 1 INJECTION, SOLUTION INTRAMUSCULAR; INTRAVENOUS; SUBCUTANEOUS at 13:56

## 2020-05-11 RX ADMIN — IPRATROPIUM BROMIDE AND ALBUTEROL SULFATE 1 AMPULE: 2.5; .5 SOLUTION RESPIRATORY (INHALATION) at 16:16

## 2020-05-11 RX ADMIN — MEROPENEM 1 G: 1 INJECTION, POWDER, FOR SOLUTION INTRAVENOUS at 19:02

## 2020-05-11 RX ADMIN — IPRATROPIUM BROMIDE AND ALBUTEROL SULFATE 1 AMPULE: 2.5; .5 SOLUTION RESPIRATORY (INHALATION) at 19:55

## 2020-05-11 RX ADMIN — ROCURONIUM BROMIDE 30 MG: 10 INJECTION, SOLUTION INTRAVENOUS at 12:07

## 2020-05-11 RX ADMIN — MEROPENEM 1 G: 1 INJECTION, POWDER, FOR SOLUTION INTRAVENOUS at 11:37

## 2020-05-11 RX ADMIN — METHOCARBAMOL TABLETS 1000 MG: 500 TABLET, COATED ORAL at 20:45

## 2020-05-11 RX ADMIN — Medication 20 MG: at 20:47

## 2020-05-11 RX ADMIN — PROPOFOL 100 MG: 10 INJECTION, EMULSION INTRAVENOUS at 12:11

## 2020-05-11 RX ADMIN — HYDROMORPHONE HYDROCHLORIDE 0.5 MG: 1 INJECTION, SOLUTION INTRAMUSCULAR; INTRAVENOUS; SUBCUTANEOUS at 21:06

## 2020-05-11 RX ADMIN — PROPOFOL 20 MG: 10 INJECTION, EMULSION INTRAVENOUS at 12:52

## 2020-05-11 RX ADMIN — SODIUM CHLORIDE SOLN NEBU 3% 4 ML: 3 NEBU SOLN at 11:09

## 2020-05-11 RX ADMIN — GUAIFENESIN 400 MG: 400 TABLET, FILM COATED ORAL at 08:26

## 2020-05-11 RX ADMIN — ROCURONIUM BROMIDE 10 MG: 10 INJECTION, SOLUTION INTRAVENOUS at 12:52

## 2020-05-11 RX ADMIN — MEROPENEM 1 G: 1 INJECTION, POWDER, FOR SOLUTION INTRAVENOUS at 02:52

## 2020-05-11 RX ADMIN — ROCURONIUM BROMIDE 20 MG: 10 INJECTION, SOLUTION INTRAVENOUS at 12:32

## 2020-05-11 RX ADMIN — METHOCARBAMOL TABLETS 1000 MG: 500 TABLET, COATED ORAL at 17:04

## 2020-05-11 RX ADMIN — GABAPENTIN 250 MG: 250 SUSPENSION ORAL at 21:01

## 2020-05-11 RX ADMIN — SODIUM CHLORIDE SOLN NEBU 3% 4 ML: 3 NEBU SOLN at 07:47

## 2020-05-11 RX ADMIN — SODIUM CHLORIDE, PRESERVATIVE FREE 300 UNITS: 5 INJECTION INTRAVENOUS at 08:25

## 2020-05-11 RX ADMIN — Medication 20 MG: at 08:17

## 2020-05-11 RX ADMIN — OXYCODONE HYDROCHLORIDE 7.5 MG: 5 SOLUTION ORAL at 03:57

## 2020-05-11 RX ADMIN — SENNOSIDES AND DOCUSATE SODIUM 2 TABLET: 8.6; 5 TABLET ORAL at 20:45

## 2020-05-11 RX ADMIN — GABAPENTIN 250 MG: 250 SUSPENSION ORAL at 06:02

## 2020-05-11 RX ADMIN — ACETAMINOPHEN 650 MG: 160 SOLUTION ORAL at 20:45

## 2020-05-11 RX ADMIN — Medication 0.4 MG: at 13:08

## 2020-05-11 RX ADMIN — GUAIFENESIN 400 MG: 400 TABLET, FILM COATED ORAL at 20:45

## 2020-05-11 RX ADMIN — SODIUM CHLORIDE SOLN NEBU 3% 4 ML: 3 NEBU SOLN at 16:16

## 2020-05-11 RX ADMIN — DEXAMETHASONE SODIUM PHOSPHATE 10 MG: 10 INJECTION INTRAMUSCULAR; INTRAVENOUS at 12:07

## 2020-05-11 RX ADMIN — PROPOFOL 100 MG: 10 INJECTION, EMULSION INTRAVENOUS at 12:07

## 2020-05-11 RX ADMIN — SODIUM CHLORIDE: 9 INJECTION, SOLUTION INTRAVENOUS at 12:02

## 2020-05-11 RX ADMIN — CHLORHEXIDINE GLUCONATE 0.12% ORAL RINSE 15 ML: 1.2 LIQUID ORAL at 20:46

## 2020-05-11 RX ADMIN — LIDOCAINE HYDROCHLORIDE 80 MG: 20 INJECTION, SOLUTION INTRAVENOUS at 12:07

## 2020-05-11 ASSESSMENT — PULMONARY FUNCTION TESTS
PIF_VALUE: 28
PIF_VALUE: 28
PIF_VALUE: 25
PIF_VALUE: 30
PIF_VALUE: 25
PIF_VALUE: 27
PIF_VALUE: 26
PIF_VALUE: 18
PIF_VALUE: 36
PIF_VALUE: 15
PIF_VALUE: 33
PIF_VALUE: 30
PIF_VALUE: 35
PIF_VALUE: 41
PIF_VALUE: 25
PIF_VALUE: 28
PIF_VALUE: 27
PIF_VALUE: 24
PIF_VALUE: 25
PIF_VALUE: 21
PIF_VALUE: 27
PIF_VALUE: 38
PIF_VALUE: 27
PIF_VALUE: 26
PIF_VALUE: 15
PIF_VALUE: 24
PIF_VALUE: 32
PIF_VALUE: 27
PIF_VALUE: 26
PIF_VALUE: 23
PIF_VALUE: 25
PIF_VALUE: 20
PIF_VALUE: 27
PIF_VALUE: 26
PIF_VALUE: 32
PIF_VALUE: 30
PIF_VALUE: 3
PIF_VALUE: 26
PIF_VALUE: 26
PIF_VALUE: 34
PIF_VALUE: 25
PIF_VALUE: 19
PIF_VALUE: 4
PIF_VALUE: 31
PIF_VALUE: 29
PIF_VALUE: 27
PIF_VALUE: 29
PIF_VALUE: 25
PIF_VALUE: 2
PIF_VALUE: 25
PIF_VALUE: 29
PIF_VALUE: 26
PIF_VALUE: 28
PIF_VALUE: 29
PIF_VALUE: 37
PIF_VALUE: 26
PIF_VALUE: 26
PIF_VALUE: 39
PIF_VALUE: 28
PIF_VALUE: 35
PIF_VALUE: 37
PIF_VALUE: 31
PIF_VALUE: 25
PIF_VALUE: 38
PIF_VALUE: 19
PIF_VALUE: 43
PIF_VALUE: 25
PIF_VALUE: 38
PIF_VALUE: 33
PIF_VALUE: 42
PIF_VALUE: 24
PIF_VALUE: 26
PIF_VALUE: 33
PIF_VALUE: 26
PIF_VALUE: 38
PIF_VALUE: 30
PIF_VALUE: 25
PIF_VALUE: 41
PIF_VALUE: 30
PIF_VALUE: 27
PIF_VALUE: 35
PIF_VALUE: 29

## 2020-05-11 ASSESSMENT — PAIN SCALES - GENERAL
PAINLEVEL_OUTOF10: 2
PAINLEVEL_OUTOF10: 10
PAINLEVEL_OUTOF10: 0
PAINLEVEL_OUTOF10: 8
PAINLEVEL_OUTOF10: 0
PAINLEVEL_OUTOF10: 10
PAINLEVEL_OUTOF10: 7
PAINLEVEL_OUTOF10: 5
PAINLEVEL_OUTOF10: 0
PAINLEVEL_OUTOF10: 0
PAINLEVEL_OUTOF10: 8

## 2020-05-11 ASSESSMENT — PAIN DESCRIPTION - DESCRIPTORS
DESCRIPTORS: ACHING;DISCOMFORT;SORE
DESCRIPTORS: ACHING;DISCOMFORT;SORE;TENDER

## 2020-05-11 ASSESSMENT — PAIN DESCRIPTION - PAIN TYPE
TYPE: ACUTE PAIN;SURGICAL PAIN
TYPE: ACUTE PAIN

## 2020-05-11 ASSESSMENT — PAIN DESCRIPTION - ORIENTATION
ORIENTATION: RIGHT;LOWER
ORIENTATION: RIGHT
ORIENTATION: RIGHT

## 2020-05-11 ASSESSMENT — PAIN DESCRIPTION - LOCATION
LOCATION: CHEST

## 2020-05-11 ASSESSMENT — PAIN DESCRIPTION - FREQUENCY
FREQUENCY: CONTINUOUS
FREQUENCY: CONTINUOUS

## 2020-05-11 ASSESSMENT — PAIN DESCRIPTION - PROGRESSION: CLINICAL_PROGRESSION: NOT CHANGED

## 2020-05-11 NOTE — PLAN OF CARE
Problem: Falls - Risk of:  Goal: Will remain free from falls  Description: Will remain free from falls  5/10/2020 2320 by Jimy Perez RN  Outcome: Met This Shift  5/10/2020 0935 by Waleska Warren RN  Outcome: Met This Shift  Goal: Absence of physical injury  Description: Absence of physical injury  5/10/2020 0935 by Waleska Warren RN  Outcome: Met This Shift     Problem: Pain:  Goal: Pain level will decrease  Description: Pain level will decrease  5/10/2020 2320 by Jimy Perez RN  Outcome: Met This Shift  5/10/2020 0935 by Waleska Warren RN  Outcome: Met This Shift  Goal: Control of chronic pain  Description: Control of chronic pain  5/10/2020 0935 by Waleska Warren RN  Outcome: Met This Shift     Problem: Anxiety/Stress:  Goal: Level of anxiety will decrease  Description: Level of anxiety will decrease  5/10/2020 0935 by Waleska Warren RN  Outcome: Met This Shift     Problem: Aspiration:  Goal: Absence of aspiration  Description: Absence of aspiration  Outcome: Met This Shift     Problem: Cardiac Output - Decreased:  Goal: Hemodynamic stability will improve  Description: Hemodynamic stability will improve  5/10/2020 0935 by Waleska Warren RN  Outcome: Met This Shift     Problem: Gas Exchange - Impaired:  Goal: Levels of oxygenation will improve  Description: Levels of oxygenation will improve  5/10/2020 0935 by Waleska Warren RN  Outcome: Met This Shift     Problem: Daily Care:  Goal: Daily care needs are met  Description: Daily care needs are met  5/10/2020 0935 by Waleska Warren RN  Outcome: Met This Shift

## 2020-05-11 NOTE — PLAN OF CARE
Problem: Falls - Risk of:  Goal: Will remain free from falls  Description: Will remain free from falls  Outcome: Met This Shift  Goal: Absence of physical injury  Description: Absence of physical injury  Outcome: Met This Shift     Problem: Pain:  Goal: Pain level will decrease  Description: Pain level will decrease  Outcome: Met This Shift  Goal: Control of chronic pain  Description: Control of chronic pain  Outcome: Met This Shift     Problem: Airway Clearance - Ineffective:  Goal: Ability to maintain a clear airway will improve  Description: Ability to maintain a clear airway will improve  Outcome: Met This Shift     Problem: Anxiety/Stress:  Goal: Level of anxiety will decrease  Description: Level of anxiety will decrease  Outcome: Met This Shift     Problem: Aspiration:  Goal: Absence of aspiration  Description: Absence of aspiration  Outcome: Met This Shift     Problem: Cardiac Output - Decreased:  Goal: Hemodynamic stability will improve  Description: Hemodynamic stability will improve  Outcome: Met This Shift     Problem: Fluid Volume - Imbalance:  Goal: Absence of imbalanced fluid volume signs and symptoms  Description: Absence of imbalanced fluid volume signs and symptoms  Outcome: Met This Shift     Problem: Gas Exchange - Impaired:  Goal: Levels of oxygenation will improve  Description: Levels of oxygenation will improve  Outcome: Met This Shift     Problem: Skin Integrity - Impaired:  Goal: Will show no infection signs and symptoms  Description: Will show no infection signs and symptoms  Outcome: Met This Shift  Goal: Absence of new skin breakdown  Description: Absence of new skin breakdown  Outcome: Met This Shift     Problem: Infection:  Goal: Will remain free from infection  Description: Will remain free from infection  Outcome: Met This Shift     Problem: Daily Care:  Goal: Daily care needs are met  Description: Daily care needs are met  Outcome: Met This Shift  Goal: Absence of falls  Outcome: Met This Shift

## 2020-05-11 NOTE — BRIEF OP NOTE
2:00 PM   Drainage Description Yellow 5/5/2020  2:00 PM   Dressing Status Clean;Dry; Intact 5/5/2020  2:00 PM   Dressing Type Dry dressing 5/5/2020  2:00 PM   Dressing Change Due 04/30/20 4/30/2020  6:00 PM   Site Assessment Other (Comment) 5/5/2020  2:00 PM   Surrounding Skin Dry; Intact 5/5/2020  2:00 PM   Patency Intervention Tip/Tilt 5/5/2020  8:00 AM   Output (ml) 10 ml 5/5/2020  2:00 PM       [REMOVED] Open Drain Left Neck (Removed)   Site Description Unable to view 4/30/2020  4:00 PM   Dressing Status Clean;Dry; Intact 4/30/2020  4:00 PM   Drainage Appearance Serosanguinous 4/30/2020  4:00 PM   Status Other (Comment) 4/27/2020  6:00 PM       [REMOVED] Open Drain Right; Anterior Neck (Removed)   Site Description Healing 4/30/2020  4:00 PM   Dressing Status Clean;Dry 4/30/2020  4:00 PM   Drainage Appearance Bloody; Serosanguinous 4/27/2020  6:00 AM   Status Other (Comment) 4/27/2020  6:00 PM       [REMOVED] NG/OG/NJ/NE Tube Orogastric (Removed)   Surrounding Skin Dry; Intact 5/8/2020  2:00 AM   Securement device Yes 5/8/2020  2:00 AM   Status Other (Comment) 5/2/2020  6:00 AM   Placement Verified by Gastric Contents 5/7/2020  8:00 PM   NG/OG/NJ/NE External Measurement (cm) 62 cm 5/8/2020  4:00 AM   Drainage Appearance Other (Comment) 5/7/2020  8:00 PM   Tube Feeding Immune Enhancing 5/8/2020  6:00 AM   Tube Feeding Status Continuous 5/8/2020  4:00 AM   Rate/Schedule 60 mL/hr 5/8/2020  6:00 AM   Tube Feeding Intake (mL) 420 ml 5/8/2020  5:43 AM   Free Water Flush (mL) 60 mL 5/8/2020  5:43 AM   Free Water Rate 30 ml q4hr 5/8/2020  5:43 AM   Residual Volume (ml) 0 ml 5/8/2020  4:00 AM       Findings: Adhesions, exudate, pustulant secretions in the RLL, no anatomic reason for recurrent RLL collapse    Electronically signed by Deliliah Boas, MD on 5/11/2020 at 12:49 PM
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
## 2020-05-11 NOTE — PROGRESS NOTES
CNP   650 mg at 05/10/20 1018    polyethylene glycol (GLYCOLAX) packet 17 g  17 g Oral Daily Melvia Oddi, APRN - CNP   17 g at 05/10/20 0900    famotidine (PEPCID) suspension 20 mg  20 mg Per NG tube BID Melvia Oddi, APRN - CNP   20 mg at 05/11/20 0817    sodium chloride flush 0.9 % injection 10 mL  10 mL Intravenous PRN Melvia Oddi, APRN - CNP   10 mL at 05/11/20 0825    heparin flush 100 UNIT/ML injection 300 Units  3 mL Intravenous 2 times per day Melvia Oddi, APRN - CNP   300 Units at 05/11/20 0825    heparin flush 100 UNIT/ML injection 300 Units  3 mL Intracatheter PRN Melvia Oddi, APRN - CNP   300 Units at 05/03/20 2011    melatonin tablet 6 mg  6 mg Oral Nightly Melvia Oddi, APRN - CNP   6 mg at 05/10/20 2109    sennosides-docusate sodium (SENOKOT-S) 8.6-50 MG tablet 2 tablet  2 tablet Oral BID Melvia Oddi, APRN - CNP   2 tablet at 05/11/20 0817    promethazine (PHENERGAN) tablet 12.5 mg  12.5 mg Oral Q6H PRN Melvia Oddi, APRN - CNP        Or    ondansetron (ZOFRAN) injection 4 mg  4 mg Intravenous Q6H PRN Melvia Oddi, APRN - CNP   4 mg at 05/07/20 0315    chlorhexidine (PERIDEX) 0.12 % solution 15 mL  15 mL Mouth/Throat BID Melvia Oddi, APRN - CNP   15 mL at 05/11/20 6503    Akwa Tears (LACRILUBE) 2-15-83 % opthalmic ointment   Both Eyes PRN Melvia Oddi, APRN - CNP           REVIEW OF SYSTEMS:      CONSTITUTIONAL:  fever  HEENT: denies blurring of vision or double vision, denies hearing problem  RESPIRATORY: denies cough, shortness of breath, sputum expectoration, chest pain. CARDIOVASCULAR:  Denies palpitation  GASTROINTESTINAL:  Denies abdomen pain, diarrhea or constipation. GENITOURINARY:  Denies burning urination or frequency of urination  INTEGUMENT: denies wound , rash  HEMATOLOGIC/LYMPHATIC:  Denies lymph node swelling, gum bleeding or easy bruising.   MUSCULOSKELETAL:  Pain   NEUROLOGICAL: denies headache    PHYSICAL EXAM:      Vitals:     /70 slightly increased as of 4/29/2020. No  left pneumothorax. 3. Interval removal of one of the right-sided chest tube and the  solitary left chest tube. Single right-sided chest tube remains. IMPRESSION:     1. Pneumonia, recurrent lung collapse s/p VATS ( 5/11)   2. Respiratory failure s/p trach , vent dependent   3. Fever / Leukocytosis - improving       RECOMMENDATIONS:      1. Meropenem 1 gram IV q 8 hrs ( for anaerobic coverage )   2.  CBC with diff / Supportive care

## 2020-05-11 NOTE — ANESTHESIA PROCEDURE NOTES
Arterial Line:    An arterial line was placed using surface landmarks, in the OR for the following indication(s): continuous blood pressure monitoring and blood sampling needed. A 20 gauge (size), 1 and 3/4 inch (length), Arrow (type) catheter was placed, Seldinger technique used, into the left radial artery, secured by tape. Anesthesia type: General    Events:  patient tolerated procedure well with no complications.   Resident/CRNA: GERMÁN Bravo CRNA  Performed: Resident/CRNA   Preanesthetic Checklist  Completed: patient identified, site marked, surgical consent, pre-op evaluation, timeout performed, IV checked, risks and benefits discussed, monitors and equipment checked, anesthesia consent given, oxygen available and patient being monitored
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
## 2020-05-12 ENCOUNTER — APPOINTMENT (OUTPATIENT)
Dept: GENERAL RADIOLOGY | Age: 25
DRG: 004 | End: 2020-05-12
Payer: MEDICAID

## 2020-05-12 LAB
AADO2: 218.5 MMHG
ALBUMIN SERPL-MCNC: 2.4 G/DL (ref 3.5–5.2)
ALP BLD-CCNC: 89 U/L (ref 40–129)
ALT SERPL-CCNC: 72 U/L (ref 0–40)
ANION GAP SERPL CALCULATED.3IONS-SCNC: 9 MMOL/L (ref 7–16)
ANISOCYTOSIS: ABNORMAL
AST SERPL-CCNC: 35 U/L (ref 0–39)
B.E.: 2.4 MMOL/L (ref -3–3)
BASOPHILS ABSOLUTE: 0.03 E9/L (ref 0–0.2)
BASOPHILS RELATIVE PERCENT: 0.2 % (ref 0–2)
BILIRUB SERPL-MCNC: 0.2 MG/DL (ref 0–1.2)
BUN BLDV-MCNC: 28 MG/DL (ref 6–20)
CALCIUM IONIZED: 1.22 MMOL/L (ref 1.15–1.33)
CALCIUM SERPL-MCNC: 8.3 MG/DL (ref 8.6–10.2)
CHLORIDE BLD-SCNC: 101 MMOL/L (ref 98–107)
CO2: 28 MMOL/L (ref 22–29)
COHB: 0.2 % (ref 0–1.5)
CREAT SERPL-MCNC: 0.8 MG/DL (ref 0.7–1.2)
CRITICAL: ABNORMAL
CULTURE, RESPIRATORY: ABNORMAL
DATE ANALYZED: ABNORMAL
DATE OF COLLECTION: ABNORMAL
EOSINOPHILS ABSOLUTE: 0.08 E9/L (ref 0.05–0.5)
EOSINOPHILS RELATIVE PERCENT: 0.4 % (ref 0–6)
FIO2: 60 %
GFR AFRICAN AMERICAN: >60
GFR NON-AFRICAN AMERICAN: >60 ML/MIN/1.73
GLUCOSE BLD-MCNC: 119 MG/DL (ref 74–99)
GRAM STAIN ORDERABLE: NORMAL
GRAM STAIN ORDERABLE: NORMAL
HCO3: 25.7 MMOL/L (ref 22–26)
HCT VFR BLD CALC: 23.9 % (ref 37–54)
HEMOGLOBIN: 7.5 G/DL (ref 12.5–16.5)
HHB: 1.3 % (ref 0–5)
IMMATURE GRANULOCYTES #: 0.14 E9/L
IMMATURE GRANULOCYTES %: 0.7 % (ref 0–5)
LAB: ABNORMAL
LYMPHOCYTES ABSOLUTE: 2.48 E9/L (ref 1.5–4)
LYMPHOCYTES RELATIVE PERCENT: 13.2 % (ref 20–42)
Lab: ABNORMAL
MAGNESIUM: 1.9 MG/DL (ref 1.6–2.6)
MCH RBC QN AUTO: 29 PG (ref 26–35)
MCHC RBC AUTO-ENTMCNC: 31.4 % (ref 32–34.5)
MCV RBC AUTO: 92.3 FL (ref 80–99.9)
METHB: 0.5 % (ref 0–1.5)
MODE: AC
MONOCYTES ABSOLUTE: 1.54 E9/L (ref 0.1–0.95)
MONOCYTES RELATIVE PERCENT: 8.2 % (ref 2–12)
NEUTROPHILS ABSOLUTE: 14.58 E9/L (ref 1.8–7.3)
NEUTROPHILS RELATIVE PERCENT: 77.3 % (ref 43–80)
O2 CONTENT: 12.2 ML/DL
O2 SATURATION: 98.7 % (ref 92–98.5)
O2HB: 98 % (ref 94–97)
OPERATOR ID: ABNORMAL
ORGANISM: ABNORMAL
ORGANISM: ABNORMAL
PATIENT TEMP: 37 C
PCO2: 34.4 MMHG (ref 35–45)
PDW BLD-RTO: 14.2 FL (ref 11.5–15)
PEEP/CPAP: 10 CMH2O
PFO2: 2.61 MMHG/%
PH BLOOD GAS: 7.49 (ref 7.35–7.45)
PHOSPHORUS: 2.7 MG/DL (ref 2.5–4.5)
PLATELET # BLD: 376 E9/L (ref 130–450)
PMV BLD AUTO: 9.2 FL (ref 7–12)
PO2: 156.5 MMHG (ref 75–100)
POLYCHROMASIA: ABNORMAL
POTASSIUM SERPL-SCNC: 3.8 MMOL/L (ref 3.5–5)
RBC # BLD: 2.59 E12/L (ref 3.8–5.8)
RI(T): 1.4
RR MECHANICAL: 18 B/MIN
SMEAR, RESPIRATORY: ABNORMAL
SODIUM BLD-SCNC: 138 MMOL/L (ref 132–146)
SOURCE, BLOOD GAS: ABNORMAL
THB: 8.6 G/DL (ref 11.5–16.5)
TIME ANALYZED: 503
TOTAL PROTEIN: 6.3 G/DL (ref 6.4–8.3)
VT MECHANICAL: 500 ML
WBC # BLD: 18.9 E9/L (ref 4.5–11.5)

## 2020-05-12 PROCEDURE — 6370000000 HC RX 637 (ALT 250 FOR IP): Performed by: NURSE PRACTITIONER

## 2020-05-12 PROCEDURE — 6360000002 HC RX W HCPCS: Performed by: NURSE PRACTITIONER

## 2020-05-12 PROCEDURE — 82805 BLOOD GASES W/O2 SATURATION: CPT

## 2020-05-12 PROCEDURE — 94669 MECHANICAL CHEST WALL OSCILL: CPT

## 2020-05-12 PROCEDURE — 82330 ASSAY OF CALCIUM: CPT

## 2020-05-12 PROCEDURE — 6360000002 HC RX W HCPCS: Performed by: STUDENT IN AN ORGANIZED HEALTH CARE EDUCATION/TRAINING PROGRAM

## 2020-05-12 PROCEDURE — 2580000003 HC RX 258: Performed by: NURSE PRACTITIONER

## 2020-05-12 PROCEDURE — 99291 CRITICAL CARE FIRST HOUR: CPT | Performed by: SURGERY

## 2020-05-12 PROCEDURE — 2000000000 HC ICU R&B

## 2020-05-12 PROCEDURE — 71045 X-RAY EXAM CHEST 1 VIEW: CPT

## 2020-05-12 PROCEDURE — 6370000000 HC RX 637 (ALT 250 FOR IP): Performed by: STUDENT IN AN ORGANIZED HEALTH CARE EDUCATION/TRAINING PROGRAM

## 2020-05-12 PROCEDURE — 94003 VENT MGMT INPAT SUBQ DAY: CPT

## 2020-05-12 PROCEDURE — 36592 COLLECT BLOOD FROM PICC: CPT

## 2020-05-12 PROCEDURE — 36415 COLL VENOUS BLD VENIPUNCTURE: CPT

## 2020-05-12 PROCEDURE — 85025 COMPLETE CBC W/AUTO DIFF WBC: CPT

## 2020-05-12 PROCEDURE — 83735 ASSAY OF MAGNESIUM: CPT

## 2020-05-12 PROCEDURE — 94640 AIRWAY INHALATION TREATMENT: CPT

## 2020-05-12 PROCEDURE — 84100 ASSAY OF PHOSPHORUS: CPT

## 2020-05-12 PROCEDURE — 80053 COMPREHEN METABOLIC PANEL: CPT

## 2020-05-12 RX ORDER — OXYCODONE HCL 5 MG/5 ML
5 SOLUTION, ORAL ORAL EVERY 6 HOURS PRN
Status: DISCONTINUED | OUTPATIENT
Start: 2020-05-12 | End: 2020-05-13

## 2020-05-12 RX ORDER — OXYCODONE HCL 5 MG/5 ML
5 SOLUTION, ORAL ORAL EVERY 4 HOURS PRN
Status: DISCONTINUED | OUTPATIENT
Start: 2020-05-12 | End: 2020-05-12

## 2020-05-12 RX ORDER — OXYCODONE HCL 5 MG/5 ML
10 SOLUTION, ORAL ORAL EVERY 6 HOURS PRN
Status: DISCONTINUED | OUTPATIENT
Start: 2020-05-12 | End: 2020-05-12

## 2020-05-12 RX ORDER — ACETAMINOPHEN 160 MG/5ML
650 SOLUTION ORAL EVERY 6 HOURS
Status: DISCONTINUED | OUTPATIENT
Start: 2020-05-12 | End: 2020-05-22

## 2020-05-12 RX ORDER — OXYCODONE HCL 5 MG/5 ML
10 SOLUTION, ORAL ORAL EVERY 6 HOURS PRN
Status: DISCONTINUED | OUTPATIENT
Start: 2020-05-12 | End: 2020-05-13

## 2020-05-12 RX ADMIN — GABAPENTIN 250 MG: 250 SUSPENSION ORAL at 21:07

## 2020-05-12 RX ADMIN — METHOCARBAMOL TABLETS 1000 MG: 500 TABLET, COATED ORAL at 21:07

## 2020-05-12 RX ADMIN — ACETAMINOPHEN 650 MG: 160 SOLUTION ORAL at 14:40

## 2020-05-12 RX ADMIN — SENNOSIDES AND DOCUSATE SODIUM 2 TABLET: 8.6; 5 TABLET ORAL at 21:07

## 2020-05-12 RX ADMIN — SODIUM CHLORIDE 33.3 ML: 9 INJECTION, SOLUTION INTRAVENOUS at 07:53

## 2020-05-12 RX ADMIN — SODIUM CHLORIDE SOLN NEBU 3% 4 ML: 3 NEBU SOLN at 19:34

## 2020-05-12 RX ADMIN — HYDROMORPHONE HYDROCHLORIDE 0.5 MG: 1 INJECTION, SOLUTION INTRAMUSCULAR; INTRAVENOUS; SUBCUTANEOUS at 01:42

## 2020-05-12 RX ADMIN — MEROPENEM 1 G: 1 INJECTION, POWDER, FOR SOLUTION INTRAVENOUS at 10:58

## 2020-05-12 RX ADMIN — METHOCARBAMOL TABLETS 1000 MG: 500 TABLET, COATED ORAL at 13:26

## 2020-05-12 RX ADMIN — SODIUM CHLORIDE SOLN NEBU 3% 4 ML: 3 NEBU SOLN at 15:30

## 2020-05-12 RX ADMIN — OXYCODONE HYDROCHLORIDE 10 MG: 5 SOLUTION ORAL at 19:47

## 2020-05-12 RX ADMIN — IPRATROPIUM BROMIDE AND ALBUTEROL SULFATE 1 AMPULE: 2.5; .5 SOLUTION RESPIRATORY (INHALATION) at 15:30

## 2020-05-12 RX ADMIN — METHOCARBAMOL TABLETS 1000 MG: 500 TABLET, COATED ORAL at 16:17

## 2020-05-12 RX ADMIN — IPRATROPIUM BROMIDE AND ALBUTEROL SULFATE 1 AMPULE: 2.5; .5 SOLUTION RESPIRATORY (INHALATION) at 11:40

## 2020-05-12 RX ADMIN — HYDROMORPHONE HYDROCHLORIDE 0.5 MG: 1 INJECTION, SOLUTION INTRAMUSCULAR; INTRAVENOUS; SUBCUTANEOUS at 06:03

## 2020-05-12 RX ADMIN — ACETAMINOPHEN 650 MG: 160 SOLUTION ORAL at 21:06

## 2020-05-12 RX ADMIN — ENOXAPARIN SODIUM 30 MG: 30 INJECTION SUBCUTANEOUS at 08:34

## 2020-05-12 RX ADMIN — CHLORHEXIDINE GLUCONATE 0.12% ORAL RINSE 15 ML: 1.2 LIQUID ORAL at 21:08

## 2020-05-12 RX ADMIN — MEROPENEM 1 G: 1 INJECTION, POWDER, FOR SOLUTION INTRAVENOUS at 04:00

## 2020-05-12 RX ADMIN — ENOXAPARIN SODIUM 30 MG: 30 INJECTION SUBCUTANEOUS at 21:06

## 2020-05-12 RX ADMIN — HYDROMORPHONE HYDROCHLORIDE 0.5 MG: 1 INJECTION, SOLUTION INTRAMUSCULAR; INTRAVENOUS; SUBCUTANEOUS at 21:16

## 2020-05-12 RX ADMIN — BISACODYL 10 MG: 10 SUPPOSITORY RECTAL at 10:25

## 2020-05-12 RX ADMIN — GUAIFENESIN 400 MG: 400 TABLET, FILM COATED ORAL at 21:07

## 2020-05-12 RX ADMIN — OXYCODONE HYDROCHLORIDE 10 MG: 5 SOLUTION ORAL at 08:16

## 2020-05-12 RX ADMIN — MELATONIN 3 MG ORAL TABLET 6 MG: 3 TABLET ORAL at 21:06

## 2020-05-12 RX ADMIN — ACETAMINOPHEN 650 MG: 160 SOLUTION ORAL at 08:50

## 2020-05-12 RX ADMIN — SODIUM CHLORIDE, PRESERVATIVE FREE 300 UNITS: 5 INJECTION INTRAVENOUS at 08:41

## 2020-05-12 RX ADMIN — METHOCARBAMOL TABLETS 1000 MG: 500 TABLET, COATED ORAL at 08:33

## 2020-05-12 RX ADMIN — OXYCODONE HYDROCHLORIDE 10 MG: 5 SOLUTION ORAL at 13:47

## 2020-05-12 RX ADMIN — Medication 20 MG: at 21:07

## 2020-05-12 RX ADMIN — SODIUM CHLORIDE SOLN NEBU 3% 4 ML: 3 NEBU SOLN at 11:40

## 2020-05-12 RX ADMIN — GABAPENTIN 250 MG: 250 SUSPENSION ORAL at 06:41

## 2020-05-12 RX ADMIN — IPRATROPIUM BROMIDE AND ALBUTEROL SULFATE 1 AMPULE: 2.5; .5 SOLUTION RESPIRATORY (INHALATION) at 09:00

## 2020-05-12 RX ADMIN — IPRATROPIUM BROMIDE AND ALBUTEROL SULFATE 1 AMPULE: 2.5; .5 SOLUTION RESPIRATORY (INHALATION) at 19:34

## 2020-05-12 RX ADMIN — HYDROMORPHONE HYDROCHLORIDE 0.5 MG: 1 INJECTION, SOLUTION INTRAMUSCULAR; INTRAVENOUS; SUBCUTANEOUS at 17:01

## 2020-05-12 RX ADMIN — SENNOSIDES AND DOCUSATE SODIUM 2 TABLET: 8.6; 5 TABLET ORAL at 08:33

## 2020-05-12 RX ADMIN — Medication 20 MG: at 08:33

## 2020-05-12 RX ADMIN — MEROPENEM 1 G: 1 INJECTION, POWDER, FOR SOLUTION INTRAVENOUS at 18:49

## 2020-05-12 RX ADMIN — SODIUM CHLORIDE, PRESERVATIVE FREE 300 UNITS: 5 INJECTION INTRAVENOUS at 21:09

## 2020-05-12 RX ADMIN — GABAPENTIN 250 MG: 250 SUSPENSION ORAL at 13:36

## 2020-05-12 RX ADMIN — POLYETHYLENE GLYCOL 3350 17 G: 17 POWDER, FOR SOLUTION ORAL at 08:33

## 2020-05-12 RX ADMIN — GUAIFENESIN 400 MG: 400 TABLET, FILM COATED ORAL at 08:33

## 2020-05-12 RX ADMIN — SODIUM CHLORIDE SOLN NEBU 3% 4 ML: 3 NEBU SOLN at 09:00

## 2020-05-12 RX ADMIN — HYDROMORPHONE HYDROCHLORIDE 0.5 MG: 1 INJECTION, SOLUTION INTRAMUSCULAR; INTRAVENOUS; SUBCUTANEOUS at 11:03

## 2020-05-12 RX ADMIN — CHLORHEXIDINE GLUCONATE 0.12% ORAL RINSE 15 ML: 1.2 LIQUID ORAL at 08:33

## 2020-05-12 RX ADMIN — SODIUM CHLORIDE 33.3 ML: 9 INJECTION, SOLUTION INTRAVENOUS at 13:52

## 2020-05-12 ASSESSMENT — PULMONARY FUNCTION TESTS
PIF_VALUE: 30
PIF_VALUE: 29
PIF_VALUE: 28
PIF_VALUE: 32
PIF_VALUE: 33
PIF_VALUE: 29
PIF_VALUE: 26
PIF_VALUE: 30
PIF_VALUE: 27
PIF_VALUE: 30
PIF_VALUE: 28
PIF_VALUE: 28
PIF_VALUE: 26
PIF_VALUE: 26
PIF_VALUE: 30
PIF_VALUE: 28
PIF_VALUE: 29
PIF_VALUE: 35
PIF_VALUE: 28
PIF_VALUE: 26
PIF_VALUE: 34
PIF_VALUE: 28
PIF_VALUE: 30
PIF_VALUE: 32
PIF_VALUE: 29

## 2020-05-12 ASSESSMENT — PAIN DESCRIPTION - DESCRIPTORS: DESCRIPTORS: ACHING;DISCOMFORT;SORE;THROBBING

## 2020-05-12 ASSESSMENT — PAIN SCALES - GENERAL
PAINLEVEL_OUTOF10: 0
PAINLEVEL_OUTOF10: 9
PAINLEVEL_OUTOF10: 7
PAINLEVEL_OUTOF10: 9
PAINLEVEL_OUTOF10: 8
PAINLEVEL_OUTOF10: 7
PAINLEVEL_OUTOF10: 0
PAINLEVEL_OUTOF10: 10
PAINLEVEL_OUTOF10: 3
PAINLEVEL_OUTOF10: 8
PAINLEVEL_OUTOF10: 0
PAINLEVEL_OUTOF10: 9
PAINLEVEL_OUTOF10: 0
PAINLEVEL_OUTOF10: 7

## 2020-05-12 ASSESSMENT — PAIN DESCRIPTION - LOCATION
LOCATION: RIB CAGE
LOCATION: CHEST
LOCATION: CHEST
LOCATION: RIB CAGE

## 2020-05-12 ASSESSMENT — PAIN - FUNCTIONAL ASSESSMENT: PAIN_FUNCTIONAL_ASSESSMENT: PREVENTS OR INTERFERES WITH ALL ACTIVE AND SOME PASSIVE ACTIVITIES

## 2020-05-12 ASSESSMENT — PAIN DESCRIPTION - ORIENTATION
ORIENTATION: RIGHT

## 2020-05-12 ASSESSMENT — PAIN DESCRIPTION - FREQUENCY: FREQUENCY: CONTINUOUS

## 2020-05-12 ASSESSMENT — PAIN DESCRIPTION - PAIN TYPE
TYPE: ACUTE PAIN;SURGICAL PAIN
TYPE: SURGICAL PAIN
TYPE: SURGICAL PAIN
TYPE: ACUTE PAIN;SURGICAL PAIN

## 2020-05-12 ASSESSMENT — PAIN DESCRIPTION - PROGRESSION: CLINICAL_PROGRESSION: NOT CHANGED

## 2020-05-12 NOTE — CARE COORDINATION
Pod #1 s/p R vats,partial decortication, pleurodesis. R Ct in place. Remains on vent. Plan is for [address] when stable. No precert needed.
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
## 2020-05-13 ENCOUNTER — APPOINTMENT (OUTPATIENT)
Dept: GENERAL RADIOLOGY | Age: 25
DRG: 004 | End: 2020-05-13
Payer: MEDICAID

## 2020-05-13 LAB
ALBUMIN SERPL-MCNC: 2.4 G/DL (ref 3.5–5.2)
ALP BLD-CCNC: 80 U/L (ref 40–129)
ALT SERPL-CCNC: 58 U/L (ref 0–40)
ANAEROBIC CULTURE: NORMAL
ANION GAP SERPL CALCULATED.3IONS-SCNC: 11 MMOL/L (ref 7–16)
AST SERPL-CCNC: 27 U/L (ref 0–39)
BASOPHILS ABSOLUTE: 0.03 E9/L (ref 0–0.2)
BASOPHILS RELATIVE PERCENT: 0.2 % (ref 0–2)
BILIRUB SERPL-MCNC: <0.2 MG/DL (ref 0–1.2)
BUN BLDV-MCNC: 23 MG/DL (ref 6–20)
CALCIUM IONIZED: 1.23 MMOL/L (ref 1.15–1.33)
CALCIUM SERPL-MCNC: 8.2 MG/DL (ref 8.6–10.2)
CHLORIDE BLD-SCNC: 99 MMOL/L (ref 98–107)
CO2: 27 MMOL/L (ref 22–29)
CREAT SERPL-MCNC: 0.7 MG/DL (ref 0.7–1.2)
CULTURE SURGICAL: NORMAL
CULTURE SURGICAL: NORMAL
EOSINOPHILS ABSOLUTE: 0.09 E9/L (ref 0.05–0.5)
EOSINOPHILS RELATIVE PERCENT: 0.6 % (ref 0–6)
GFR AFRICAN AMERICAN: >60
GFR NON-AFRICAN AMERICAN: >60 ML/MIN/1.73
GLUCOSE BLD-MCNC: 117 MG/DL (ref 74–99)
HCT VFR BLD CALC: 22.9 % (ref 37–54)
HEMOGLOBIN: 7.2 G/DL (ref 12.5–16.5)
IMMATURE GRANULOCYTES #: 0.09 E9/L
IMMATURE GRANULOCYTES %: 0.6 % (ref 0–5)
LYMPHOCYTES ABSOLUTE: 2.14 E9/L (ref 1.5–4)
LYMPHOCYTES RELATIVE PERCENT: 14.1 % (ref 20–42)
MAGNESIUM: 1.8 MG/DL (ref 1.6–2.6)
MCH RBC QN AUTO: 29 PG (ref 26–35)
MCHC RBC AUTO-ENTMCNC: 31.4 % (ref 32–34.5)
MCV RBC AUTO: 92.3 FL (ref 80–99.9)
MONOCYTES ABSOLUTE: 1.34 E9/L (ref 0.1–0.95)
MONOCYTES RELATIVE PERCENT: 8.9 % (ref 2–12)
NEUTROPHILS ABSOLUTE: 11.44 E9/L (ref 1.8–7.3)
NEUTROPHILS RELATIVE PERCENT: 75.6 % (ref 43–80)
PDW BLD-RTO: 14.3 FL (ref 11.5–15)
PHOSPHORUS: 2.8 MG/DL (ref 2.5–4.5)
PLATELET # BLD: 330 E9/L (ref 130–450)
PMV BLD AUTO: 9.3 FL (ref 7–12)
POTASSIUM SERPL-SCNC: 3.7 MMOL/L (ref 3.5–5)
RBC # BLD: 2.48 E12/L (ref 3.8–5.8)
SODIUM BLD-SCNC: 137 MMOL/L (ref 132–146)
TOTAL PROTEIN: 6.2 G/DL (ref 6.4–8.3)
WBC # BLD: 15.1 E9/L (ref 4.5–11.5)

## 2020-05-13 PROCEDURE — 94640 AIRWAY INHALATION TREATMENT: CPT

## 2020-05-13 PROCEDURE — 6370000000 HC RX 637 (ALT 250 FOR IP): Performed by: NURSE PRACTITIONER

## 2020-05-13 PROCEDURE — 83735 ASSAY OF MAGNESIUM: CPT

## 2020-05-13 PROCEDURE — 6370000000 HC RX 637 (ALT 250 FOR IP): Performed by: STUDENT IN AN ORGANIZED HEALTH CARE EDUCATION/TRAINING PROGRAM

## 2020-05-13 PROCEDURE — 82330 ASSAY OF CALCIUM: CPT

## 2020-05-13 PROCEDURE — 84100 ASSAY OF PHOSPHORUS: CPT

## 2020-05-13 PROCEDURE — 99152 MOD SED SAME PHYS/QHP 5/>YRS: CPT | Performed by: SURGERY

## 2020-05-13 PROCEDURE — 94003 VENT MGMT INPAT SUBQ DAY: CPT

## 2020-05-13 PROCEDURE — 6360000002 HC RX W HCPCS: Performed by: NURSE PRACTITIONER

## 2020-05-13 PROCEDURE — 2709999900 HC NON-CHARGEABLE SUPPLY: Performed by: SURGERY

## 2020-05-13 PROCEDURE — 2580000003 HC RX 258: Performed by: NURSE PRACTITIONER

## 2020-05-13 PROCEDURE — 36592 COLLECT BLOOD FROM PICC: CPT

## 2020-05-13 PROCEDURE — 99291 CRITICAL CARE FIRST HOUR: CPT | Performed by: SURGERY

## 2020-05-13 PROCEDURE — 0BC68ZZ EXTIRPATION OF MATTER FROM RIGHT LOWER LOBE BRONCHUS, VIA NATURAL OR ARTIFICIAL OPENING ENDOSCOPIC: ICD-10-PCS | Performed by: SURGERY

## 2020-05-13 PROCEDURE — 80053 COMPREHEN METABOLIC PANEL: CPT

## 2020-05-13 PROCEDURE — 0BC38ZZ EXTIRPATION OF MATTER FROM RIGHT MAIN BRONCHUS, VIA NATURAL OR ARTIFICIAL OPENING ENDOSCOPIC: ICD-10-PCS | Performed by: SURGERY

## 2020-05-13 PROCEDURE — 2000000000 HC ICU R&B

## 2020-05-13 PROCEDURE — 85025 COMPLETE CBC W/AUTO DIFF WBC: CPT

## 2020-05-13 PROCEDURE — 0BC58ZZ EXTIRPATION OF MATTER FROM RIGHT MIDDLE LOBE BRONCHUS, VIA NATURAL OR ARTIFICIAL OPENING ENDOSCOPIC: ICD-10-PCS | Performed by: SURGERY

## 2020-05-13 PROCEDURE — 0BC48ZZ EXTIRPATION OF MATTER FROM RIGHT UPPER LOBE BRONCHUS, VIA NATURAL OR ARTIFICIAL OPENING ENDOSCOPIC: ICD-10-PCS | Performed by: SURGERY

## 2020-05-13 PROCEDURE — 94669 MECHANICAL CHEST WALL OSCILL: CPT

## 2020-05-13 PROCEDURE — 6360000002 HC RX W HCPCS: Performed by: STUDENT IN AN ORGANIZED HEALTH CARE EDUCATION/TRAINING PROGRAM

## 2020-05-13 PROCEDURE — 71045 X-RAY EXAM CHEST 1 VIEW: CPT

## 2020-05-13 PROCEDURE — 36415 COLL VENOUS BLD VENIPUNCTURE: CPT

## 2020-05-13 PROCEDURE — 3609010900 HC BRONCHOSCOPY THERAPUTIC ASPIRATION INITIAL: Performed by: SURGERY

## 2020-05-13 RX ORDER — MIDAZOLAM HYDROCHLORIDE 1 MG/ML
6 INJECTION INTRAMUSCULAR; INTRAVENOUS
Status: COMPLETED | OUTPATIENT
Start: 2020-05-13 | End: 2020-05-13

## 2020-05-13 RX ORDER — OXYCODONE HCL 5 MG/5 ML
10 SOLUTION, ORAL ORAL EVERY 6 HOURS PRN
Status: DISCONTINUED | OUTPATIENT
Start: 2020-05-13 | End: 2020-05-15

## 2020-05-13 RX ORDER — OXYCODONE HCL 5 MG/5 ML
15 SOLUTION, ORAL ORAL EVERY 6 HOURS PRN
Status: DISCONTINUED | OUTPATIENT
Start: 2020-05-13 | End: 2020-05-15

## 2020-05-13 RX ORDER — FENTANYL CITRATE 50 UG/ML
200 INJECTION, SOLUTION INTRAMUSCULAR; INTRAVENOUS
Status: COMPLETED | OUTPATIENT
Start: 2020-05-13 | End: 2020-05-13

## 2020-05-13 RX ADMIN — GABAPENTIN 250 MG: 250 SUSPENSION ORAL at 21:48

## 2020-05-13 RX ADMIN — SODIUM CHLORIDE 33.3 ML: 9 INJECTION, SOLUTION INTRAVENOUS at 13:44

## 2020-05-13 RX ADMIN — ENOXAPARIN SODIUM 30 MG: 30 INJECTION SUBCUTANEOUS at 07:50

## 2020-05-13 RX ADMIN — SODIUM CHLORIDE, PRESERVATIVE FREE 10 ML: 5 INJECTION INTRAVENOUS at 07:50

## 2020-05-13 RX ADMIN — SODIUM CHLORIDE, PRESERVATIVE FREE 300 UNITS: 5 INJECTION INTRAVENOUS at 07:50

## 2020-05-13 RX ADMIN — ENOXAPARIN SODIUM 30 MG: 30 INJECTION SUBCUTANEOUS at 20:20

## 2020-05-13 RX ADMIN — ACETAMINOPHEN 650 MG: 160 SOLUTION ORAL at 02:33

## 2020-05-13 RX ADMIN — IPRATROPIUM BROMIDE AND ALBUTEROL SULFATE 1 AMPULE: 2.5; .5 SOLUTION RESPIRATORY (INHALATION) at 16:48

## 2020-05-13 RX ADMIN — IPRATROPIUM BROMIDE AND ALBUTEROL SULFATE 1 AMPULE: 2.5; .5 SOLUTION RESPIRATORY (INHALATION) at 19:40

## 2020-05-13 RX ADMIN — METHOCARBAMOL TABLETS 1000 MG: 500 TABLET, COATED ORAL at 07:52

## 2020-05-13 RX ADMIN — OXYCODONE HYDROCHLORIDE 15 MG: 5 SOLUTION ORAL at 13:43

## 2020-05-13 RX ADMIN — POLYETHYLENE GLYCOL 3350 17 G: 17 POWDER, FOR SOLUTION ORAL at 07:50

## 2020-05-13 RX ADMIN — HYDROMORPHONE HYDROCHLORIDE 0.5 MG: 1 INJECTION, SOLUTION INTRAMUSCULAR; INTRAVENOUS; SUBCUTANEOUS at 16:08

## 2020-05-13 RX ADMIN — METHOCARBAMOL TABLETS 1000 MG: 500 TABLET, COATED ORAL at 20:29

## 2020-05-13 RX ADMIN — MEROPENEM 1 G: 1 INJECTION, POWDER, FOR SOLUTION INTRAVENOUS at 18:30

## 2020-05-13 RX ADMIN — OXYCODONE HYDROCHLORIDE 10 MG: 5 SOLUTION ORAL at 05:28

## 2020-05-13 RX ADMIN — ACETAMINOPHEN 650 MG: 160 SOLUTION ORAL at 07:51

## 2020-05-13 RX ADMIN — IPRATROPIUM BROMIDE AND ALBUTEROL SULFATE 1 AMPULE: 2.5; .5 SOLUTION RESPIRATORY (INHALATION) at 07:59

## 2020-05-13 RX ADMIN — HYDROMORPHONE HYDROCHLORIDE 0.5 MG: 1 INJECTION, SOLUTION INTRAMUSCULAR; INTRAVENOUS; SUBCUTANEOUS at 10:37

## 2020-05-13 RX ADMIN — MIDAZOLAM 6 MG: 1 INJECTION INTRAMUSCULAR; INTRAVENOUS at 08:31

## 2020-05-13 RX ADMIN — CHLORHEXIDINE GLUCONATE 0.12% ORAL RINSE 15 ML: 1.2 LIQUID ORAL at 20:18

## 2020-05-13 RX ADMIN — ACETAMINOPHEN 650 MG: 160 SOLUTION ORAL at 14:38

## 2020-05-13 RX ADMIN — GABAPENTIN 250 MG: 250 SUSPENSION ORAL at 13:43

## 2020-05-13 RX ADMIN — MEROPENEM 1 G: 1 INJECTION, POWDER, FOR SOLUTION INTRAVENOUS at 02:34

## 2020-05-13 RX ADMIN — SODIUM CHLORIDE, PRESERVATIVE FREE 300 UNITS: 5 INJECTION INTRAVENOUS at 20:29

## 2020-05-13 RX ADMIN — IPRATROPIUM BROMIDE AND ALBUTEROL SULFATE 1 AMPULE: 2.5; .5 SOLUTION RESPIRATORY (INHALATION) at 11:13

## 2020-05-13 RX ADMIN — GUAIFENESIN 400 MG: 400 TABLET, FILM COATED ORAL at 20:28

## 2020-05-13 RX ADMIN — MEROPENEM 1 G: 1 INJECTION, POWDER, FOR SOLUTION INTRAVENOUS at 10:34

## 2020-05-13 RX ADMIN — SODIUM CHLORIDE SOLN NEBU 3% 4 ML: 3 NEBU SOLN at 19:41

## 2020-05-13 RX ADMIN — ACETAMINOPHEN 650 MG: 160 SOLUTION ORAL at 20:30

## 2020-05-13 RX ADMIN — GABAPENTIN 250 MG: 250 SUSPENSION ORAL at 05:31

## 2020-05-13 RX ADMIN — FENTANYL CITRATE 100 MCG: 50 INJECTION, SOLUTION INTRAMUSCULAR; INTRAVENOUS at 08:31

## 2020-05-13 RX ADMIN — CHLORHEXIDINE GLUCONATE 0.12% ORAL RINSE 15 ML: 1.2 LIQUID ORAL at 07:51

## 2020-05-13 RX ADMIN — METHOCARBAMOL TABLETS 1000 MG: 500 TABLET, COATED ORAL at 16:08

## 2020-05-13 RX ADMIN — Medication 20 MG: at 07:58

## 2020-05-13 RX ADMIN — BISACODYL 10 MG: 10 SUPPOSITORY RECTAL at 07:50

## 2020-05-13 RX ADMIN — METHOCARBAMOL TABLETS 1000 MG: 500 TABLET, COATED ORAL at 13:43

## 2020-05-13 RX ADMIN — SODIUM CHLORIDE SOLN NEBU 3% 4 ML: 3 NEBU SOLN at 11:13

## 2020-05-13 RX ADMIN — SODIUM CHLORIDE SOLN NEBU 3% 4 ML: 3 NEBU SOLN at 07:59

## 2020-05-13 RX ADMIN — HYDROMORPHONE HYDROCHLORIDE 0.5 MG: 1 INJECTION, SOLUTION INTRAMUSCULAR; INTRAVENOUS; SUBCUTANEOUS at 02:21

## 2020-05-13 RX ADMIN — SENNOSIDES AND DOCUSATE SODIUM 2 TABLET: 8.6; 5 TABLET ORAL at 07:50

## 2020-05-13 RX ADMIN — GUAIFENESIN 400 MG: 400 TABLET, FILM COATED ORAL at 07:53

## 2020-05-13 RX ADMIN — HYDROMORPHONE HYDROCHLORIDE 0.5 MG: 1 INJECTION, SOLUTION INTRAMUSCULAR; INTRAVENOUS; SUBCUTANEOUS at 22:26

## 2020-05-13 RX ADMIN — SODIUM CHLORIDE SOLN NEBU 3% 4 ML: 3 NEBU SOLN at 16:48

## 2020-05-13 RX ADMIN — MELATONIN 3 MG ORAL TABLET 6 MG: 3 TABLET ORAL at 20:29

## 2020-05-13 RX ADMIN — Medication 20 MG: at 20:20

## 2020-05-13 RX ADMIN — SODIUM CHLORIDE, PRESERVATIVE FREE 10 ML: 5 INJECTION INTRAVENOUS at 20:29

## 2020-05-13 ASSESSMENT — PAIN DESCRIPTION - ORIENTATION
ORIENTATION: RIGHT

## 2020-05-13 ASSESSMENT — PULMONARY FUNCTION TESTS
PIF_VALUE: 25
PIF_VALUE: 26
PIF_VALUE: 29
PIF_VALUE: 40
PIF_VALUE: 31
PIF_VALUE: 27
PIF_VALUE: 27
PIF_VALUE: 32
PIF_VALUE: 29
PIF_VALUE: 32
PIF_VALUE: 27
PIF_VALUE: 28
PIF_VALUE: 24
PIF_VALUE: 36
PIF_VALUE: 33
PIF_VALUE: 27
PIF_VALUE: 24
PIF_VALUE: 26
PIF_VALUE: 34
PIF_VALUE: 35
PIF_VALUE: 25
PIF_VALUE: 27
PIF_VALUE: 33
PIF_VALUE: 24
PIF_VALUE: 25
PIF_VALUE: 24
PIF_VALUE: 33
PIF_VALUE: 28
PIF_VALUE: 33
PIF_VALUE: 26
PIF_VALUE: 34

## 2020-05-13 ASSESSMENT — PAIN SCALES - GENERAL
PAINLEVEL_OUTOF10: 0
PAINLEVEL_OUTOF10: 0
PAINLEVEL_OUTOF10: 3
PAINLEVEL_OUTOF10: 0
PAINLEVEL_OUTOF10: 3
PAINLEVEL_OUTOF10: 10
PAINLEVEL_OUTOF10: 9
PAINLEVEL_OUTOF10: 8
PAINLEVEL_OUTOF10: 0
PAINLEVEL_OUTOF10: 0
PAINLEVEL_OUTOF10: 5
PAINLEVEL_OUTOF10: 10
PAINLEVEL_OUTOF10: 9

## 2020-05-13 ASSESSMENT — PAIN DESCRIPTION - FREQUENCY: FREQUENCY: CONTINUOUS

## 2020-05-13 ASSESSMENT — PAIN DESCRIPTION - PAIN TYPE
TYPE: ACUTE PAIN
TYPE: ACUTE PAIN;SURGICAL PAIN
TYPE: ACUTE PAIN
TYPE: ACUTE PAIN

## 2020-05-13 ASSESSMENT — PAIN DESCRIPTION - LOCATION
LOCATION: RIB CAGE

## 2020-05-13 ASSESSMENT — PAIN DESCRIPTION - DESCRIPTORS
DESCRIPTORS: ACHING;CONSTANT;DISCOMFORT
DESCRIPTORS: ACHING;THROBBING
DESCRIPTORS: ACHING;DISCOMFORT;THROBBING
DESCRIPTORS: ACHING;SORE

## 2020-05-13 NOTE — CARE COORDINATION
Pod #2 s/p R Vats. Still not ready for ltac d/t cont'd need for bronchs. Neymar Stein will accept when stable.
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
## 2020-05-13 NOTE — PLAN OF CARE
Problem: Falls - Risk of:  Goal: Will remain free from falls  Description: Will remain free from falls  5/12/2020 2008 by Adrianna Zepeda RN  Outcome: Met This Shift  5/12/2020 1518 by Tonny Sen RN  Outcome: Met This Shift  Goal: Absence of physical injury  Description: Absence of physical injury  Outcome: Met This Shift     Problem: Pain:  Goal: Pain level will decrease  Description: Pain level will decrease  5/12/2020 2008 by Adrianna Zepeda RN  Outcome: Met This Shift  5/12/2020 1518 by Tonny Sen RN  Outcome: Met This Shift  Goal: Control of chronic pain  Description: Control of chronic pain  Outcome: Met This Shift     Problem: Airway Clearance - Ineffective:  Goal: Ability to maintain a clear airway will improve  Description: Ability to maintain a clear airway will improve  Outcome: Met This Shift     Problem: Anxiety/Stress:  Goal: Level of anxiety will decrease  Description: Level of anxiety will decrease  Outcome: Met This Shift     Problem: Aspiration:  Goal: Absence of aspiration  Description: Absence of aspiration  Outcome: Met This Shift     Problem: Gas Exchange - Impaired:  Goal: Levels of oxygenation will improve  Description: Levels of oxygenation will improve  Outcome: Met This Shift     Problem: Infection:  Goal: Will remain free from infection  Description: Will remain free from infection  Outcome: Met This Shift     Problem: Daily Care:  Goal: Daily care needs are met  Description: Daily care needs are met  Outcome: Met This Shift     Problem: Musculor/Skeletal Functional Status  Goal: Highest potential functional level  Outcome: Met This Shift  Goal: Absence of falls  Outcome: Met This Shift

## 2020-05-13 NOTE — PROGRESS NOTES
today.      PHYSICAL EXAM:  Vitals:    05/13/20 0700 05/13/20 0757 05/13/20 0800 05/13/20 0900   BP: (!) 108/55  114/70 (!) 114/55   Pulse: 76 79 87 85   Resp: 16  16 16   Temp:   100.5 °F (38.1 °C)    TempSrc:   Oral    SpO2: 95% 95% 93% 98%   Weight:       Height:           I/O last 3 completed shifts: In: 2230.3 [I.V.:74.3; NG/GT:1756; IV Piggyback:400]  Out: 7151 [GKEGI:2139; Chest Tube:90]    Neuro -awake, alert, attentive. Briskly follows commands. Paraplegic. Trached. HEENT -neck incision healing. Tracheostomy secured without bleeding. CV -normal sinus rhythm. Well perfused. Pulm -AC 50%/10  Abdomen -nondistended, nontender. PEG left upper quadrant secure. Musculoskeletal - bilateral LE SCDs, no deformities  Skin -warm, dry, no rash  Tubes/drains -tracheostomy. PEG. Sanabria. Right chest tube (remove)  Lines -5/1-right upper extremity PIC      The patient is at significant risk for life-threatening respiratory deterioration and death and requires ongoing critical care management. Critical care time exclusive of teaching and procedures = 35 minutes    NOTE: This report was transcribed using voice recognition software. Every effort was made to ensure accuracy; however, inadvertent computerized transcription errors may be present.

## 2020-05-13 NOTE — PLAN OF CARE
Problem: Falls - Risk of:  Goal: Will remain free from falls  Description: Will remain free from falls  5/13/2020 0009 by Jose Ramirez RN  Outcome: Met This Shift  5/12/2020 2008 by Jose Ramirez RN  Outcome: Met This Shift  5/12/2020 1518 by Anastacia Baldwin RN  Outcome: Met This Shift  Goal: Absence of physical injury  Description: Absence of physical injury  5/13/2020 0009 by Jose Ramirez RN  Outcome: Met This Shift  5/12/2020 2008 by Jose Ramirez RN  Outcome: Met This Shift     Problem: Pain:  Goal: Pain level will decrease  Description: Pain level will decrease  5/13/2020 0009 by Jose Ramirez RN  Outcome: Met This Shift  5/12/2020 2008 by Jose Ramirez RN  Outcome: Met This Shift  5/12/2020 1518 by Anastacia Baldwin RN  Outcome: Met This Shift  Goal: Control of chronic pain  Description: Control of chronic pain  5/13/2020 0009 by Jose Ramirez RN  Outcome: Met This Shift  5/12/2020 2008 by Jose Ramirez RN  Outcome: Met This Shift     Problem: Airway Clearance - Ineffective:  Goal: Ability to maintain a clear airway will improve  Description: Ability to maintain a clear airway will improve  5/13/2020 0009 by Jose Ramirez RN  Outcome: Met This Shift  5/12/2020 2008 by Jose Ramirez RN  Outcome: Met This Shift     Problem: Anxiety/Stress:  Goal: Level of anxiety will decrease  Description: Level of anxiety will decrease  5/13/2020 0009 by Jose Ramirez RN  Outcome: Met This Shift  5/12/2020 2008 by Jose Ramirez RN  Outcome: Met This Shift     Problem: Aspiration:  Goal: Absence of aspiration  Description: Absence of aspiration  Outcome: Met This Shift     Problem: Gas Exchange - Impaired:  Goal: Levels of oxygenation will improve  Description: Levels of oxygenation will improve  Outcome: Met This Shift     Problem: Infection:  Goal: Will remain free from infection  Description: Will remain free from infection  Outcome: Met This Shift     Problem: Daily Care:  Goal: Daily care needs are met  Description:

## 2020-05-14 ENCOUNTER — APPOINTMENT (OUTPATIENT)
Dept: GENERAL RADIOLOGY | Age: 25
DRG: 004 | End: 2020-05-14
Payer: MEDICAID

## 2020-05-14 LAB
ALBUMIN SERPL-MCNC: 2.6 G/DL (ref 3.5–5.2)
ALP BLD-CCNC: 83 U/L (ref 40–129)
ALT SERPL-CCNC: 49 U/L (ref 0–40)
ANION GAP SERPL CALCULATED.3IONS-SCNC: 9 MMOL/L (ref 7–16)
AST SERPL-CCNC: 25 U/L (ref 0–39)
BASOPHILS ABSOLUTE: 0.03 E9/L (ref 0–0.2)
BASOPHILS RELATIVE PERCENT: 0.2 % (ref 0–2)
BILIRUB SERPL-MCNC: <0.2 MG/DL (ref 0–1.2)
BLOOD BANK DISPENSE STATUS: NORMAL
BLOOD BANK PRODUCT CODE: NORMAL
BPU ID: NORMAL
BUN BLDV-MCNC: 23 MG/DL (ref 6–20)
CALCIUM SERPL-MCNC: 8.5 MG/DL (ref 8.6–10.2)
CHLORIDE BLD-SCNC: 98 MMOL/L (ref 98–107)
CO2: 29 MMOL/L (ref 22–29)
CREAT SERPL-MCNC: 0.7 MG/DL (ref 0.7–1.2)
CULTURE SURGICAL: ABNORMAL
CULTURE SURGICAL: ABNORMAL
DESCRIPTION BLOOD BANK: NORMAL
EOSINOPHILS ABSOLUTE: 0.25 E9/L (ref 0.05–0.5)
EOSINOPHILS RELATIVE PERCENT: 1.9 % (ref 0–6)
GFR AFRICAN AMERICAN: >60
GFR NON-AFRICAN AMERICAN: >60 ML/MIN/1.73
GLUCOSE BLD-MCNC: 102 MG/DL (ref 74–99)
HCT VFR BLD CALC: 23.5 % (ref 37–54)
HEMOGLOBIN: 7.4 G/DL (ref 12.5–16.5)
IMMATURE GRANULOCYTES #: 0.07 E9/L
IMMATURE GRANULOCYTES %: 0.5 % (ref 0–5)
LYMPHOCYTES ABSOLUTE: 2.27 E9/L (ref 1.5–4)
LYMPHOCYTES RELATIVE PERCENT: 17.1 % (ref 20–42)
MAGNESIUM: 1.9 MG/DL (ref 1.6–2.6)
MCH RBC QN AUTO: 29.4 PG (ref 26–35)
MCHC RBC AUTO-ENTMCNC: 31.5 % (ref 32–34.5)
MCV RBC AUTO: 93.3 FL (ref 80–99.9)
MONOCYTES ABSOLUTE: 1.07 E9/L (ref 0.1–0.95)
MONOCYTES RELATIVE PERCENT: 8 % (ref 2–12)
NEUTROPHILS ABSOLUTE: 9.62 E9/L (ref 1.8–7.3)
NEUTROPHILS RELATIVE PERCENT: 72.3 % (ref 43–80)
ORGANISM: ABNORMAL
ORGANISM: ABNORMAL
PDW BLD-RTO: 14.2 FL (ref 11.5–15)
PHOSPHORUS: 2.7 MG/DL (ref 2.5–4.5)
PLATELET # BLD: 344 E9/L (ref 130–450)
PMV BLD AUTO: 9.8 FL (ref 7–12)
POTASSIUM SERPL-SCNC: 3.7 MMOL/L (ref 3.5–5)
RBC # BLD: 2.52 E12/L (ref 3.8–5.8)
SODIUM BLD-SCNC: 136 MMOL/L (ref 132–146)
TOTAL PROTEIN: 6.3 G/DL (ref 6.4–8.3)
WBC # BLD: 13.3 E9/L (ref 4.5–11.5)

## 2020-05-14 PROCEDURE — 0BC38ZZ EXTIRPATION OF MATTER FROM RIGHT MAIN BRONCHUS, VIA NATURAL OR ARTIFICIAL OPENING ENDOSCOPIC: ICD-10-PCS | Performed by: SURGERY

## 2020-05-14 PROCEDURE — 99152 MOD SED SAME PHYS/QHP 5/>YRS: CPT | Performed by: SURGERY

## 2020-05-14 PROCEDURE — 94669 MECHANICAL CHEST WALL OSCILL: CPT

## 2020-05-14 PROCEDURE — 6370000000 HC RX 637 (ALT 250 FOR IP): Performed by: NURSE PRACTITIONER

## 2020-05-14 PROCEDURE — 36415 COLL VENOUS BLD VENIPUNCTURE: CPT

## 2020-05-14 PROCEDURE — 0BC48ZZ EXTIRPATION OF MATTER FROM RIGHT UPPER LOBE BRONCHUS, VIA NATURAL OR ARTIFICIAL OPENING ENDOSCOPIC: ICD-10-PCS | Performed by: SURGERY

## 2020-05-14 PROCEDURE — 31646 BRNCHSC W/THER ASPIR SBSQ: CPT | Performed by: SURGERY

## 2020-05-14 PROCEDURE — 6360000002 HC RX W HCPCS: Performed by: NURSE PRACTITIONER

## 2020-05-14 PROCEDURE — 2580000003 HC RX 258: Performed by: NURSE PRACTITIONER

## 2020-05-14 PROCEDURE — 99291 CRITICAL CARE FIRST HOUR: CPT | Performed by: SURGERY

## 2020-05-14 PROCEDURE — 94003 VENT MGMT INPAT SUBQ DAY: CPT

## 2020-05-14 PROCEDURE — 80053 COMPREHEN METABOLIC PANEL: CPT

## 2020-05-14 PROCEDURE — 94640 AIRWAY INHALATION TREATMENT: CPT

## 2020-05-14 PROCEDURE — 6360000002 HC RX W HCPCS: Performed by: STUDENT IN AN ORGANIZED HEALTH CARE EDUCATION/TRAINING PROGRAM

## 2020-05-14 PROCEDURE — 2000000000 HC ICU R&B

## 2020-05-14 PROCEDURE — 3609010900 HC BRONCHOSCOPY THERAPUTIC ASPIRATION INITIAL: Performed by: SURGERY

## 2020-05-14 PROCEDURE — 6370000000 HC RX 637 (ALT 250 FOR IP): Performed by: STUDENT IN AN ORGANIZED HEALTH CARE EDUCATION/TRAINING PROGRAM

## 2020-05-14 PROCEDURE — 84100 ASSAY OF PHOSPHORUS: CPT

## 2020-05-14 PROCEDURE — 2709999900 HC NON-CHARGEABLE SUPPLY: Performed by: SURGERY

## 2020-05-14 PROCEDURE — 83735 ASSAY OF MAGNESIUM: CPT

## 2020-05-14 PROCEDURE — 85025 COMPLETE CBC W/AUTO DIFF WBC: CPT

## 2020-05-14 PROCEDURE — 6360000002 HC RX W HCPCS

## 2020-05-14 PROCEDURE — 0BC58ZZ EXTIRPATION OF MATTER FROM RIGHT MIDDLE LOBE BRONCHUS, VIA NATURAL OR ARTIFICIAL OPENING ENDOSCOPIC: ICD-10-PCS | Performed by: SURGERY

## 2020-05-14 PROCEDURE — 0BC68ZZ EXTIRPATION OF MATTER FROM RIGHT LOWER LOBE BRONCHUS, VIA NATURAL OR ARTIFICIAL OPENING ENDOSCOPIC: ICD-10-PCS | Performed by: SURGERY

## 2020-05-14 PROCEDURE — 71045 X-RAY EXAM CHEST 1 VIEW: CPT

## 2020-05-14 RX ORDER — FENTANYL CITRATE 50 UG/ML
100 INJECTION, SOLUTION INTRAMUSCULAR; INTRAVENOUS
Status: COMPLETED | OUTPATIENT
Start: 2020-05-14 | End: 2020-05-14

## 2020-05-14 RX ORDER — BISACODYL 10 MG
10 SUPPOSITORY, RECTAL RECTAL DAILY PRN
Status: DISCONTINUED | OUTPATIENT
Start: 2020-05-14 | End: 2020-05-26 | Stop reason: HOSPADM

## 2020-05-14 RX ORDER — FENTANYL CITRATE 50 UG/ML
INJECTION, SOLUTION INTRAMUSCULAR; INTRAVENOUS
Status: COMPLETED
Start: 2020-05-14 | End: 2020-05-14

## 2020-05-14 RX ORDER — MIDAZOLAM HYDROCHLORIDE 1 MG/ML
6 INJECTION INTRAMUSCULAR; INTRAVENOUS
Status: COMPLETED | OUTPATIENT
Start: 2020-05-14 | End: 2020-05-14

## 2020-05-14 RX ORDER — FENTANYL CITRATE 50 UG/ML
100 INJECTION, SOLUTION INTRAMUSCULAR; INTRAVENOUS ONCE
Status: COMPLETED | OUTPATIENT
Start: 2020-05-14 | End: 2020-05-14

## 2020-05-14 RX ADMIN — POLYETHYLENE GLYCOL 3350 17 G: 17 POWDER, FOR SOLUTION ORAL at 09:02

## 2020-05-14 RX ADMIN — SODIUM CHLORIDE 33.3 ML: 9 INJECTION, SOLUTION INTRAVENOUS at 22:15

## 2020-05-14 RX ADMIN — METHOCARBAMOL TABLETS 1000 MG: 500 TABLET, COATED ORAL at 09:02

## 2020-05-14 RX ADMIN — Medication 20 MG: at 20:40

## 2020-05-14 RX ADMIN — METHOCARBAMOL TABLETS 1000 MG: 500 TABLET, COATED ORAL at 13:00

## 2020-05-14 RX ADMIN — GUAIFENESIN 400 MG: 400 TABLET, FILM COATED ORAL at 09:02

## 2020-05-14 RX ADMIN — FENTANYL CITRATE 100 MCG: 50 INJECTION, SOLUTION INTRAMUSCULAR; INTRAVENOUS at 15:24

## 2020-05-14 RX ADMIN — HYDROMORPHONE HYDROCHLORIDE 0.5 MG: 1 INJECTION, SOLUTION INTRAMUSCULAR; INTRAVENOUS; SUBCUTANEOUS at 13:00

## 2020-05-14 RX ADMIN — ACETAMINOPHEN 650 MG: 160 SOLUTION ORAL at 20:37

## 2020-05-14 RX ADMIN — GUAIFENESIN 400 MG: 400 TABLET, FILM COATED ORAL at 20:38

## 2020-05-14 RX ADMIN — ACETAMINOPHEN 650 MG: 160 SOLUTION ORAL at 09:01

## 2020-05-14 RX ADMIN — MEROPENEM 1 G: 1 INJECTION, POWDER, FOR SOLUTION INTRAVENOUS at 11:30

## 2020-05-14 RX ADMIN — SODIUM CHLORIDE SOLN NEBU 3% 4 ML: 3 NEBU SOLN at 15:43

## 2020-05-14 RX ADMIN — MIDAZOLAM 6 MG: 1 INJECTION INTRAMUSCULAR; INTRAVENOUS at 15:19

## 2020-05-14 RX ADMIN — POTASSIUM BICARBONATE 40 MEQ: 782 TABLET, EFFERVESCENT ORAL at 11:37

## 2020-05-14 RX ADMIN — OXYCODONE HYDROCHLORIDE 15 MG: 5 SOLUTION ORAL at 18:51

## 2020-05-14 RX ADMIN — SODIUM CHLORIDE SOLN NEBU 3% 4 ML: 3 NEBU SOLN at 18:55

## 2020-05-14 RX ADMIN — METHOCARBAMOL TABLETS 1000 MG: 500 TABLET, COATED ORAL at 20:38

## 2020-05-14 RX ADMIN — ENOXAPARIN SODIUM 30 MG: 30 INJECTION SUBCUTANEOUS at 20:37

## 2020-05-14 RX ADMIN — SENNOSIDES AND DOCUSATE SODIUM 2 TABLET: 8.6; 5 TABLET ORAL at 09:15

## 2020-05-14 RX ADMIN — SODIUM CHLORIDE, PRESERVATIVE FREE 300 UNITS: 5 INJECTION INTRAVENOUS at 09:02

## 2020-05-14 RX ADMIN — MELATONIN 3 MG ORAL TABLET 6 MG: 3 TABLET ORAL at 20:37

## 2020-05-14 RX ADMIN — CHLORHEXIDINE GLUCONATE 0.12% ORAL RINSE 15 ML: 1.2 LIQUID ORAL at 09:01

## 2020-05-14 RX ADMIN — MEROPENEM 1 G: 1 INJECTION, POWDER, FOR SOLUTION INTRAVENOUS at 18:48

## 2020-05-14 RX ADMIN — GABAPENTIN 250 MG: 250 SUSPENSION ORAL at 21:40

## 2020-05-14 RX ADMIN — MEROPENEM 1 G: 1 INJECTION, POWDER, FOR SOLUTION INTRAVENOUS at 04:39

## 2020-05-14 RX ADMIN — SODIUM CHLORIDE, PRESERVATIVE FREE 300 UNITS: 5 INJECTION INTRAVENOUS at 20:38

## 2020-05-14 RX ADMIN — IPRATROPIUM BROMIDE AND ALBUTEROL SULFATE 1 AMPULE: 2.5; .5 SOLUTION RESPIRATORY (INHALATION) at 18:55

## 2020-05-14 RX ADMIN — SODIUM CHLORIDE SOLN NEBU 3% 4 ML: 3 NEBU SOLN at 12:35

## 2020-05-14 RX ADMIN — HYDROMORPHONE HYDROCHLORIDE 0.5 MG: 1 INJECTION, SOLUTION INTRAMUSCULAR; INTRAVENOUS; SUBCUTANEOUS at 01:42

## 2020-05-14 RX ADMIN — IPRATROPIUM BROMIDE AND ALBUTEROL SULFATE 1 AMPULE: 2.5; .5 SOLUTION RESPIRATORY (INHALATION) at 08:44

## 2020-05-14 RX ADMIN — HYDROMORPHONE HYDROCHLORIDE 0.5 MG: 1 INJECTION, SOLUTION INTRAMUSCULAR; INTRAVENOUS; SUBCUTANEOUS at 21:37

## 2020-05-14 RX ADMIN — OXYCODONE HYDROCHLORIDE 15 MG: 5 SOLUTION ORAL at 10:50

## 2020-05-14 RX ADMIN — SODIUM CHLORIDE, PRESERVATIVE FREE 10 ML: 5 INJECTION INTRAVENOUS at 20:38

## 2020-05-14 RX ADMIN — Medication 20 MG: at 09:15

## 2020-05-14 RX ADMIN — IPRATROPIUM BROMIDE AND ALBUTEROL SULFATE 1 AMPULE: 2.5; .5 SOLUTION RESPIRATORY (INHALATION) at 12:36

## 2020-05-14 RX ADMIN — SODIUM CHLORIDE 33.3 ML: 9 INJECTION, SOLUTION INTRAVENOUS at 14:30

## 2020-05-14 RX ADMIN — OXYCODONE HYDROCHLORIDE 15 MG: 5 SOLUTION ORAL at 04:43

## 2020-05-14 RX ADMIN — FENTANYL CITRATE 100 MCG: 50 INJECTION, SOLUTION INTRAMUSCULAR; INTRAVENOUS at 15:19

## 2020-05-14 RX ADMIN — SODIUM CHLORIDE SOLN NEBU 3% 4 ML: 3 NEBU SOLN at 08:44

## 2020-05-14 RX ADMIN — GABAPENTIN 250 MG: 250 SUSPENSION ORAL at 14:30

## 2020-05-14 RX ADMIN — SODIUM CHLORIDE 33.3 ML: 9 INJECTION, SOLUTION INTRAVENOUS at 08:05

## 2020-05-14 RX ADMIN — GABAPENTIN 250 MG: 250 SUSPENSION ORAL at 05:10

## 2020-05-14 RX ADMIN — CHLORHEXIDINE GLUCONATE 0.12% ORAL RINSE 15 ML: 1.2 LIQUID ORAL at 20:37

## 2020-05-14 RX ADMIN — ACETAMINOPHEN 650 MG: 160 SOLUTION ORAL at 15:55

## 2020-05-14 RX ADMIN — METHOCARBAMOL TABLETS 1000 MG: 500 TABLET, COATED ORAL at 17:51

## 2020-05-14 RX ADMIN — SODIUM CHLORIDE, PRESERVATIVE FREE 10 ML: 5 INJECTION INTRAVENOUS at 09:01

## 2020-05-14 RX ADMIN — ACETAMINOPHEN 650 MG: 160 SOLUTION ORAL at 04:43

## 2020-05-14 RX ADMIN — IPRATROPIUM BROMIDE AND ALBUTEROL SULFATE 1 AMPULE: 2.5; .5 SOLUTION RESPIRATORY (INHALATION) at 15:43

## 2020-05-14 RX ADMIN — ENOXAPARIN SODIUM 30 MG: 30 INJECTION SUBCUTANEOUS at 09:02

## 2020-05-14 RX ADMIN — SENNOSIDES AND DOCUSATE SODIUM 2 TABLET: 8.6; 5 TABLET ORAL at 20:37

## 2020-05-14 ASSESSMENT — PAIN SCALES - GENERAL
PAINLEVEL_OUTOF10: 8
PAINLEVEL_OUTOF10: 10
PAINLEVEL_OUTOF10: 0
PAINLEVEL_OUTOF10: 8
PAINLEVEL_OUTOF10: 0
PAINLEVEL_OUTOF10: 3
PAINLEVEL_OUTOF10: 7
PAINLEVEL_OUTOF10: 0
PAINLEVEL_OUTOF10: 7
PAINLEVEL_OUTOF10: 0
PAINLEVEL_OUTOF10: 8
PAINLEVEL_OUTOF10: 0
PAINLEVEL_OUTOF10: 8

## 2020-05-14 ASSESSMENT — PULMONARY FUNCTION TESTS
PIF_VALUE: 20
PIF_VALUE: 21
PIF_VALUE: 26
PIF_VALUE: 25
PIF_VALUE: 27
PIF_VALUE: 25
PIF_VALUE: 30
PIF_VALUE: 33
PIF_VALUE: 22
PIF_VALUE: 22
PIF_VALUE: 26
PIF_VALUE: 26
PIF_VALUE: 22
PIF_VALUE: 28
PIF_VALUE: 28
PIF_VALUE: 27
PIF_VALUE: 30
PIF_VALUE: 21
PIF_VALUE: 27
PIF_VALUE: 25
PIF_VALUE: 29
PIF_VALUE: 21
PIF_VALUE: 21
PIF_VALUE: 26
PIF_VALUE: 30
PIF_VALUE: 26
PIF_VALUE: 20

## 2020-05-14 NOTE — PROGRESS NOTES
today.  5/14 - recurrent RLL collapse. Bronch today. 2nd right CT removed yesterday. PHYSICAL EXAM:  Vitals:    05/14/20 0600 05/14/20 0700 05/14/20 0800 05/14/20 0900   BP: (!) 94/43 (!) 114/48 (!) 115/58 120/63   Pulse: 73 65 67 92   Resp: 16 16 16    Temp: 99.7 °F (37.6 °C)  99.3 °F (37.4 °C)    TempSrc: Oral  Oral    SpO2: 98% 98% 95% 90%   Weight:       Height:           I/O last 3 completed shifts: In: 2388 [I.V.:175; NG/GT:1913; IV Piggyback:300]  Out: 1430 [Urine:1280; Chest Tube:150]    Neuro -awake, alert, attentive. Briskly follows commands. Paraplegic. Trached. HEENT -neck incision healing. Tracheostomy secured without bleeding. CV -normal sinus rhythm. Well perfused. Pulm -AC 50%/10  Abdomen -nondistended, nontender. PEG left upper quadrant secure. Musculoskeletal - bilateral LE SCDs, no deformities  Skin -warm, dry, no rash  Tubes/drains -tracheostomy. PEG. Sanabria. Lines -5/1-right upper extremity PIC      The patient is at significant risk for life-threatening respiratory deterioration and death and requires ongoing critical care management. Critical care time exclusive of teaching and procedures = 35 minutes    NOTE: This report was transcribed using voice recognition software. Every effort was made to ensure accuracy; however, inadvertent computerized transcription errors may be present.
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
## 2020-05-14 NOTE — PLAN OF CARE
Problem: Falls - Risk of:  Goal: Will remain free from falls  Description: Will remain free from falls  5/13/2020 2236 by Wendie Foley RN  Outcome: Met This Shift  5/13/2020 1807 by Dagmar Carson RN  Outcome: Met This Shift  5/13/2020 0913 by Dagmar Carson RN  Outcome: Met This Shift  Goal: Absence of physical injury  Description: Absence of physical injury  5/13/2020 2236 by Wendie Foley RN  Outcome: Met This Shift  5/13/2020 1807 by Dagmar Carson RN  Outcome: Met This Shift  5/13/2020 0913 by Dagmar Carson RN  Outcome: Met This Shift     Problem: Pain:  Goal: Pain level will decrease  Description: Pain level will decrease  5/13/2020 2236 by Wendie Foley RN  Outcome: Met This Shift  5/13/2020 1807 by Dagmar Carson RN  Outcome: Met This Shift  5/13/2020 0913 by Dagmar Carson RN  Outcome: Met This Shift  Goal: Control of chronic pain  Description: Control of chronic pain  5/13/2020 1807 by Dagmar Carson RN  Outcome: Met This Shift  5/13/2020 0913 by Dagmar Carson RN  Outcome: Met This Shift     Problem: Anxiety/Stress:  Goal: Level of anxiety will decrease  Description: Level of anxiety will decrease  5/13/2020 2236 by Wendie Foley RN  Outcome: Met This Shift  5/13/2020 0913 by Dagmar Carson RN  Outcome: Met This Shift     Problem: Aspiration:  Goal: Absence of aspiration  Description: Absence of aspiration  5/13/2020 2236 by Wendie Foley RN  Outcome: Met This Shift  5/13/2020 1807 by Dagmar Carson RN  Outcome: Met This Shift  5/13/2020 0913 by Dagmar Carson RN  Outcome: Met This Shift     Problem: Cardiac Output - Decreased:  Goal: Hemodynamic stability will improve  Description: Hemodynamic stability will improve  5/13/2020 1807 by Dagmar Carson RN  Outcome: Met This Shift  5/13/2020 0913 by Dagmar Carson RN  Outcome: Met This Shift     Problem: Fluid Volume - Imbalance:  Goal: Absence of imbalanced fluid volume signs and symptoms  Description: Absence of 1807 by Bettina Rodriguez, RN  Outcome: Met This Shift  5/13/2020 0913 by Bettina Rodriguez, RN  Outcome: Met This Shift

## 2020-05-14 NOTE — PROGRESS NOTES
Department of Internal Medicine  Infectious Diseases  Progress Note      C/C :   Pneumonia, fever, leukocytosis s/p VATS     Pt it awake and alert  Denies fever or chest pain   Chest tube removed  Afebrile today       Current Facility-Administered Medications   Medication Dose Route Frequency Provider Last Rate Last Dose    midazolam (VERSED) injection 6 mg  6 mg Intravenous On Call Jeni Beavers MD        fentaNYL (SUBLIMAZE) injection 100 mcg  100 mcg Intravenous On Call Jeni Beavers MD        potassium bicarb-citric acid (EFFER-K) effervescent tablet 40 mEq  40 mEq Oral Once Jeni Beavers MD        bisacodyl (DULCOLAX) suppository 10 mg  10 mg Rectal Daily PRN Jeni Beavers MD        oxyCODONE (ROXICODONE) 5 MG/5ML solution 10 mg  10 mg Oral Q6H PRN Jeni Beavers MD        Or    oxyCODONE (ROXICODONE) 5 MG/5ML solution 15 mg  15 mg Oral Q6H PRN Jeni Beavers MD   15 mg at 05/14/20 0901    acetaminophen (TYLENOL) 160 MG/5ML solution 650 mg  650 mg Oral Q6H Jeni Beavers MD   650 mg at 05/14/20 0901    0.9 % sodium chloride infusion admixture  33.3 mL Intravenous Q8H Alberto Postal, APRN - CNP 33.3 mL/hr at 05/14/20 0805 33.3 mL at 05/14/20 0805    And    meropenem (MERREM) 1 g in sodium chloride 0.9 % 100 mL IVPB (mini-bag)  1 g Intravenous Q8H Alberto Postal, APRN - CNP   Stopped at 05/14/20 0800    enoxaparin (LOVENOX) injection 30 mg  30 mg Subcutaneous BID Alberto Postal, APRN - CNP   30 mg at 05/14/20 0902    ipratropium-albuterol (DUONEB) nebulizer solution 1 ampule  1 ampule Inhalation Q4H WA Alberto Postal, APRN - CNP   1 ampule at 05/14/20 0844    HYDROmorphone (DILAUDID) injection 0.5 mg  0.5 mg Intravenous Q4H PRN Jeni Beavers MD   0.5 mg at 05/14/20 0142    methocarbamol (ROBAXIN) tablet 1,000 mg  1,000 mg Oral 4x Daily Jeni Beavers MD   1,000 mg at 05/14/20 0902    lidocaine 4 % external patch 1 patch  1 patch Transdermal Daily Jeni Beavers MD   1 patch at 05/14/20 0915    guaiFENesin tablet 400 mg  400 mg Oral BID Samantha Charly, APRN - CNP   400 mg at 05/14/20 0902    gabapentin (NEURONTIN) 250 MG/5ML solution 250 mg  250 mg Oral 3 times per day Samantha Charly, APRN - CNP   250 mg at 05/14/20 0510    sodium chloride (Inhalant) 3 % nebulizer solution 4 mL  4 mL Nebulization Q4H WA Samantha Charly, APRN - CNP   4 mL at 05/14/20 0844    polyethylene glycol (GLYCOLAX) packet 17 g  17 g Oral Daily Samantha Charly, APRN - CNP   17 g at 05/14/20 0902    famotidine (PEPCID) suspension 20 mg  20 mg Per NG tube BID Samantha Charly, APRN - CNP   20 mg at 05/14/20 0915    sodium chloride flush 0.9 % injection 10 mL  10 mL Intravenous PRN Samantha Charly, APRN - CNP   10 mL at 05/14/20 0901    heparin flush 100 UNIT/ML injection 300 Units  3 mL Intravenous 2 times per day Samantha Charly, APRN - CNP   300 Units at 05/14/20 0902    heparin flush 100 UNIT/ML injection 300 Units  3 mL Intracatheter PRN Samantha Charly, APRN - CNP   300 Units at 05/03/20 2011    melatonin tablet 6 mg  6 mg Oral Nightly Samantha Charly, APRN - CNP   6 mg at 05/13/20 2029    sennosides-docusate sodium (SENOKOT-S) 8.6-50 MG tablet 2 tablet  2 tablet Oral BID Samantha Charly, APRN - CNP   2 tablet at 05/14/20 0915    promethazine (PHENERGAN) tablet 12.5 mg  12.5 mg Oral Q6H PRN Samantha Charly, APRN - CNP        Or    ondansetron (ZOFRAN) injection 4 mg  4 mg Intravenous Q6H PRN Samantha Charly, APRN - CNP   4 mg at 05/07/20 0315    chlorhexidine (PERIDEX) 0.12 % solution 15 mL  15 mL Mouth/Throat BID Samantha Charly, APRN - CNP   15 mL at 05/14/20 0901    Akwa Tears (LACRILUBE) 2-15-83 % opthalmic ointment   Both Eyes PRN Samantha Charly, APRN - CNP           REVIEW OF SYSTEMS:      CONSTITUTIONAL:Denies fever   HEENT: denies blurring of vision or double vision, denies hearing problem  RESPIRATORY: Denies chest pain .   CARDIOVASCULAR:  Denies palpitation  GASTROINTESTINAL:  Denies abdomen pain, diarrhea or constipation. GENITOURINARY:  Denies burning urination or frequency of urination  INTEGUMENT: denies wound , rash  HEMATOLOGIC/LYMPHATIC:  Denies lymph node swelling, gum bleeding or easy bruising. MUSCULOSKELETAL:  Pain   NEUROLOGICAL: denies headache    PHYSICAL EXAM:      Vitals:     BP (!) 108/55   Pulse 67   Temp 99.3 °F (37.4 °C) (Oral)   Resp 13   Ht 6' (1.829 m)   Wt 167 lb 1.6 oz (75.8 kg)   SpO2 98%   BMI 22.66 kg/m²     General Appearance:    Awake, ventilated , FiO2 70 %  , PEEP 12   Head:    Normocephalic, atraumatic   Eyes:    No pallor, no icterus,   Ears:    No obvious deformity or drainage.    Nose:   No nasal drainage   Throat:   Mucosa moist, no oral thrush   Neck:   Supple, no lymphadenopathy   Lungs:     Right lung crackles, right chest tube    Heart:    Regular rate and rhythm,no murmur    Abdomen:     Soft, non-tender, bowel sounds present , PEG tube    Extremities:   No edema, no cyanosis    Pulses:   Dorsalis pedis palpable    Skin:   no rashes or lesions       DATA:      CBC with Differential:      Lab Results   Component Value Date    WBC 13.3 05/14/2020    RBC 2.52 05/14/2020    HGB 7.4 05/14/2020    HCT 23.5 05/14/2020     05/14/2020    MCV 93.3 05/14/2020    MCH 29.4 05/14/2020    MCHC 31.5 05/14/2020    RDW 14.2 05/14/2020    METASPCT 0.9 05/04/2020    LYMPHOPCT 17.1 05/14/2020    MONOPCT 8.0 05/14/2020    BASOPCT 0.2 05/14/2020    MONOSABS 1.07 05/14/2020    LYMPHSABS 2.27 05/14/2020    EOSABS 0.25 05/14/2020    BASOSABS 0.03 05/14/2020       CMP     Lab Results   Component Value Date     05/14/2020    K 3.7 05/14/2020    CL 98 05/14/2020    CO2 29 05/14/2020    BUN 23 05/14/2020    CREATININE 0.7 05/14/2020    GFRAA >60 05/14/2020    LABGLOM >60 05/14/2020    GLUCOSE 102 05/14/2020    PROT 6.3 05/14/2020    LABALBU 2.6 05/14/2020    CALCIUM 8.5 05/14/2020    BILITOT <0.2 05/14/2020    ALKPHOS 83 05/14/2020    AST 25 05/14/2020    ALT 49 05/14/2020         Hepatic

## 2020-05-14 NOTE — PROGRESS NOTES
OCCUPATIONAL THERAPY    Date:2020  Patient Name: Ara Felty  MRN: [id number]  : 1995  Room: 3802/3802-A     Chart removed; attempted OT session this pm. Noted chest tubes removed. Pt refused session due to c/o increased pain. Benefits of mobility reviewed with patient. Pt agreeable to participate at later time. Will try back as able.    Belkis Kennedy, OTR/L [id number]

## 2020-05-14 NOTE — PROCEDURES
[address] [place] [address]  BRONCHOSCOPY PROCEDURE NOTE    Procedure Date: 5/14/2020    Pre-op Diagnosis: respiratory failure    Post-op Diagnosis: same     Procedure:  Flexible bronchoscopy, therapeutic    Attending: Mortimer Nian, MD    Assistant: Ishan Gan MD PGY3    Specimen: None    Complications: None    Condition: Critical    Procedure: At the bedside in the ICU, the patient was sedated with 6mg Versed and 100mcg fentanyl. Heart rate, blood pressure, respiratory rate, and oxygen saturation were monitored. The bronchoscope was inserted via the tracheostomy. Thick white secretions were suctioned from the right mainstem bronchus and all right 1st and 2nd order bronchi. The left bronchus and bronchi were clear. The patient tolerated the procedure well with no readily apparent complications.     Electronically signed by Ishan Gan MD on 5/14/2020 at 3:36 PM

## 2020-05-15 ENCOUNTER — APPOINTMENT (OUTPATIENT)
Dept: GENERAL RADIOLOGY | Age: 25
DRG: 004 | End: 2020-05-15
Payer: MEDICAID

## 2020-05-15 LAB
BLOOD CULTURE, ROUTINE: NORMAL
CULTURE, BLOOD 2: NORMAL

## 2020-05-15 PROCEDURE — 2580000003 HC RX 258: Performed by: NURSE PRACTITIONER

## 2020-05-15 PROCEDURE — 6360000002 HC RX W HCPCS: Performed by: NURSE PRACTITIONER

## 2020-05-15 PROCEDURE — 99152 MOD SED SAME PHYS/QHP 5/>YRS: CPT | Performed by: SURGERY

## 2020-05-15 PROCEDURE — 6370000000 HC RX 637 (ALT 250 FOR IP): Performed by: NURSE PRACTITIONER

## 2020-05-15 PROCEDURE — 6370000000 HC RX 637 (ALT 250 FOR IP): Performed by: STUDENT IN AN ORGANIZED HEALTH CARE EDUCATION/TRAINING PROGRAM

## 2020-05-15 PROCEDURE — 2580000003 HC RX 258: Performed by: INTERNAL MEDICINE

## 2020-05-15 PROCEDURE — 31646 BRNCHSC W/THER ASPIR SBSQ: CPT | Performed by: SURGERY

## 2020-05-15 PROCEDURE — 94640 AIRWAY INHALATION TREATMENT: CPT

## 2020-05-15 PROCEDURE — 71045 X-RAY EXAM CHEST 1 VIEW: CPT

## 2020-05-15 PROCEDURE — 6360000002 HC RX W HCPCS

## 2020-05-15 PROCEDURE — 97530 THERAPEUTIC ACTIVITIES: CPT

## 2020-05-15 PROCEDURE — 6360000002 HC RX W HCPCS: Performed by: STUDENT IN AN ORGANIZED HEALTH CARE EDUCATION/TRAINING PROGRAM

## 2020-05-15 PROCEDURE — 2000000000 HC ICU R&B

## 2020-05-15 PROCEDURE — 94003 VENT MGMT INPAT SUBQ DAY: CPT

## 2020-05-15 PROCEDURE — 0BC68ZZ EXTIRPATION OF MATTER FROM RIGHT LOWER LOBE BRONCHUS, VIA NATURAL OR ARTIFICIAL OPENING ENDOSCOPIC: ICD-10-PCS | Performed by: SURGERY

## 2020-05-15 PROCEDURE — 3609010900 HC BRONCHOSCOPY THERAPUTIC ASPIRATION INITIAL: Performed by: SURGERY

## 2020-05-15 PROCEDURE — 6360000002 HC RX W HCPCS: Performed by: INTERNAL MEDICINE

## 2020-05-15 PROCEDURE — 2709999900 HC NON-CHARGEABLE SUPPLY: Performed by: SURGERY

## 2020-05-15 PROCEDURE — 0BC38ZZ EXTIRPATION OF MATTER FROM RIGHT MAIN BRONCHUS, VIA NATURAL OR ARTIFICIAL OPENING ENDOSCOPIC: ICD-10-PCS | Performed by: SURGERY

## 2020-05-15 PROCEDURE — 99291 CRITICAL CARE FIRST HOUR: CPT | Performed by: SURGERY

## 2020-05-15 PROCEDURE — 94669 MECHANICAL CHEST WALL OSCILL: CPT

## 2020-05-15 PROCEDURE — 0BC48ZZ EXTIRPATION OF MATTER FROM RIGHT UPPER LOBE BRONCHUS, VIA NATURAL OR ARTIFICIAL OPENING ENDOSCOPIC: ICD-10-PCS | Performed by: SURGERY

## 2020-05-15 RX ORDER — FENTANYL CITRATE 50 UG/ML
100 INJECTION, SOLUTION INTRAMUSCULAR; INTRAVENOUS
Status: COMPLETED | OUTPATIENT
Start: 2020-05-15 | End: 2020-05-15

## 2020-05-15 RX ORDER — OXYCODONE HCL 5 MG/5 ML
5 SOLUTION, ORAL ORAL EVERY 6 HOURS PRN
Status: DISCONTINUED | OUTPATIENT
Start: 2020-05-15 | End: 2020-05-18

## 2020-05-15 RX ORDER — MIDAZOLAM HYDROCHLORIDE 1 MG/ML
INJECTION INTRAMUSCULAR; INTRAVENOUS
Status: COMPLETED
Start: 2020-05-15 | End: 2020-05-15

## 2020-05-15 RX ORDER — MIDAZOLAM HYDROCHLORIDE 1 MG/ML
2 INJECTION INTRAMUSCULAR; INTRAVENOUS ONCE
Status: COMPLETED | OUTPATIENT
Start: 2020-05-15 | End: 2020-05-15

## 2020-05-15 RX ORDER — MIDAZOLAM HYDROCHLORIDE 1 MG/ML
6 INJECTION INTRAMUSCULAR; INTRAVENOUS
Status: COMPLETED | OUTPATIENT
Start: 2020-05-15 | End: 2020-05-15

## 2020-05-15 RX ORDER — FENTANYL CITRATE 50 UG/ML
INJECTION, SOLUTION INTRAMUSCULAR; INTRAVENOUS
Status: COMPLETED
Start: 2020-05-15 | End: 2020-05-15

## 2020-05-15 RX ORDER — OXYCODONE HCL 5 MG/5 ML
10 SOLUTION, ORAL ORAL EVERY 6 HOURS PRN
Status: DISCONTINUED | OUTPATIENT
Start: 2020-05-15 | End: 2020-05-18

## 2020-05-15 RX ORDER — FENTANYL CITRATE 50 UG/ML
100 INJECTION, SOLUTION INTRAMUSCULAR; INTRAVENOUS ONCE
Status: COMPLETED | OUTPATIENT
Start: 2020-05-15 | End: 2020-05-15

## 2020-05-15 RX ADMIN — SENNOSIDES AND DOCUSATE SODIUM 2 TABLET: 8.6; 5 TABLET ORAL at 21:05

## 2020-05-15 RX ADMIN — SODIUM CHLORIDE 33.3 ML: 9 INJECTION, SOLUTION INTRAVENOUS at 06:03

## 2020-05-15 RX ADMIN — SENNOSIDES AND DOCUSATE SODIUM 2 TABLET: 8.6; 5 TABLET ORAL at 08:44

## 2020-05-15 RX ADMIN — SODIUM CHLORIDE, PRESERVATIVE FREE 300 UNITS: 5 INJECTION INTRAVENOUS at 08:45

## 2020-05-15 RX ADMIN — ENOXAPARIN SODIUM 30 MG: 30 INJECTION SUBCUTANEOUS at 08:45

## 2020-05-15 RX ADMIN — SODIUM CHLORIDE SOLN NEBU 3% 4 ML: 3 NEBU SOLN at 11:05

## 2020-05-15 RX ADMIN — METHOCARBAMOL TABLETS 1000 MG: 500 TABLET, COATED ORAL at 20:57

## 2020-05-15 RX ADMIN — MIDAZOLAM 6 MG: 1 INJECTION INTRAMUSCULAR; INTRAVENOUS at 13:19

## 2020-05-15 RX ADMIN — SODIUM CHLORIDE SOLN NEBU 3% 4 ML: 3 NEBU SOLN at 16:01

## 2020-05-15 RX ADMIN — ACETAMINOPHEN 650 MG: 160 SOLUTION ORAL at 20:56

## 2020-05-15 RX ADMIN — MEROPENEM 1 G: 1 INJECTION, POWDER, FOR SOLUTION INTRAVENOUS at 02:30

## 2020-05-15 RX ADMIN — SODIUM CHLORIDE SOLN NEBU 3% 4 ML: 3 NEBU SOLN at 07:39

## 2020-05-15 RX ADMIN — SODIUM CHLORIDE SOLN NEBU 3% 4 ML: 3 NEBU SOLN at 20:40

## 2020-05-15 RX ADMIN — ACETAMINOPHEN 650 MG: 160 SOLUTION ORAL at 02:29

## 2020-05-15 RX ADMIN — MELATONIN 3 MG ORAL TABLET 6 MG: 3 TABLET ORAL at 20:57

## 2020-05-15 RX ADMIN — METHOCARBAMOL TABLETS 1000 MG: 500 TABLET, COATED ORAL at 13:29

## 2020-05-15 RX ADMIN — ENOXAPARIN SODIUM 30 MG: 30 INJECTION SUBCUTANEOUS at 20:56

## 2020-05-15 RX ADMIN — CHLORHEXIDINE GLUCONATE 0.12% ORAL RINSE 15 ML: 1.2 LIQUID ORAL at 08:44

## 2020-05-15 RX ADMIN — SODIUM CHLORIDE, PRESERVATIVE FREE 10 ML: 5 INJECTION INTRAVENOUS at 08:45

## 2020-05-15 RX ADMIN — GABAPENTIN 250 MG: 250 SUSPENSION ORAL at 13:31

## 2020-05-15 RX ADMIN — Medication 20 MG: at 09:58

## 2020-05-15 RX ADMIN — OXYCODONE HYDROCHLORIDE 10 MG: 5 SOLUTION ORAL at 18:23

## 2020-05-15 RX ADMIN — IPRATROPIUM BROMIDE AND ALBUTEROL SULFATE 1 AMPULE: 2.5; .5 SOLUTION RESPIRATORY (INHALATION) at 11:05

## 2020-05-15 RX ADMIN — ERTAPENEM SODIUM 1000 MG: 1 INJECTION, POWDER, LYOPHILIZED, FOR SOLUTION INTRAMUSCULAR; INTRAVENOUS at 18:24

## 2020-05-15 RX ADMIN — MIDAZOLAM 2 MG: 1 INJECTION INTRAMUSCULAR; INTRAVENOUS at 13:27

## 2020-05-15 RX ADMIN — MEROPENEM 1 G: 1 INJECTION, POWDER, FOR SOLUTION INTRAVENOUS at 11:08

## 2020-05-15 RX ADMIN — ACETAMINOPHEN 650 MG: 160 SOLUTION ORAL at 08:44

## 2020-05-15 RX ADMIN — GUAIFENESIN 400 MG: 400 TABLET, FILM COATED ORAL at 20:57

## 2020-05-15 RX ADMIN — METHOCARBAMOL TABLETS 1000 MG: 500 TABLET, COATED ORAL at 08:46

## 2020-05-15 RX ADMIN — SODIUM CHLORIDE, PRESERVATIVE FREE 300 UNITS: 5 INJECTION INTRAVENOUS at 22:58

## 2020-05-15 RX ADMIN — OXYCODONE HYDROCHLORIDE 10 MG: 5 SOLUTION ORAL at 00:22

## 2020-05-15 RX ADMIN — HYDROMORPHONE HYDROCHLORIDE 0.5 MG: 1 INJECTION, SOLUTION INTRAMUSCULAR; INTRAVENOUS; SUBCUTANEOUS at 02:25

## 2020-05-15 RX ADMIN — ACETAMINOPHEN 650 MG: 160 SOLUTION ORAL at 15:15

## 2020-05-15 RX ADMIN — IPRATROPIUM BROMIDE AND ALBUTEROL SULFATE 1 AMPULE: 2.5; .5 SOLUTION RESPIRATORY (INHALATION) at 07:39

## 2020-05-15 RX ADMIN — Medication 20 MG: at 20:57

## 2020-05-15 RX ADMIN — OXYCODONE HYDROCHLORIDE 10 MG: 5 SOLUTION ORAL at 11:49

## 2020-05-15 RX ADMIN — IPRATROPIUM BROMIDE AND ALBUTEROL SULFATE 1 AMPULE: 2.5; .5 SOLUTION RESPIRATORY (INHALATION) at 16:00

## 2020-05-15 RX ADMIN — METHOCARBAMOL TABLETS 1000 MG: 500 TABLET, COATED ORAL at 17:03

## 2020-05-15 RX ADMIN — MIDAZOLAM HYDROCHLORIDE 2 MG: 1 INJECTION INTRAMUSCULAR; INTRAVENOUS at 13:27

## 2020-05-15 RX ADMIN — GUAIFENESIN 400 MG: 400 TABLET, FILM COATED ORAL at 08:46

## 2020-05-15 RX ADMIN — POLYETHYLENE GLYCOL 3350 17 G: 17 POWDER, FOR SOLUTION ORAL at 08:44

## 2020-05-15 RX ADMIN — CHLORHEXIDINE GLUCONATE 0.12% ORAL RINSE 15 ML: 1.2 LIQUID ORAL at 20:56

## 2020-05-15 RX ADMIN — IPRATROPIUM BROMIDE AND ALBUTEROL SULFATE 1 AMPULE: 2.5; .5 SOLUTION RESPIRATORY (INHALATION) at 20:40

## 2020-05-15 RX ADMIN — FENTANYL CITRATE 100 MCG: 50 INJECTION, SOLUTION INTRAMUSCULAR; INTRAVENOUS at 13:19

## 2020-05-15 RX ADMIN — FENTANYL CITRATE 100 MCG: 50 INJECTION, SOLUTION INTRAMUSCULAR; INTRAVENOUS at 13:27

## 2020-05-15 RX ADMIN — GABAPENTIN 250 MG: 250 SUSPENSION ORAL at 22:59

## 2020-05-15 ASSESSMENT — PAIN DESCRIPTION - PROGRESSION
CLINICAL_PROGRESSION: RAPIDLY WORSENING
CLINICAL_PROGRESSION: NOT CHANGED

## 2020-05-15 ASSESSMENT — PULMONARY FUNCTION TESTS
PIF_VALUE: 21
PIF_VALUE: 27
PIF_VALUE: 28
PIF_VALUE: 24
PIF_VALUE: 26
PIF_VALUE: 20
PIF_VALUE: 20
PIF_VALUE: 29
PIF_VALUE: 29
PIF_VALUE: 33
PIF_VALUE: 26
PIF_VALUE: 21
PIF_VALUE: 21
PIF_VALUE: 20
PIF_VALUE: 28
PIF_VALUE: 23
PIF_VALUE: 35
PIF_VALUE: 27
PIF_VALUE: 28
PIF_VALUE: 29
PIF_VALUE: 24
PIF_VALUE: 22
PIF_VALUE: 27
PIF_VALUE: 21
PIF_VALUE: 21
PIF_VALUE: 23
PIF_VALUE: 28

## 2020-05-15 ASSESSMENT — PAIN DESCRIPTION - DESCRIPTORS: DESCRIPTORS: ACHING;STABBING;CONSTANT

## 2020-05-15 ASSESSMENT — PAIN SCALES - GENERAL
PAINLEVEL_OUTOF10: 10
PAINLEVEL_OUTOF10: 10
PAINLEVEL_OUTOF10: 0
PAINLEVEL_OUTOF10: 0
PAINLEVEL_OUTOF10: 6
PAINLEVEL_OUTOF10: 10
PAINLEVEL_OUTOF10: 10
PAINLEVEL_OUTOF10: 3
PAINLEVEL_OUTOF10: 8
PAINLEVEL_OUTOF10: 10
PAINLEVEL_OUTOF10: 6

## 2020-05-15 ASSESSMENT — PAIN - FUNCTIONAL ASSESSMENT
PAIN_FUNCTIONAL_ASSESSMENT: PREVENTS OR INTERFERES WITH ALL ACTIVE AND SOME PASSIVE ACTIVITIES
PAIN_FUNCTIONAL_ASSESSMENT: PREVENTS OR INTERFERES WITH ALL ACTIVE AND SOME PASSIVE ACTIVITIES

## 2020-05-15 ASSESSMENT — PAIN DESCRIPTION - ORIENTATION
ORIENTATION: MID;LEFT;RIGHT
ORIENTATION: MID;LEFT;RIGHT

## 2020-05-15 ASSESSMENT — PAIN DESCRIPTION - PAIN TYPE
TYPE: ACUTE PAIN;SURGICAL PAIN

## 2020-05-15 ASSESSMENT — PAIN DESCRIPTION - FREQUENCY
FREQUENCY: CONTINUOUS
FREQUENCY: CONTINUOUS

## 2020-05-15 ASSESSMENT — PAIN DESCRIPTION - ONSET
ONSET: ON-GOING
ONSET: ON-GOING

## 2020-05-15 ASSESSMENT — PAIN DESCRIPTION - LOCATION
LOCATION: OTHER (COMMENT)
LOCATION: CHEST;NECK
LOCATION: CHEST;NECK

## 2020-05-15 NOTE — PROGRESS NOTES
[facility]  SURGICAL INTENSIVE CARE UNIT (SICU)  ATTENDING PHYSICIAN CRITICAL CARE PROGRESS NOTE     I have examined the patient, reviewed the record, and discussed the case with the APN/ resident. Please refer to the APN/ resident's note. I agree with the assessment and plan. I have reviewed all relevant labs and imaging data. The following summarizes my clinical findings and independent assessment. CC:  Critical care management after [address] Course/Overnight Events:  4/21 Left neck exploration and bilateral chest tube insertion  4/23 tracheostomy by ENT  4/24 upsized trach to #8  4/25 brochoscopy for mucous plug and 2nd Right chest tube  4/27--febrile overnight  4/28--febrile again overnight; bronched yesterday  4/29--bronched again yesterday  4/30--bronched overnight  5/1--increased O2 requirement overnight  5/2--febrile overnight  5/3--increased O2 requirement again last night  5/4-- Febrile overnight , Bronchoscopy yesterday for Right main stem mucus plug   5/5 -- Tachycardia improving , low grade fevers still   5/6 No Fevers overnight , Bronchoscopy early am for desaturation, Chest tube pulled yesterday   5/7 Tachycardia and tachypea overnight   5/8 No acute issues overnight   5/9 Bronchoscopy for mucus plug and PEG tube placed yesterday Fever Max 102.1   5/10 No acute overnight issues   5/11- bronchoscopy done yesterday. Remains on ventilator. Going to the OR today for right VATS with CT surgery. 5/12 - went for uncomplicated right VATS. 5/13 - recurrent RLL collapse. Bronch today. 5/14 - recurrent RLL collapse. Bronch today. 2nd right CT removed yesterday.   5/15--recurrent RLL collapse--for another bronch today    Trached; sedated; narcotized  Eyes open  Follows commands  Heart: Regular  Lungs: Coarse bilaterally  Abdomen: Soft; bowel sounds active; nontender/nondistended; PEG in place  Skin: Warm/dry  Extremities: Strength 5 out of 5 bilateral upper extremities; flaccid
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
## 2020-05-15 NOTE — PROGRESS NOTES
 sodium chloride (Inhalant) 3 % nebulizer solution 4 mL  4 mL Nebulization Q4H WA Shea Fey, APRN - CNP   4 mL at 05/15/20 1105    polyethylene glycol (GLYCOLAX) packet 17 g  17 g Oral Daily Shea Fey, APRN - CNP   17 g at 05/15/20 0844    famotidine (PEPCID) suspension 20 mg  20 mg Per NG tube BID Shea Fey, APRN - CNP   20 mg at 05/15/20 0958    sodium chloride flush 0.9 % injection 10 mL  10 mL Intravenous PRN Shea Fey, APRN - CNP   10 mL at 05/15/20 0845    heparin flush 100 UNIT/ML injection 300 Units  3 mL Intravenous 2 times per day Shea Fey, APRN - CNP   300 Units at 05/15/20 0845    heparin flush 100 UNIT/ML injection 300 Units  3 mL Intracatheter PRN Shea Fey, APRN - CNP   300 Units at 05/03/20 2011    melatonin tablet 6 mg  6 mg Oral Nightly Shea Fey, APRN - CNP   6 mg at 05/14/20 2037    sennosides-docusate sodium (SENOKOT-S) 8.6-50 MG tablet 2 tablet  2 tablet Oral BID Shea Fey, APRN - CNP   2 tablet at 05/15/20 0844    promethazine (PHENERGAN) tablet 12.5 mg  12.5 mg Oral Q6H PRN Shea Fey, APRN - CNP        Or    ondansetron (ZOFRAN) injection 4 mg  4 mg Intravenous Q6H PRN Shea Fey, APRN - CNP   4 mg at 05/07/20 0315    chlorhexidine (PERIDEX) 0.12 % solution 15 mL  15 mL Mouth/Throat BID Shea Fey, APRN - CNP   15 mL at 05/15/20 5928    Akwa Tears (LACRILUBE) 2-15-83 % opthalmic ointment   Both Eyes PRN Shea Fey, APRN - CNP           REVIEW OF SYSTEMS:      CONSTITUTIONAL:Denies fever   HEENT: denies blurring of vision or double vision, denies hearing problem  RESPIRATORY: Denies chest pain . CARDIOVASCULAR:  Denies palpitation  GASTROINTESTINAL:  Denies abdomen pain, diarrhea or constipation. GENITOURINARY:  Denies burning urination or frequency of urination  INTEGUMENT: denies wound , rash  HEMATOLOGIC/LYMPHATIC:  Denies lymph node swelling, gum bleeding or easy bruising.   MUSCULOSKELETAL:  Pain   NEUROLOGICAL: Date    PROTIME 15.0 05/10/2020    INR 1.3 05/10/2020       TSH:  No results found for: TSH    U/A:  No results found for: NITRITE, COLORU, PHUR, LABCAST, WBCUA, RBCUA, MUCUS, TRICHOMONAS, YEAST, BACTERIA, CLARITYU, SPECGRAV, LEUKOCYTESUR, UROBILINOGEN, BILIRUBINUR, BLOODU, GLUCOSEU, AMORPHOUS    ABG:    Lab Results   Component Value Date    STA5NJB 24.1 04/21/2020    BIC3UOV 44.0 04/21/2020    PO2ART 309.9 04/21/2020       MICROBIOLOGY:    Blood culture - negative       Sputum Culture -   Group 6: <25 PMN's/LPF and <25 Epithelial cells/LPF   Few Polymorphonuclear leukocytes   Epithelial cells not seen   Rare Gram positive coccobacillary organisms   Rare Gram negative rods    Narrative:         Sputum culture :     Narrative   Performed by: [address] [place] [address] Lab   Source: [facility]       Site:              Susceptibility     Escherichia coli (1)     Antibiotic Interpretation FLAKO Status    ampicillin Resistant >=^32 mcg/mL     ceFAZolin Sensitive ^8 mcg/mL     cefepime Sensitive <=^0.12 mcg/mL     cefTRIAXone Sensitive <=^0.25 mcg/mL     Confirmatory Extended Spectrum Beta-Lactamase  Neg mcg/mL     ertapenem Sensitive <=^0.12 mcg/mL     gentamicin Sensitive <=^1 mcg/mL     levofloxacin Sensitive <=^0.12 mcg/mL     piperacillin-tazobactam Sensitive <=^4 mcg/mL     trimethoprim-sulfamethoxazole Sensitive <=^20 mcg/mL     Lab and Collection       Tissue -    Susceptibility     Escherichia coli (1)     Antibiotic Interpretation FLAKO Status    ampicillin Resistant >=^32 mcg/mL     ceFAZolin Sensitive ^8 mcg/mL     cefepime Sensitive <=^0.12 mcg/mL     cefTRIAXone Sensitive <=^0.25 mcg/mL     Confirmatory Extended Spectrum Beta-Lactamase  Neg mcg/mL     ertapenem Sensitive <=^0.12 mcg/mL     gentamicin Sensitive <=^1 mcg/mL     levofloxacin Sensitive <=^0.12 mcg/mL     piperacillin-tazobactam Sensitive <=^4 mcg/mL     trimethoprim-sulfamethoxazole Sensitive <=^20 mcg/mL     Lab and Collection           Radiology

## 2020-05-15 NOTE — PROGRESS NOTES
precautions, turn, reposition  · Heme: No acute issues. Monday/thursday labs    Bowel regimen: colace, senna, glycolax, dulcolax prn  Pain control/Sedation: decrease prn oxycodone, robaxin, lidoderm, gabapentin  DVT prophylaxis: PCDs, lovenox  GI: pepcid  Glucose protocol: monitor BG  Mouth/Eye care: peridex, tears.    [place]: in place    Code status:    Full Code    Disposition: continue SICU     Electronically signed by Roni Garcia MD on 5/15/2020 at 8:41 AM

## 2020-05-15 NOTE — PLAN OF CARE
Problem: Falls - Risk of:  Goal: Will remain free from falls  Description: Will remain free from falls  5/15/2020 0934 by Lillian Martinez RN  Outcome: Met This Shift     Problem: Falls - Risk of:  Goal: Absence of physical injury  Description: Absence of physical injury  5/15/2020 0934 by Lillian Martinez RN  Outcome: Met This Shift     Problem: Pain:  Goal: Pain level will decrease  Description: Pain level will decrease  5/15/2020 0934 by Lillian Martinez RN  Outcome: Met This Shift     Problem: Pain:  Goal: Control of chronic pain  Description: Control of chronic pain  Outcome: Met This Shift     Problem: Discharge Planning:  Goal: Participates in care planning  Description: Participates in care planning  Outcome: Met This Shift     Problem: Discharge Planning:  Goal: Discharged to appropriate level of care  Description: Discharged to appropriate level of care  Outcome: Met This Shift     Problem: Airway Clearance - Ineffective:  Goal: Ability to maintain a clear airway will improve  Description: Ability to maintain a clear airway will improve  5/15/2020 0934 by Lillian Martinez RN  Outcome: Met This Shift     Problem: Anxiety/Stress:  Goal: Level of anxiety will decrease  Description: Level of anxiety will decrease  5/15/2020 0934 by Lillian Martinez RN  Outcome: Met This Shift     Problem: Aspiration:  Goal: Absence of aspiration  Description: Absence of aspiration  5/15/2020 0934 by Lillian Martinez RN  Outcome: Met This Shift     Problem: Gas Exchange - Impaired:  Goal: Levels of oxygenation will improve  Description: Levels of oxygenation will improve  5/15/2020 0934 by Lillian Martinez RN  Outcome: Met This Shift     Problem: Skin Integrity - Impaired:  Goal: Will show no infection signs and symptoms  Description: Will show no infection signs and symptoms  5/15/2020 0934 by Lillian Martinez RN  Outcome: Met This Shift     Problem: Skin Integrity - Impaired:  Goal: Absence of new skin breakdown  Description: Absence of new skin breakdown  5/15/2020 0934 by Nohemi Gomez RN  Outcome: Met This Shift     Problem: Infection:  Goal: Will remain free from infection  Description: Will remain free from infection  5/15/2020 0934 by Nohemi Gomez RN  Outcome: Met This Shift     Problem: Daily Care:  Goal: Daily care needs are met  Description: Daily care needs are met  5/15/2020 0934 by Nohemi Gomez RN  Outcome: Met This Shift     Problem: Musculor/Skeletal Functional Status  Goal: Highest potential functional level  5/15/2020 0934 by Nohemi Gomez RN  Outcome: Met This Shift     Problem: Musculor/Skeletal Functional Status  Goal: Absence of falls  Outcome: Met This Shift

## 2020-05-16 ENCOUNTER — APPOINTMENT (OUTPATIENT)
Dept: GENERAL RADIOLOGY | Age: 25
DRG: 004 | End: 2020-05-16
Payer: MEDICAID

## 2020-05-16 LAB
AADO2: 232.7 MMHG
AADO2: 415.4 MMHG
AADO2: 593 MMHG
ANAEROBIC CULTURE: NORMAL
ANAEROBIC CULTURE: NORMAL
B.E.: 3.6 MMOL/L (ref -3–3)
B.E.: 5.5 MMOL/L (ref -3–3)
B.E.: 6.8 MMOL/L (ref -3–3)
COHB: 0.2 % (ref 0–1.5)
COHB: 0.3 % (ref 0–1.5)
COHB: 0.3 % (ref 0–1.5)
CRITICAL: ABNORMAL
DATE ANALYZED: ABNORMAL
DATE OF COLLECTION: ABNORMAL
FIO2: 100 %
FIO2: 100 %
FIO2: 60 %
HCO3: 26.9 MMOL/L (ref 22–26)
HCO3: 30.8 MMOL/L (ref 22–26)
HCO3: 31.4 MMOL/L (ref 22–26)
HHB: 1.1 % (ref 0–5)
HHB: 1.6 % (ref 0–5)
HHB: 15.5 % (ref 0–5)
I/E RATIO: ABNORMAL
LAB: ABNORMAL
METHB: 0.2 % (ref 0–1.5)
METHB: 0.3 % (ref 0–1.5)
METHB: 0.4 % (ref 0–1.5)
MODE: ABNORMAL
MODE: ABNORMAL
MODE: AC
O2 CONTENT: 11.4 ML/DL
O2 CONTENT: 12.3 ML/DL
O2 CONTENT: 14.5 ML/DL
O2 SATURATION: 84.4 % (ref 92–98.5)
O2 SATURATION: 98.4 % (ref 92–98.5)
O2 SATURATION: 98.9 % (ref 92–98.5)
O2HB: 83.9 % (ref 94–97)
O2HB: 97.9 % (ref 94–97)
O2HB: 98.3 % (ref 94–97)
OPERATOR ID: 1394
PATIENT TEMP: 37 C
PCO2: 35 MMHG (ref 35–45)
PCO2: 45.2 MMHG (ref 35–45)
PCO2: 48.4 MMHG (ref 35–45)
PEEP/CPAP: 0 CMH2O
PEEP/CPAP: 0 CMH2O
PEEP/CPAP: 12 CMH2O
PFO2: 0.5 MMHG/%
PFO2: 2.24 MMHG/%
PFO2: 2.36 MMHG/%
PH BLOOD GAS: 7.42 (ref 7.35–7.45)
PH BLOOD GAS: 7.46 (ref 7.35–7.45)
PH BLOOD GAS: 7.5 (ref 7.35–7.45)
PIP: 30 CMH2O
PIP: 30 CMH2O
PO2: 141.6 MMHG (ref 75–100)
PO2: 224.2 MMHG (ref 75–100)
PO2: 49.8 MMHG (ref 75–100)
PS: 10 CMH20
PS: 10 CMH20
RI(T): 1.64
RI(T): 1.85
RI(T): 11.91
RR MECHANICAL: 16 B/MIN
RR MECHANICAL: 6 B/MIN
RR MECHANICAL: 8 B/MIN
SOURCE, BLOOD GAS: ABNORMAL
THB: 10.1 G/DL (ref 11.5–16.5)
THB: 8.7 G/DL (ref 11.5–16.5)
THB: 9.6 G/DL (ref 11.5–16.5)
TIME ANALYZED: 1014
TIME ANALYZED: 1857
TIME ANALYZED: 743
VT MECHANICAL: 500 ML

## 2020-05-16 PROCEDURE — 31624 DX BRONCHOSCOPE/LAVAGE: CPT

## 2020-05-16 PROCEDURE — 82805 BLOOD GASES W/O2 SATURATION: CPT

## 2020-05-16 PROCEDURE — 6370000000 HC RX 637 (ALT 250 FOR IP): Performed by: NURSE PRACTITIONER

## 2020-05-16 PROCEDURE — 6360000002 HC RX W HCPCS: Performed by: STUDENT IN AN ORGANIZED HEALTH CARE EDUCATION/TRAINING PROGRAM

## 2020-05-16 PROCEDURE — 0B958ZZ DRAINAGE OF RIGHT MIDDLE LOBE BRONCHUS, VIA NATURAL OR ARTIFICIAL OPENING ENDOSCOPIC: ICD-10-PCS | Performed by: SURGERY

## 2020-05-16 PROCEDURE — 94003 VENT MGMT INPAT SUBQ DAY: CPT

## 2020-05-16 PROCEDURE — 99291 CRITICAL CARE FIRST HOUR: CPT | Performed by: SURGERY

## 2020-05-16 PROCEDURE — 0B968ZZ DRAINAGE OF RIGHT LOWER LOBE BRONCHUS, VIA NATURAL OR ARTIFICIAL OPENING ENDOSCOPIC: ICD-10-PCS | Performed by: SURGERY

## 2020-05-16 PROCEDURE — 2000000000 HC ICU R&B

## 2020-05-16 PROCEDURE — 6360000002 HC RX W HCPCS

## 2020-05-16 PROCEDURE — 2580000003 HC RX 258: Performed by: INTERNAL MEDICINE

## 2020-05-16 PROCEDURE — 94640 AIRWAY INHALATION TREATMENT: CPT

## 2020-05-16 PROCEDURE — 31646 BRNCHSC W/THER ASPIR SBSQ: CPT | Performed by: SURGERY

## 2020-05-16 PROCEDURE — 2580000003 HC RX 258: Performed by: NURSE PRACTITIONER

## 2020-05-16 PROCEDURE — 71045 X-RAY EXAM CHEST 1 VIEW: CPT

## 2020-05-16 PROCEDURE — 6360000002 HC RX W HCPCS: Performed by: NURSE PRACTITIONER

## 2020-05-16 PROCEDURE — 6360000002 HC RX W HCPCS: Performed by: INTERNAL MEDICINE

## 2020-05-16 PROCEDURE — 6370000000 HC RX 637 (ALT 250 FOR IP): Performed by: STUDENT IN AN ORGANIZED HEALTH CARE EDUCATION/TRAINING PROGRAM

## 2020-05-16 RX ORDER — FENTANYL CITRATE 50 UG/ML
100 INJECTION, SOLUTION INTRAMUSCULAR; INTRAVENOUS
Status: COMPLETED | OUTPATIENT
Start: 2020-05-16 | End: 2020-05-16

## 2020-05-16 RX ORDER — FENTANYL CITRATE 50 UG/ML
100 INJECTION, SOLUTION INTRAMUSCULAR; INTRAVENOUS ONCE
Status: COMPLETED | OUTPATIENT
Start: 2020-05-16 | End: 2020-05-16

## 2020-05-16 RX ORDER — FENTANYL CITRATE 50 UG/ML
200 INJECTION, SOLUTION INTRAMUSCULAR; INTRAVENOUS
Status: COMPLETED | OUTPATIENT
Start: 2020-05-16 | End: 2020-05-16

## 2020-05-16 RX ORDER — FENTANYL CITRATE 50 UG/ML
INJECTION, SOLUTION INTRAMUSCULAR; INTRAVENOUS
Status: COMPLETED
Start: 2020-05-16 | End: 2020-05-16

## 2020-05-16 RX ORDER — MIDAZOLAM HYDROCHLORIDE 1 MG/ML
INJECTION INTRAMUSCULAR; INTRAVENOUS
Status: COMPLETED
Start: 2020-05-16 | End: 2020-05-16

## 2020-05-16 RX ORDER — MIDAZOLAM HYDROCHLORIDE 1 MG/ML
6 INJECTION INTRAMUSCULAR; INTRAVENOUS
Status: COMPLETED | OUTPATIENT
Start: 2020-05-16 | End: 2020-05-16

## 2020-05-16 RX ADMIN — SODIUM CHLORIDE SOLN NEBU 3% 4 ML: 3 NEBU SOLN at 15:53

## 2020-05-16 RX ADMIN — GABAPENTIN 250 MG: 250 SUSPENSION ORAL at 21:43

## 2020-05-16 RX ADMIN — GUAIFENESIN 400 MG: 400 TABLET, FILM COATED ORAL at 08:51

## 2020-05-16 RX ADMIN — ENOXAPARIN SODIUM 30 MG: 30 INJECTION SUBCUTANEOUS at 08:58

## 2020-05-16 RX ADMIN — SODIUM CHLORIDE SOLN NEBU 3% 4 ML: 3 NEBU SOLN at 20:03

## 2020-05-16 RX ADMIN — Medication 20 MG: at 21:43

## 2020-05-16 RX ADMIN — ACETAMINOPHEN 650 MG: 160 SOLUTION ORAL at 08:58

## 2020-05-16 RX ADMIN — FENTANYL CITRATE 100 MCG: 50 INJECTION, SOLUTION INTRAMUSCULAR; INTRAVENOUS at 08:30

## 2020-05-16 RX ADMIN — ACETAMINOPHEN 650 MG: 160 SOLUTION ORAL at 14:38

## 2020-05-16 RX ADMIN — SENNOSIDES AND DOCUSATE SODIUM 2 TABLET: 8.6; 5 TABLET ORAL at 08:52

## 2020-05-16 RX ADMIN — FENTANYL CITRATE 200 MCG: 50 INJECTION, SOLUTION INTRAMUSCULAR; INTRAVENOUS at 06:52

## 2020-05-16 RX ADMIN — OXYCODONE HYDROCHLORIDE 10 MG: 5 SOLUTION ORAL at 00:24

## 2020-05-16 RX ADMIN — MIDAZOLAM 6 MG: 1 INJECTION INTRAMUSCULAR; INTRAVENOUS at 06:53

## 2020-05-16 RX ADMIN — ACETAMINOPHEN 650 MG: 160 SOLUTION ORAL at 21:42

## 2020-05-16 RX ADMIN — MIDAZOLAM HYDROCHLORIDE 6 MG: 1 INJECTION INTRAMUSCULAR; INTRAVENOUS at 06:53

## 2020-05-16 RX ADMIN — OXYCODONE HYDROCHLORIDE 10 MG: 5 SOLUTION ORAL at 10:15

## 2020-05-16 RX ADMIN — GABAPENTIN 250 MG: 250 SUSPENSION ORAL at 14:36

## 2020-05-16 RX ADMIN — OXYCODONE HYDROCHLORIDE 10 MG: 5 SOLUTION ORAL at 17:28

## 2020-05-16 RX ADMIN — OXYCODONE HYDROCHLORIDE 10 MG: 5 SOLUTION ORAL at 23:30

## 2020-05-16 RX ADMIN — METHOCARBAMOL TABLETS 1000 MG: 500 TABLET, COATED ORAL at 08:51

## 2020-05-16 RX ADMIN — METHOCARBAMOL TABLETS 1000 MG: 500 TABLET, COATED ORAL at 12:33

## 2020-05-16 RX ADMIN — ENOXAPARIN SODIUM 30 MG: 30 INJECTION SUBCUTANEOUS at 21:42

## 2020-05-16 RX ADMIN — METHOCARBAMOL TABLETS 1000 MG: 500 TABLET, COATED ORAL at 17:28

## 2020-05-16 RX ADMIN — IPRATROPIUM BROMIDE AND ALBUTEROL SULFATE 1 AMPULE: 2.5; .5 SOLUTION RESPIRATORY (INHALATION) at 12:52

## 2020-05-16 RX ADMIN — IPRATROPIUM BROMIDE AND ALBUTEROL SULFATE 1 AMPULE: 2.5; .5 SOLUTION RESPIRATORY (INHALATION) at 07:57

## 2020-05-16 RX ADMIN — SODIUM CHLORIDE, PRESERVATIVE FREE 300 UNITS: 5 INJECTION INTRAVENOUS at 21:42

## 2020-05-16 RX ADMIN — ERTAPENEM SODIUM 1000 MG: 1 INJECTION, POWDER, LYOPHILIZED, FOR SOLUTION INTRAMUSCULAR; INTRAVENOUS at 18:20

## 2020-05-16 RX ADMIN — FENTANYL CITRATE 100 MCG: 50 INJECTION, SOLUTION INTRAMUSCULAR; INTRAVENOUS at 07:40

## 2020-05-16 RX ADMIN — ACETAMINOPHEN 650 MG: 160 SOLUTION ORAL at 04:07

## 2020-05-16 RX ADMIN — METHOCARBAMOL TABLETS 1000 MG: 500 TABLET, COATED ORAL at 21:42

## 2020-05-16 RX ADMIN — CHLORHEXIDINE GLUCONATE 0.12% ORAL RINSE 15 ML: 1.2 LIQUID ORAL at 08:53

## 2020-05-16 RX ADMIN — Medication 20 MG: at 09:00

## 2020-05-16 RX ADMIN — GUAIFENESIN 400 MG: 400 TABLET, FILM COATED ORAL at 21:42

## 2020-05-16 RX ADMIN — CHLORHEXIDINE GLUCONATE 0.12% ORAL RINSE 15 ML: 1.2 LIQUID ORAL at 21:43

## 2020-05-16 RX ADMIN — MELATONIN 3 MG ORAL TABLET 6 MG: 3 TABLET ORAL at 21:43

## 2020-05-16 RX ADMIN — SODIUM CHLORIDE SOLN NEBU 3% 4 ML: 3 NEBU SOLN at 12:49

## 2020-05-16 RX ADMIN — POLYETHYLENE GLYCOL 3350 17 G: 17 POWDER, FOR SOLUTION ORAL at 08:51

## 2020-05-16 RX ADMIN — SODIUM CHLORIDE SOLN NEBU 3% 4 ML: 3 NEBU SOLN at 07:57

## 2020-05-16 RX ADMIN — SODIUM CHLORIDE, PRESERVATIVE FREE 300 UNITS: 5 INJECTION INTRAVENOUS at 08:52

## 2020-05-16 ASSESSMENT — PAIN DESCRIPTION - PROGRESSION: CLINICAL_PROGRESSION: NOT CHANGED

## 2020-05-16 ASSESSMENT — PAIN SCALES - GENERAL
PAINLEVEL_OUTOF10: 7
PAINLEVEL_OUTOF10: 10
PAINLEVEL_OUTOF10: 3
PAINLEVEL_OUTOF10: 10
PAINLEVEL_OUTOF10: 0
PAINLEVEL_OUTOF10: 0
PAINLEVEL_OUTOF10: 6
PAINLEVEL_OUTOF10: 10
PAINLEVEL_OUTOF10: 5
PAINLEVEL_OUTOF10: 4
PAINLEVEL_OUTOF10: 5
PAINLEVEL_OUTOF10: 6
PAINLEVEL_OUTOF10: 10
PAINLEVEL_OUTOF10: 6

## 2020-05-16 ASSESSMENT — PULMONARY FUNCTION TESTS
PIF_VALUE: 32
PIF_VALUE: 32
PIF_VALUE: 34
PIF_VALUE: 32
PIF_VALUE: 33
PIF_VALUE: 32
PIF_VALUE: 32
PIF_VALUE: 29
PIF_VALUE: 26
PIF_VALUE: 26
PIF_VALUE: 33
PIF_VALUE: 27
PIF_VALUE: 35
PIF_VALUE: 32
PIF_VALUE: 33
PIF_VALUE: 32
PIF_VALUE: 32
PIF_VALUE: 27
PIF_VALUE: 32
PIF_VALUE: 32
PIF_VALUE: 26
PIF_VALUE: 26
PIF_VALUE: 34
PIF_VALUE: 28
PIF_VALUE: 27
PIF_VALUE: 32
PIF_VALUE: 32
PIF_VALUE: 33

## 2020-05-16 ASSESSMENT — PAIN DESCRIPTION - PAIN TYPE: TYPE: ACUTE PAIN;SURGICAL PAIN

## 2020-05-16 ASSESSMENT — PAIN DESCRIPTION - ONSET: ONSET: ON-GOING

## 2020-05-16 ASSESSMENT — PAIN - FUNCTIONAL ASSESSMENT: PAIN_FUNCTIONAL_ASSESSMENT: PREVENTS OR INTERFERES WITH ALL ACTIVE AND SOME PASSIVE ACTIVITIES

## 2020-05-16 ASSESSMENT — PAIN DESCRIPTION - LOCATION: LOCATION: CHEST;NECK

## 2020-05-16 ASSESSMENT — PAIN DESCRIPTION - DESCRIPTORS: DESCRIPTORS: CONSTANT;STABBING

## 2020-05-16 ASSESSMENT — PAIN DESCRIPTION - ORIENTATION: ORIENTATION: MID;RIGHT;LEFT

## 2020-05-16 ASSESSMENT — PAIN DESCRIPTION - FREQUENCY: FREQUENCY: CONTINUOUS

## 2020-05-16 NOTE — PROGRESS NOTES
Department of Internal Medicine  Infectious Diseases  Progress Note      C/C :   Pneumonia, fever, leukocytosis s/p VATS     Pt it awake and alert  Denies fever   Reports chest pain     Afebrile today       Current Facility-Administered Medications   Medication Dose Route Frequency Provider Last Rate Last Dose    fentaNYL (SUBLIMAZE) injection 100 mcg  100 mcg Intravenous Once Bettina Song MD        fentaNYL (SUBLIMAZE) 100 MCG/2ML injection             oxyCODONE (ROXICODONE) 5 MG/5ML solution 5 mg  5 mg Oral Q6H PRN Bettina Song MD        Or    oxyCODONE (ROXICODONE) 5 MG/5ML solution 10 mg  10 mg Oral Q6H PRN Bettina Song MD   10 mg at 05/16/20 0024    ertapenem (INVANZ) 1 g IVPB minibag  1 g Intravenous Q24H Haja Poe MD   Stopped at 05/15/20 1900    bisacodyl (DULCOLAX) suppository 10 mg  10 mg Rectal Daily PRN Bettina Song MD        acetaminophen (TYLENOL) 160 MG/5ML solution 650 mg  650 mg Oral Q6H Bettina Song MD   650 mg at 05/16/20 0407    enoxaparin (LOVENOX) injection 30 mg  30 mg Subcutaneous BID Analilia Islas, APRN - CNP   30 mg at 05/15/20 2056    ipratropium-albuterol (DUONEB) nebulizer solution 1 ampule  1 ampule Inhalation Q4H WA Analilia Islas APRN - CNP   1 ampule at 05/16/20 0757    methocarbamol (ROBAXIN) tablet 1,000 mg  1,000 mg Oral 4x Daily Bettina Song MD   1,000 mg at 05/15/20 2057    lidocaine 4 % external patch 1 patch  1 patch Transdermal Daily Bettina Song MD   1 patch at 05/15/20 0844    guaiFENesin tablet 400 mg  400 mg Oral BID Analilia Bending, APRN - CNP   400 mg at 05/15/20 2057    gabapentin (NEURONTIN) 250 MG/5ML solution 250 mg  250 mg Oral 3 times per day Analilia Bending, APRN - CNP   250 mg at 05/15/20 2259    sodium chloride (Inhalant) 3 % nebulizer solution 4 mL  4 mL Nebulization Q4H WA Analilia Islas APRN - CNP   4 mL at 05/16/20 0757    polyethylene glycol (GLYCOLAX) packet 17 g  17 g Oral Daily Analilia Islas, APRN - CNP   17 g at ventilated , FiO2 100 %  , PEEP 8   Head:    Normocephalic, atraumatic   Eyes:    No pallor, no icterus,   Ears:    No obvious deformity or drainage.    Nose:   No nasal drainage   Throat:   Mucosa moist, no oral thrush   Neck:   Supple, no lymphadenopathy   Lungs:     Right lung crackles, right chest tube    Heart:    Regular rate and rhythm,no murmur    Abdomen:     Soft, non-tender, bowel sounds present     Extremities:   No edema, no cyanosis    Pulses:   Dorsalis pedis palpable    Skin:   no rashes or lesions       DATA:      CBC with Differential:      Lab Results   Component Value Date    WBC 13.3 05/14/2020    RBC 2.52 05/14/2020    HGB 7.4 05/14/2020    HCT 23.5 05/14/2020     05/14/2020    MCV 93.3 05/14/2020    MCH 29.4 05/14/2020    MCHC 31.5 05/14/2020    RDW 14.2 05/14/2020    METASPCT 0.9 05/04/2020    LYMPHOPCT 17.1 05/14/2020    MONOPCT 8.0 05/14/2020    BASOPCT 0.2 05/14/2020    MONOSABS 1.07 05/14/2020    LYMPHSABS 2.27 05/14/2020    EOSABS 0.25 05/14/2020    BASOSABS 0.03 05/14/2020       CMP     Lab Results   Component Value Date     05/14/2020    K 3.7 05/14/2020    CL 98 05/14/2020    CO2 29 05/14/2020    BUN 23 05/14/2020    CREATININE 0.7 05/14/2020    GFRAA >60 05/14/2020    LABGLOM >60 05/14/2020    GLUCOSE 102 05/14/2020    PROT 6.3 05/14/2020    LABALBU 2.6 05/14/2020    CALCIUM 8.5 05/14/2020    BILITOT <0.2 05/14/2020    ALKPHOS 83 05/14/2020    AST 25 05/14/2020    ALT 49 05/14/2020         Hepatic Function Panel:    Lab Results   Component Value Date    ALKPHOS 83 05/14/2020    ALT 49 05/14/2020    AST 25 05/14/2020    PROT 6.3 05/14/2020    BILITOT <0.2 05/14/2020    LABALBU 2.6 05/14/2020       PT/INR:    Lab Results   Component Value Date    PROTIME 15.0 05/10/2020    INR 1.3 05/10/2020       TSH:  No results found for: TSH    U/A:  No results found for: NITRITE, COLORU, PHUR, LABCAST, WBCUA, RBCUA, MUCUS, TRICHOMONAS, YEAST, BACTERIA, CLARITYU, SPECGRAV, LEUKOCYTESUR, UROBILINOGEN, BILIRUBINUR, BLOODU, GLUCOSEU, AMORPHOUS    ABG:    Lab Results   Component Value Date    ZUO4FZC 24.1 04/21/2020    TAG2UBR 44.0 04/21/2020    PO2ART 309.9 04/21/2020       MICROBIOLOGY:    Blood culture - negative       Sputum Culture -   Group 6: <25 PMN's/LPF and <25 Epithelial cells/LPF   Few Polymorphonuclear leukocytes   Epithelial cells not seen   Rare Gram positive coccobacillary organisms   Rare Gram negative rods    Narrative:         Sputum culture :     Narrative   Performed by: [address] [place] [address] Lab   Source: [facility]       Site:              Susceptibility     Escherichia coli (1)     Antibiotic Interpretation FLAKO Status    ampicillin Resistant >=^32 mcg/mL     ceFAZolin Sensitive ^8 mcg/mL     cefepime Sensitive <=^0.12 mcg/mL     cefTRIAXone Sensitive <=^0.25 mcg/mL     Confirmatory Extended Spectrum Beta-Lactamase  Neg mcg/mL     ertapenem Sensitive <=^0.12 mcg/mL     gentamicin Sensitive <=^1 mcg/mL     levofloxacin Sensitive <=^0.12 mcg/mL     piperacillin-tazobactam Sensitive <=^4 mcg/mL     trimethoprim-sulfamethoxazole Sensitive <=^20 mcg/mL     Lab and Collection       Tissue -    Susceptibility     Escherichia coli (1)     Antibiotic Interpretation FLAKO Status    ampicillin Resistant >=^32 mcg/mL     ceFAZolin Sensitive ^8 mcg/mL     cefepime Sensitive <=^0.12 mcg/mL     cefTRIAXone Sensitive <=^0.25 mcg/mL     Confirmatory Extended Spectrum Beta-Lactamase  Neg mcg/mL     ertapenem Sensitive <=^0.12 mcg/mL     gentamicin Sensitive <=^1 mcg/mL     levofloxacin Sensitive <=^0.12 mcg/mL     piperacillin-tazobactam Sensitive <=^4 mcg/mL     trimethoprim-sulfamethoxazole Sensitive <=^20 mcg/mL     Lab and Collection           Radiology :    Chest X ray :   1. Compared to yesterday, there is interval development of a left  basilar opacity, which may represent atelectasis or pneumonia. 2. The right mid and lower lung opacity is grossly stable.   3. The life support lines and tubes appear well positioned. IMPRESSION:     1. Pneumonia, recurrent lung collapse, empyema  s/p VATS and decortication ( 5/11)  Candida - contamination    2. Respiratory failure s/p trach , vent dependent   3. Leukocytosis ( 13 K )  / Fever( afebrile )  - improving       RECOMMENDATIONS:      1. Invanz 1 gram IV q 24 hr    2.  CBC with diff

## 2020-05-17 ENCOUNTER — APPOINTMENT (OUTPATIENT)
Dept: GENERAL RADIOLOGY | Age: 25
DRG: 004 | End: 2020-05-17
Payer: MEDICAID

## 2020-05-17 LAB
AADO2: 102.9 MMHG
AADO2: 116.7 MMHG
AADO2: 190.3 MMHG
B.E.: 4.3 MMOL/L (ref -3–3)
B.E.: 5.2 MMOL/L (ref -3–3)
B.E.: 8.3 MMOL/L (ref -3–3)
COHB: 0 % (ref 0–1.5)
COHB: 0.3 % (ref 0–1.5)
COHB: 0.3 % (ref 0–1.5)
CRITICAL: ABNORMAL
DATE ANALYZED: ABNORMAL
DATE OF COLLECTION: ABNORMAL
FIO2: 40 %
FIO2: 40 %
FIO2: 60 %
HCO3: 28.2 MMOL/L (ref 22–26)
HCO3: 28.4 MMOL/L (ref 22–26)
HCO3: 32 MMOL/L (ref 22–26)
HHB: 1.4 % (ref 0–5)
HHB: 1.8 % (ref 0–5)
HHB: 2.4 % (ref 0–5)
LAB: ABNORMAL
METHB: 0.2 % (ref 0–1.5)
METHB: 0.4 % (ref 0–1.5)
METHB: 0.5 % (ref 0–1.5)
MODE: ABNORMAL
O2 CONTENT: 11.2 ML/DL
O2 CONTENT: 11.4 ML/DL
O2 CONTENT: 11.7 ML/DL
O2 SATURATION: 97.6 % (ref 92–98.5)
O2 SATURATION: 98.2 % (ref 92–98.5)
O2 SATURATION: 98.6 % (ref 92–98.5)
O2HB: 96.8 % (ref 94–97)
O2HB: 97.9 % (ref 94–97)
O2HB: 98 % (ref 94–97)
OPERATOR ID: 1874
OPERATOR ID: 421
OPERATOR ID: 421
PATIENT TEMP: 37 C
PCO2: 35.8 MMHG (ref 35–45)
PCO2: 39.3 MMHG (ref 35–45)
PCO2: 40.7 MMHG (ref 35–45)
PEEP/CPAP: 0 CMH2O
PEEP/CPAP: 0 CMH2O
PFO2: 2.79 MMHG/%
PFO2: 2.99 MMHG/%
PFO2: 3.28 MMHG/%
PH BLOOD GAS: 7.47 (ref 7.35–7.45)
PH BLOOD GAS: 7.51 (ref 7.35–7.45)
PH BLOOD GAS: 7.52 (ref 7.35–7.45)
PIP: 30 CMH2O
PO2: 111.7 MMHG (ref 75–100)
PO2: 131.1 MMHG (ref 75–100)
PO2: 179.3 MMHG (ref 75–100)
PS: 10 CMH20
RI(T): 0.79
RI(T): 1.04
RI(T): 1.06
RR MECHANICAL: 8 B/MIN
SOURCE, BLOOD GAS: ABNORMAL
THB: 8.1 G/DL (ref 11.5–16.5)
THB: 8.1 G/DL (ref 11.5–16.5)
THB: 8.2 G/DL (ref 11.5–16.5)
TIME ANALYZED: 1558
TIME ANALYZED: 2206
TIME ANALYZED: 557

## 2020-05-17 PROCEDURE — 6360000002 HC RX W HCPCS: Performed by: NURSE PRACTITIONER

## 2020-05-17 PROCEDURE — 6370000000 HC RX 637 (ALT 250 FOR IP): Performed by: NURSE PRACTITIONER

## 2020-05-17 PROCEDURE — 94640 AIRWAY INHALATION TREATMENT: CPT

## 2020-05-17 PROCEDURE — 2580000003 HC RX 258: Performed by: NURSE PRACTITIONER

## 2020-05-17 PROCEDURE — 2000000000 HC ICU R&B

## 2020-05-17 PROCEDURE — 6370000000 HC RX 637 (ALT 250 FOR IP): Performed by: STUDENT IN AN ORGANIZED HEALTH CARE EDUCATION/TRAINING PROGRAM

## 2020-05-17 PROCEDURE — 36600 WITHDRAWAL OF ARTERIAL BLOOD: CPT

## 2020-05-17 PROCEDURE — 94003 VENT MGMT INPAT SUBQ DAY: CPT

## 2020-05-17 PROCEDURE — 99291 CRITICAL CARE FIRST HOUR: CPT | Performed by: SURGERY

## 2020-05-17 PROCEDURE — 2580000003 HC RX 258: Performed by: INTERNAL MEDICINE

## 2020-05-17 PROCEDURE — 6360000002 HC RX W HCPCS: Performed by: INTERNAL MEDICINE

## 2020-05-17 PROCEDURE — 82805 BLOOD GASES W/O2 SATURATION: CPT

## 2020-05-17 PROCEDURE — 6370000000 HC RX 637 (ALT 250 FOR IP): Performed by: SURGERY

## 2020-05-17 PROCEDURE — 71045 X-RAY EXAM CHEST 1 VIEW: CPT

## 2020-05-17 RX ORDER — DIMETHICONE, OXYBENZONE, AND PADIMATE O 2; 2.5; 6.6 G/100G; G/100G; G/100G
STICK TOPICAL PRN
Status: DISCONTINUED | OUTPATIENT
Start: 2020-05-17 | End: 2020-05-26 | Stop reason: HOSPADM

## 2020-05-17 RX ADMIN — OXYCODONE HYDROCHLORIDE 10 MG: 5 SOLUTION ORAL at 06:03

## 2020-05-17 RX ADMIN — GABAPENTIN 250 MG: 250 SUSPENSION ORAL at 20:13

## 2020-05-17 RX ADMIN — CHLORHEXIDINE GLUCONATE 0.12% ORAL RINSE 15 ML: 1.2 LIQUID ORAL at 09:13

## 2020-05-17 RX ADMIN — METHOCARBAMOL TABLETS 1000 MG: 500 TABLET, COATED ORAL at 12:05

## 2020-05-17 RX ADMIN — SODIUM CHLORIDE SOLN NEBU 3% 4 ML: 3 NEBU SOLN at 16:23

## 2020-05-17 RX ADMIN — METHOCARBAMOL TABLETS 1000 MG: 500 TABLET, COATED ORAL at 20:15

## 2020-05-17 RX ADMIN — ENOXAPARIN SODIUM 30 MG: 30 INJECTION SUBCUTANEOUS at 20:14

## 2020-05-17 RX ADMIN — METHOCARBAMOL TABLETS 1000 MG: 500 TABLET, COATED ORAL at 09:14

## 2020-05-17 RX ADMIN — SODIUM CHLORIDE SOLN NEBU 3% 4 ML: 3 NEBU SOLN at 11:12

## 2020-05-17 RX ADMIN — GUAIFENESIN 400 MG: 400 TABLET, FILM COATED ORAL at 09:14

## 2020-05-17 RX ADMIN — SODIUM CHLORIDE SOLN NEBU 3% 4 ML: 3 NEBU SOLN at 19:28

## 2020-05-17 RX ADMIN — SODIUM CHLORIDE, PRESERVATIVE FREE 300 UNITS: 5 INJECTION INTRAVENOUS at 20:13

## 2020-05-17 RX ADMIN — Medication: at 21:30

## 2020-05-17 RX ADMIN — ENOXAPARIN SODIUM 30 MG: 30 INJECTION SUBCUTANEOUS at 09:13

## 2020-05-17 RX ADMIN — ACETAMINOPHEN 650 MG: 160 SOLUTION ORAL at 03:30

## 2020-05-17 RX ADMIN — Medication 20 MG: at 20:13

## 2020-05-17 RX ADMIN — SODIUM CHLORIDE, PRESERVATIVE FREE 10 ML: 5 INJECTION INTRAVENOUS at 20:15

## 2020-05-17 RX ADMIN — SODIUM CHLORIDE, PRESERVATIVE FREE 300 UNITS: 5 INJECTION INTRAVENOUS at 09:14

## 2020-05-17 RX ADMIN — SODIUM CHLORIDE SOLN NEBU 3% 4 ML: 3 NEBU SOLN at 07:39

## 2020-05-17 RX ADMIN — MELATONIN 3 MG ORAL TABLET 6 MG: 3 TABLET ORAL at 20:15

## 2020-05-17 RX ADMIN — ACETAMINOPHEN 650 MG: 160 SOLUTION ORAL at 09:13

## 2020-05-17 RX ADMIN — CHLORHEXIDINE GLUCONATE 0.12% ORAL RINSE 15 ML: 1.2 LIQUID ORAL at 20:13

## 2020-05-17 RX ADMIN — ERTAPENEM SODIUM 1000 MG: 1 INJECTION, POWDER, LYOPHILIZED, FOR SOLUTION INTRAMUSCULAR; INTRAVENOUS at 18:07

## 2020-05-17 RX ADMIN — GABAPENTIN 250 MG: 250 SUSPENSION ORAL at 06:03

## 2020-05-17 RX ADMIN — OXYCODONE HYDROCHLORIDE 10 MG: 5 SOLUTION ORAL at 18:07

## 2020-05-17 RX ADMIN — METHOCARBAMOL TABLETS 1000 MG: 500 TABLET, COATED ORAL at 18:07

## 2020-05-17 RX ADMIN — GABAPENTIN 250 MG: 250 SUSPENSION ORAL at 15:05

## 2020-05-17 RX ADMIN — GUAIFENESIN 400 MG: 400 TABLET, FILM COATED ORAL at 20:15

## 2020-05-17 RX ADMIN — Medication 20 MG: at 09:14

## 2020-05-17 RX ADMIN — OXYCODONE HYDROCHLORIDE 10 MG: 5 SOLUTION ORAL at 12:04

## 2020-05-17 RX ADMIN — ACETAMINOPHEN 650 MG: 160 SOLUTION ORAL at 15:05

## 2020-05-17 RX ADMIN — ACETAMINOPHEN 650 MG: 160 SOLUTION ORAL at 20:13

## 2020-05-17 ASSESSMENT — PULMONARY FUNCTION TESTS
PIF_VALUE: 32
PIF_VALUE: 33
PIF_VALUE: 32
PIF_VALUE: 33
PIF_VALUE: 34
PIF_VALUE: 40
PIF_VALUE: 32
PIF_VALUE: 40
PIF_VALUE: 32
PIF_VALUE: 35
PIF_VALUE: 32
PIF_VALUE: 34
PIF_VALUE: 32
PIF_VALUE: 36
PIF_VALUE: 46
PIF_VALUE: 35
PIF_VALUE: 32
PIF_VALUE: 33
PIF_VALUE: 36
PIF_VALUE: 32
PIF_VALUE: 34
PIF_VALUE: 32
PIF_VALUE: 33

## 2020-05-17 ASSESSMENT — PAIN SCALES - GENERAL
PAINLEVEL_OUTOF10: 0
PAINLEVEL_OUTOF10: 0
PAINLEVEL_OUTOF10: 7
PAINLEVEL_OUTOF10: 7
PAINLEVEL_OUTOF10: 0
PAINLEVEL_OUTOF10: 6
PAINLEVEL_OUTOF10: 7
PAINLEVEL_OUTOF10: 7
PAINLEVEL_OUTOF10: 5

## 2020-05-17 NOTE — PLAN OF CARE
Problem: Falls - Risk of:  Goal: Will remain free from falls  Description: Will remain free from falls  Outcome: Ongoing  Goal: Absence of physical injury  Description: Absence of physical injury  Outcome: Ongoing     Problem: Pain:  Description: Pain management should include both nonpharmacologic and pharmacologic interventions.   Goal: Pain level will decrease  Description: Pain level will decrease  Outcome: Ongoing  Goal: Control of chronic pain  Description: Control of chronic pain  Outcome: Ongoing     Problem: Discharge Planning:  Goal: Participates in care planning  Description: Participates in care planning  Outcome: Ongoing  Goal: Discharged to appropriate level of care  Description: Discharged to appropriate level of care  Outcome: Ongoing     Problem: Airway Clearance - Ineffective:  Goal: Ability to maintain a clear airway will improve  Description: Ability to maintain a clear airway will improve  Outcome: Ongoing     Problem: Anxiety/Stress:  Goal: Level of anxiety will decrease  Description: Level of anxiety will decrease  Outcome: Ongoing     Problem: Aspiration:  Goal: Absence of aspiration  Description: Absence of aspiration  Outcome: Ongoing     Problem: Gas Exchange - Impaired:  Goal: Levels of oxygenation will improve  Description: Levels of oxygenation will improve  Outcome: Ongoing     Problem: Skin Integrity - Impaired:  Goal: Will show no infection signs and symptoms  Description: Will show no infection signs and symptoms  Outcome: Ongoing  Goal: Absence of new skin breakdown  Description: Absence of new skin breakdown  Outcome: Ongoing     Problem: Infection:  Goal: Will remain free from infection  Description: Will remain free from infection  Outcome: Ongoing     Problem: Daily Care:  Goal: Daily care needs are met  Description: Daily care needs are met  Outcome: Ongoing     Problem: Musculor/Skeletal Functional Status  Goal: Highest potential functional level  Outcome: Ongoing  Goal: Absence of falls  Outcome: Ongoing

## 2020-05-17 NOTE — PLAN OF CARE
Problem: Falls - Risk of:  Goal: Will remain free from falls  Description: Will remain free from falls  5/17/2020 0138 by Justina Evans RN  Outcome: Met This Shift     Problem: Falls - Risk of:  Goal: Absence of physical injury  Description: Absence of physical injury  Outcome: Met This Shift     Problem: Pain:  Goal: Pain level will decrease  Description: Pain level will decrease  5/17/2020 0138 by Justina Evans RN  Outcome: Met This Shift     Problem: Anxiety/Stress:  Goal: Level of anxiety will decrease  Description: Level of anxiety will decrease  5/17/2020 0138 by Justina Evans RN  Outcome: Met This Shift     Problem: Aspiration:  Goal: Absence of aspiration  Description: Absence of aspiration  5/17/2020 0138 by Justina Evans RN  Outcome: Met This Shift     Problem: Gas Exchange - Impaired:  Goal: Levels of oxygenation will improve  Description: Levels of oxygenation will improve  Outcome: Met This Shift     Problem: Skin Integrity - Impaired:  Goal: Will show no infection signs and symptoms  Description: Will show no infection signs and symptoms  Outcome: Met This Shift
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
## 2020-05-17 NOTE — PROGRESS NOTES
Written order for PS of 10. While on APRV settings, PS needs to be 10 above PHigh to work as intended. Increased to PS of 40, now getting adequate tidal volumes.

## 2020-05-18 ENCOUNTER — APPOINTMENT (OUTPATIENT)
Dept: GENERAL RADIOLOGY | Age: 25
DRG: 004 | End: 2020-05-18
Payer: MEDICAID

## 2020-05-18 LAB
AADO2: 164.3 MMHG
AADO2: 99.5 MMHG
ALBUMIN SERPL-MCNC: 2.6 G/DL (ref 3.5–5.2)
ALP BLD-CCNC: 81 U/L (ref 40–129)
ALT SERPL-CCNC: 41 U/L (ref 0–40)
ANION GAP SERPL CALCULATED.3IONS-SCNC: 15 MMOL/L (ref 7–16)
AST SERPL-CCNC: 30 U/L (ref 0–39)
B.E.: 4.9 MMOL/L (ref -3–3)
B.E.: 5.8 MMOL/L (ref -3–3)
BASOPHILS ABSOLUTE: 0.02 E9/L (ref 0–0.2)
BASOPHILS RELATIVE PERCENT: 0.2 % (ref 0–2)
BILIRUB SERPL-MCNC: <0.2 MG/DL (ref 0–1.2)
BUN BLDV-MCNC: 24 MG/DL (ref 6–20)
CALCIUM SERPL-MCNC: 8.7 MG/DL (ref 8.6–10.2)
CHLORIDE BLD-SCNC: 100 MMOL/L (ref 98–107)
CO2: 28 MMOL/L (ref 22–29)
COHB: 0.3 % (ref 0–1.5)
COHB: 0.7 % (ref 0–1.5)
CREAT SERPL-MCNC: 0.8 MG/DL (ref 0.7–1.2)
CRITICAL: ABNORMAL
CRITICAL: ABNORMAL
DATE ANALYZED: ABNORMAL
DATE ANALYZED: ABNORMAL
DATE OF COLLECTION: ABNORMAL
DATE OF COLLECTION: ABNORMAL
EOSINOPHILS ABSOLUTE: 0.33 E9/L (ref 0.05–0.5)
EOSINOPHILS RELATIVE PERCENT: 2.8 % (ref 0–6)
FIO2: 40 %
FIO2: 60 %
GFR AFRICAN AMERICAN: >60
GFR NON-AFRICAN AMERICAN: >60 ML/MIN/1.73
GLUCOSE BLD-MCNC: 90 MG/DL (ref 74–99)
HCO3: 28 MMOL/L (ref 22–26)
HCO3: 28.9 MMOL/L (ref 22–26)
HCT VFR BLD CALC: 22.6 % (ref 37–54)
HEMOGLOBIN: 7 G/DL (ref 12.5–16.5)
HHB: 0.7 % (ref 0–5)
HHB: 1.3 % (ref 0–5)
IMMATURE GRANULOCYTES #: 0.04 E9/L
IMMATURE GRANULOCYTES %: 0.3 % (ref 0–5)
LAB: ABNORMAL
LAB: ABNORMAL
LYMPHOCYTES ABSOLUTE: 2.12 E9/L (ref 1.5–4)
LYMPHOCYTES RELATIVE PERCENT: 17.8 % (ref 20–42)
Lab: ABNORMAL
Lab: ABNORMAL
MAGNESIUM: 1.9 MG/DL (ref 1.6–2.6)
MCH RBC QN AUTO: 28.5 PG (ref 26–35)
MCHC RBC AUTO-ENTMCNC: 31 % (ref 32–34.5)
MCV RBC AUTO: 91.9 FL (ref 80–99.9)
METHB: 0.3 % (ref 0–1.5)
METHB: 0.5 % (ref 0–1.5)
MODE: ABNORMAL
MODE: AC
MONOCYTES ABSOLUTE: 0.98 E9/L (ref 0.1–0.95)
MONOCYTES RELATIVE PERCENT: 8.2 % (ref 2–12)
NEUTROPHILS ABSOLUTE: 8.43 E9/L (ref 1.8–7.3)
NEUTROPHILS RELATIVE PERCENT: 70.7 % (ref 43–80)
O2 CONTENT: 10.6 ML/DL
O2 CONTENT: 11.6 ML/DL
O2 SATURATION: 98.7 % (ref 92–98.5)
O2 SATURATION: 99.3 % (ref 92–98.5)
O2HB: 97.9 % (ref 94–97)
O2HB: 98.3 % (ref 94–97)
OPERATOR ID: 1358
OPERATOR ID: ABNORMAL
PATIENT TEMP: 37 C
PATIENT TEMP: 37 C
PCO2: 34.8 MMHG (ref 35–45)
PCO2: 35 MMHG (ref 35–45)
PDW BLD-RTO: 14.4 FL (ref 11.5–15)
PEEP/CPAP: 14 CMH2O
PEEP/CPAP: 30 CMH2O
PFO2: 3.39 MMHG/%
PFO2: 3.5 MMHG/%
PH BLOOD GAS: 7.52 (ref 7.35–7.45)
PH BLOOD GAS: 7.53 (ref 7.35–7.45)
PHOSPHORUS: 3 MG/DL (ref 2.5–4.5)
PLATELET # BLD: 316 E9/L (ref 130–450)
PMV BLD AUTO: 10.4 FL (ref 7–12)
PO2: 135.5 MMHG (ref 75–100)
PO2: 210.2 MMHG (ref 75–100)
POTASSIUM SERPL-SCNC: 3.5 MMOL/L (ref 3.5–5)
PS: 40 CMH20
RBC # BLD: 2.46 E12/L (ref 3.8–5.8)
RI(T): 0.78
RI(T): 73 %
RR MECHANICAL: 18 B/MIN
RR MECHANICAL: 9 B/MIN
SODIUM BLD-SCNC: 143 MMOL/L (ref 132–146)
SOURCE, BLOOD GAS: ABNORMAL
SOURCE, BLOOD GAS: ABNORMAL
THB: 7.5 G/DL (ref 11.5–16.5)
THB: 8 G/DL (ref 11.5–16.5)
TIME ANALYZED: 1216
TIME ANALYZED: 602
TOTAL PROTEIN: 6.9 G/DL (ref 6.4–8.3)
VT MECHANICAL: 470 ML
WBC # BLD: 11.9 E9/L (ref 4.5–11.5)

## 2020-05-18 PROCEDURE — 6370000000 HC RX 637 (ALT 250 FOR IP): Performed by: NURSE PRACTITIONER

## 2020-05-18 PROCEDURE — 94640 AIRWAY INHALATION TREATMENT: CPT

## 2020-05-18 PROCEDURE — 6370000000 HC RX 637 (ALT 250 FOR IP): Performed by: STUDENT IN AN ORGANIZED HEALTH CARE EDUCATION/TRAINING PROGRAM

## 2020-05-18 PROCEDURE — 2580000003 HC RX 258: Performed by: INTERNAL MEDICINE

## 2020-05-18 PROCEDURE — 84100 ASSAY OF PHOSPHORUS: CPT

## 2020-05-18 PROCEDURE — 2580000003 HC RX 258: Performed by: NURSE PRACTITIONER

## 2020-05-18 PROCEDURE — 83735 ASSAY OF MAGNESIUM: CPT

## 2020-05-18 PROCEDURE — 80053 COMPREHEN METABOLIC PANEL: CPT

## 2020-05-18 PROCEDURE — 85025 COMPLETE CBC W/AUTO DIFF WBC: CPT

## 2020-05-18 PROCEDURE — 36592 COLLECT BLOOD FROM PICC: CPT

## 2020-05-18 PROCEDURE — 6360000002 HC RX W HCPCS: Performed by: NURSE PRACTITIONER

## 2020-05-18 PROCEDURE — 94003 VENT MGMT INPAT SUBQ DAY: CPT

## 2020-05-18 PROCEDURE — 82805 BLOOD GASES W/O2 SATURATION: CPT

## 2020-05-18 PROCEDURE — 99291 CRITICAL CARE FIRST HOUR: CPT | Performed by: SURGERY

## 2020-05-18 PROCEDURE — 2000000000 HC ICU R&B

## 2020-05-18 PROCEDURE — 6360000002 HC RX W HCPCS: Performed by: INTERNAL MEDICINE

## 2020-05-18 PROCEDURE — 94669 MECHANICAL CHEST WALL OSCILL: CPT

## 2020-05-18 PROCEDURE — 71045 X-RAY EXAM CHEST 1 VIEW: CPT

## 2020-05-18 RX ORDER — METHOCARBAMOL 500 MG/1
500 TABLET, FILM COATED ORAL 4 TIMES DAILY
Status: COMPLETED | OUTPATIENT
Start: 2020-05-18 | End: 2020-05-22

## 2020-05-18 RX ORDER — OXYCODONE HCL 5 MG/5 ML
5 SOLUTION, ORAL ORAL EVERY 6 HOURS PRN
Status: DISCONTINUED | OUTPATIENT
Start: 2020-05-18 | End: 2020-05-21

## 2020-05-18 RX ADMIN — METHOCARBAMOL TABLETS 500 MG: 500 TABLET, COATED ORAL at 08:35

## 2020-05-18 RX ADMIN — SODIUM CHLORIDE SOLN NEBU 3% 4 ML: 3 NEBU SOLN at 16:16

## 2020-05-18 RX ADMIN — OXYCODONE HYDROCHLORIDE 5 MG: 5 SOLUTION ORAL at 17:06

## 2020-05-18 RX ADMIN — SODIUM CHLORIDE, PRESERVATIVE FREE 10 ML: 5 INJECTION INTRAVENOUS at 08:34

## 2020-05-18 RX ADMIN — GUAIFENESIN 400 MG: 400 TABLET, FILM COATED ORAL at 08:35

## 2020-05-18 RX ADMIN — ENOXAPARIN SODIUM 30 MG: 30 INJECTION SUBCUTANEOUS at 20:23

## 2020-05-18 RX ADMIN — OXYCODONE HYDROCHLORIDE 5 MG: 5 SOLUTION ORAL at 08:33

## 2020-05-18 RX ADMIN — GABAPENTIN 250 MG: 250 SUSPENSION ORAL at 20:27

## 2020-05-18 RX ADMIN — POLYETHYLENE GLYCOL 3350 17 G: 17 POWDER, FOR SOLUTION ORAL at 08:34

## 2020-05-18 RX ADMIN — SODIUM CHLORIDE, PRESERVATIVE FREE 300 UNITS: 5 INJECTION INTRAVENOUS at 08:37

## 2020-05-18 RX ADMIN — CHLORHEXIDINE GLUCONATE 0.12% ORAL RINSE 15 ML: 1.2 LIQUID ORAL at 08:34

## 2020-05-18 RX ADMIN — SODIUM CHLORIDE SOLN NEBU 3% 4 ML: 3 NEBU SOLN at 19:47

## 2020-05-18 RX ADMIN — METHOCARBAMOL TABLETS 500 MG: 500 TABLET, COATED ORAL at 13:32

## 2020-05-18 RX ADMIN — SODIUM CHLORIDE, PRESERVATIVE FREE 300 UNITS: 5 INJECTION INTRAVENOUS at 20:23

## 2020-05-18 RX ADMIN — GABAPENTIN 250 MG: 250 SUSPENSION ORAL at 05:43

## 2020-05-18 RX ADMIN — Medication 20 MG: at 20:23

## 2020-05-18 RX ADMIN — GUAIFENESIN 400 MG: 400 TABLET, FILM COATED ORAL at 20:21

## 2020-05-18 RX ADMIN — ENOXAPARIN SODIUM 30 MG: 30 INJECTION SUBCUTANEOUS at 08:34

## 2020-05-18 RX ADMIN — ACETAMINOPHEN 650 MG: 160 SOLUTION ORAL at 03:19

## 2020-05-18 RX ADMIN — ERTAPENEM SODIUM 1000 MG: 1 INJECTION, POWDER, LYOPHILIZED, FOR SOLUTION INTRAMUSCULAR; INTRAVENOUS at 17:37

## 2020-05-18 RX ADMIN — ACETAMINOPHEN 650 MG: 160 SOLUTION ORAL at 15:21

## 2020-05-18 RX ADMIN — OXYCODONE HYDROCHLORIDE 10 MG: 5 SOLUTION ORAL at 00:01

## 2020-05-18 RX ADMIN — ACETAMINOPHEN 650 MG: 160 SOLUTION ORAL at 20:21

## 2020-05-18 RX ADMIN — METHOCARBAMOL TABLETS 500 MG: 500 TABLET, COATED ORAL at 17:06

## 2020-05-18 RX ADMIN — MELATONIN 3 MG ORAL TABLET 6 MG: 3 TABLET ORAL at 20:21

## 2020-05-18 RX ADMIN — METHOCARBAMOL TABLETS 500 MG: 500 TABLET, COATED ORAL at 20:21

## 2020-05-18 RX ADMIN — ACETAMINOPHEN 650 MG: 160 SOLUTION ORAL at 08:33

## 2020-05-18 RX ADMIN — OXYCODONE HYDROCHLORIDE 5 MG: 5 SOLUTION ORAL at 23:18

## 2020-05-18 RX ADMIN — Medication 20 MG: at 08:36

## 2020-05-18 RX ADMIN — GABAPENTIN 250 MG: 250 SUSPENSION ORAL at 13:31

## 2020-05-18 RX ADMIN — CHLORHEXIDINE GLUCONATE 0.12% ORAL RINSE 15 ML: 1.2 LIQUID ORAL at 20:21

## 2020-05-18 ASSESSMENT — PAIN SCALES - GENERAL
PAINLEVEL_OUTOF10: 0
PAINLEVEL_OUTOF10: 7
PAINLEVEL_OUTOF10: 0
PAINLEVEL_OUTOF10: 8
PAINLEVEL_OUTOF10: 0
PAINLEVEL_OUTOF10: 5
PAINLEVEL_OUTOF10: 0
PAINLEVEL_OUTOF10: 7
PAINLEVEL_OUTOF10: 7
PAINLEVEL_OUTOF10: 0

## 2020-05-18 ASSESSMENT — PULMONARY FUNCTION TESTS
PIF_VALUE: 27
PIF_VALUE: 31
PIF_VALUE: 40
PIF_VALUE: 32
PIF_VALUE: 32
PIF_VALUE: 29
PIF_VALUE: 28
PIF_VALUE: 32
PIF_VALUE: 40
PIF_VALUE: 30
PIF_VALUE: 32
PIF_VALUE: 34
PIF_VALUE: 31
PIF_VALUE: 29
PIF_VALUE: 30
PIF_VALUE: 31
PIF_VALUE: 28
PIF_VALUE: 30
PIF_VALUE: 29
PIF_VALUE: 32
PIF_VALUE: 27
PIF_VALUE: 32
PIF_VALUE: 38
PIF_VALUE: 32

## 2020-05-18 ASSESSMENT — PAIN DESCRIPTION - LOCATION: LOCATION: CHEST

## 2020-05-18 ASSESSMENT — PAIN DESCRIPTION - DESCRIPTORS: DESCRIPTORS: SHARP

## 2020-05-18 NOTE — PLAN OF CARE
Problem: Falls - Risk of:  Goal: Will remain free from falls  Description: Will remain free from falls  5/18/2020 1112 by Inez Christensen RN  Outcome: Met This Shift  5/18/2020 0055 by Shelly Neff RN  Outcome: Met This Shift  Goal: Absence of physical injury  Description: Absence of physical injury  5/18/2020 1112 by Inez Christensen RN  Outcome: Met This Shift  5/18/2020 0055 by Shelly Neff RN  Outcome: Met This Shift     Problem: Pain:  Goal: Pain level will decrease  Description: Pain level will decrease  5/18/2020 1112 by Inez Christensen RN  Outcome: Ongoing  5/18/2020 0055 by Shelly Neff RN  Outcome: Met This Shift  Goal: Control of chronic pain  Description: Control of chronic pain  5/18/2020 1112 by Inez Christensen RN  Outcome: Ongoing  5/18/2020 0055 by Shelly Neff RN  Outcome: Met This Shift     Problem: Discharge Planning:  Goal: Participates in care planning  Description: Participates in care planning  5/18/2020 1112 by Inez Christensen RN  Outcome: Ongoing  5/18/2020 0055 by Shelly Neff RN  Outcome: Met This Shift  Goal: Discharged to appropriate level of care  Description: Discharged to appropriate level of care  5/18/2020 1112 by Inez Christensen RN  Outcome: Ongoing  5/18/2020 0055 by Shelly Neff RN  Outcome: Not Met This Shift     Problem: Airway Clearance - Ineffective:  Goal: Ability to maintain a clear airway will improve  Description: Ability to maintain a clear airway will improve  5/18/2020 1112 by Inez Christensen RN  Outcome: Ongoing  5/18/2020 0055 by Shelly Neff RN  Outcome: Not Met This Shift     Problem: Anxiety/Stress:  Goal: Level of anxiety will decrease  Description: Level of anxiety will decrease  5/18/2020 1112 by Inez Christensen RN  Outcome: Ongoing  5/18/2020 0055 by Shelly Neff RN  Outcome: Met This Shift     Problem: Aspiration:  Goal: Absence of aspiration  Description: Absence of aspiration  5/18/2020 1112 by Inez Christensen RN  Outcome: Ongoing  5/18/2020 0055 by Germaine Rutledge RN  Outcome: Met This Shift     Problem: Gas Exchange - Impaired:  Goal: Levels of oxygenation will improve  Description: Levels of oxygenation will improve  5/18/2020 1112 by Perla Birch RN  Outcome: Met This Shift  5/18/2020 0055 by Germaine Rutledge RN  Outcome: Met This Shift     Problem: Gas Exchange - Impaired:  Goal: Levels of oxygenation will improve  Description: Levels of oxygenation will improve  5/18/2020 1112 by Perla Birch RN  Outcome: Met This Shift  5/18/2020 0055 by Germaine Rutledge RN  Outcome: Met This Shift     Problem: Skin Integrity - Impaired:  Goal: Will show no infection signs and symptoms  Description: Will show no infection signs and symptoms  5/18/2020 1112 by Perla Birch RN  Outcome: Ongoing  5/18/2020 0055 by Germaine Rutledge RN  Outcome: Not Met This Shift  Goal: Absence of new skin breakdown  Description: Absence of new skin breakdown  5/18/2020 1112 by Perla Birch RN  Outcome: Ongoing  5/18/2020 0055 by Germaine Rutledge RN  Outcome: Met This Shift     Problem: Infection:  Goal: Will remain free from infection  Description: Will remain free from infection  5/18/2020 0055 by Germaine Rutledge RN  Outcome: Met This Shift     Problem: Daily Care:  Goal: Daily care needs are met  Description: Daily care needs are met  5/18/2020 1112 by Perla Birch RN  Outcome: Ongoing  5/18/2020 0055 by Germaine Rutledge RN  Outcome: Met This Shift     Problem: Musculor/Skeletal Functional Status  Goal: Highest potential functional level  5/18/2020 0055 by Germaine Rutledge RN  Outcome: Not Met This Shift  Goal: Absence of falls  5/18/2020 0055 by Germaine Rutledge RN  Outcome: Met This Shift
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
## 2020-05-18 NOTE — PROGRESS NOTES
injection 300 Units  3 mL Intravenous 2 times per day Rand Barrier, APRN - CNP   300 Units at 05/18/20 4432    heparin flush 100 UNIT/ML injection 300 Units  3 mL Intracatheter PRN Rand Barrier, APRN - CNP   300 Units at 05/03/20 2011    melatonin tablet 6 mg  6 mg Oral Nightly Rand Barrier, APRN - CNP   6 mg at 05/17/20 2015    sennosides-docusate sodium (SENOKOT-S) 8.6-50 MG tablet 2 tablet  2 tablet Oral BID Rand Barrier, APRN - CNP   Stopped at 05/16/20 2143    promethazine (PHENERGAN) tablet 12.5 mg  12.5 mg Oral Q6H PRN Rand Barrier, APRN - CNP        Or    ondansetron (ZOFRAN) injection 4 mg  4 mg Intravenous Q6H PRN Rand Barrier, APRN - CNP   4 mg at 05/07/20 0315    chlorhexidine (PERIDEX) 0.12 % solution 15 mL  15 mL Mouth/Throat BID Rand Barrier, APRN - CNP   15 mL at 05/18/20 8842    Akwa Tears (LACRILUBE) 2-15-83 % opthalmic ointment   Both Eyes PRN Rand Barrier, APRN - CNP           REVIEW OF SYSTEMS:      CONSTITUTIONAL:Denies fever   HEENT: Denies sore throat   RESPIRATORY: + chest pain . CARDIOVASCULAR:  Denies palpitation  GASTROINTESTINAL:  Denies abdomen pain, diarrhea or constipation. GENITOURINARY:  Denies burning urination or frequency of urination  INTEGUMENT: denies wound , rash  HEMATOLOGIC/LYMPHATIC:  No bleeding   MUSCULOSKELETAL:  Pain   NEUROLOGICAL: denies headache    PHYSICAL EXAM:      Vitals:     /64   Pulse 62   Temp 99.4 °F (37.4 °C) (Oral)   Resp 18   Ht 6' (1.829 m)   Wt 175 lb 0.7 oz (79.4 kg)   SpO2 99%   BMI 23.74 kg/m²     General Appearance:    Awake, ventilated , FiO2 60% , PEEP 14    Head:    Normocephalic, atraumatic   Eyes:    No pallor, no icterus,   Ears:    No obvious deformity or drainage.    Nose:   No nasal drainage   Throat:   Mucosa moist, no oral thrush   Neck:   Supple, no lymphadenopathy   Lungs:     Scattered rhonchi     Heart:    Regular rate and rhythm,no murmur    Abdomen:     Soft, non-tender, bowel sounds present Extremities:   No edema, no cyanosis    Pulses:   Dorsalis pedis palpable    Skin:   no rashes or lesions     CBC with Differential:      Lab Results   Component Value Date    WBC 11.9 05/18/2020    RBC 2.46 05/18/2020    HGB 7.0 05/18/2020    HCT 22.6 05/18/2020     05/18/2020    MCV 91.9 05/18/2020    MCH 28.5 05/18/2020    MCHC 31.0 05/18/2020    RDW 14.4 05/18/2020    METASPCT 0.9 05/04/2020    LYMPHOPCT 17.8 05/18/2020    MONOPCT 8.2 05/18/2020    BASOPCT 0.2 05/18/2020    MONOSABS 0.98 05/18/2020    LYMPHSABS 2.12 05/18/2020    EOSABS 0.33 05/18/2020    BASOSABS 0.02 05/18/2020       CMP     Lab Results   Component Value Date     05/18/2020    K 3.5 05/18/2020     05/18/2020    CO2 28 05/18/2020    BUN 24 05/18/2020    CREATININE 0.8 05/18/2020    GFRAA >60 05/18/2020    LABGLOM >60 05/18/2020    GLUCOSE 90 05/18/2020    PROT 6.9 05/18/2020    LABALBU 2.6 05/18/2020    CALCIUM 8.7 05/18/2020    BILITOT <0.2 05/18/2020    ALKPHOS 81 05/18/2020    AST 30 05/18/2020    ALT 41 05/18/2020         Hepatic Function Panel:    Lab Results   Component Value Date    ALKPHOS 81 05/18/2020    ALT 41 05/18/2020    AST 30 05/18/2020    PROT 6.9 05/18/2020    BILITOT <0.2 05/18/2020    LABALBU 2.6 05/18/2020       PT/INR:    Lab Results   Component Value Date    PROTIME 15.0 05/10/2020    INR 1.3 05/10/2020       TSH:  No results found for: TSH    U/A:  No results found for: NITRITE, COLORU, PHUR, LABCAST, WBCUA, RBCUA, MUCUS, TRICHOMONAS, YEAST, BACTERIA, CLARITYU, SPECGRAV, LEUKOCYTESUR, UROBILINOGEN, BILIRUBINUR, BLOODU, GLUCOSEU, AMORPHOUS    ABG:    Lab Results   Component Value Date    CXA2ZAG 24.1 04/21/2020    LLU2SVD 44.0 04/21/2020    PO2ART 309.9 04/21/2020       MICROBIOLOGY:    Blood culture - negative       Sputum Culture -   Group 6: <25 PMN's/LPF and <25 Epithelial cells/LPF   Few Polymorphonuclear leukocytes   Epithelial cells not seen   Rare Gram positive coccobacillary organisms   Rare

## 2020-05-19 ENCOUNTER — APPOINTMENT (OUTPATIENT)
Dept: GENERAL RADIOLOGY | Age: 25
DRG: 004 | End: 2020-05-19
Payer: MEDICAID

## 2020-05-19 LAB
AADO2: 151 MMHG
ABO/RH: NORMAL
ANTIBODY SCREEN: NORMAL
B.E.: 4.2 MMOL/L (ref -3–3)
COHB: 0.3 % (ref 0–1.5)
CRITICAL: ABNORMAL
DATE ANALYZED: ABNORMAL
DATE OF COLLECTION: ABNORMAL
FIO2: 40 %
HCO3: 27.3 MMOL/L (ref 22–26)
HHB: 4.2 % (ref 0–5)
LAB: ABNORMAL
Lab: ABNORMAL
METHB: 0.4 % (ref 0–1.5)
MODE: AC
O2 CONTENT: 12.2 ML/DL
O2 SATURATION: 95.8 % (ref 92–98.5)
O2HB: 95.1 % (ref 94–97)
OPERATOR ID: 1874
PATIENT TEMP: 37 C
PCO2: 35.2 MMHG (ref 35–45)
PEEP/CPAP: 14 CMH2O
PFO2: 2.09 MMHG/%
PH BLOOD GAS: 7.51 (ref 7.35–7.45)
PO2: 83.7 MMHG (ref 75–100)
RI(T): 1.8
RR MECHANICAL: 18 B/MIN
SOURCE, BLOOD GAS: ABNORMAL
THB: 9 G/DL (ref 11.5–16.5)
TIME ANALYZED: 617
VT MECHANICAL: 470 ML

## 2020-05-19 PROCEDURE — 6360000002 HC RX W HCPCS: Performed by: NURSE PRACTITIONER

## 2020-05-19 PROCEDURE — 2000000000 HC ICU R&B

## 2020-05-19 PROCEDURE — 86901 BLOOD TYPING SEROLOGIC RH(D): CPT

## 2020-05-19 PROCEDURE — 0BC68ZZ EXTIRPATION OF MATTER FROM RIGHT LOWER LOBE BRONCHUS, VIA NATURAL OR ARTIFICIAL OPENING ENDOSCOPIC: ICD-10-PCS | Performed by: SURGERY

## 2020-05-19 PROCEDURE — 6370000000 HC RX 637 (ALT 250 FOR IP): Performed by: NURSE PRACTITIONER

## 2020-05-19 PROCEDURE — 89220 SPUTUM SPECIMEN COLLECTION: CPT

## 2020-05-19 PROCEDURE — 71045 X-RAY EXAM CHEST 1 VIEW: CPT

## 2020-05-19 PROCEDURE — 94640 AIRWAY INHALATION TREATMENT: CPT

## 2020-05-19 PROCEDURE — 86850 RBC ANTIBODY SCREEN: CPT

## 2020-05-19 PROCEDURE — 2580000003 HC RX 258: Performed by: NURSE PRACTITIONER

## 2020-05-19 PROCEDURE — 82805 BLOOD GASES W/O2 SATURATION: CPT

## 2020-05-19 PROCEDURE — 6370000000 HC RX 637 (ALT 250 FOR IP): Performed by: STUDENT IN AN ORGANIZED HEALTH CARE EDUCATION/TRAINING PROGRAM

## 2020-05-19 PROCEDURE — 0BC48ZZ EXTIRPATION OF MATTER FROM RIGHT UPPER LOBE BRONCHUS, VIA NATURAL OR ARTIFICIAL OPENING ENDOSCOPIC: ICD-10-PCS | Performed by: SURGERY

## 2020-05-19 PROCEDURE — 2580000003 HC RX 258: Performed by: INTERNAL MEDICINE

## 2020-05-19 PROCEDURE — 99291 CRITICAL CARE FIRST HOUR: CPT | Performed by: SURGERY

## 2020-05-19 PROCEDURE — 94669 MECHANICAL CHEST WALL OSCILL: CPT

## 2020-05-19 PROCEDURE — 6360000002 HC RX W HCPCS: Performed by: EMERGENCY MEDICINE

## 2020-05-19 PROCEDURE — 31646 BRNCHSC W/THER ASPIR SBSQ: CPT | Performed by: SURGERY

## 2020-05-19 PROCEDURE — 0B9B8ZZ DRAINAGE OF LEFT LOWER LOBE BRONCHUS, VIA NATURAL OR ARTIFICIAL OPENING ENDOSCOPIC: ICD-10-PCS | Performed by: SURGERY

## 2020-05-19 PROCEDURE — 0BC58ZZ EXTIRPATION OF MATTER FROM RIGHT MIDDLE LOBE BRONCHUS, VIA NATURAL OR ARTIFICIAL OPENING ENDOSCOPIC: ICD-10-PCS | Performed by: SURGERY

## 2020-05-19 PROCEDURE — 36415 COLL VENOUS BLD VENIPUNCTURE: CPT

## 2020-05-19 PROCEDURE — 6360000002 HC RX W HCPCS: Performed by: INTERNAL MEDICINE

## 2020-05-19 PROCEDURE — 86900 BLOOD TYPING SEROLOGIC ABO: CPT

## 2020-05-19 PROCEDURE — 6360000002 HC RX W HCPCS: Performed by: STUDENT IN AN ORGANIZED HEALTH CARE EDUCATION/TRAINING PROGRAM

## 2020-05-19 PROCEDURE — 94003 VENT MGMT INPAT SUBQ DAY: CPT

## 2020-05-19 RX ORDER — SODIUM CHLORIDE FOR INHALATION 3 %
4 VIAL, NEBULIZER (ML) INHALATION PRN
Status: DISCONTINUED | OUTPATIENT
Start: 2020-05-19 | End: 2020-05-26 | Stop reason: HOSPADM

## 2020-05-19 RX ORDER — POTASSIUM CHLORIDE 29.8 MG/ML
20 INJECTION INTRAVENOUS ONCE
Status: COMPLETED | OUTPATIENT
Start: 2020-05-19 | End: 2020-05-19

## 2020-05-19 RX ORDER — MIDAZOLAM HYDROCHLORIDE 1 MG/ML
6 INJECTION INTRAMUSCULAR; INTRAVENOUS
Status: COMPLETED | OUTPATIENT
Start: 2020-05-19 | End: 2020-05-19

## 2020-05-19 RX ORDER — FENTANYL CITRATE 50 UG/ML
200 INJECTION, SOLUTION INTRAMUSCULAR; INTRAVENOUS
Status: COMPLETED | OUTPATIENT
Start: 2020-05-19 | End: 2020-05-19

## 2020-05-19 RX ADMIN — OXYCODONE HYDROCHLORIDE 5 MG: 5 SOLUTION ORAL at 13:47

## 2020-05-19 RX ADMIN — GUAIFENESIN 400 MG: 400 TABLET, FILM COATED ORAL at 08:46

## 2020-05-19 RX ADMIN — SENNOSIDES AND DOCUSATE SODIUM 2 TABLET: 8.6; 5 TABLET ORAL at 20:55

## 2020-05-19 RX ADMIN — SODIUM CHLORIDE SOLN NEBU 3% 4 ML: 3 NEBU SOLN at 16:45

## 2020-05-19 RX ADMIN — ERTAPENEM SODIUM 1000 MG: 1 INJECTION, POWDER, LYOPHILIZED, FOR SOLUTION INTRAMUSCULAR; INTRAVENOUS at 18:25

## 2020-05-19 RX ADMIN — SODIUM CHLORIDE, PRESERVATIVE FREE 10 ML: 5 INJECTION INTRAVENOUS at 20:54

## 2020-05-19 RX ADMIN — ACETAMINOPHEN 650 MG: 160 SOLUTION ORAL at 08:45

## 2020-05-19 RX ADMIN — FENTANYL CITRATE 200 MCG: 50 INJECTION, SOLUTION INTRAMUSCULAR; INTRAVENOUS at 07:57

## 2020-05-19 RX ADMIN — METHOCARBAMOL TABLETS 500 MG: 500 TABLET, COATED ORAL at 08:45

## 2020-05-19 RX ADMIN — Medication 20 MG: at 08:48

## 2020-05-19 RX ADMIN — GABAPENTIN 250 MG: 250 SUSPENSION ORAL at 14:15

## 2020-05-19 RX ADMIN — SODIUM CHLORIDE, PRESERVATIVE FREE 300 UNITS: 5 INJECTION INTRAVENOUS at 08:44

## 2020-05-19 RX ADMIN — SODIUM CHLORIDE SOLN NEBU 3% 4 ML: 3 NEBU SOLN at 12:31

## 2020-05-19 RX ADMIN — CHLORHEXIDINE GLUCONATE 0.12% ORAL RINSE 15 ML: 1.2 LIQUID ORAL at 08:44

## 2020-05-19 RX ADMIN — SODIUM CHLORIDE SOLN NEBU 3% 4 ML: 3 NEBU SOLN at 08:33

## 2020-05-19 RX ADMIN — POTASSIUM CHLORIDE 20 MEQ: 400 INJECTION, SOLUTION INTRAVENOUS at 09:29

## 2020-05-19 RX ADMIN — ACETAMINOPHEN 650 MG: 160 SOLUTION ORAL at 03:05

## 2020-05-19 RX ADMIN — MELATONIN 3 MG ORAL TABLET 6 MG: 3 TABLET ORAL at 20:55

## 2020-05-19 RX ADMIN — ENOXAPARIN SODIUM 30 MG: 30 INJECTION SUBCUTANEOUS at 08:45

## 2020-05-19 RX ADMIN — GUAIFENESIN 400 MG: 400 TABLET, FILM COATED ORAL at 20:54

## 2020-05-19 RX ADMIN — GABAPENTIN 250 MG: 250 SUSPENSION ORAL at 05:08

## 2020-05-19 RX ADMIN — SENNOSIDES AND DOCUSATE SODIUM 2 TABLET: 8.6; 5 TABLET ORAL at 08:46

## 2020-05-19 RX ADMIN — GABAPENTIN 250 MG: 250 SUSPENSION ORAL at 22:00

## 2020-05-19 RX ADMIN — METHOCARBAMOL TABLETS 500 MG: 500 TABLET, COATED ORAL at 13:47

## 2020-05-19 RX ADMIN — ACETAMINOPHEN 650 MG: 160 SOLUTION ORAL at 20:53

## 2020-05-19 RX ADMIN — SODIUM CHLORIDE, PRESERVATIVE FREE 10 ML: 5 INJECTION INTRAVENOUS at 08:44

## 2020-05-19 RX ADMIN — ENOXAPARIN SODIUM 30 MG: 30 INJECTION SUBCUTANEOUS at 20:55

## 2020-05-19 RX ADMIN — ACETAMINOPHEN 650 MG: 160 SOLUTION ORAL at 15:28

## 2020-05-19 RX ADMIN — METHOCARBAMOL TABLETS 500 MG: 500 TABLET, COATED ORAL at 20:54

## 2020-05-19 RX ADMIN — POLYETHYLENE GLYCOL 3350 17 G: 17 POWDER, FOR SOLUTION ORAL at 08:44

## 2020-05-19 RX ADMIN — Medication 20 MG: at 20:55

## 2020-05-19 RX ADMIN — MIDAZOLAM 6 MG: 1 INJECTION INTRAMUSCULAR; INTRAVENOUS at 07:52

## 2020-05-19 RX ADMIN — SODIUM CHLORIDE, PRESERVATIVE FREE 300 UNITS: 5 INJECTION INTRAVENOUS at 20:55

## 2020-05-19 RX ADMIN — CHLORHEXIDINE GLUCONATE 0.12% ORAL RINSE 15 ML: 1.2 LIQUID ORAL at 20:55

## 2020-05-19 RX ADMIN — METHOCARBAMOL TABLETS 500 MG: 500 TABLET, COATED ORAL at 17:03

## 2020-05-19 ASSESSMENT — PULMONARY FUNCTION TESTS
PIF_VALUE: 33
PIF_VALUE: 28
PIF_VALUE: 29
PIF_VALUE: 28
PIF_VALUE: 32
PIF_VALUE: 29
PIF_VALUE: 26
PIF_VALUE: 27
PIF_VALUE: 28
PIF_VALUE: 37
PIF_VALUE: 28
PIF_VALUE: 32
PIF_VALUE: 30
PIF_VALUE: 32
PIF_VALUE: 28
PIF_VALUE: 39
PIF_VALUE: 33
PIF_VALUE: 37
PIF_VALUE: 29
PIF_VALUE: 30
PIF_VALUE: 28
PIF_VALUE: 27
PIF_VALUE: 33

## 2020-05-19 ASSESSMENT — PAIN DESCRIPTION - FREQUENCY: FREQUENCY: INTERMITTENT

## 2020-05-19 ASSESSMENT — PAIN SCALES - GENERAL
PAINLEVEL_OUTOF10: 0
PAINLEVEL_OUTOF10: 7
PAINLEVEL_OUTOF10: 0

## 2020-05-19 ASSESSMENT — PAIN DESCRIPTION - DESCRIPTORS: DESCRIPTORS: DISCOMFORT;SORE

## 2020-05-19 NOTE — PLAN OF CARE
Problem: Falls - Risk of:  Goal: Will remain free from falls  Description: Will remain free from falls  Outcome: Met This Shift  Goal: Absence of physical injury  Description: Absence of physical injury  Outcome: Met This Shift     Problem: Pain:  Goal: Pain level will decrease  Description: Pain level will decrease  Outcome: Met This Shift  Goal: Control of chronic pain  Description: Control of chronic pain  Outcome: Met This Shift     Problem: Airway Clearance - Ineffective:  Goal: Ability to maintain a clear airway will improve  Description: Ability to maintain a clear airway will improve  Outcome: Met This Shift     Problem: Anxiety/Stress:  Goal: Level of anxiety will decrease  Description: Level of anxiety will decrease  Outcome: Met This Shift     Problem: Aspiration:  Goal: Absence of aspiration  Description: Absence of aspiration  Outcome: Met This Shift     Problem: Gas Exchange - Impaired:  Goal: Levels of oxygenation will improve  Description: Levels of oxygenation will improve  Outcome: Met This Shift     Problem: Skin Integrity - Impaired:  Goal: Will show no infection signs and symptoms  Description: Will show no infection signs and symptoms  Outcome: Met This Shift  Goal: Absence of new skin breakdown  Description: Absence of new skin breakdown  Outcome: Met This Shift     Problem: Infection:  Goal: Will remain free from infection  Description: Will remain free from infection  Outcome: Met This Shift     Problem: Daily Care:  Goal: Daily care needs are met  Description: Daily care needs are met  Outcome: Met This Shift

## 2020-05-19 NOTE — PROGRESS NOTES
Department of Internal Medicine  Infectious Diseases  Progress Note      C/C :   Pneumonia, empyema  s/p VATS     Pt it awake and alert  Denies fever   Reports chest pain     Afebrile     Current Facility-Administered Medications   Medication Dose Route Frequency Provider Last Rate Last Dose    sodium chloride (Inhalant) 3 % nebulizer solution 4 mL  4 mL Nebulization PRN Lizzette Pimentel DO        oxyCODONE (ROXICODONE) 5 MG/5ML solution 5 mg  5 mg Oral Q6H PRN Geovanny Cade MD   5 mg at 05/18/20 2318    methocarbamol (ROBAXIN) tablet 500 mg  500 mg Oral 4x Daily Geovanny Cade MD   500 mg at 05/19/20 0845    medicated lip balm (BLISTEX/CARMEX) stick   Topical PRN Kristin Mann MD        [facility] 1 g IVPB minibag  1 g Intravenous Q24H Mica Pacheco MD   Stopped at 05/18/20 1807    bisacodyl (DULCOLAX) suppository 10 mg  10 mg Rectal Daily PRN Geovanny Cade MD        acetaminophen (TYLENOL) 160 MG/5ML solution 650 mg  650 mg Oral Q6H Geovanny Cade MD   650 mg at 05/19/20 0845    enoxaparin (LOVENOX) injection 30 mg  30 mg Subcutaneous BID GERMÁN Dumont CNP   30 mg at 05/19/20 0845    guaiFENesin tablet 400 mg  400 mg Oral BID GERMÁN Dumont CNP   400 mg at 05/19/20 0846    gabapentin (NEURONTIN) 250 MG/5ML solution 250 mg  250 mg Oral 3 times per day GERMÁN Dumotn CNP   250 mg at 05/19/20 0536    sodium chloride (Inhalant) 3 % nebulizer solution 4 mL  4 mL Nebulization Q4H WA GERMÁN Dumont CNP   4 mL at 05/19/20 0348    polyethylene glycol (GLYCOLAX) packet 17 g  17 g Oral Daily GERMÁN Dumont CNP   17 g at 05/19/20 0844    famotidine (PEPCID) suspension 20 mg  20 mg Per NG tube BID GERMÁN Dumont CNP   20 mg at 05/19/20 0848    sodium chloride flush 0.9 % injection 10 mL  10 mL Intravenous PRN GERMÁN Dumont CNP   10 mL at 05/19/20 0892    heparin flush 100 UNIT/ML injection 300 Units  3 mL Intravenous 2 times per day Oni COMBS Valerie Glynn - CNP   300 Units at 05/19/20 0844    heparin flush 100 UNIT/ML injection 300 Units  3 mL Intracatheter PRN Gómez Skeens, APRN - CNP   300 Units at 05/03/20 2011    melatonin tablet 6 mg  6 mg Oral Nightly Redby Skeens, APRN - CNP   6 mg at 05/18/20 2021    sennosides-docusate sodium (SENOKOT-S) 8.6-50 MG tablet 2 tablet  2 tablet Oral BID Gómez Skeens, APRN - CNP   2 tablet at 05/19/20 0846    promethazine (PHENERGAN) tablet 12.5 mg  12.5 mg Oral Q6H PRN Gómez Skeens, APRN - CNP        Or    ondansetron (ZOFRAN) injection 4 mg  4 mg Intravenous Q6H PRN Redby Skeens, APRN - CNP   4 mg at 05/07/20 0315    chlorhexidine (PERIDEX) 0.12 % solution 15 mL  15 mL Mouth/Throat BID Gómez Skeens, APRN - CNP   15 mL at 05/19/20 9672    Akwa Tears (LACRILUBE) 2-15-83 % opthalmic ointment   Both Eyes PRN Gómez Skeens, APRN - CNP           REVIEW OF SYSTEMS:      CONSTITUTIONAL:Denies fever   HEENT: Denies sore throat   RESPIRATORY: + chest pain . CARDIOVASCULAR:  Denies palpitation  GASTROINTESTINAL:  Denies abdomen pain, diarrhea or constipation. GENITOURINARY:  Denies burning urination or frequency of urination  INTEGUMENT: denies wound , rash  HEMATOLOGIC/LYMPHATIC:  No bleeding   MUSCULOSKELETAL:  Pain   NEUROLOGICAL: denies headache    PHYSICAL EXAM:      Vitals:     BP (!) 106/58   Pulse 59   Temp 99.5 °F (37.5 °C) (Axillary)   Resp 19   Ht 6' (1.829 m)   Wt 175 lb (79.4 kg)   SpO2 99%   BMI 23.73 kg/m²     General Appearance:    Awake, ventilated , FiO2 100 % , PEEP 14    Head:    Normocephalic, atraumatic   Eyes:    No pallor, no icterus,   Ears:    No obvious deformity or drainage.    Nose:   No nasal drainage   Throat:   Mucosa moist, no oral thrush   Neck:   Supple, no lymphadenopathy   Lungs:     Scattered rhonchi bilaterally    Heart:    Regular rate and rhythm,no murmur    Abdomen:     Soft, non-tender, bowel sounds present     Extremities:   No edema, no cyanosis    Pulses:

## 2020-05-19 NOTE — PROGRESS NOTES
OCCUPATIONAL THERAPY    Date:2020  Patient Name: Ara Felty  MRN: [id number]  : 1995  Room: [facility]2/[facility]2-A     Chart reviewed; attempted OT treatment this pm. Pt refused due to c/o pain. Will try back as able.    Belkis Kennedy, OTR/L [id number]

## 2020-05-19 NOTE — PROGRESS NOTES
Mouth/Throat:      Mouth: Mucous membranes are moist.   Eyes:      Extraocular Movements: Extraocular movements intact. Pupils: Pupils are equal, round, and reactive to light. Neck:      Comments: Tracheostomy tube in place and normal position   Cardiovascular:      Rate and Rhythm: Normal rate and regular rhythm. Abdominal:      General: Abdomen is flat. Palpations: Abdomen is soft. Musculoskeletal:      Comments: No sensation or movement of lower extremities   Neurological:      General: No focal deficit present. Mental Status: He is alert and oriented to person, place, and time. Psychiatric:         Mood and Affect: Mood normal.         ASSESSMENT / PLAN:  · Neuro:  Pain -oxycodone, gabapentin, robaxin, . T6 transection. No intervention per neurosurgery. · CV:No cardiac issues. Monitor hemodynamics  · Pulm:Acute respiratory failure. Tracheostomy on vent. AC rate 18 PEEP 14  Fio2 40. P/F 350   Monitor RR, SpO2>92. S/p Right VATs on 5/11 CT surgery following. Check daily chest xray for possible bronchoscopy. Bronchoscopy today. Continue with guaifenesin tablet. · GI: Tube feeds 60 hr through PEG. glycolax and senakot. Pepcid for PPI. · Renal: Monitor UOP 1.7L out yesterday  · ID: surgery culture from VATs + ecoli and candida albicans. Day #5 ertapenem. ID following. Previously on Merrem and cefepime  for ecoli and strep penumo then ecoli again in respiratory cultures. Monitor for SIRS  · Heme: acute blood loss anemia. Hb 7.0<-7.4<7.2  · Endo:No acute issue. Monitor glucose  ·   · MSK: BL lower extremity paralysis 2/2 T6 transection    Total fluid rate: none  Bowel regimen: glycolax and senakot  DVT proph:  SQ lovenox  GI proph:  pepcid  Glucose protocol: No glycemic issues  Mouth/eye care: As per patient  Sanabria:   keep in place for critical care monitoring of fluid balance.   CVC sites:  PICC Day # 18  Ancillary consults: PT/OT, CT surgery, Neurosurgery  And ID  Family Update: As per

## 2020-05-19 NOTE — PROGRESS NOTES
Physical Therapy  PT SESSION ATTEMPT     Date:2020  Patient Name: Ara Felty  MRN: [id number]  : 1995  Room: 3802/3802-A     Attempted PT session this date:    [] unavailable due to other medical staff currently with pt   [] on hold per nursing staff   [] on hold per nursing staff secondary to lab / radiology results    [x] declined Physical Therapy this date. Benefits of participation in therapy reviewed with pt.    [] off unit   [] Other:   Pt refusing therapy session this date secondary to pain. Education/encouragement attempts unsuccessful, pt still refusing. Will reattempt PT at a later time as able.     Liz Stover, PT, DPT  [id number]

## 2020-05-19 NOTE — PLAN OF CARE
Absence of aspiration  5/19/2020 0026 by Didier Licea RN  Outcome: Met This Shift  5/18/2020 1112 by Maddison Jewell RN  Outcome: Ongoing     Problem: Gas Exchange - Impaired:  Goal: Levels of oxygenation will improve  Description: Levels of oxygenation will improve  5/19/2020 0026 by Didier Licea RN  Outcome: Met This Shift  5/18/2020 1112 by Maddison Jewell RN  Outcome: Met This Shift     Problem: Skin Integrity - Impaired:  Goal: Will show no infection signs and symptoms  Description: Will show no infection signs and symptoms  5/19/2020 0026 by Didier Licea RN  Outcome: Met This Shift  5/18/2020 1112 by Maddison Jewell RN  Outcome: Ongoing  Goal: Absence of new skin breakdown  Description: Absence of new skin breakdown  5/19/2020 0026 by Didier Licea RN  Outcome: Met This Shift  5/18/2020 1112 by Maddison Jewell RN  Outcome: Ongoing     Problem: Infection:  Goal: Will remain free from infection  Description: Will remain free from infection  Outcome: Met This Shift     Problem: Daily Care:  Goal: Daily care needs are met  Description: Daily care needs are met  5/19/2020 0026 by Didier Licea RN  Outcome: Met This Shift  5/18/2020 1112 by Maddison Jewell RN  Outcome: Ongoing        Problem: Musculor/Skeletal Functional Status  Goal: Highest potential functional level  Outcome: Met This Shift  Goal: Absence of falls  Outcome: Met This Shift

## 2020-05-20 ENCOUNTER — APPOINTMENT (OUTPATIENT)
Dept: GENERAL RADIOLOGY | Age: 25
DRG: 004 | End: 2020-05-20
Payer: MEDICAID

## 2020-05-20 LAB
AADO2: 269.3 MMHG
B.E.: 2 MMOL/L (ref -3–3)
COHB: 0.3 % (ref 0–1.5)
CRITICAL: ABNORMAL
DATE ANALYZED: ABNORMAL
DATE OF COLLECTION: ABNORMAL
FIO2: 60 %
HCO3: 26.1 MMOL/L (ref 22–26)
HHB: 3.2 % (ref 0–5)
LAB: ABNORMAL
Lab: ABNORMAL
METHB: 0.3 % (ref 0–1.5)
MODE: AC
O2 CONTENT: 13.7 ML/DL
O2 SATURATION: 96.8 % (ref 92–98.5)
O2HB: 96.2 % (ref 94–97)
OPERATOR ID: ABNORMAL
PATIENT TEMP: 37 C
PCO2: 39.1 MMHG (ref 35–45)
PEEP/CPAP: 14 CMH2O
PFO2: 1.67 MMHG/%
PH BLOOD GAS: 7.44 (ref 7.35–7.45)
PO2: 100.5 MMHG (ref 75–100)
RI(T): 2.68
RR MECHANICAL: 18 B/MIN
SOURCE, BLOOD GAS: ABNORMAL
THB: 10 G/DL (ref 11.5–16.5)
TIME ANALYZED: 512
VT MECHANICAL: 470 ML

## 2020-05-20 PROCEDURE — 6360000002 HC RX W HCPCS: Performed by: NURSE PRACTITIONER

## 2020-05-20 PROCEDURE — 6360000002 HC RX W HCPCS: Performed by: STUDENT IN AN ORGANIZED HEALTH CARE EDUCATION/TRAINING PROGRAM

## 2020-05-20 PROCEDURE — 89051 BODY FLUID CELL COUNT: CPT

## 2020-05-20 PROCEDURE — 2580000003 HC RX 258: Performed by: NURSE PRACTITIONER

## 2020-05-20 PROCEDURE — 6370000000 HC RX 637 (ALT 250 FOR IP): Performed by: NURSE PRACTITIONER

## 2020-05-20 PROCEDURE — 3609027000 HC BRONCHOSCOPY: Performed by: SURGERY

## 2020-05-20 PROCEDURE — 2580000003 HC RX 258: Performed by: INTERNAL MEDICINE

## 2020-05-20 PROCEDURE — 94669 MECHANICAL CHEST WALL OSCILL: CPT

## 2020-05-20 PROCEDURE — 87070 CULTURE OTHR SPECIMN AEROBIC: CPT

## 2020-05-20 PROCEDURE — 99152 MOD SED SAME PHYS/QHP 5/>YRS: CPT | Performed by: SURGERY

## 2020-05-20 PROCEDURE — 6360000002 HC RX W HCPCS: Performed by: INTERNAL MEDICINE

## 2020-05-20 PROCEDURE — 6370000000 HC RX 637 (ALT 250 FOR IP): Performed by: STUDENT IN AN ORGANIZED HEALTH CARE EDUCATION/TRAINING PROGRAM

## 2020-05-20 PROCEDURE — 87102 FUNGUS ISOLATION CULTURE: CPT

## 2020-05-20 PROCEDURE — 2000000000 HC ICU R&B

## 2020-05-20 PROCEDURE — 71045 X-RAY EXAM CHEST 1 VIEW: CPT

## 2020-05-20 PROCEDURE — 6360000002 HC RX W HCPCS: Performed by: SURGERY

## 2020-05-20 PROCEDURE — 0BC68ZZ EXTIRPATION OF MATTER FROM RIGHT LOWER LOBE BRONCHUS, VIA NATURAL OR ARTIFICIAL OPENING ENDOSCOPIC: ICD-10-PCS | Performed by: SURGERY

## 2020-05-20 PROCEDURE — 0BC48ZZ EXTIRPATION OF MATTER FROM RIGHT UPPER LOBE BRONCHUS, VIA NATURAL OR ARTIFICIAL OPENING ENDOSCOPIC: ICD-10-PCS | Performed by: SURGERY

## 2020-05-20 PROCEDURE — 6360000002 HC RX W HCPCS

## 2020-05-20 PROCEDURE — 87205 SMEAR GRAM STAIN: CPT

## 2020-05-20 PROCEDURE — 87106 FUNGI IDENTIFICATION YEAST: CPT

## 2020-05-20 PROCEDURE — 82805 BLOOD GASES W/O2 SATURATION: CPT

## 2020-05-20 PROCEDURE — 2709999900 HC NON-CHARGEABLE SUPPLY: Performed by: SURGERY

## 2020-05-20 PROCEDURE — 94003 VENT MGMT INPAT SUBQ DAY: CPT

## 2020-05-20 PROCEDURE — 31646 BRNCHSC W/THER ASPIR SBSQ: CPT | Performed by: SURGERY

## 2020-05-20 PROCEDURE — 99291 CRITICAL CARE FIRST HOUR: CPT | Performed by: SURGERY

## 2020-05-20 PROCEDURE — 87206 SMEAR FLUORESCENT/ACID STAI: CPT

## 2020-05-20 RX ORDER — FENTANYL CITRATE 50 UG/ML
INJECTION, SOLUTION INTRAMUSCULAR; INTRAVENOUS
Status: DISPENSED
Start: 2020-05-20 | End: 2020-05-20

## 2020-05-20 RX ORDER — FENTANYL CITRATE 50 UG/ML
200 INJECTION, SOLUTION INTRAMUSCULAR; INTRAVENOUS
Status: COMPLETED | OUTPATIENT
Start: 2020-05-20 | End: 2020-05-20

## 2020-05-20 RX ORDER — MIDAZOLAM HYDROCHLORIDE 1 MG/ML
6 INJECTION INTRAMUSCULAR; INTRAVENOUS
Status: COMPLETED | OUTPATIENT
Start: 2020-05-20 | End: 2020-05-20

## 2020-05-20 RX ORDER — MIDAZOLAM HYDROCHLORIDE 1 MG/ML
INJECTION INTRAMUSCULAR; INTRAVENOUS
Status: COMPLETED
Start: 2020-05-20 | End: 2020-05-20

## 2020-05-20 RX ORDER — FENTANYL CITRATE 50 UG/ML
200 INJECTION, SOLUTION INTRAMUSCULAR; INTRAVENOUS ONCE
Status: COMPLETED | OUTPATIENT
Start: 2020-05-20 | End: 2020-05-20

## 2020-05-20 RX ORDER — MIDAZOLAM HYDROCHLORIDE 1 MG/ML
4 INJECTION INTRAMUSCULAR; INTRAVENOUS ONCE
Status: COMPLETED | OUTPATIENT
Start: 2020-05-20 | End: 2020-05-20

## 2020-05-20 RX ADMIN — SENNOSIDES AND DOCUSATE SODIUM 2 TABLET: 8.6; 5 TABLET ORAL at 20:31

## 2020-05-20 RX ADMIN — ACETAMINOPHEN 650 MG: 160 SOLUTION ORAL at 20:30

## 2020-05-20 RX ADMIN — ERTAPENEM SODIUM 1000 MG: 1 INJECTION, POWDER, LYOPHILIZED, FOR SOLUTION INTRAMUSCULAR; INTRAVENOUS at 17:47

## 2020-05-20 RX ADMIN — METHOCARBAMOL TABLETS 500 MG: 500 TABLET, COATED ORAL at 08:11

## 2020-05-20 RX ADMIN — GABAPENTIN 250 MG: 250 SUSPENSION ORAL at 21:59

## 2020-05-20 RX ADMIN — GUAIFENESIN 400 MG: 400 TABLET, FILM COATED ORAL at 20:31

## 2020-05-20 RX ADMIN — GUAIFENESIN 400 MG: 400 TABLET, FILM COATED ORAL at 09:52

## 2020-05-20 RX ADMIN — GABAPENTIN 250 MG: 250 SUSPENSION ORAL at 05:07

## 2020-05-20 RX ADMIN — METHOCARBAMOL TABLETS 500 MG: 500 TABLET, COATED ORAL at 13:33

## 2020-05-20 RX ADMIN — SENNOSIDES AND DOCUSATE SODIUM 2 TABLET: 8.6; 5 TABLET ORAL at 08:18

## 2020-05-20 RX ADMIN — SODIUM CHLORIDE, PRESERVATIVE FREE 300 UNITS: 5 INJECTION INTRAVENOUS at 20:31

## 2020-05-20 RX ADMIN — MIDAZOLAM 4 MG: 1 INJECTION INTRAMUSCULAR; INTRAVENOUS at 08:13

## 2020-05-20 RX ADMIN — ENOXAPARIN SODIUM 30 MG: 30 INJECTION SUBCUTANEOUS at 20:30

## 2020-05-20 RX ADMIN — FENTANYL CITRATE 200 MCG: 50 INJECTION, SOLUTION INTRAMUSCULAR; INTRAVENOUS at 08:04

## 2020-05-20 RX ADMIN — OXYCODONE HYDROCHLORIDE 5 MG: 5 SOLUTION ORAL at 13:32

## 2020-05-20 RX ADMIN — SODIUM CHLORIDE SOLN NEBU 3% 4 ML: 3 NEBU SOLN at 19:34

## 2020-05-20 RX ADMIN — OXYCODONE HYDROCHLORIDE 5 MG: 5 SOLUTION ORAL at 05:08

## 2020-05-20 RX ADMIN — GABAPENTIN 250 MG: 250 SUSPENSION ORAL at 13:40

## 2020-05-20 RX ADMIN — SODIUM CHLORIDE SOLN NEBU 3% 4 ML: 3 NEBU SOLN at 16:06

## 2020-05-20 RX ADMIN — MIDAZOLAM 6 MG: 1 INJECTION INTRAMUSCULAR; INTRAVENOUS at 08:04

## 2020-05-20 RX ADMIN — ENOXAPARIN SODIUM 30 MG: 30 INJECTION SUBCUTANEOUS at 08:18

## 2020-05-20 RX ADMIN — METHOCARBAMOL TABLETS 500 MG: 500 TABLET, COATED ORAL at 17:47

## 2020-05-20 RX ADMIN — METHOCARBAMOL TABLETS 500 MG: 500 TABLET, COATED ORAL at 20:31

## 2020-05-20 RX ADMIN — MELATONIN 3 MG ORAL TABLET 6 MG: 3 TABLET ORAL at 20:31

## 2020-05-20 RX ADMIN — ACETAMINOPHEN 650 MG: 160 SOLUTION ORAL at 08:18

## 2020-05-20 RX ADMIN — ACETAMINOPHEN 650 MG: 160 SOLUTION ORAL at 14:44

## 2020-05-20 RX ADMIN — FENTANYL CITRATE 200 MCG: 50 INJECTION, SOLUTION INTRAMUSCULAR; INTRAVENOUS at 08:13

## 2020-05-20 RX ADMIN — SODIUM CHLORIDE, PRESERVATIVE FREE 300 UNITS: 5 INJECTION INTRAVENOUS at 08:15

## 2020-05-20 RX ADMIN — OXYCODONE HYDROCHLORIDE 5 MG: 5 SOLUTION ORAL at 19:32

## 2020-05-20 RX ADMIN — Medication 20 MG: at 08:20

## 2020-05-20 RX ADMIN — MIDAZOLAM HYDROCHLORIDE 4 MG: 1 INJECTION INTRAMUSCULAR; INTRAVENOUS at 08:13

## 2020-05-20 RX ADMIN — CHLORHEXIDINE GLUCONATE 0.12% ORAL RINSE 15 ML: 1.2 LIQUID ORAL at 08:15

## 2020-05-20 RX ADMIN — POLYETHYLENE GLYCOL 3350 17 G: 17 POWDER, FOR SOLUTION ORAL at 08:11

## 2020-05-20 RX ADMIN — Medication 20 MG: at 20:31

## 2020-05-20 RX ADMIN — CHLORHEXIDINE GLUCONATE 0.12% ORAL RINSE 15 ML: 1.2 LIQUID ORAL at 20:30

## 2020-05-20 RX ADMIN — SODIUM CHLORIDE SOLN NEBU 3% 4 ML: 3 NEBU SOLN at 11:59

## 2020-05-20 RX ADMIN — SODIUM CHLORIDE SOLN NEBU 3% 4 ML: 3 NEBU SOLN at 09:45

## 2020-05-20 ASSESSMENT — PAIN SCALES - GENERAL
PAINLEVEL_OUTOF10: 0
PAINLEVEL_OUTOF10: 0
PAINLEVEL_OUTOF10: 8
PAINLEVEL_OUTOF10: 10
PAINLEVEL_OUTOF10: 7
PAINLEVEL_OUTOF10: 0

## 2020-05-20 ASSESSMENT — PULMONARY FUNCTION TESTS
PIF_VALUE: 33
PIF_VALUE: 30
PIF_VALUE: 36
PIF_VALUE: 37
PIF_VALUE: 29
PIF_VALUE: 29
PIF_VALUE: 31
PIF_VALUE: 32
PIF_VALUE: 36
PIF_VALUE: 30
PIF_VALUE: 28
PIF_VALUE: 30
PIF_VALUE: 34
PIF_VALUE: 33
PIF_VALUE: 31
PIF_VALUE: 28
PIF_VALUE: 28
PIF_VALUE: 30
PIF_VALUE: 28
PIF_VALUE: 29
PIF_VALUE: 29
PIF_VALUE: 32
PIF_VALUE: 31
PIF_VALUE: 28
PIF_VALUE: 32
PIF_VALUE: 31
PIF_VALUE: 34
PIF_VALUE: 29
PIF_VALUE: 37

## 2020-05-20 ASSESSMENT — PAIN DESCRIPTION - ONSET: ONSET: GRADUAL

## 2020-05-20 ASSESSMENT — PAIN DESCRIPTION - PAIN TYPE: TYPE: SURGICAL PAIN

## 2020-05-20 ASSESSMENT — PAIN DESCRIPTION - ORIENTATION: ORIENTATION: RIGHT

## 2020-05-20 ASSESSMENT — PAIN DESCRIPTION - LOCATION: LOCATION: FLANK
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
## 2020-05-20 NOTE — PROGRESS NOTES
Physical Therapy  PT SESSION ATTEMPT     Date:2020  Patient Name: Lazarus Hams  MRN: [id number]  : 1995  Room: 3802/3802-A     Attempted PT session this date:    [] unavailable due to other medical staff currently with pt   [] on hold per nursing staff   [] on hold per nursing staff secondary to lab / radiology results    [x] declined Physical Therapy this date. Benefits of participation in therapy reviewed with pt.    [] off unit   [] Other:   Pt refusing therapy d/t pain and dizziness. Will reattempt PT at a later time as able.     Helen Stone, PT, DPT  [id number]
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
## 2020-05-20 NOTE — PROGRESS NOTES
Intravenous 2 times per day Ardine Safer, APRN - CNP   300 Units at 05/20/20 0815    heparin flush 100 UNIT/ML injection 300 Units  3 mL Intracatheter PRN Ardine Safer, APRN - CNP   300 Units at 05/03/20 2011    melatonin tablet 6 mg  6 mg Oral Nightly Ardine Safer, APRN - CNP   6 mg at 05/19/20 2055    sennosides-docusate sodium (SENOKOT-S) 8.6-50 MG tablet 2 tablet  2 tablet Oral BID Ardine Safer, APRN - CNP   2 tablet at 05/20/20 0818    promethazine (PHENERGAN) tablet 12.5 mg  12.5 mg Oral Q6H PRN Ardine Safer, APRN - CNP        Or    ondansetron (ZOFRAN) injection 4 mg  4 mg Intravenous Q6H PRN Ardine Safer, APRN - CNP   4 mg at 05/07/20 0315    chlorhexidine (PERIDEX) 0.12 % solution 15 mL  15 mL Mouth/Throat BID Ardine Safer, APRN - CNP   15 mL at 05/20/20 0815    Akwa Tears (LACRILUBE) 2-15-83 % opthalmic ointment   Both Eyes PRN Ardine Safer, APRN - CNP           REVIEW OF SYSTEMS:      CONSTITUTIONAL:Denies fever   HEENT: Denies sore throat   RESPIRATORY: + chest pain . CARDIOVASCULAR:  Denies palpitation  GASTROINTESTINAL:  Denies abdomen pain, diarrhea or constipation. GENITOURINARY:  Denies burning urination or frequency of urination  INTEGUMENT: denies wound , rash  HEMATOLOGIC/LYMPHATIC:  No bleeding   MUSCULOSKELETAL:  Pain   NEUROLOGICAL: denies headache    PHYSICAL EXAM:      Vitals:     /60   Pulse 57   Temp 99.1 °F (37.3 °C)   Resp 18   Ht 6' (1.829 m)   Wt 169 lb 15.6 oz (77.1 kg)   SpO2 99%   BMI 23.05 kg/m²     General Appearance:    Awake, ventilated , FiO2 100 % , PEEP 14    Head:    Normocephalic, atraumatic   Eyes:    No pallor, no icterus,   Ears:    No obvious deformity or drainage.    Nose:   No nasal drainage   Throat:   Mucosa dry   Neck:   Supple, no lymphadenopathy   Lungs:     Scattered rhonchi bilaterally    Heart:    Regular rate and rhythm,no murmur    Abdomen:     Soft, non-tender, bowel sounds present     Extremities:   No edema, no

## 2020-05-20 NOTE — CARE COORDINATION
Required bronch yesterday and plan rpt bronch today. Remains on high vent settings. ID recommending Iv invanz x 2 wks (started 5/15). Plan will be to [address] when pulm status more stable.

## 2020-05-21 ENCOUNTER — APPOINTMENT (OUTPATIENT)
Dept: GENERAL RADIOLOGY | Age: 25
DRG: 004 | End: 2020-05-21
Payer: MEDICAID

## 2020-05-21 LAB
AADO2: 237.2 MMHG
ALBUMIN SERPL-MCNC: 2.4 G/DL (ref 3.5–5.2)
ALP BLD-CCNC: 81 U/L (ref 40–129)
ALT SERPL-CCNC: 32 U/L (ref 0–40)
ANION GAP SERPL CALCULATED.3IONS-SCNC: 12 MMOL/L (ref 7–16)
APPEARANCE FLUID: NORMAL
AST SERPL-CCNC: 21 U/L (ref 0–39)
B.E.: -0.1 MMOL/L (ref -3–3)
BASO FLUID: 0 %
BASOPHILS ABSOLUTE: 0.02 E9/L (ref 0–0.2)
BASOPHILS RELATIVE PERCENT: 0.2 % (ref 0–2)
BILIRUB SERPL-MCNC: <0.2 MG/DL (ref 0–1.2)
BUN BLDV-MCNC: 20 MG/DL (ref 6–20)
CALCIUM SERPL-MCNC: 8.6 MG/DL (ref 8.6–10.2)
CELL COUNT FLUID TYPE: NORMAL
CHLORIDE BLD-SCNC: 104 MMOL/L (ref 98–107)
CO2: 24 MMOL/L (ref 22–29)
COHB: 0.3 % (ref 0–1.5)
COLOR FLUID: NORMAL
CREAT SERPL-MCNC: 0.9 MG/DL (ref 0.7–1.2)
CRITICAL: ABNORMAL
DATE ANALYZED: ABNORMAL
DATE OF COLLECTION: ABNORMAL
EOSINOPHIL FLUID: 0 %
EOSINOPHILS ABSOLUTE: 0.28 E9/L (ref 0.05–0.5)
EOSINOPHILS RELATIVE PERCENT: 2.7 % (ref 0–6)
FIO2: 75 %
GFR AFRICAN AMERICAN: >60
GFR NON-AFRICAN AMERICAN: >60 ML/MIN/1.73
GLUCOSE BLD-MCNC: 106 MG/DL (ref 74–99)
GRAM STAIN ORDERABLE: NORMAL
HCO3: 21.7 MMOL/L (ref 22–26)
HCT VFR BLD CALC: 25.7 % (ref 37–54)
HEMOGLOBIN: 8.2 G/DL (ref 12.5–16.5)
HHB: 1 % (ref 0–5)
IMMATURE GRANULOCYTES #: 0.03 E9/L
IMMATURE GRANULOCYTES %: 0.3 % (ref 0–5)
LAB: ABNORMAL
LYMPHOCYTES ABSOLUTE: 1.91 E9/L (ref 1.5–4)
LYMPHOCYTES RELATIVE PERCENT: 18.3 % (ref 20–42)
LYMPHOCYTES, BODY FLUID: 7 %
Lab: ABNORMAL
MAGNESIUM: 1.8 MG/DL (ref 1.6–2.6)
MCH RBC QN AUTO: 29 PG (ref 26–35)
MCHC RBC AUTO-ENTMCNC: 31.9 % (ref 32–34.5)
MCV RBC AUTO: 90.8 FL (ref 80–99.9)
METHB: 0.3 % (ref 0–1.5)
MODE: AC
MONOCYTE, FLUID: 5 %
MONOCYTES ABSOLUTE: 0.81 E9/L (ref 0.1–0.95)
MONOCYTES RELATIVE PERCENT: 7.8 % (ref 2–12)
NEUTROPHIL, FLUID: 88 %
NEUTROPHILS ABSOLUTE: 7.4 E9/L (ref 1.8–7.3)
NEUTROPHILS RELATIVE PERCENT: 70.7 % (ref 43–80)
NUCLEATED CELLS FLUID: 900 /UL
O2 CONTENT: 13.9 ML/DL
O2 SATURATION: 99 % (ref 92–98.5)
O2HB: 98.4 % (ref 94–97)
OPERATOR ID: ABNORMAL
PATIENT TEMP: 37 C
PCO2: 26.2 MMHG (ref 35–45)
PDW BLD-RTO: 14.4 FL (ref 11.5–15)
PEEP/CPAP: 14 CMH2O
PFO2: 3.35 MMHG/%
PH BLOOD GAS: 7.54 (ref 7.35–7.45)
PHOSPHORUS: 3.4 MG/DL (ref 2.5–4.5)
PLATELET # BLD: 350 E9/L (ref 130–450)
PMV BLD AUTO: 10.2 FL (ref 7–12)
PO2: 251 MMHG (ref 75–100)
POTASSIUM SERPL-SCNC: 4 MMOL/L (ref 3.5–5)
RBC # BLD: 2.83 E12/L (ref 3.8–5.8)
RBC FLUID: <2000 /UL
RI(T): 0.94
RR MECHANICAL: 18 B/MIN
SODIUM BLD-SCNC: 140 MMOL/L (ref 132–146)
SOURCE, BLOOD GAS: ABNORMAL
THB: 9.6 G/DL (ref 11.5–16.5)
TIME ANALYZED: 456
TOTAL PROTEIN: 6.8 G/DL (ref 6.4–8.3)
VT MECHANICAL: 600 ML
WBC # BLD: 10.5 E9/L (ref 4.5–11.5)

## 2020-05-21 PROCEDURE — 94669 MECHANICAL CHEST WALL OSCILL: CPT

## 2020-05-21 PROCEDURE — 2580000003 HC RX 258: Performed by: INTERNAL MEDICINE

## 2020-05-21 PROCEDURE — 99291 CRITICAL CARE FIRST HOUR: CPT | Performed by: SURGERY

## 2020-05-21 PROCEDURE — 6370000000 HC RX 637 (ALT 250 FOR IP): Performed by: NURSE PRACTITIONER

## 2020-05-21 PROCEDURE — 85025 COMPLETE CBC W/AUTO DIFF WBC: CPT

## 2020-05-21 PROCEDURE — 36415 COLL VENOUS BLD VENIPUNCTURE: CPT

## 2020-05-21 PROCEDURE — 0BC68ZZ EXTIRPATION OF MATTER FROM RIGHT LOWER LOBE BRONCHUS, VIA NATURAL OR ARTIFICIAL OPENING ENDOSCOPIC: ICD-10-PCS | Performed by: SURGERY

## 2020-05-21 PROCEDURE — 2000000000 HC ICU R&B

## 2020-05-21 PROCEDURE — 71045 X-RAY EXAM CHEST 1 VIEW: CPT

## 2020-05-21 PROCEDURE — 6360000002 HC RX W HCPCS: Performed by: NURSE PRACTITIONER

## 2020-05-21 PROCEDURE — 83735 ASSAY OF MAGNESIUM: CPT

## 2020-05-21 PROCEDURE — 82805 BLOOD GASES W/O2 SATURATION: CPT

## 2020-05-21 PROCEDURE — 6370000000 HC RX 637 (ALT 250 FOR IP): Performed by: STUDENT IN AN ORGANIZED HEALTH CARE EDUCATION/TRAINING PROGRAM

## 2020-05-21 PROCEDURE — 0BC48ZZ EXTIRPATION OF MATTER FROM RIGHT UPPER LOBE BRONCHUS, VIA NATURAL OR ARTIFICIAL OPENING ENDOSCOPIC: ICD-10-PCS | Performed by: SURGERY

## 2020-05-21 PROCEDURE — 84100 ASSAY OF PHOSPHORUS: CPT

## 2020-05-21 PROCEDURE — 80053 COMPREHEN METABOLIC PANEL: CPT

## 2020-05-21 PROCEDURE — 6360000002 HC RX W HCPCS

## 2020-05-21 PROCEDURE — 94003 VENT MGMT INPAT SUBQ DAY: CPT

## 2020-05-21 PROCEDURE — 6360000002 HC RX W HCPCS: Performed by: INTERNAL MEDICINE

## 2020-05-21 PROCEDURE — 36592 COLLECT BLOOD FROM PICC: CPT

## 2020-05-21 PROCEDURE — 89220 SPUTUM SPECIMEN COLLECTION: CPT

## 2020-05-21 PROCEDURE — 2580000003 HC RX 258: Performed by: NURSE PRACTITIONER

## 2020-05-21 RX ORDER — FENTANYL CITRATE 50 UG/ML
INJECTION, SOLUTION INTRAMUSCULAR; INTRAVENOUS
Status: COMPLETED
Start: 2020-05-21 | End: 2020-05-21

## 2020-05-21 RX ORDER — MIDAZOLAM HYDROCHLORIDE 1 MG/ML
INJECTION INTRAMUSCULAR; INTRAVENOUS
Status: DISCONTINUED
Start: 2020-05-21 | End: 2020-05-21

## 2020-05-21 RX ORDER — MIDAZOLAM HYDROCHLORIDE 1 MG/ML
8 INJECTION INTRAMUSCULAR; INTRAVENOUS ONCE
Status: COMPLETED | OUTPATIENT
Start: 2020-05-21 | End: 2020-05-21

## 2020-05-21 RX ORDER — FENTANYL CITRATE 50 UG/ML
300 INJECTION, SOLUTION INTRAMUSCULAR; INTRAVENOUS ONCE
Status: COMPLETED | OUTPATIENT
Start: 2020-05-21 | End: 2020-05-21

## 2020-05-21 RX ORDER — MIDAZOLAM HYDROCHLORIDE 1 MG/ML
INJECTION INTRAMUSCULAR; INTRAVENOUS
Status: DISCONTINUED
Start: 2020-05-21 | End: 2020-05-21 | Stop reason: WASHOUT

## 2020-05-21 RX ORDER — PREGABALIN 100 MG/1
200 CAPSULE ORAL 2 TIMES DAILY
Status: DISCONTINUED | OUTPATIENT
Start: 2020-05-21 | End: 2020-05-23

## 2020-05-21 RX ADMIN — OXYCODONE HYDROCHLORIDE 5 MG: 5 SOLUTION ORAL at 04:23

## 2020-05-21 RX ADMIN — ACETAMINOPHEN 650 MG: 160 SOLUTION ORAL at 14:09

## 2020-05-21 RX ADMIN — SODIUM CHLORIDE SOLN NEBU 3% 4 ML: 3 NEBU SOLN at 08:08

## 2020-05-21 RX ADMIN — GUAIFENESIN 400 MG: 400 TABLET, FILM COATED ORAL at 20:26

## 2020-05-21 RX ADMIN — METHOCARBAMOL TABLETS 500 MG: 500 TABLET, COATED ORAL at 16:20

## 2020-05-21 RX ADMIN — ACETAMINOPHEN 650 MG: 160 SOLUTION ORAL at 09:49

## 2020-05-21 RX ADMIN — SODIUM CHLORIDE SOLN NEBU 3% 4 ML: 3 NEBU SOLN at 15:58

## 2020-05-21 RX ADMIN — METHOCARBAMOL TABLETS 500 MG: 500 TABLET, COATED ORAL at 13:59

## 2020-05-21 RX ADMIN — POLYETHYLENE GLYCOL 3350 17 G: 17 POWDER, FOR SOLUTION ORAL at 09:57

## 2020-05-21 RX ADMIN — Medication 20 MG: at 20:28

## 2020-05-21 RX ADMIN — ENOXAPARIN SODIUM 30 MG: 30 INJECTION SUBCUTANEOUS at 09:49

## 2020-05-21 RX ADMIN — PREGABALIN 200 MG: 100 CAPSULE ORAL at 20:26

## 2020-05-21 RX ADMIN — METHOCARBAMOL TABLETS 500 MG: 500 TABLET, COATED ORAL at 20:27

## 2020-05-21 RX ADMIN — ACETAMINOPHEN 650 MG: 160 SOLUTION ORAL at 04:23

## 2020-05-21 RX ADMIN — ERTAPENEM SODIUM 1000 MG: 1 INJECTION, POWDER, LYOPHILIZED, FOR SOLUTION INTRAMUSCULAR; INTRAVENOUS at 17:50

## 2020-05-21 RX ADMIN — CHLORHEXIDINE GLUCONATE 0.12% ORAL RINSE 15 ML: 1.2 LIQUID ORAL at 20:29

## 2020-05-21 RX ADMIN — FENTANYL CITRATE 300 MCG: 50 INJECTION, SOLUTION INTRAMUSCULAR; INTRAVENOUS at 07:53

## 2020-05-21 RX ADMIN — SODIUM CHLORIDE SOLN NEBU 3% 4 ML: 3 NEBU SOLN at 19:54

## 2020-05-21 RX ADMIN — PREGABALIN 200 MG: 100 CAPSULE ORAL at 09:57

## 2020-05-21 RX ADMIN — SENNOSIDES AND DOCUSATE SODIUM 2 TABLET: 8.6; 5 TABLET ORAL at 09:58

## 2020-05-21 RX ADMIN — ENOXAPARIN SODIUM 30 MG: 30 INJECTION SUBCUTANEOUS at 20:27

## 2020-05-21 RX ADMIN — SODIUM CHLORIDE, PRESERVATIVE FREE 300 UNITS: 5 INJECTION INTRAVENOUS at 20:28

## 2020-05-21 RX ADMIN — SODIUM CHLORIDE, PRESERVATIVE FREE 300 UNITS: 5 INJECTION INTRAVENOUS at 09:51

## 2020-05-21 RX ADMIN — MIDAZOLAM HYDROCHLORIDE 8 MG: 1 INJECTION INTRAMUSCULAR; INTRAVENOUS at 07:50

## 2020-05-21 RX ADMIN — CHLORHEXIDINE GLUCONATE 0.12% ORAL RINSE 15 ML: 1.2 LIQUID ORAL at 09:49

## 2020-05-21 RX ADMIN — METHOCARBAMOL TABLETS 500 MG: 500 TABLET, COATED ORAL at 09:50

## 2020-05-21 RX ADMIN — SODIUM CHLORIDE SOLN NEBU 3% 4 ML: 3 NEBU SOLN at 12:08

## 2020-05-21 RX ADMIN — MIDAZOLAM 8 MG: 1 INJECTION INTRAMUSCULAR; INTRAVENOUS at 07:50

## 2020-05-21 RX ADMIN — SENNOSIDES AND DOCUSATE SODIUM 2 TABLET: 8.6; 5 TABLET ORAL at 20:26

## 2020-05-21 RX ADMIN — ACETAMINOPHEN 650 MG: 160 SOLUTION ORAL at 20:26

## 2020-05-21 RX ADMIN — MELATONIN 3 MG ORAL TABLET 6 MG: 3 TABLET ORAL at 20:26

## 2020-05-21 RX ADMIN — GABAPENTIN 250 MG: 250 SUSPENSION ORAL at 05:35

## 2020-05-21 RX ADMIN — GUAIFENESIN 400 MG: 400 TABLET, FILM COATED ORAL at 09:49

## 2020-05-21 RX ADMIN — Medication 20 MG: at 09:49

## 2020-05-21 ASSESSMENT — PULMONARY FUNCTION TESTS
PIF_VALUE: 32
PIF_VALUE: 33
PIF_VALUE: 30
PIF_VALUE: 31
PIF_VALUE: 29
PIF_VALUE: 30
PIF_VALUE: 31
PIF_VALUE: 30
PIF_VALUE: 50
PIF_VALUE: 30
PIF_VALUE: 29
PIF_VALUE: 31
PIF_VALUE: 31
PIF_VALUE: 41
PIF_VALUE: 30
PIF_VALUE: 50
PIF_VALUE: 32
PIF_VALUE: 29
PIF_VALUE: 50
PIF_VALUE: 29
PIF_VALUE: 30
PIF_VALUE: 29
PIF_VALUE: 43
PIF_VALUE: 29
PIF_VALUE: 31
PIF_VALUE: 29
PIF_VALUE: 30
PIF_VALUE: 32
PIF_VALUE: 32
PIF_VALUE: 30
PIF_VALUE: 30
PIF_VALUE: 36
PIF_VALUE: 41
PIF_VALUE: 31
PIF_VALUE: 32
PIF_VALUE: 29
PIF_VALUE: 32
PIF_VALUE: 29
PIF_VALUE: 45

## 2020-05-21 ASSESSMENT — PAIN DESCRIPTION - DESCRIPTORS: DESCRIPTORS: DISCOMFORT;CONSTANT

## 2020-05-21 ASSESSMENT — PAIN SCALES - GENERAL
PAINLEVEL_OUTOF10: 0
PAINLEVEL_OUTOF10: 0
PAINLEVEL_OUTOF10: 6
PAINLEVEL_OUTOF10: 8
PAINLEVEL_OUTOF10: 0

## 2020-05-21 NOTE — PROGRESS NOTES
OCCUPATIONAL THERAPY    Date:2020  Patient Name: Paty Nur  MRN: [id number]  : 1995  Room: 3802/[facility]2-A     Chart reviewed; attempted OT treatment this pm. Pt on hold due to high vent settings-contraindicated for treatment. Will monitor status and re-attempt at later time.    Monroe Laguna, OTR/L [id number]
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
## 2020-05-21 NOTE — PROGRESS NOTES
Physical Therapy  Physical Therapy Attempt    Name: Rebecca Martell  : 1995  MRN: [id number]      Date of Service: 2020  Chart reviewed. Attempted treatment session this afternoon; however, pt's ventilator settings contraindicated intervention (PEEP 14). Will re-attempt as able.     Jennifer Rae, PT, DPT  [id number]

## 2020-05-21 NOTE — PROGRESS NOTES
Pulmonary: Aggressive pulmonary hygiene , Metanebs and 3%,   Chest Xray Daily ABG prn,  Likely right diaphragm paralyzed patient to eventually get sniff test will not  right now and difficult to do secondary to vent needs. Monitor RR and Maintain SpO2 > 92%  Bronch prn - will do again ttoday AC Tv 600( increased Tv 650 as trying to keep lung inflated - easily collapsible) PEEP 14 RR 18 FiO2 75% PF ratio 335 . Rotate patient supine and to left  ID:  Recurrent PNA's ( Ecoli and candida - likely contaminate , in Surgical cultures)  followed by ID currently on Invanz - rechecked cultures bronch yesterday rare gram + cocci    Monitor leukocytosis and Monitor Fever Curve  Heme: No indication for Transfusion   Cardiac: Monitor Hemodynamics No Cardiac Issues   Endocrine: No Glycemic Issues    DVT Prophylaxis: PCDs, SQ Lovenox   Ulcer Prophylaxis: H2 Intubated for >48 hours   Tubes and Lines: Continue PICC, Continue Sanabria for urinary retention , Continue Tracheostomy , PEG   Seizure proph:     No Indication  Ancillary consults:   Neurosurgery, ENT and PT/OT  , CT ,  ID   Family Update:          Today spoke with Mother   CODE Status:       Full Code    Dispo: TOMÁS Long MD    Critical Care: 34 minutes evaluating and managing patient with Respiratory Failure , Multiple Traumatic Issues

## 2020-05-22 ENCOUNTER — APPOINTMENT (OUTPATIENT)
Dept: GENERAL RADIOLOGY | Age: 25
DRG: 004 | End: 2020-05-22
Payer: MEDICAID

## 2020-05-22 LAB
AADO2: 204 MMHG
B.E.: 0.4 MMOL/L (ref -3–3)
COHB: 0.3 % (ref 0–1.5)
CRITICAL: ABNORMAL
CULTURE, RESPIRATORY: NORMAL
DATE ANALYZED: ABNORMAL
DATE OF COLLECTION: ABNORMAL
FIO2: 60 %
HCO3: 22.3 MMOL/L (ref 22–26)
HHB: 1.3 % (ref 0–5)
LAB: ABNORMAL
Lab: ABNORMAL
METHB: 0.4 % (ref 0–1.5)
MODE: AC
O2 CONTENT: 13 ML/DL
O2 SATURATION: 98.7 % (ref 92–98.5)
O2HB: 98 % (ref 94–97)
OPERATOR ID: 1632
PATIENT TEMP: 37 C
PCO2: 26.7 MMHG (ref 35–45)
PEEP/CPAP: 14 CMH2O
PFO2: 2.99 MMHG/%
PH BLOOD GAS: 7.54 (ref 7.35–7.45)
PO2: 179.4 MMHG (ref 75–100)
RI(T): 1.14
RR MECHANICAL: 14 B/MIN
SMEAR, RESPIRATORY: NORMAL
SOURCE, BLOOD GAS: ABNORMAL
THB: 9.1 G/DL (ref 11.5–16.5)
TIME ANALYZED: 530
VT MECHANICAL: 650 ML

## 2020-05-22 PROCEDURE — 6360000002 HC RX W HCPCS: Performed by: NURSE PRACTITIONER

## 2020-05-22 PROCEDURE — 94003 VENT MGMT INPAT SUBQ DAY: CPT

## 2020-05-22 PROCEDURE — 2580000003 HC RX 258: Performed by: INTERNAL MEDICINE

## 2020-05-22 PROCEDURE — 6370000000 HC RX 637 (ALT 250 FOR IP): Performed by: NURSE PRACTITIONER

## 2020-05-22 PROCEDURE — 51798 US URINE CAPACITY MEASURE: CPT | Performed by: NURSE PRACTITIONER

## 2020-05-22 PROCEDURE — 51798 US URINE CAPACITY MEASURE: CPT

## 2020-05-22 PROCEDURE — 6370000000 HC RX 637 (ALT 250 FOR IP): Performed by: STUDENT IN AN ORGANIZED HEALTH CARE EDUCATION/TRAINING PROGRAM

## 2020-05-22 PROCEDURE — 6360000002 HC RX W HCPCS: Performed by: INTERNAL MEDICINE

## 2020-05-22 PROCEDURE — 2580000003 HC RX 258: Performed by: NURSE PRACTITIONER

## 2020-05-22 PROCEDURE — 51701 INSERT BLADDER CATHETER: CPT

## 2020-05-22 PROCEDURE — 71045 X-RAY EXAM CHEST 1 VIEW: CPT

## 2020-05-22 PROCEDURE — 82805 BLOOD GASES W/O2 SATURATION: CPT

## 2020-05-22 PROCEDURE — 94669 MECHANICAL CHEST WALL OSCILL: CPT

## 2020-05-22 PROCEDURE — 2000000000 HC ICU R&B

## 2020-05-22 PROCEDURE — 99291 CRITICAL CARE FIRST HOUR: CPT | Performed by: SURGERY

## 2020-05-22 RX ORDER — ACETAMINOPHEN 160 MG/5ML
650 SOLUTION ORAL EVERY 6 HOURS PRN
Status: DISCONTINUED | OUTPATIENT
Start: 2020-05-22 | End: 2020-05-26 | Stop reason: HOSPADM

## 2020-05-22 RX ADMIN — SENNOSIDES AND DOCUSATE SODIUM 2 TABLET: 8.6; 5 TABLET ORAL at 21:31

## 2020-05-22 RX ADMIN — SODIUM CHLORIDE, PRESERVATIVE FREE 300 UNITS: 5 INJECTION INTRAVENOUS at 21:27

## 2020-05-22 RX ADMIN — SODIUM CHLORIDE SOLN NEBU 3% 4 ML: 3 NEBU SOLN at 07:37

## 2020-05-22 RX ADMIN — SODIUM CHLORIDE, PRESERVATIVE FREE 300 UNITS: 5 INJECTION INTRAVENOUS at 08:29

## 2020-05-22 RX ADMIN — SODIUM CHLORIDE SOLN NEBU 3% 4 ML: 3 NEBU SOLN at 19:17

## 2020-05-22 RX ADMIN — METHOCARBAMOL TABLETS 500 MG: 500 TABLET, COATED ORAL at 08:27

## 2020-05-22 RX ADMIN — POLYETHYLENE GLYCOL 3350 17 G: 17 POWDER, FOR SOLUTION ORAL at 08:28

## 2020-05-22 RX ADMIN — ACETAMINOPHEN ORAL SOLUTION 650 MG: 650 SOLUTION ORAL at 08:54

## 2020-05-22 RX ADMIN — SODIUM CHLORIDE SOLN NEBU 3% 4 ML: 3 NEBU SOLN at 15:36

## 2020-05-22 RX ADMIN — GUAIFENESIN 400 MG: 400 TABLET, FILM COATED ORAL at 21:28

## 2020-05-22 RX ADMIN — MELATONIN 3 MG ORAL TABLET 6 MG: 3 TABLET ORAL at 21:26

## 2020-05-22 RX ADMIN — SODIUM CHLORIDE SOLN NEBU 3% 4 ML: 3 NEBU SOLN at 12:13

## 2020-05-22 RX ADMIN — SENNOSIDES AND DOCUSATE SODIUM 2 TABLET: 8.6; 5 TABLET ORAL at 08:28

## 2020-05-22 RX ADMIN — GUAIFENESIN 400 MG: 400 TABLET, FILM COATED ORAL at 08:54

## 2020-05-22 RX ADMIN — SODIUM CHLORIDE, PRESERVATIVE FREE 10 ML: 5 INJECTION INTRAVENOUS at 08:29

## 2020-05-22 RX ADMIN — ACETAMINOPHEN ORAL SOLUTION 650 MG: 650 SOLUTION ORAL at 16:25

## 2020-05-22 RX ADMIN — METHOCARBAMOL TABLETS 500 MG: 500 TABLET, COATED ORAL at 16:25

## 2020-05-22 RX ADMIN — PREGABALIN 200 MG: 100 CAPSULE ORAL at 08:28

## 2020-05-22 RX ADMIN — Medication 20 MG: at 08:54

## 2020-05-22 RX ADMIN — METHOCARBAMOL TABLETS 500 MG: 500 TABLET, COATED ORAL at 13:56

## 2020-05-22 RX ADMIN — ENOXAPARIN SODIUM 30 MG: 30 INJECTION SUBCUTANEOUS at 08:33

## 2020-05-22 RX ADMIN — ENOXAPARIN SODIUM 30 MG: 30 INJECTION SUBCUTANEOUS at 21:26

## 2020-05-22 RX ADMIN — ERTAPENEM SODIUM 1000 MG: 1 INJECTION, POWDER, LYOPHILIZED, FOR SOLUTION INTRAMUSCULAR; INTRAVENOUS at 18:30

## 2020-05-22 RX ADMIN — METHOCARBAMOL TABLETS 500 MG: 500 TABLET, COATED ORAL at 21:28

## 2020-05-22 RX ADMIN — ACETAMINOPHEN 650 MG: 160 SOLUTION ORAL at 02:49

## 2020-05-22 RX ADMIN — CHLORHEXIDINE GLUCONATE 0.12% ORAL RINSE 15 ML: 1.2 LIQUID ORAL at 08:28

## 2020-05-22 RX ADMIN — CHLORHEXIDINE GLUCONATE 0.12% ORAL RINSE 15 ML: 1.2 LIQUID ORAL at 21:26

## 2020-05-22 RX ADMIN — PREGABALIN 200 MG: 100 CAPSULE ORAL at 21:26

## 2020-05-22 RX ADMIN — Medication 20 MG: at 21:35

## 2020-05-22 ASSESSMENT — PAIN DESCRIPTION - ONSET: ONSET: GRADUAL

## 2020-05-22 ASSESSMENT — PULMONARY FUNCTION TESTS
PIF_VALUE: 27
PIF_VALUE: 31
PIF_VALUE: 32
PIF_VALUE: 29
PIF_VALUE: 27
PIF_VALUE: 28
PIF_VALUE: 29
PIF_VALUE: 34
PIF_VALUE: 29
PIF_VALUE: 33
PIF_VALUE: 29
PIF_VALUE: 27
PIF_VALUE: 28
PIF_VALUE: 29
PIF_VALUE: 28
PIF_VALUE: 50
PIF_VALUE: 30
PIF_VALUE: 26
PIF_VALUE: 32
PIF_VALUE: 30
PIF_VALUE: 26
PIF_VALUE: 29
PIF_VALUE: 29
PIF_VALUE: 28
PIF_VALUE: 28
PIF_VALUE: 31

## 2020-05-22 ASSESSMENT — PAIN SCALES - GENERAL
PAINLEVEL_OUTOF10: 7
PAINLEVEL_OUTOF10: 0
PAINLEVEL_OUTOF10: 8

## 2020-05-22 ASSESSMENT — PAIN DESCRIPTION - PAIN TYPE: TYPE: ACUTE PAIN

## 2020-05-22 ASSESSMENT — PAIN SCALES - WONG BAKER: WONGBAKER_NUMERICALRESPONSE: 0

## 2020-05-22 ASSESSMENT — PAIN DESCRIPTION - DESCRIPTORS: DESCRIPTORS: ACHING;DISCOMFORT

## 2020-05-22 ASSESSMENT — PAIN DESCRIPTION - LOCATION: LOCATION: HEAD

## 2020-05-22 ASSESSMENT — PAIN DESCRIPTION - FREQUENCY: FREQUENCY: INTERMITTENT

## 2020-05-22 NOTE — PROGRESS NOTES
05/21/2020    RBC 2.83 05/21/2020    HGB 8.2 05/21/2020    HCT 25.7 05/21/2020     05/21/2020    MCV 90.8 05/21/2020    MCH 29.0 05/21/2020    MCHC 31.9 05/21/2020    RDW 14.4 05/21/2020    METASPCT 0.9 05/04/2020    LYMPHOPCT 18.3 05/21/2020    MONOPCT 7.8 05/21/2020    BASOPCT 0.2 05/21/2020    MONOSABS 0.81 05/21/2020    LYMPHSABS 1.91 05/21/2020    EOSABS 0.28 05/21/2020    BASOSABS 0.02 05/21/2020       CMP     Lab Results   Component Value Date     05/21/2020    K 4.0 05/21/2020     05/21/2020    CO2 24 05/21/2020    BUN 20 05/21/2020    CREATININE 0.9 05/21/2020    GFRAA >60 05/21/2020    LABGLOM >60 05/21/2020    GLUCOSE 106 05/21/2020    PROT 6.8 05/21/2020    LABALBU 2.4 05/21/2020    CALCIUM 8.6 05/21/2020    BILITOT <0.2 05/21/2020    ALKPHOS 81 05/21/2020    AST 21 05/21/2020    ALT 32 05/21/2020         Hepatic Function Panel:    Lab Results   Component Value Date    ALKPHOS 81 05/21/2020    ALT 32 05/21/2020    AST 21 05/21/2020    PROT 6.8 05/21/2020    BILITOT <0.2 05/21/2020    LABALBU 2.4 05/21/2020         MICROBIOLOGY:    Blood culture - negative       Sputum Culture -   Group 6: <25 PMN's/LPF and <25 Epithelial cells/LPF   Few Polymorphonuclear leukocytes   Epithelial cells not seen   Rare Gram positive coccobacillary organisms   Rare Gram negative rods    Narrative:         Sputum culture :     Narrative   Performed by: [address] [place] [address] Lab   Source: [facility]       Site:              Susceptibility     Escherichia coli (1)     Antibiotic Interpretation FLAKO Status    ampicillin Resistant >=^32 mcg/mL     ceFAZolin Sensitive ^8 mcg/mL     cefepime Sensitive <=^0.12 mcg/mL     cefTRIAXone Sensitive <=^0.25 mcg/mL     Confirmatory Extended Spectrum Beta-Lactamase  Neg mcg/mL     ertapenem Sensitive <=^0.12 mcg/mL     gentamicin Sensitive <=^1 mcg/mL     levofloxacin Sensitive <=^0.12 mcg/mL     piperacillin-tazobactam Sensitive <=^4 mcg/mL
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
## 2020-05-23 ENCOUNTER — APPOINTMENT (OUTPATIENT)
Dept: GENERAL RADIOLOGY | Age: 25
DRG: 004 | End: 2020-05-23
Payer: MEDICAID

## 2020-05-23 PROCEDURE — 05HA33Z INSERTION OF INFUSION DEVICE INTO LEFT BRACHIAL VEIN, PERCUTANEOUS APPROACH: ICD-10-PCS | Performed by: SURGERY

## 2020-05-23 PROCEDURE — 51798 US URINE CAPACITY MEASURE: CPT

## 2020-05-23 PROCEDURE — 2580000003 HC RX 258: Performed by: NURSE PRACTITIONER

## 2020-05-23 PROCEDURE — 6370000000 HC RX 637 (ALT 250 FOR IP): Performed by: NURSE PRACTITIONER

## 2020-05-23 PROCEDURE — 2580000003 HC RX 258: Performed by: INTERNAL MEDICINE

## 2020-05-23 PROCEDURE — 94669 MECHANICAL CHEST WALL OSCILL: CPT

## 2020-05-23 PROCEDURE — 71045 X-RAY EXAM CHEST 1 VIEW: CPT

## 2020-05-23 PROCEDURE — 94003 VENT MGMT INPAT SUBQ DAY: CPT

## 2020-05-23 PROCEDURE — 36410 VNPNXR 3YR/> PHY/QHP DX/THER: CPT

## 2020-05-23 PROCEDURE — 6360000002 HC RX W HCPCS: Performed by: NURSE PRACTITIONER

## 2020-05-23 PROCEDURE — 51701 INSERT BLADDER CATHETER: CPT

## 2020-05-23 PROCEDURE — 94640 AIRWAY INHALATION TREATMENT: CPT

## 2020-05-23 PROCEDURE — C1751 CATH, INF, PER/CENT/MIDLINE: HCPCS

## 2020-05-23 PROCEDURE — 31502 CHANGE OF WINDPIPE AIRWAY: CPT

## 2020-05-23 PROCEDURE — 6370000000 HC RX 637 (ALT 250 FOR IP): Performed by: STUDENT IN AN ORGANIZED HEALTH CARE EDUCATION/TRAINING PROGRAM

## 2020-05-23 PROCEDURE — 6370000000 HC RX 637 (ALT 250 FOR IP): Performed by: SURGERY

## 2020-05-23 PROCEDURE — 76937 US GUIDE VASCULAR ACCESS: CPT

## 2020-05-23 PROCEDURE — 99291 CRITICAL CARE FIRST HOUR: CPT | Performed by: SURGERY

## 2020-05-23 PROCEDURE — 2000000000 HC ICU R&B

## 2020-05-23 PROCEDURE — 6360000002 HC RX W HCPCS: Performed by: INTERNAL MEDICINE

## 2020-05-23 RX ORDER — PREGABALIN 100 MG/1
100 CAPSULE ORAL ONCE
Status: COMPLETED | OUTPATIENT
Start: 2020-05-23 | End: 2020-05-23

## 2020-05-23 RX ORDER — SENNA AND DOCUSATE SODIUM 50; 8.6 MG/1; MG/1
1 TABLET, FILM COATED ORAL 2 TIMES DAILY
Status: DISCONTINUED | OUTPATIENT
Start: 2020-05-23 | End: 2020-05-26 | Stop reason: HOSPADM

## 2020-05-23 RX ORDER — IPRATROPIUM BROMIDE AND ALBUTEROL SULFATE 2.5; .5 MG/3ML; MG/3ML
1 SOLUTION RESPIRATORY (INHALATION) EVERY 4 HOURS PRN
Status: DISCONTINUED | OUTPATIENT
Start: 2020-05-23 | End: 2020-05-26 | Stop reason: HOSPADM

## 2020-05-23 RX ORDER — PREGABALIN 100 MG/1
300 CAPSULE ORAL 2 TIMES DAILY
Status: DISCONTINUED | OUTPATIENT
Start: 2020-05-23 | End: 2020-05-26 | Stop reason: HOSPADM

## 2020-05-23 RX ADMIN — ENOXAPARIN SODIUM 30 MG: 30 INJECTION SUBCUTANEOUS at 21:37

## 2020-05-23 RX ADMIN — Medication 20 MG: at 10:35

## 2020-05-23 RX ADMIN — PREGABALIN 200 MG: 100 CAPSULE ORAL at 09:06

## 2020-05-23 RX ADMIN — PREGABALIN 100 MG: 100 CAPSULE ORAL at 14:52

## 2020-05-23 RX ADMIN — IPRATROPIUM BROMIDE AND ALBUTEROL SULFATE 1 AMPULE: 2.5; .5 SOLUTION RESPIRATORY (INHALATION) at 05:45

## 2020-05-23 RX ADMIN — SENNOSIDES AND DOCUSATE SODIUM 2 TABLET: 8.6; 5 TABLET ORAL at 09:06

## 2020-05-23 RX ADMIN — SODIUM CHLORIDE SOLN NEBU 3% 4 ML: 3 NEBU SOLN at 11:22

## 2020-05-23 RX ADMIN — GUAIFENESIN 400 MG: 400 TABLET, FILM COATED ORAL at 09:18

## 2020-05-23 RX ADMIN — PREGABALIN 300 MG: 100 CAPSULE ORAL at 21:15

## 2020-05-23 RX ADMIN — SODIUM CHLORIDE SOLN NEBU 3% 4 ML: 3 NEBU SOLN at 19:12

## 2020-05-23 RX ADMIN — SODIUM CHLORIDE, PRESERVATIVE FREE 10 ML: 5 INJECTION INTRAVENOUS at 09:06

## 2020-05-23 RX ADMIN — SODIUM CHLORIDE, PRESERVATIVE FREE 300 UNITS: 5 INJECTION INTRAVENOUS at 09:06

## 2020-05-23 RX ADMIN — CHLORHEXIDINE GLUCONATE 0.12% ORAL RINSE 15 ML: 1.2 LIQUID ORAL at 09:06

## 2020-05-23 RX ADMIN — ENOXAPARIN SODIUM 30 MG: 30 INJECTION SUBCUTANEOUS at 09:15

## 2020-05-23 RX ADMIN — CHLORHEXIDINE GLUCONATE 0.12% ORAL RINSE 15 ML: 1.2 LIQUID ORAL at 21:11

## 2020-05-23 RX ADMIN — GUAIFENESIN 400 MG: 400 TABLET, FILM COATED ORAL at 21:13

## 2020-05-23 RX ADMIN — SODIUM CHLORIDE SOLN NEBU 3% 4 ML: 3 NEBU SOLN at 15:32

## 2020-05-23 RX ADMIN — SODIUM CHLORIDE SOLN NEBU 3% 4 ML: 3 NEBU SOLN at 05:45

## 2020-05-23 RX ADMIN — ACETAMINOPHEN ORAL SOLUTION 650 MG: 650 SOLUTION ORAL at 09:22

## 2020-05-23 RX ADMIN — SENNOSIDES AND DOCUSATE SODIUM 1 TABLET: 8.6; 5 TABLET ORAL at 21:35

## 2020-05-23 RX ADMIN — MELATONIN 3 MG ORAL TABLET 6 MG: 3 TABLET ORAL at 21:14

## 2020-05-23 RX ADMIN — ERTAPENEM SODIUM 1000 MG: 1 INJECTION, POWDER, LYOPHILIZED, FOR SOLUTION INTRAMUSCULAR; INTRAVENOUS at 19:47

## 2020-05-23 RX ADMIN — ACETAMINOPHEN ORAL SOLUTION 650 MG: 650 SOLUTION ORAL at 00:29

## 2020-05-23 RX ADMIN — POLYETHYLENE GLYCOL 3350 17 G: 17 POWDER, FOR SOLUTION ORAL at 09:06

## 2020-05-23 RX ADMIN — Medication 20 MG: at 21:37

## 2020-05-23 RX ADMIN — ACETAMINOPHEN ORAL SOLUTION 650 MG: 650 SOLUTION ORAL at 16:00

## 2020-05-23 RX ADMIN — IPRATROPIUM BROMIDE AND ALBUTEROL SULFATE 1 AMPULE: 2.5; .5 SOLUTION RESPIRATORY (INHALATION) at 00:10

## 2020-05-23 ASSESSMENT — PAIN SCALES - GENERAL
PAINLEVEL_OUTOF10: 0
PAINLEVEL_OUTOF10: 3
PAINLEVEL_OUTOF10: 0
PAINLEVEL_OUTOF10: 0
PAINLEVEL_OUTOF10: 8
PAINLEVEL_OUTOF10: 0
PAINLEVEL_OUTOF10: 10

## 2020-05-23 ASSESSMENT — PULMONARY FUNCTION TESTS
PIF_VALUE: 29
PIF_VALUE: 30
PIF_VALUE: 29
PIF_VALUE: 31
PIF_VALUE: 29
PIF_VALUE: 32
PIF_VALUE: 29
PIF_VALUE: 28
PIF_VALUE: 29
PIF_VALUE: 29
PIF_VALUE: 30
PIF_VALUE: 33
PIF_VALUE: 32
PIF_VALUE: 36
PIF_VALUE: 30
PIF_VALUE: 29

## 2020-05-23 ASSESSMENT — PAIN DESCRIPTION - FREQUENCY: FREQUENCY: INTERMITTENT

## 2020-05-23 ASSESSMENT — PAIN DESCRIPTION - DESCRIPTORS: DESCRIPTORS: HEADACHE

## 2020-05-23 ASSESSMENT — PAIN DESCRIPTION - PAIN TYPE: TYPE: ACUTE PAIN

## 2020-05-23 ASSESSMENT — PAIN DESCRIPTION - ONSET: ONSET: AWAKENED FROM SLEEP

## 2020-05-23 ASSESSMENT — PAIN DESCRIPTION - PROGRESSION: CLINICAL_PROGRESSION: GRADUALLY WORSENING

## 2020-05-23 NOTE — PROGRESS NOTES
[facility]  SURGICAL INTENSIVE CARE UNIT (SICU)  ATTENDING PHYSICIAN CRITICAL CARE PROGRESS NOTE     I have examined the patient, reviewed the record,and discussed the case with the APN/  Resident. I have reviewed all relevant labs and imaging data. The following summarizes my clinical findings and independent assessment. Date of admission:  4/21/2020    CC: [address] COURSE:   4/21 Left neck exploration and bilateral chest tube insertion  4/23 tracheostomy by ENT  4/24 upsized trach to #8  4/25 brochoscopy for mucous plug and 2nd Right chest tube  4/27--febrile overnight  4/28--febrile again overnight; bronched yesterday  4/29--bronched again yesterday  4/30--bronched overnight  5/1--increased O2 requirement overnight  5/2--febrile overnight  5/3--increased O2 requirement again last night  5/4-- Febrile overnight , Bronchoscopy yesterday for Right main stem mucus plug   5/5 -- Tachycardia improving , low grade fevers still   5/6 No Fevers overnight , Bronchoscopy early am for desaturation, Chest tube pulled yesterday   5/7 Tachycardia and tachypea overnight   5/8 No acute issues overnight   5/9 Bronchoscopy for mucus plug and PEG tube placed yesterday Fever Max 102.1   5/10 No acute overnight issues   5/11- bronchoscopy done yesterday. Remains on ventilator. Going to the OR today for right VATS with CT surgery. 5/12 - went for uncomplicated right VATS. 5/13 - recurrent RLL collapse. Bronch today. 5/14 - recurrent RLL collapse. Bronch today. 2nd right CT removed yesterday.   5/16- remains on mechanical ventilation with daily bronchoscopy for recurrent right lower lobe collapse. Desaturated earlier this morning requiring stat bronchoscopy and increased ventilator support. Switched to APRV. 5/17 - remains on APRV. No other acute changes.   5/18 - Patient had an episode of hypoxia early in the am requiring bronchoscopy   5/19 Required bronchoscopy  Taken off APRV yesterday   5/20

## 2020-05-23 NOTE — PROGRESS NOTES
10.5 05/21/2020    RBC 2.83 05/21/2020    HGB 8.2 05/21/2020    HCT 25.7 05/21/2020     05/21/2020    MCV 90.8 05/21/2020    MCH 29.0 05/21/2020    MCHC 31.9 05/21/2020    RDW 14.4 05/21/2020    METASPCT 0.9 05/04/2020    LYMPHOPCT 18.3 05/21/2020    MONOPCT 7.8 05/21/2020    BASOPCT 0.2 05/21/2020    MONOSABS 0.81 05/21/2020    LYMPHSABS 1.91 05/21/2020    EOSABS 0.28 05/21/2020    BASOSABS 0.02 05/21/2020       CMP     Lab Results   Component Value Date     05/21/2020    K 4.0 05/21/2020     05/21/2020    CO2 24 05/21/2020    BUN 20 05/21/2020    CREATININE 0.9 05/21/2020    GFRAA >60 05/21/2020    LABGLOM >60 05/21/2020    GLUCOSE 106 05/21/2020    PROT 6.8 05/21/2020    LABALBU 2.4 05/21/2020    CALCIUM 8.6 05/21/2020    BILITOT <0.2 05/21/2020    ALKPHOS 81 05/21/2020    AST 21 05/21/2020    ALT 32 05/21/2020         Hepatic Function Panel:    Lab Results   Component Value Date    ALKPHOS 81 05/21/2020    ALT 32 05/21/2020    AST 21 05/21/2020    PROT 6.8 05/21/2020    BILITOT <0.2 05/21/2020    LABALBU 2.4 05/21/2020         MICROBIOLOGY:    Blood culture - negative       Sputum Culture -   Group 6: <25 PMN's/LPF and <25 Epithelial cells/LPF   Few Polymorphonuclear leukocytes   Epithelial cells not seen   Rare Gram positive coccobacillary organisms   Rare Gram negative rods    Narrative:         Sputum culture :     Narrative   Performed by: [address] [place] [address] Lab   Source: [facility]       Site:              Susceptibility     Escherichia coli (1)     Antibiotic Interpretation FLAKO Status    ampicillin Resistant >=^32 mcg/mL     ceFAZolin Sensitive ^8 mcg/mL     cefepime Sensitive <=^0.12 mcg/mL     cefTRIAXone Sensitive <=^0.25 mcg/mL     Confirmatory Extended Spectrum Beta-Lactamase  Neg mcg/mL     ertapenem Sensitive <=^0.12 mcg/mL     gentamicin Sensitive <=^1 mcg/mL     levofloxacin Sensitive <=^0.12 mcg/mL     piperacillin-tazobactam Sensitive <=^4 mcg/mL trimethoprim-sulfamethoxazole Sensitive <=^20 mcg/mL     Lab and Collection       Tissue -    Susceptibility     Escherichia coli (1)     Antibiotic Interpretation FLAKO Status    ampicillin Resistant >=^32 mcg/mL     ceFAZolin Sensitive ^8 mcg/mL     cefepime Sensitive <=^0.12 mcg/mL     cefTRIAXone Sensitive <=^0.25 mcg/mL     Confirmatory Extended Spectrum Beta-Lactamase  Neg mcg/mL     ertapenem Sensitive <=^0.12 mcg/mL     gentamicin Sensitive <=^1 mcg/mL     levofloxacin Sensitive <=^0.12 mcg/mL     piperacillin-tazobactam Sensitive <=^4 mcg/mL     trimethoprim-sulfamethoxazole Sensitive <=^20 mcg/mL     Lab and Collection           Radiology :      Right lung opacity       IMPRESSION:     1. Pneumonia,  empyema ( E coli )   s/p VATS and decortication ( 5/11)-recurrent lung collapse s/p multiple bronchoscopies   2. Respiratory failure s/p trach , vent dependent   3. Leukocytosis ( improved )         RECOMMENDATIONS:      1. Invanz 1 gram IV q 24 hr  ~ 2 wks   2. Supportive care   3.  Bronch PRN

## 2020-05-24 ENCOUNTER — APPOINTMENT (OUTPATIENT)
Dept: GENERAL RADIOLOGY | Age: 25
DRG: 004 | End: 2020-05-24
Payer: MEDICAID

## 2020-05-24 PROCEDURE — 2580000003 HC RX 258: Performed by: NURSE PRACTITIONER

## 2020-05-24 PROCEDURE — 94003 VENT MGMT INPAT SUBQ DAY: CPT | Performed by: SURGERY

## 2020-05-24 PROCEDURE — 2000000000 HC ICU R&B

## 2020-05-24 PROCEDURE — 6370000000 HC RX 637 (ALT 250 FOR IP): Performed by: NURSE PRACTITIONER

## 2020-05-24 PROCEDURE — 6370000000 HC RX 637 (ALT 250 FOR IP): Performed by: STUDENT IN AN ORGANIZED HEALTH CARE EDUCATION/TRAINING PROGRAM

## 2020-05-24 PROCEDURE — 6370000000 HC RX 637 (ALT 250 FOR IP): Performed by: SURGERY

## 2020-05-24 PROCEDURE — 6360000002 HC RX W HCPCS: Performed by: NURSE PRACTITIONER

## 2020-05-24 PROCEDURE — 51798 US URINE CAPACITY MEASURE: CPT

## 2020-05-24 PROCEDURE — 94669 MECHANICAL CHEST WALL OSCILL: CPT

## 2020-05-24 PROCEDURE — 99233 SBSQ HOSP IP/OBS HIGH 50: CPT | Performed by: SURGERY

## 2020-05-24 PROCEDURE — 94640 AIRWAY INHALATION TREATMENT: CPT

## 2020-05-24 PROCEDURE — 6360000002 HC RX W HCPCS: Performed by: INTERNAL MEDICINE

## 2020-05-24 PROCEDURE — 71045 X-RAY EXAM CHEST 1 VIEW: CPT

## 2020-05-24 PROCEDURE — 94003 VENT MGMT INPAT SUBQ DAY: CPT

## 2020-05-24 PROCEDURE — 2580000003 HC RX 258: Performed by: INTERNAL MEDICINE

## 2020-05-24 RX ADMIN — CHLORHEXIDINE GLUCONATE 0.12% ORAL RINSE 15 ML: 1.2 LIQUID ORAL at 08:44

## 2020-05-24 RX ADMIN — GUAIFENESIN 400 MG: 400 TABLET, FILM COATED ORAL at 08:31

## 2020-05-24 RX ADMIN — ENOXAPARIN SODIUM 30 MG: 30 INJECTION SUBCUTANEOUS at 20:30

## 2020-05-24 RX ADMIN — ERTAPENEM SODIUM 1000 MG: 1 INJECTION, POWDER, LYOPHILIZED, FOR SOLUTION INTRAMUSCULAR; INTRAVENOUS at 18:16

## 2020-05-24 RX ADMIN — PREGABALIN 300 MG: 100 CAPSULE ORAL at 08:31

## 2020-05-24 RX ADMIN — SODIUM CHLORIDE SOLN NEBU 3% 4 ML: 3 NEBU SOLN at 07:45

## 2020-05-24 RX ADMIN — IPRATROPIUM BROMIDE AND ALBUTEROL SULFATE 1 AMPULE: 2.5; .5 SOLUTION RESPIRATORY (INHALATION) at 21:08

## 2020-05-24 RX ADMIN — CHLORHEXIDINE GLUCONATE 0.12% ORAL RINSE 15 ML: 1.2 LIQUID ORAL at 20:26

## 2020-05-24 RX ADMIN — ACETAMINOPHEN ORAL SOLUTION 650 MG: 650 SOLUTION ORAL at 20:26

## 2020-05-24 RX ADMIN — SENNOSIDES AND DOCUSATE SODIUM 1 TABLET: 8.6; 5 TABLET ORAL at 08:31

## 2020-05-24 RX ADMIN — SODIUM CHLORIDE SOLN NEBU 3% 4 ML: 3 NEBU SOLN at 19:20

## 2020-05-24 RX ADMIN — Medication 20 MG: at 08:37

## 2020-05-24 RX ADMIN — MELATONIN 3 MG ORAL TABLET 6 MG: 3 TABLET ORAL at 20:23

## 2020-05-24 RX ADMIN — SODIUM CHLORIDE SOLN NEBU 3% 4 ML: 3 NEBU SOLN at 11:47

## 2020-05-24 RX ADMIN — ACETAMINOPHEN ORAL SOLUTION 650 MG: 650 SOLUTION ORAL at 02:34

## 2020-05-24 RX ADMIN — SENNOSIDES AND DOCUSATE SODIUM 1 TABLET: 8.6; 5 TABLET ORAL at 20:31

## 2020-05-24 RX ADMIN — SODIUM CHLORIDE SOLN NEBU 3% 4 ML: 3 NEBU SOLN at 16:01

## 2020-05-24 RX ADMIN — ENOXAPARIN SODIUM 30 MG: 30 INJECTION SUBCUTANEOUS at 08:31

## 2020-05-24 RX ADMIN — SODIUM CHLORIDE, PRESERVATIVE FREE 300 UNITS: 5 INJECTION INTRAVENOUS at 20:31

## 2020-05-24 RX ADMIN — PREGABALIN 300 MG: 100 CAPSULE ORAL at 20:30

## 2020-05-24 RX ADMIN — POLYETHYLENE GLYCOL 3350 17 G: 17 POWDER, FOR SOLUTION ORAL at 09:30

## 2020-05-24 RX ADMIN — SODIUM CHLORIDE, PRESERVATIVE FREE 10 ML: 5 INJECTION INTRAVENOUS at 20:26

## 2020-05-24 RX ADMIN — GUAIFENESIN 400 MG: 400 TABLET, FILM COATED ORAL at 20:27

## 2020-05-24 RX ADMIN — ACETAMINOPHEN ORAL SOLUTION 650 MG: 650 SOLUTION ORAL at 08:30

## 2020-05-24 RX ADMIN — Medication 20 MG: at 20:26

## 2020-05-24 ASSESSMENT — PAIN SCALES - GENERAL
PAINLEVEL_OUTOF10: 7
PAINLEVEL_OUTOF10: 0
PAINLEVEL_OUTOF10: 0
PAINLEVEL_OUTOF10: 8
PAINLEVEL_OUTOF10: 5
PAINLEVEL_OUTOF10: 0

## 2020-05-24 ASSESSMENT — PULMONARY FUNCTION TESTS
PIF_VALUE: 29
PIF_VALUE: 33
PIF_VALUE: 29
PIF_VALUE: 28
PIF_VALUE: 29
PIF_VALUE: 32
PIF_VALUE: 31
PIF_VALUE: 29
PIF_VALUE: 30
PIF_VALUE: 29
PIF_VALUE: 29
PIF_VALUE: 33
PIF_VALUE: 28
PIF_VALUE: 29
PIF_VALUE: 29
PIF_VALUE: 28
PIF_VALUE: 29
PIF_VALUE: 30
PIF_VALUE: 29
PIF_VALUE: 32
PIF_VALUE: 33
PIF_VALUE: 30
PIF_VALUE: 29
PIF_VALUE: 28
PIF_VALUE: 29
PIF_VALUE: 30

## 2020-05-24 ASSESSMENT — PAIN DESCRIPTION - ONSET
ONSET: ON-GOING
ONSET: ON-GOING

## 2020-05-24 ASSESSMENT — PAIN DESCRIPTION - LOCATION
LOCATION: CHEST
LOCATION: GENERALIZED
LOCATION: CHEST

## 2020-05-24 ASSESSMENT — PAIN DESCRIPTION - PROGRESSION
CLINICAL_PROGRESSION: NOT CHANGED

## 2020-05-24 ASSESSMENT — PAIN DESCRIPTION - DESCRIPTORS
DESCRIPTORS: DISCOMFORT;SORE
DESCRIPTORS: SORE

## 2020-05-24 ASSESSMENT — PAIN DESCRIPTION - FREQUENCY
FREQUENCY: INTERMITTENT
FREQUENCY: INTERMITTENT

## 2020-05-24 ASSESSMENT — PAIN DESCRIPTION - PAIN TYPE: TYPE: SURGICAL PAIN;NEUROPATHIC PAIN;CHRONIC PAIN

## 2020-05-24 ASSESSMENT — PAIN DESCRIPTION - ORIENTATION: ORIENTATION: RIGHT

## 2020-05-24 NOTE — CARE COORDINATION
Pt has not required bronch in 72 hrs. And resp status stable. [facility] lt[facility] reviewing case to determine if they will accept with peep 14. They normally don't accept unless peep 10 or less. Will notify floor if they can accept today. 11:00   [facility] cannot accept on peep 14. They can admit when able to wean to peep 10.

## 2020-05-25 ENCOUNTER — APPOINTMENT (OUTPATIENT)
Dept: GENERAL RADIOLOGY | Age: 25
DRG: 004 | End: 2020-05-25
Payer: MEDICAID

## 2020-05-25 LAB
ALBUMIN SERPL-MCNC: 2.7 G/DL (ref 3.5–5.2)
ALP BLD-CCNC: 75 U/L (ref 40–129)
ALT SERPL-CCNC: 27 U/L (ref 0–40)
ANION GAP SERPL CALCULATED.3IONS-SCNC: 14 MMOL/L (ref 7–16)
AST SERPL-CCNC: 19 U/L (ref 0–39)
BASOPHILS ABSOLUTE: 0.03 E9/L (ref 0–0.2)
BASOPHILS RELATIVE PERCENT: 0.3 % (ref 0–2)
BILIRUB SERPL-MCNC: <0.2 MG/DL (ref 0–1.2)
BUN BLDV-MCNC: 21 MG/DL (ref 6–20)
CALCIUM SERPL-MCNC: 8.8 MG/DL (ref 8.6–10.2)
CHLORIDE BLD-SCNC: 104 MMOL/L (ref 98–107)
CO2: 23 MMOL/L (ref 22–29)
CREAT SERPL-MCNC: 0.8 MG/DL (ref 0.7–1.2)
EOSINOPHILS ABSOLUTE: 0.2 E9/L (ref 0.05–0.5)
EOSINOPHILS RELATIVE PERCENT: 2 % (ref 0–6)
GFR AFRICAN AMERICAN: >60
GFR NON-AFRICAN AMERICAN: >60 ML/MIN/1.73
GLUCOSE BLD-MCNC: 100 MG/DL (ref 74–99)
HCT VFR BLD CALC: 26.3 % (ref 37–54)
HEMOGLOBIN: 8.2 G/DL (ref 12.5–16.5)
IMMATURE GRANULOCYTES #: 0.03 E9/L
IMMATURE GRANULOCYTES %: 0.3 % (ref 0–5)
LYMPHOCYTES ABSOLUTE: 1.73 E9/L (ref 1.5–4)
LYMPHOCYTES RELATIVE PERCENT: 17.1 % (ref 20–42)
MAGNESIUM: 1.9 MG/DL (ref 1.6–2.6)
MCH RBC QN AUTO: 28.7 PG (ref 26–35)
MCHC RBC AUTO-ENTMCNC: 31.2 % (ref 32–34.5)
MCV RBC AUTO: 92 FL (ref 80–99.9)
MONOCYTES ABSOLUTE: 0.96 E9/L (ref 0.1–0.95)
MONOCYTES RELATIVE PERCENT: 9.5 % (ref 2–12)
NEUTROPHILS ABSOLUTE: 7.15 E9/L (ref 1.8–7.3)
NEUTROPHILS RELATIVE PERCENT: 70.8 % (ref 43–80)
PDW BLD-RTO: 14.6 FL (ref 11.5–15)
PHOSPHORUS: 3.3 MG/DL (ref 2.5–4.5)
PLATELET # BLD: 317 E9/L (ref 130–450)
PMV BLD AUTO: 10.3 FL (ref 7–12)
POTASSIUM SERPL-SCNC: 3.9 MMOL/L (ref 3.5–5)
RBC # BLD: 2.86 E12/L (ref 3.8–5.8)
SODIUM BLD-SCNC: 141 MMOL/L (ref 132–146)
TOTAL PROTEIN: 6.7 G/DL (ref 6.4–8.3)
WBC # BLD: 10.1 E9/L (ref 4.5–11.5)

## 2020-05-25 PROCEDURE — 84100 ASSAY OF PHOSPHORUS: CPT

## 2020-05-25 PROCEDURE — 2000000000 HC ICU R&B

## 2020-05-25 PROCEDURE — 80053 COMPREHEN METABOLIC PANEL: CPT

## 2020-05-25 PROCEDURE — 6370000000 HC RX 637 (ALT 250 FOR IP): Performed by: STUDENT IN AN ORGANIZED HEALTH CARE EDUCATION/TRAINING PROGRAM

## 2020-05-25 PROCEDURE — 71045 X-RAY EXAM CHEST 1 VIEW: CPT

## 2020-05-25 PROCEDURE — 94669 MECHANICAL CHEST WALL OSCILL: CPT

## 2020-05-25 PROCEDURE — 94003 VENT MGMT INPAT SUBQ DAY: CPT

## 2020-05-25 PROCEDURE — 51701 INSERT BLADDER CATHETER: CPT

## 2020-05-25 PROCEDURE — 85025 COMPLETE CBC W/AUTO DIFF WBC: CPT

## 2020-05-25 PROCEDURE — 99233 SBSQ HOSP IP/OBS HIGH 50: CPT | Performed by: SURGERY

## 2020-05-25 PROCEDURE — 2580000003 HC RX 258: Performed by: NURSE PRACTITIONER

## 2020-05-25 PROCEDURE — 6370000000 HC RX 637 (ALT 250 FOR IP): Performed by: NURSE PRACTITIONER

## 2020-05-25 PROCEDURE — 2580000003 HC RX 258: Performed by: INTERNAL MEDICINE

## 2020-05-25 PROCEDURE — 6360000002 HC RX W HCPCS: Performed by: NURSE PRACTITIONER

## 2020-05-25 PROCEDURE — 36415 COLL VENOUS BLD VENIPUNCTURE: CPT

## 2020-05-25 PROCEDURE — 83735 ASSAY OF MAGNESIUM: CPT

## 2020-05-25 PROCEDURE — 6370000000 HC RX 637 (ALT 250 FOR IP): Performed by: SURGERY

## 2020-05-25 PROCEDURE — 6360000002 HC RX W HCPCS: Performed by: INTERNAL MEDICINE

## 2020-05-25 PROCEDURE — 94003 VENT MGMT INPAT SUBQ DAY: CPT | Performed by: SURGERY

## 2020-05-25 PROCEDURE — 51798 US URINE CAPACITY MEASURE: CPT

## 2020-05-25 PROCEDURE — 94640 AIRWAY INHALATION TREATMENT: CPT

## 2020-05-25 RX ADMIN — ACETAMINOPHEN ORAL SOLUTION 650 MG: 650 SOLUTION ORAL at 14:45

## 2020-05-25 RX ADMIN — SODIUM CHLORIDE SOLN NEBU 3% 4 ML: 3 NEBU SOLN at 17:05

## 2020-05-25 RX ADMIN — POLYETHYLENE GLYCOL 3350 17 G: 17 POWDER, FOR SOLUTION ORAL at 08:47

## 2020-05-25 RX ADMIN — Medication 20 MG: at 20:32

## 2020-05-25 RX ADMIN — Medication 20 MG: at 08:48

## 2020-05-25 RX ADMIN — SODIUM CHLORIDE SOLN NEBU 3% 4 ML: 3 NEBU SOLN at 09:36

## 2020-05-25 RX ADMIN — ACETAMINOPHEN ORAL SOLUTION 650 MG: 650 SOLUTION ORAL at 20:52

## 2020-05-25 RX ADMIN — ENOXAPARIN SODIUM 30 MG: 30 INJECTION SUBCUTANEOUS at 20:32

## 2020-05-25 RX ADMIN — ACETAMINOPHEN ORAL SOLUTION 650 MG: 650 SOLUTION ORAL at 08:30

## 2020-05-25 RX ADMIN — SENNOSIDES AND DOCUSATE SODIUM 1 TABLET: 8.6; 5 TABLET ORAL at 08:47

## 2020-05-25 RX ADMIN — GUAIFENESIN 400 MG: 400 TABLET, FILM COATED ORAL at 20:32

## 2020-05-25 RX ADMIN — SODIUM CHLORIDE, PRESERVATIVE FREE 10 ML: 5 INJECTION INTRAVENOUS at 20:33

## 2020-05-25 RX ADMIN — CHLORHEXIDINE GLUCONATE 0.12% ORAL RINSE 15 ML: 1.2 LIQUID ORAL at 20:32

## 2020-05-25 RX ADMIN — PREGABALIN 300 MG: 100 CAPSULE ORAL at 20:32

## 2020-05-25 RX ADMIN — PREGABALIN 300 MG: 100 CAPSULE ORAL at 08:47

## 2020-05-25 RX ADMIN — ERTAPENEM SODIUM 1000 MG: 1 INJECTION, POWDER, LYOPHILIZED, FOR SOLUTION INTRAMUSCULAR; INTRAVENOUS at 18:24

## 2020-05-25 RX ADMIN — SENNOSIDES AND DOCUSATE SODIUM 1 TABLET: 8.6; 5 TABLET ORAL at 20:33

## 2020-05-25 RX ADMIN — MELATONIN 3 MG ORAL TABLET 6 MG: 3 TABLET ORAL at 20:32

## 2020-05-25 RX ADMIN — GUAIFENESIN 400 MG: 400 TABLET, FILM COATED ORAL at 08:46

## 2020-05-25 RX ADMIN — CHLORHEXIDINE GLUCONATE 0.12% ORAL RINSE 15 ML: 1.2 LIQUID ORAL at 08:47

## 2020-05-25 RX ADMIN — SODIUM CHLORIDE SOLN NEBU 3% 4 ML: 3 NEBU SOLN at 12:33

## 2020-05-25 RX ADMIN — ENOXAPARIN SODIUM 30 MG: 30 INJECTION SUBCUTANEOUS at 08:58

## 2020-05-25 RX ADMIN — SODIUM CHLORIDE SOLN NEBU 3% 4 ML: 3 NEBU SOLN at 20:39

## 2020-05-25 ASSESSMENT — PULMONARY FUNCTION TESTS
PIF_VALUE: 33
PIF_VALUE: 24
PIF_VALUE: 25
PIF_VALUE: 25
PIF_VALUE: 24
PIF_VALUE: 25
PIF_VALUE: 39
PIF_VALUE: 27
PIF_VALUE: 24
PIF_VALUE: 31
PIF_VALUE: 30
PIF_VALUE: 28
PIF_VALUE: 24
PIF_VALUE: 25
PIF_VALUE: 27
PIF_VALUE: 28
PIF_VALUE: 26
PIF_VALUE: 27
PIF_VALUE: 25
PIF_VALUE: 26
PIF_VALUE: 29
PIF_VALUE: 26
PIF_VALUE: 28
PIF_VALUE: 44
PIF_VALUE: 38
PIF_VALUE: 27
PIF_VALUE: 24
PIF_VALUE: 25
PIF_VALUE: 32
PIF_VALUE: 24
PIF_VALUE: 25
PIF_VALUE: 33
PIF_VALUE: 26
PEFR_L/MIN: 65
PIF_VALUE: 43

## 2020-05-25 ASSESSMENT — PAIN SCALES - GENERAL
PAINLEVEL_OUTOF10: 7
PAINLEVEL_OUTOF10: 0
PAINLEVEL_OUTOF10: 7
PAINLEVEL_OUTOF10: 7
PAINLEVEL_OUTOF10: 0
PAINLEVEL_OUTOF10: 0

## 2020-05-25 ASSESSMENT — PAIN DESCRIPTION - PAIN TYPE: TYPE: ACUTE PAIN;CHRONIC PAIN;NEUROPATHIC PAIN

## 2020-05-25 ASSESSMENT — PAIN DESCRIPTION - LOCATION: LOCATION: CHEST
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
## 2020-05-26 ENCOUNTER — APPOINTMENT (OUTPATIENT)
Dept: GENERAL RADIOLOGY | Age: 25
DRG: 004 | End: 2020-05-26
Payer: MEDICAID

## 2020-05-26 VITALS
TEMPERATURE: 99.4 F | DIASTOLIC BLOOD PRESSURE: 71 MMHG | HEIGHT: 72 IN | RESPIRATION RATE: 14 BRPM | WEIGHT: 164.9 LBS | SYSTOLIC BLOOD PRESSURE: 120 MMHG | HEART RATE: 81 BPM | BODY MASS INDEX: 22.34 KG/M2 | OXYGEN SATURATION: 100 %

## 2020-05-26 PROCEDURE — 94003 VENT MGMT INPAT SUBQ DAY: CPT | Performed by: SURGERY

## 2020-05-26 PROCEDURE — 51798 US URINE CAPACITY MEASURE: CPT

## 2020-05-26 PROCEDURE — 6360000002 HC RX W HCPCS: Performed by: NURSE PRACTITIONER

## 2020-05-26 PROCEDURE — 2580000003 HC RX 258: Performed by: NURSE PRACTITIONER

## 2020-05-26 PROCEDURE — 99233 SBSQ HOSP IP/OBS HIGH 50: CPT | Performed by: SURGERY

## 2020-05-26 PROCEDURE — 6370000000 HC RX 637 (ALT 250 FOR IP): Performed by: NURSE PRACTITIONER

## 2020-05-26 PROCEDURE — 6370000000 HC RX 637 (ALT 250 FOR IP): Performed by: STUDENT IN AN ORGANIZED HEALTH CARE EDUCATION/TRAINING PROGRAM

## 2020-05-26 PROCEDURE — 94640 AIRWAY INHALATION TREATMENT: CPT

## 2020-05-26 PROCEDURE — 94669 MECHANICAL CHEST WALL OSCILL: CPT

## 2020-05-26 PROCEDURE — 71045 X-RAY EXAM CHEST 1 VIEW: CPT

## 2020-05-26 PROCEDURE — 94003 VENT MGMT INPAT SUBQ DAY: CPT

## 2020-05-26 RX ORDER — BISACODYL 10 MG
10 SUPPOSITORY, RECTAL RECTAL DAILY PRN
DISCHARGE
Start: 2020-05-26 | End: 2020-06-25

## 2020-05-26 RX ORDER — SENNA AND DOCUSATE SODIUM 50; 8.6 MG/1; MG/1
1 TABLET, FILM COATED ORAL 2 TIMES DAILY
DISCHARGE
Start: 2020-05-26 | End: 2020-07-22 | Stop reason: SDUPTHER

## 2020-05-26 RX ORDER — FAMOTIDINE 20 MG/1
20 TABLET, FILM COATED ORAL 2 TIMES DAILY
Qty: 60 TABLET | Refills: 3 | DISCHARGE
Start: 2020-05-26 | End: 2020-07-22 | Stop reason: SDUPTHER

## 2020-05-26 RX ORDER — IPRATROPIUM BROMIDE AND ALBUTEROL SULFATE 2.5; .5 MG/3ML; MG/3ML
3 SOLUTION RESPIRATORY (INHALATION) EVERY 4 HOURS PRN
Qty: 360 ML | DISCHARGE
Start: 2020-05-26

## 2020-05-26 RX ORDER — LANOLIN ALCOHOL/MO/W.PET/CERES
6 CREAM (GRAM) TOPICAL NIGHTLY
Refills: 3 | DISCHARGE
Start: 2020-05-26 | End: 2020-10-01

## 2020-05-26 RX ORDER — PREGABALIN 300 MG/1
300 CAPSULE ORAL 2 TIMES DAILY
Qty: 60 CAPSULE | Refills: 3 | Status: SHIPPED | DISCHARGE
Start: 2020-05-26 | End: 2020-07-22 | Stop reason: SDUPTHER

## 2020-05-26 RX ORDER — SODIUM CHLORIDE FOR INHALATION 3 %
4 VIAL, NEBULIZER (ML) INHALATION
DISCHARGE
Start: 2020-05-26

## 2020-05-26 RX ORDER — POLYETHYLENE GLYCOL 3350 17 G/17G
17 POWDER, FOR SOLUTION ORAL DAILY
Qty: 527 G | Refills: 1 | DISCHARGE
Start: 2020-05-27 | End: 2020-06-26

## 2020-05-26 RX ADMIN — POLYETHYLENE GLYCOL 3350 17 G: 17 POWDER, FOR SOLUTION ORAL at 08:33

## 2020-05-26 RX ADMIN — SENNOSIDES AND DOCUSATE SODIUM 1 TABLET: 8.6; 5 TABLET ORAL at 08:37

## 2020-05-26 RX ADMIN — SODIUM CHLORIDE SOLN NEBU 3% 4 ML: 3 NEBU SOLN at 08:17

## 2020-05-26 RX ADMIN — CHLORHEXIDINE GLUCONATE 0.12% ORAL RINSE 15 ML: 1.2 LIQUID ORAL at 08:32

## 2020-05-26 RX ADMIN — PREGABALIN 300 MG: 100 CAPSULE ORAL at 08:33

## 2020-05-26 RX ADMIN — Medication 20 MG: at 08:32

## 2020-05-26 RX ADMIN — SODIUM CHLORIDE, PRESERVATIVE FREE 10 ML: 5 INJECTION INTRAVENOUS at 08:33

## 2020-05-26 RX ADMIN — SODIUM CHLORIDE SOLN NEBU 3% 4 ML: 3 NEBU SOLN at 11:07

## 2020-05-26 RX ADMIN — ENOXAPARIN SODIUM 30 MG: 30 INJECTION SUBCUTANEOUS at 08:33

## 2020-05-26 RX ADMIN — GUAIFENESIN 400 MG: 400 TABLET, FILM COATED ORAL at 08:32

## 2020-05-26 ASSESSMENT — PULMONARY FUNCTION TESTS
PIF_VALUE: 26
PIF_VALUE: 25
PIF_VALUE: 40
PIF_VALUE: 25
PIF_VALUE: 26
PIF_VALUE: 25
PIF_VALUE: 26

## 2020-05-26 ASSESSMENT — PAIN SCALES - GENERAL
PAINLEVEL_OUTOF10: 7
PAINLEVEL_OUTOF10: 0

## 2020-05-26 NOTE — PROGRESS NOTES
[facility]  SURGICAL INTENSIVE CARE UNIT (SICU)  ATTENDING PHYSICIAN CRITICAL CARE PROGRESS NOTE     I have examined the patient, reviewed the record, and discussed the case with the APN/ resident. Please refer to the APN/ resident's note. I agree with the assessment and plan. I have reviewed all relevant labs and imaging data. The following summarizes my clinical findings and independent assessment. CC:  Critical care management after [address] Course/Overnight Events:  4/21 Left neck exploration and bilateral chest tube insertion  4/23 tracheostomy by ENT  4/24 upsized trach to #8  4/25 brochoscopy for mucous plug and 2nd Right chest tube  4/27--febrile overnight  4/28--febrile again overnight; bronched yesterday  4/29--bronched again yesterday  4/30--bronched overnight  5/1--increased O2 requirement overnight  5/2--febrile overnight  5/3--increased O2 requirement again last night  5/4-- Febrile overnight , Bronchoscopy yesterday for Right main stem mucus plug   5/5 -- Tachycardia improving , low grade fevers still   5/6 No Fevers overnight , Bronchoscopy early am for desaturation, Chest tube pulled yesterday   5/7 Tachycardia and tachypea overnight   5/8 No acute issues overnight   5/9 Bronchoscopy for mucus plug and PEG tube placed yesterday Fever Max 102.1   5/10 No acute overnight issues   5/11- bronchoscopy done yesterday. Remains on ventilator. Going to the OR today for right VATS with CT surgery. 5/12 - went for uncomplicated right VATS. 5/13 - recurrent RLL collapse. Bronch today. 5/14 - recurrent RLL collapse. Bronch today. 2nd right CT removed yesterday. 5/15--recurrent RLL collapse--for another bronch today  5/16- remains on mechanical ventilation with daily bronchoscopy for recurrent right lower lobe collapse.  Desaturated earlier this morning requiring stat bronchoscopy and increased ventilator support.  Switched to APRV.   5/17 - remains on APRV.  No other acute was transcribed using voice recognition software. Every effort was made to ensure accuracy; however, inadvertent computerized transcription errors may be present.

## 2020-05-26 NOTE — PROGRESS NOTES
Nurse to nurse report called to Patricia Jin at [phone]. Updated that patient will be going to room 301.

## 2020-05-26 NOTE — PROGRESS NOTES
Nutrition Assessment (Enteral Nutrition)    Type and Reason for Visit: Reassess    Nutrition Recommendations: Continue current EN as ordered. No new recommendations at this time. Will monitor & follow. Nutrition Assessment: Pt w/ nutritional status as same w/ continued trach/PEG for support. Will continue current TF as ordered w/ EN meeting current neesd. Malnutrition Assessment:  · Malnutrition Status: Insufficient data  · Context: Acute illness or injury  · Findings of the 6 clinical characteristics of malnutrition (Minimum of 2 out of 6 clinical characteristics is required to make the diagnosis of moderate or severe Protein Calorie Malnutrition based on AND/ASPEN Guidelines):  1. Energy Intake-Greater than 75% of estimated energy requirement, (x1 week per EN order)    2. Weight Loss-Unable to assess, unable to assess  3. Fat Loss-Unable to assess,    4. Muscle Loss-Unable to assess,    5. Fluid Accumulation-No significant fluid accumulation,    6.  Strength-Not measured    Nutrition Risk Level:  Moderate    Nutrition Needs:  · Estimated Daily Total Kcal: 5178-4917(PS03B: REE= 2131, Tmax= 38.1, MV= 9.28)  · Estimated Daily Protein (g): 120-140(1.5-1.8)  · Estimated Daily Fluid (ml/day): per critical care     Nutrition Diagnosis:   · Problem: Inadequate oral intake  · Etiology: related to Impaired respiratory function-inability to consume food     Signs and symptoms:  as evidenced by NPO status due to medical condition, Intubation, Nutrition support - EN    Objective Information:  · Nutrition-Focused Physical Findings: +I/Os, trach to vent, PEG w/ TF, soft abd, active bs, trace edema, MAP WNL   · Wound Type: Multiple, Surgical Wound  · Current Nutrition Therapies:  · Oral Diet Orders: NPO   · Oral Diet intake: NPO  · Tube Feeding (TF) Orders:   · Feeding Route: Gastrostomy  · Formula: Immune Enhancing  · Rate (ml/hr):60ml/hr     · Volume (ml/day): 1440ml TV  · Duration: Continuous  · Water

## 2020-05-26 NOTE — PROGRESS NOTES
Jarred, darian Marsh. Has not required bronchoscopy for 5 days. .   · GI: Tube feeds at goal PEG. glycolax and senakot. Pepcid for PPI. Zofran PRN  · Renal: Monitor UOP  · ID: Surgery culture from VATs + ecoli and candida albicans. ID following; ertapenem. Previously on Merrem and cefepime  for ecoli and strep penumo then ecoli again in respiratory cultures. · Heme: Acute blood loss anemia. CBC every other day  · Endo: No acute issue. Monitor glucose  · MSK: BL lower extremity paralysis 2/2 T6 transection. PT/OT as able. Total fluid rate: 60 ml/hr  Bowel regimen: Glycolax and senakot  DVT proph:  SQ lovenox  GI proph:  Pepcid  Glucose protocol: No glycemic issues  Mouth/eye care:  As per patient  Sanabria:   Remove and begin straight cath bladder training  CVC sites:  Midline in left branchial  Ancillary consults: PT/OT, CT surgery, Neurosurgery  And ID  Family Update: As per patient    Disposition: D/C to LTAC today      5/26/2020  8:27 AM EDT  Nav Rankin DO PGY 4

## 2020-05-28 ENCOUNTER — TELEPHONE (OUTPATIENT)
Dept: SURGERY | Age: 25
End: 2020-05-28

## 2020-06-01 LAB
FUNGUS (MYCOLOGY) CULTURE: NORMAL
FUNGUS STAIN: NORMAL

## 2020-06-15 LAB
FUNGUS (MYCOLOGY) CULTURE: NORMAL
FUNGUS STAIN: NORMAL

## 2020-06-16 LAB
AFB CULTURE (MYCOBACTERIA): NORMAL
AFB SMEAR: NORMAL

## 2020-06-22 LAB
FUNGUS (MYCOLOGY) CULTURE: ABNORMAL
FUNGUS STAIN: ABNORMAL
ORGANISM: ABNORMAL

## 2020-07-22 ENCOUNTER — TELEPHONE (OUTPATIENT)
Dept: INTERNAL MEDICINE | Age: 25
End: 2020-07-22

## 2020-07-22 ENCOUNTER — OFFICE VISIT (OUTPATIENT)
Dept: INTERNAL MEDICINE | Age: 25
End: 2020-07-22
Payer: MEDICAID

## 2020-07-22 VITALS
TEMPERATURE: 98.7 F | OXYGEN SATURATION: 99 % | HEART RATE: 115 BPM | BODY MASS INDEX: 21.76 KG/M2 | HEIGHT: 73 IN | SYSTOLIC BLOOD PRESSURE: 105 MMHG | DIASTOLIC BLOOD PRESSURE: 68 MMHG | RESPIRATION RATE: 16 BRPM

## 2020-07-22 PROCEDURE — 1036F TOBACCO NON-USER: CPT | Performed by: INTERNAL MEDICINE

## 2020-07-22 PROCEDURE — 99212 OFFICE O/P EST SF 10 MIN: CPT | Performed by: INTERNAL MEDICINE

## 2020-07-22 PROCEDURE — 99204 OFFICE O/P NEW MOD 45 MIN: CPT | Performed by: INTERNAL MEDICINE

## 2020-07-22 PROCEDURE — G8420 CALC BMI NORM PARAMETERS: HCPCS | Performed by: INTERNAL MEDICINE

## 2020-07-22 PROCEDURE — G8427 DOCREV CUR MEDS BY ELIG CLIN: HCPCS | Performed by: INTERNAL MEDICINE

## 2020-07-22 RX ORDER — FLUOXETINE 20 MG/1
20 TABLET, FILM COATED ORAL DAILY
Qty: 90 TABLET | Refills: 1 | Status: SHIPPED
Start: 2020-07-22 | End: 2021-03-29

## 2020-07-22 RX ORDER — SENNA AND DOCUSATE SODIUM 50; 8.6 MG/1; MG/1
1 TABLET, FILM COATED ORAL 2 TIMES DAILY
Qty: 90 TABLET | Refills: 1 | Status: SHIPPED | OUTPATIENT
Start: 2020-07-22

## 2020-07-22 RX ORDER — MELOXICAM 7.5 MG/1
7.5 TABLET ORAL DAILY
COMMUNITY
Start: 2020-07-17 | End: 2020-10-01 | Stop reason: SDUPTHER

## 2020-07-22 RX ORDER — BACLOFEN 10 MG/1
10 TABLET ORAL DAILY
COMMUNITY
Start: 2020-07-17 | End: 2020-07-22 | Stop reason: SDUPTHER

## 2020-07-22 RX ORDER — WARFARIN SODIUM 2 MG/1
2 TABLET ORAL DAILY
Qty: 30 TABLET | Refills: 3 | Status: ON HOLD
Start: 2020-07-22 | End: 2020-08-23 | Stop reason: HOSPADM

## 2020-07-22 RX ORDER — IBUPROFEN 800 MG/1
800 TABLET ORAL EVERY 6 HOURS PRN
Qty: 16 TABLET | Refills: 0 | Status: SHIPPED
Start: 2020-07-22 | End: 2020-07-22

## 2020-07-22 RX ORDER — BACLOFEN 10 MG/1
10 TABLET ORAL DAILY
Qty: 90 TABLET | Refills: 1 | Status: SHIPPED
Start: 2020-07-22 | End: 2020-09-14 | Stop reason: SDUPTHER

## 2020-07-22 RX ORDER — FLUOXETINE 20 MG/1
20 TABLET, FILM COATED ORAL DAILY
COMMUNITY
Start: 2020-07-17 | End: 2020-07-22 | Stop reason: SDUPTHER

## 2020-07-22 RX ORDER — FAMOTIDINE 20 MG/1
20 TABLET, FILM COATED ORAL 2 TIMES DAILY
Qty: 60 TABLET | Refills: 3 | Status: SHIPPED
Start: 2020-07-22 | End: 2021-03-29

## 2020-07-22 RX ORDER — LIDOCAINE 50 MG/G
OINTMENT TOPICAL
Qty: 2 TUBE | Refills: 1 | Status: SHIPPED | OUTPATIENT
Start: 2020-07-22

## 2020-07-22 RX ORDER — IBUPROFEN 600 MG/1
600 TABLET ORAL EVERY 6 HOURS PRN
Qty: 28 TABLET | Refills: 0 | Status: SHIPPED
Start: 2020-07-22 | End: 2020-07-22

## 2020-07-22 RX ORDER — PREGABALIN 300 MG/1
300 CAPSULE ORAL 2 TIMES DAILY
Qty: 180 CAPSULE | Refills: 1 | Status: SHIPPED
Start: 2020-07-22 | End: 2020-09-14 | Stop reason: SDUPTHER

## 2020-07-22 RX ORDER — POTASSIUM CHLORIDE 750 MG/1
10 TABLET, EXTENDED RELEASE ORAL DAILY
COMMUNITY
Start: 2020-07-17 | End: 2021-03-29

## 2020-07-22 SDOH — ECONOMIC STABILITY: INCOME INSECURITY: HOW HARD IS IT FOR YOU TO PAY FOR THE VERY BASICS LIKE FOOD, HOUSING, MEDICAL CARE, AND HEATING?: NOT HARD AT ALL

## 2020-07-22 SDOH — ECONOMIC STABILITY: FOOD INSECURITY: WITHIN THE PAST 12 MONTHS, YOU WORRIED THAT YOUR FOOD WOULD RUN OUT BEFORE YOU GOT MONEY TO BUY MORE.: NEVER TRUE

## 2020-07-22 SDOH — ECONOMIC STABILITY: FOOD INSECURITY: WITHIN THE PAST 12 MONTHS, THE FOOD YOU BOUGHT JUST DIDN'T LAST AND YOU DIDN'T HAVE MONEY TO GET MORE.: NEVER TRUE

## 2020-07-22 SDOH — ECONOMIC STABILITY: TRANSPORTATION INSECURITY
IN THE PAST 12 MONTHS, HAS THE LACK OF TRANSPORTATION KEPT YOU FROM MEDICAL APPOINTMENTS OR FROM GETTING MEDICATIONS?: NO

## 2020-07-22 SDOH — ECONOMIC STABILITY: TRANSPORTATION INSECURITY
IN THE PAST 12 MONTHS, HAS LACK OF TRANSPORTATION KEPT YOU FROM MEETINGS, WORK, OR FROM GETTING THINGS NEEDED FOR DAILY LIVING?: NO

## 2020-07-22 ASSESSMENT — PATIENT HEALTH QUESTIONNAIRE - PHQ9
SUM OF ALL RESPONSES TO PHQ QUESTIONS 1-9: 0
2. FEELING DOWN, DEPRESSED OR HOPELESS: 0
SUM OF ALL RESPONSES TO PHQ9 QUESTIONS 1 & 2: 0
SUM OF ALL RESPONSES TO PHQ QUESTIONS 1-9: 0
1. LITTLE INTEREST OR PLEASURE IN DOING THINGS: 0
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
## 2020-07-22 NOTE — PROGRESS NOTES
Discharge instructions reviewed with patient per Dr. Rose Toro. Patient directed to  to  AVS and schedule follow up appt.

## 2020-07-22 NOTE — PROGRESS NOTES
Discharge instructions reviewed with patient per Dr. Nic Boyle. Patient directed to  to  AVS and schedule follow up appt.

## 2020-07-23 ENCOUNTER — TELEPHONE (OUTPATIENT)
Dept: INTERNAL MEDICINE | Age: 25
End: 2020-07-23

## 2020-07-24 ENCOUNTER — NURSE ONLY (OUTPATIENT)
Dept: INTERNAL MEDICINE | Age: 25
End: 2020-07-24
Payer: MEDICAID

## 2020-07-24 LAB
INTERNATIONAL NORMALIZATION RATIO, POC: 1.1
PROTHROMBIN TIME, POC: 13.2

## 2020-07-24 PROCEDURE — 85610 PROTHROMBIN TIME: CPT | Performed by: INTERNAL MEDICINE

## 2020-07-24 PROCEDURE — 93793 ANTICOAG MGMT PT WARFARIN: CPT | Performed by: INTERNAL MEDICINE

## 2020-07-24 RX ORDER — WARFARIN SODIUM 1 MG/1
1 TABLET ORAL DAILY
Qty: 30 TABLET | Refills: 3 | Status: SHIPPED
Start: 2020-07-24 | End: 2020-08-19 | Stop reason: DRUGHIGH

## 2020-07-24 NOTE — PATIENT INSTRUCTIONS
Patient Education        Taking Warfarin Safely: Care Instructions  Your Care Instructions     Warfarin is a medicine that you take to prevent blood clots. It is often called a blood thinner. Doctors give warfarin (such as Coumadin) to reduce the risk of blood clots. You may be at risk for blood clots if you have atrial fibrillation or deep vein thrombosis. Some other health problems may also put you at risk. Warfarin slows the amount of time it takes for your blood to clot. It can cause bleeding problems. Even if you've been taking warfarin for a while, it's important to know how to take it safely. Foods and other medicines can affect the way warfarin works. Some can make warfarin work too well. This can cause bleeding problems. And some can make it work poorly, so that it does not prevent blood clots very well. You will need regular blood tests to check how long it takes for your blood to form a clot. This test is called a PT or prothrombin time test. The result of the test is called an INR level. Depending on the test results, your doctor or anticoagulation clinic may adjust your dose of warfarin. Follow-up care is a key part of your treatment and safety. Be sure to make and go to all appointments, and call your doctor if you are having problems. It's also a good idea to know your test results and keep a list of the medicines you take. How can you care for yourself at home? Take warfarin safely  · Take your warfarin at the same time each day. · If you miss a dose of warfarin, don't take an extra dose to make up for it. Your doctor can tell you exactly what to do so you don't take too much or too little. · Wear medical alert jewelry that lets others know that you take warfarin. You can buy this at most drugstores. · Don't take warfarin if you are pregnant or planning to get pregnant. Talk to your doctor about how you can prevent getting pregnant while you are taking it.   · Don't change your dose or stop taking warfarin unless your doctor tells you to. Effects of medicines and food on warfarin  · Don't start or stop taking any medicines, vitamins, or natural remedies unless you first talk to your doctor. Many medicines can affect how warfarin works. These include aspirin and other pain relievers, over-the-counter medicines, multivitamins, dietary supplements, and herbal products. · Tell all of your doctors and pharmacists that you take warfarin. Some prescription medicines can affect how warfarin works. · Keep the amount of vitamin K in your diet about the same from day to day. Do not suddenly eat a lot more or a lot less food that is rich in vitamin K than you usually do. Vitamin K affects how warfarin works and how your blood clots. Talk with your doctor before making big changes in your diet. Foods that have a lot of vitamin K include cooked green vegetables, such as:  ? Kale, spinach, turnip greens, bren greens, Swiss chard, and mustard greens. ? Lee sprouts, broccoli, and cabbage. · Limit your use of alcohol. Avoid bleeding by preventing falls and injuries  · Wear slippers or shoes with nonskid soles. · Remove throw rugs and clutter. · Rearrange furniture and electrical cords to keep them out of walking paths. · Keep stairways, porches, and outside walkways well lit. Use night-lights in hallways and bathrooms. · Be extra careful when you work with sharp tools or knives. When should you call for help? [id number] anytime you think you may need emergency care. For example, call if:  · You have a sudden, severe headache that is different from past headaches. Call your doctor now or seek immediate medical care if:  · You have any abnormal bleeding, such as:  ? Nosebleeds. ? Vaginal bleeding that is different (heavier, more frequent, at a different time of the month) than what you are used to.  ? Bloody or black stools, or rectal bleeding. ? Bloody or pink urine.   Watch closely for changes in your health, and be sure to contact your doctor if you have any problems. Where can you learn more? Go to [email or web address] and sign in to your [employer] account. Enter B251 in the [facility] box to learn more about \"Taking Warfarin Safely: Care Instructions. \"     If you do not have an account, please click on the \"Sign Up Now\" link. Current as of: December 16, 2019               Content Version: 12.5  © 1054-8840 [employer]. Care instructions adapted under license by [facility] ([facility]). If you have questions about a medical condition or this instruction, always ask your healthcare professional. [address] any warranty or liability for your use of this information.        Take coumadin 3 mg daily  Repeat INR on 7/28/20  Continue lovenox as instructed  Bleeding precautions as reviewed  Continue all other medications  Please call if any questions or concerns

## 2020-07-27 ENCOUNTER — TELEPHONE (OUTPATIENT)
Dept: INTERNAL MEDICINE | Age: 25
End: 2020-07-27

## 2020-07-28 ENCOUNTER — HOSPITAL ENCOUNTER (OUTPATIENT)
Age: 25
Discharge: HOME | End: 2020-07-28
Payer: MEDICAID

## 2020-07-28 ENCOUNTER — HOSPITAL ENCOUNTER (OUTPATIENT)
Dept: GENERAL RADIOLOGY | Age: 25
Discharge: HOME | End: 2020-07-30
Payer: MEDICAID

## 2020-07-28 ENCOUNTER — HOSPITAL ENCOUNTER (OUTPATIENT)
Age: 25
Discharge: HOME | End: 2020-07-30
Payer: MEDICAID

## 2020-07-28 LAB
ANION GAP SERPL CALCULATED.3IONS-SCNC: 13 MMOL/L (ref 7–16)
BUN BLDV-MCNC: 13 MG/DL (ref 6–20)
CALCIUM SERPL-MCNC: 9.8 MG/DL (ref 8.6–10.2)
CHLORIDE BLD-SCNC: 98 MMOL/L (ref 98–107)
CO2: 26 MMOL/L (ref 22–29)
CREAT SERPL-MCNC: 0.8 MG/DL (ref 0.7–1.2)
GFR AFRICAN AMERICAN: >60
GFR NON-AFRICAN AMERICAN: >60 ML/MIN/1.73
GLUCOSE BLD-MCNC: 132 MG/DL (ref 74–99)
INR BLD: 2.4
POTASSIUM SERPL-SCNC: 3.9 MMOL/L (ref 3.5–5)
PROTHROMBIN TIME: 27.4 SEC (ref 9.3–12.4)
SODIUM BLD-SCNC: 137 MMOL/L (ref 132–146)

## 2020-07-28 PROCEDURE — 36415 COLL VENOUS BLD VENIPUNCTURE: CPT

## 2020-07-28 PROCEDURE — 71110 X-RAY EXAM RIBS BIL 3 VIEWS: CPT

## 2020-07-28 PROCEDURE — 85610 PROTHROMBIN TIME: CPT

## 2020-07-28 PROCEDURE — 80048 BASIC METABOLIC PNL TOTAL CA: CPT

## 2020-07-31 ENCOUNTER — ANTI-COAG VISIT (OUTPATIENT)
Dept: INTERNAL MEDICINE | Age: 25
End: 2020-07-31
Payer: MEDICAID

## 2020-07-31 PROCEDURE — 93793 ANTICOAG MGMT PT WARFARIN: CPT | Performed by: INTERNAL MEDICINE

## 2020-07-31 NOTE — PROGRESS NOTES
Called pt and coumadin instructions reviewed with him   Understanding verbalized   Printed avs mailed

## 2020-08-05 ENCOUNTER — TELEPHONE (OUTPATIENT)
Dept: INTERNAL MEDICINE | Age: 25
End: 2020-08-05
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
## 2020-08-13 ENCOUNTER — TELEPHONE (OUTPATIENT)
Dept: INTERNAL MEDICINE | Age: 25
End: 2020-08-13

## 2020-08-14 ENCOUNTER — TELEPHONE (OUTPATIENT)
Dept: INTERNAL MEDICINE | Age: 25
End: 2020-08-14

## 2020-08-14 NOTE — TELEPHONE ENCOUNTER
Mother called LSW somewhat frustrated ; states [facility] has recommended P500 air mattress and states PCP office must order; also frustrated as has not heard from [address] [place] [address] if pt approved for waiver.   LSW offered to make calls to attempt to resolve and we can discuss at 8/18 appt ; reminded mother re: making transportation arrangements  LSW left message with Geena at [facility] ([phone])  Also attempted [facility], but needed 's name which mother did not have bu would try to find and advise

## 2020-08-17 ENCOUNTER — TELEPHONE (OUTPATIENT)
Dept: INTERNAL MEDICINE | Age: 25
End: 2020-08-17

## 2020-08-17 NOTE — TELEPHONE ENCOUNTER
Pt wants a referral to pain management and also requesting medical supplies diapers etc please advise an call pt has appt tomorrow but only inr

## 2020-08-18 ENCOUNTER — NURSE ONLY (OUTPATIENT)
Dept: INTERNAL MEDICINE | Age: 25
End: 2020-08-18
Payer: MEDICAID

## 2020-08-18 LAB
INTERNATIONAL NORMALIZATION RATIO, POC: 2.1
PROTHROMBIN TIME, POC: 25.2

## 2020-08-18 PROCEDURE — 93793 ANTICOAG MGMT PT WARFARIN: CPT | Performed by: INTERNAL MEDICINE

## 2020-08-18 PROCEDURE — 85610 PROTHROMBIN TIME: CPT | Performed by: INTERNAL MEDICINE

## 2020-08-18 RX ORDER — WARFARIN SODIUM 2 MG/1
2 TABLET ORAL DAILY
Qty: 30 TABLET | Refills: 0 | Status: SHIPPED
Start: 2020-08-18 | End: 2020-08-19 | Stop reason: SDUPTHER

## 2020-08-18 NOTE — PATIENT INSTRUCTIONS
Please take 2mg of Coumadin daily  Recheck INR at next scheduled office visit on 8/31/20    Call [phone]  For further medication refills as needed

## 2020-08-19 ENCOUNTER — APPOINTMENT (OUTPATIENT)
Dept: ULTRASOUND IMAGING | Age: 25
DRG: 466 | End: 2020-08-19
Payer: MEDICAID

## 2020-08-19 ENCOUNTER — HOSPITAL ENCOUNTER (INPATIENT)
Age: 25
LOS: 4 days | Discharge: HOME HEALTH | DRG: 466 | End: 2020-08-23
Attending: EMERGENCY MEDICINE | Admitting: INTERNAL MEDICINE
Payer: MEDICAID

## 2020-08-19 ENCOUNTER — APPOINTMENT (OUTPATIENT)
Dept: CT IMAGING | Age: 25
DRG: 466 | End: 2020-08-19
Payer: MEDICAID

## 2020-08-19 ENCOUNTER — APPOINTMENT (OUTPATIENT)
Dept: GENERAL RADIOLOGY | Age: 25
DRG: 466 | End: 2020-08-19
Payer: MEDICAID

## 2020-08-19 PROBLEM — A41.9 SEPSIS (HCC): Status: ACTIVE | Noted: 2020-08-19

## 2020-08-19 LAB
ALBUMIN SERPL-MCNC: 3.4 G/DL (ref 3.5–5.2)
ALP BLD-CCNC: 125 U/L (ref 40–129)
ALT SERPL-CCNC: 23 U/L (ref 0–40)
ANION GAP SERPL CALCULATED.3IONS-SCNC: 16 MMOL/L (ref 7–16)
APTT: 37.1 SEC (ref 24.5–35.1)
AST SERPL-CCNC: 17 U/L (ref 0–39)
BACTERIA: ABNORMAL /HPF
BASOPHILS ABSOLUTE: 0.04 E9/L (ref 0–0.2)
BASOPHILS RELATIVE PERCENT: 0.2 % (ref 0–2)
BILIRUB SERPL-MCNC: 0.3 MG/DL (ref 0–1.2)
BILIRUBIN URINE: NEGATIVE
BLOOD, URINE: NEGATIVE
BUN BLDV-MCNC: 7 MG/DL (ref 6–20)
CALCIUM SERPL-MCNC: 10.3 MG/DL (ref 8.6–10.2)
CHLORIDE BLD-SCNC: 94 MMOL/L (ref 98–107)
CLARITY: ABNORMAL
CO2: 28 MMOL/L (ref 22–29)
COLOR: YELLOW
CREAT SERPL-MCNC: 0.6 MG/DL (ref 0.7–1.2)
D DIMER: 633 NG/ML DDU
EOSINOPHILS ABSOLUTE: 0.08 E9/L (ref 0.05–0.5)
EOSINOPHILS RELATIVE PERCENT: 0.4 % (ref 0–6)
GFR AFRICAN AMERICAN: >60
GFR NON-AFRICAN AMERICAN: >60 ML/MIN/1.73
GLUCOSE BLD-MCNC: 89 MG/DL (ref 74–99)
GLUCOSE URINE: NEGATIVE MG/DL
HCT VFR BLD CALC: 41.2 % (ref 37–54)
HEMOGLOBIN: 13.2 G/DL (ref 12.5–16.5)
IMMATURE GRANULOCYTES #: 0.12 E9/L
IMMATURE GRANULOCYTES %: 0.6 % (ref 0–5)
INR BLD: 2.5
KETONES, URINE: NEGATIVE MG/DL
LACTIC ACID: 1.9 MMOL/L (ref 0.5–2.2)
LEUKOCYTE ESTERASE, URINE: ABNORMAL
LYMPHOCYTES ABSOLUTE: 1.49 E9/L (ref 1.5–4)
LYMPHOCYTES RELATIVE PERCENT: 7.6 % (ref 20–42)
MCH RBC QN AUTO: 25.8 PG (ref 26–35)
MCHC RBC AUTO-ENTMCNC: 32 % (ref 32–34.5)
MCV RBC AUTO: 80.5 FL (ref 80–99.9)
MONOCYTES ABSOLUTE: 1.22 E9/L (ref 0.1–0.95)
MONOCYTES RELATIVE PERCENT: 6.2 % (ref 2–12)
NEUTROPHILS ABSOLUTE: 16.74 E9/L (ref 1.8–7.3)
NEUTROPHILS RELATIVE PERCENT: 85 % (ref 43–80)
NITRITE, URINE: NEGATIVE
PDW BLD-RTO: 15.3 FL (ref 11.5–15)
PH UA: 7 (ref 5–9)
PLATELET # BLD: 577 E9/L (ref 130–450)
PMV BLD AUTO: 10.3 FL (ref 7–12)
POTASSIUM REFLEX MAGNESIUM: 4.1 MMOL/L (ref 3.5–5)
PROTEIN UA: NEGATIVE MG/DL
PROTHROMBIN TIME: 28.8 SEC (ref 9.3–12.4)
RBC # BLD: 5.12 E12/L (ref 3.8–5.8)
RBC UA: ABNORMAL /HPF (ref 0–2)
SODIUM BLD-SCNC: 138 MMOL/L (ref 132–146)
SPECIFIC GRAVITY UA: 1.01 (ref 1–1.03)
TOTAL PROTEIN: 9.1 G/DL (ref 6.4–8.3)
TROPONIN: <0.01 NG/ML (ref 0–0.03)
UROBILINOGEN, URINE: 0.2 E.U./DL
WBC # BLD: 19.7 E9/L (ref 4.5–11.5)
WBC UA: >20 /HPF (ref 0–5)

## 2020-08-19 PROCEDURE — 93005 ELECTROCARDIOGRAM TRACING: CPT | Performed by: NURSE PRACTITIONER

## 2020-08-19 PROCEDURE — 87088 URINE BACTERIA CULTURE: CPT

## 2020-08-19 PROCEDURE — 76770 US EXAM ABDO BACK WALL COMP: CPT

## 2020-08-19 PROCEDURE — 85025 COMPLETE CBC W/AUTO DIFF WBC: CPT

## 2020-08-19 PROCEDURE — 74177 CT ABD & PELVIS W/CONTRAST: CPT

## 2020-08-19 PROCEDURE — 80053 COMPREHEN METABOLIC PANEL: CPT

## 2020-08-19 PROCEDURE — 87040 BLOOD CULTURE FOR BACTERIA: CPT

## 2020-08-19 PROCEDURE — 96365 THER/PROPH/DIAG IV INF INIT: CPT

## 2020-08-19 PROCEDURE — 2580000003 HC RX 258: Performed by: RADIOLOGY

## 2020-08-19 PROCEDURE — 71045 X-RAY EXAM CHEST 1 VIEW: CPT

## 2020-08-19 PROCEDURE — 71275 CT ANGIOGRAPHY CHEST: CPT

## 2020-08-19 PROCEDURE — 2060000000 HC ICU INTERMEDIATE R&B

## 2020-08-19 PROCEDURE — 6360000004 HC RX CONTRAST MEDICATION: Performed by: RADIOLOGY

## 2020-08-19 PROCEDURE — 2580000003 HC RX 258: Performed by: STUDENT IN AN ORGANIZED HEALTH CARE EDUCATION/TRAINING PROGRAM

## 2020-08-19 PROCEDURE — 6360000002 HC RX W HCPCS: Performed by: STUDENT IN AN ORGANIZED HEALTH CARE EDUCATION/TRAINING PROGRAM

## 2020-08-19 PROCEDURE — 83605 ASSAY OF LACTIC ACID: CPT

## 2020-08-19 PROCEDURE — 85730 THROMBOPLASTIN TIME PARTIAL: CPT

## 2020-08-19 PROCEDURE — 81001 URINALYSIS AUTO W/SCOPE: CPT

## 2020-08-19 PROCEDURE — 85378 FIBRIN DEGRADE SEMIQUANT: CPT

## 2020-08-19 PROCEDURE — 84484 ASSAY OF TROPONIN QUANT: CPT

## 2020-08-19 PROCEDURE — 99282 EMERGENCY DEPT VISIT SF MDM: CPT

## 2020-08-19 PROCEDURE — 87186 SC STD MICRODIL/AGAR DIL: CPT

## 2020-08-19 PROCEDURE — 99283 EMERGENCY DEPT VISIT LOW MDM: CPT

## 2020-08-19 PROCEDURE — 85610 PROTHROMBIN TIME: CPT

## 2020-08-19 RX ORDER — SODIUM CHLORIDE 0.9 % (FLUSH) 0.9 %
10 SYRINGE (ML) INJECTION EVERY 12 HOURS SCHEDULED
Status: DISCONTINUED | OUTPATIENT
Start: 2020-08-19 | End: 2020-08-19 | Stop reason: SDUPTHER

## 2020-08-19 RX ORDER — ONDANSETRON 2 MG/ML
4 INJECTION INTRAMUSCULAR; INTRAVENOUS EVERY 6 HOURS PRN
Status: DISCONTINUED | OUTPATIENT
Start: 2020-08-19 | End: 2020-08-23 | Stop reason: HOSPADM

## 2020-08-19 RX ORDER — CIMETIDINE 300 MG/1
300 TABLET, FILM COATED ORAL 2 TIMES DAILY
Status: ON HOLD | COMMUNITY
End: 2020-08-23 | Stop reason: HOSPADM

## 2020-08-19 RX ORDER — FLUOXETINE 10 MG/1
20 TABLET, FILM COATED ORAL DAILY
Status: DISCONTINUED | OUTPATIENT
Start: 2020-08-20 | End: 2020-08-23 | Stop reason: HOSPADM

## 2020-08-19 RX ORDER — ACETAMINOPHEN 325 MG/1
650 TABLET ORAL EVERY 6 HOURS PRN
Status: DISCONTINUED | OUTPATIENT
Start: 2020-08-19 | End: 2020-08-23 | Stop reason: HOSPADM

## 2020-08-19 RX ORDER — 0.9 % SODIUM CHLORIDE 0.9 %
1000 INTRAVENOUS SOLUTION INTRAVENOUS ONCE
Status: COMPLETED | OUTPATIENT
Start: 2020-08-19 | End: 2020-08-19

## 2020-08-19 RX ORDER — SODIUM CHLORIDE 0.9 % (FLUSH) 0.9 %
10 SYRINGE (ML) INJECTION PRN
Status: DISCONTINUED | OUTPATIENT
Start: 2020-08-19 | End: 2020-08-19 | Stop reason: SDUPTHER

## 2020-08-19 RX ORDER — ACETAMINOPHEN 650 MG/1
650 SUPPOSITORY RECTAL EVERY 6 HOURS PRN
Status: DISCONTINUED | OUTPATIENT
Start: 2020-08-19 | End: 2020-08-23 | Stop reason: HOSPADM

## 2020-08-19 RX ORDER — PREGABALIN 150 MG/1
300 CAPSULE ORAL 2 TIMES DAILY
Status: DISCONTINUED | OUTPATIENT
Start: 2020-08-19 | End: 2020-08-23 | Stop reason: HOSPADM

## 2020-08-19 RX ORDER — SODIUM CHLORIDE 0.9 % (FLUSH) 0.9 %
10 SYRINGE (ML) INJECTION PRN
Status: DISCONTINUED | OUTPATIENT
Start: 2020-08-19 | End: 2020-08-23 | Stop reason: HOSPADM

## 2020-08-19 RX ORDER — ONDANSETRON 4 MG/1
4 TABLET, ORALLY DISINTEGRATING ORAL EVERY 8 HOURS PRN
Status: DISCONTINUED | OUTPATIENT
Start: 2020-08-19 | End: 2020-08-23 | Stop reason: HOSPADM

## 2020-08-19 RX ORDER — BACLOFEN 10 MG/1
10 TABLET ORAL DAILY
Status: DISCONTINUED | OUTPATIENT
Start: 2020-08-20 | End: 2020-08-23 | Stop reason: HOSPADM

## 2020-08-19 RX ORDER — SODIUM CHLORIDE 9 MG/ML
INJECTION, SOLUTION INTRAVENOUS CONTINUOUS
Status: DISCONTINUED | OUTPATIENT
Start: 2020-08-19 | End: 2020-08-21

## 2020-08-19 RX ORDER — SODIUM CHLORIDE 0.9 % (FLUSH) 0.9 %
10 SYRINGE (ML) INJECTION EVERY 12 HOURS SCHEDULED
Status: DISCONTINUED | OUTPATIENT
Start: 2020-08-19 | End: 2020-08-23 | Stop reason: HOSPADM

## 2020-08-19 RX ORDER — POLYETHYLENE GLYCOL 3350 17 G/17G
17 POWDER, FOR SOLUTION ORAL DAILY PRN
Status: DISCONTINUED | OUTPATIENT
Start: 2020-08-19 | End: 2020-08-23 | Stop reason: HOSPADM

## 2020-08-19 RX ORDER — WARFARIN SODIUM 2 MG/1
2 TABLET ORAL DAILY
Status: DISCONTINUED | OUTPATIENT
Start: 2020-08-19 | End: 2020-08-21

## 2020-08-19 RX ORDER — LANOLIN ALCOHOL/MO/W.PET/CERES
6 CREAM (GRAM) TOPICAL NIGHTLY
Status: DISCONTINUED | OUTPATIENT
Start: 2020-08-19 | End: 2020-08-23 | Stop reason: HOSPADM

## 2020-08-19 RX ORDER — FAMOTIDINE 20 MG/1
20 TABLET, FILM COATED ORAL DAILY
Status: DISCONTINUED | OUTPATIENT
Start: 2020-08-20 | End: 2020-08-23 | Stop reason: HOSPADM

## 2020-08-19 RX ORDER — PIPERACILLIN SODIUM, TAZOBACTAM SODIUM 3; .375 G/15ML; G/15ML
3.38 INJECTION, POWDER, LYOPHILIZED, FOR SOLUTION INTRAVENOUS EVERY 6 HOURS
Status: DISCONTINUED | OUTPATIENT
Start: 2020-08-19 | End: 2020-08-19 | Stop reason: SDUPTHER

## 2020-08-19 RX ORDER — NAPROXEN 250 MG/1
250 TABLET ORAL
Status: DISCONTINUED | OUTPATIENT
Start: 2020-08-20 | End: 2020-08-23 | Stop reason: HOSPADM

## 2020-08-19 RX ADMIN — Medication 10 ML: at 16:26

## 2020-08-19 RX ADMIN — CEFTRIAXONE SODIUM 1 G: 1 INJECTION, POWDER, FOR SOLUTION INTRAMUSCULAR; INTRAVENOUS at 16:59

## 2020-08-19 RX ADMIN — VANCOMYCIN HYDROCHLORIDE 1.5 G: 10 INJECTION, POWDER, LYOPHILIZED, FOR SOLUTION INTRAVENOUS at 17:43

## 2020-08-19 RX ADMIN — IOPAMIDOL 110 ML: 755 INJECTION, SOLUTION INTRAVENOUS at 16:26

## 2020-08-19 RX ADMIN — SODIUM CHLORIDE 1000 ML: 9 INJECTION, SOLUTION INTRAVENOUS at 15:47

## 2020-08-19 ASSESSMENT — PAIN DESCRIPTION - PAIN TYPE: TYPE: ACUTE PAIN

## 2020-08-19 ASSESSMENT — PAIN DESCRIPTION - FREQUENCY: FREQUENCY: CONTINUOUS

## 2020-08-19 ASSESSMENT — PAIN DESCRIPTION - DESCRIPTORS: DESCRIPTORS: ACHING

## 2020-08-19 ASSESSMENT — PAIN SCALES - GENERAL: PAINLEVEL_OUTOF10: 3

## 2020-08-19 ASSESSMENT — PAIN DESCRIPTION - LOCATION: LOCATION: CHEST
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
## 2020-08-20 ENCOUNTER — TELEPHONE (OUTPATIENT)
Dept: INTERNAL MEDICINE | Age: 25
End: 2020-08-20

## 2020-08-20 LAB
ALBUMIN SERPL-MCNC: 3.2 G/DL (ref 3.5–5.2)
ALP BLD-CCNC: 106 U/L (ref 40–129)
ALT SERPL-CCNC: 18 U/L (ref 0–40)
ANION GAP SERPL CALCULATED.3IONS-SCNC: 17 MMOL/L (ref 7–16)
AST SERPL-CCNC: 12 U/L (ref 0–39)
BASOPHILS ABSOLUTE: 0.03 E9/L (ref 0–0.2)
BASOPHILS RELATIVE PERCENT: 0.2 % (ref 0–2)
BILIRUB SERPL-MCNC: 0.3 MG/DL (ref 0–1.2)
BUN BLDV-MCNC: 7 MG/DL (ref 6–20)
CALCIUM SERPL-MCNC: 10 MG/DL (ref 8.6–10.2)
CHLORIDE BLD-SCNC: 98 MMOL/L (ref 98–107)
CO2: 24 MMOL/L (ref 22–29)
CORTISOL TOTAL: 6.93 MCG/DL (ref 2.68–18.4)
CREAT SERPL-MCNC: 0.6 MG/DL (ref 0.7–1.2)
EKG ATRIAL RATE: 123 BPM
EKG P AXIS: 83 DEGREES
EKG P-R INTERVAL: 112 MS
EKG Q-T INTERVAL: 296 MS
EKG QRS DURATION: 80 MS
EKG QTC CALCULATION (BAZETT): 423 MS
EKG R AXIS: 82 DEGREES
EKG T AXIS: 56 DEGREES
EKG VENTRICULAR RATE: 123 BPM
EOSINOPHILS ABSOLUTE: 0.09 E9/L (ref 0.05–0.5)
EOSINOPHILS RELATIVE PERCENT: 0.6 % (ref 0–6)
GFR AFRICAN AMERICAN: >60
GFR NON-AFRICAN AMERICAN: >60 ML/MIN/1.73
GLUCOSE BLD-MCNC: 98 MG/DL (ref 74–99)
HCT VFR BLD CALC: 32.2 % (ref 37–54)
HEMOGLOBIN: 10.3 G/DL (ref 12.5–16.5)
IMMATURE GRANULOCYTES #: 0.08 E9/L
IMMATURE GRANULOCYTES %: 0.5 % (ref 0–5)
LACTIC ACID, SEPSIS: 1.2 MMOL/L (ref 0.5–1.9)
LYMPHOCYTES ABSOLUTE: 2.08 E9/L (ref 1.5–4)
LYMPHOCYTES RELATIVE PERCENT: 14.2 % (ref 20–42)
MCH RBC QN AUTO: 25.2 PG (ref 26–35)
MCHC RBC AUTO-ENTMCNC: 32 % (ref 32–34.5)
MCV RBC AUTO: 78.9 FL (ref 80–99.9)
MONOCYTES ABSOLUTE: 1.16 E9/L (ref 0.1–0.95)
MONOCYTES RELATIVE PERCENT: 7.9 % (ref 2–12)
NEUTROPHILS ABSOLUTE: 11.22 E9/L (ref 1.8–7.3)
NEUTROPHILS RELATIVE PERCENT: 76.6 % (ref 43–80)
PDW BLD-RTO: 15.4 FL (ref 11.5–15)
PLATELET # BLD: 541 E9/L (ref 130–450)
PMV BLD AUTO: 10.1 FL (ref 7–12)
POTASSIUM SERPL-SCNC: 4 MMOL/L (ref 3.5–5)
RBC # BLD: 4.08 E12/L (ref 3.8–5.8)
SODIUM BLD-SCNC: 139 MMOL/L (ref 132–146)
TOTAL CK: 246 U/L (ref 20–200)
TOTAL PROTEIN: 8 G/DL (ref 6.4–8.3)
TROPONIN: <0.01 NG/ML (ref 0–0.03)
WBC # BLD: 14.7 E9/L (ref 4.5–11.5)

## 2020-08-20 PROCEDURE — 99231 SBSQ HOSP IP/OBS SF/LOW 25: CPT | Performed by: INTERNAL MEDICINE

## 2020-08-20 PROCEDURE — 2580000003 HC RX 258: Performed by: INTERNAL MEDICINE

## 2020-08-20 PROCEDURE — 84484 ASSAY OF TROPONIN QUANT: CPT

## 2020-08-20 PROCEDURE — 2580000003 HC RX 258: Performed by: RADIOLOGY

## 2020-08-20 PROCEDURE — 6370000000 HC RX 637 (ALT 250 FOR IP): Performed by: INTERNAL MEDICINE

## 2020-08-20 PROCEDURE — 36415 COLL VENOUS BLD VENIPUNCTURE: CPT

## 2020-08-20 PROCEDURE — 87070 CULTURE OTHR SPECIMN AEROBIC: CPT

## 2020-08-20 PROCEDURE — 2580000003 HC RX 258: Performed by: STUDENT IN AN ORGANIZED HEALTH CARE EDUCATION/TRAINING PROGRAM

## 2020-08-20 PROCEDURE — 6360000002 HC RX W HCPCS: Performed by: INTERNAL MEDICINE

## 2020-08-20 PROCEDURE — 83605 ASSAY OF LACTIC ACID: CPT

## 2020-08-20 PROCEDURE — 85025 COMPLETE CBC W/AUTO DIFF WBC: CPT

## 2020-08-20 PROCEDURE — 82550 ASSAY OF CK (CPK): CPT

## 2020-08-20 PROCEDURE — 82533 TOTAL CORTISOL: CPT

## 2020-08-20 PROCEDURE — 2060000000 HC ICU INTERMEDIATE R&B

## 2020-08-20 PROCEDURE — 80053 COMPREHEN METABOLIC PANEL: CPT

## 2020-08-20 PROCEDURE — 93010 ELECTROCARDIOGRAM REPORT: CPT | Performed by: INTERNAL MEDICINE

## 2020-08-20 RX ORDER — DEXTROSE MONOHYDRATE 25 G/50ML
12.5 INJECTION, SOLUTION INTRAVENOUS PRN
Status: DISCONTINUED | OUTPATIENT
Start: 2020-08-20 | End: 2020-08-23 | Stop reason: HOSPADM

## 2020-08-20 RX ORDER — 0.9 % SODIUM CHLORIDE 0.9 %
500 INTRAVENOUS SOLUTION INTRAVENOUS ONCE
Status: COMPLETED | OUTPATIENT
Start: 2020-08-20 | End: 2020-08-20

## 2020-08-20 RX ORDER — PETROLATUM 42 G/100G
OINTMENT TOPICAL 2 TIMES DAILY
Status: DISCONTINUED | OUTPATIENT
Start: 2020-08-20 | End: 2020-08-23 | Stop reason: HOSPADM

## 2020-08-20 RX ORDER — NICOTINE POLACRILEX 4 MG
15 LOZENGE BUCCAL PRN
Status: DISCONTINUED | OUTPATIENT
Start: 2020-08-20 | End: 2020-08-23 | Stop reason: HOSPADM

## 2020-08-20 RX ORDER — DEXTROSE MONOHYDRATE 50 MG/ML
100 INJECTION, SOLUTION INTRAVENOUS PRN
Status: DISCONTINUED | OUTPATIENT
Start: 2020-08-20 | End: 2020-08-23 | Stop reason: HOSPADM

## 2020-08-20 RX ORDER — COSYNTROPIN 0.25 MG/ML
250 INJECTION, POWDER, FOR SOLUTION INTRAMUSCULAR; INTRAVENOUS ONCE
Status: COMPLETED | OUTPATIENT
Start: 2020-08-21 | End: 2020-08-21

## 2020-08-20 RX ADMIN — MELATONIN 3 MG ORAL TABLET 6 MG: 3 TABLET ORAL at 21:18

## 2020-08-20 RX ADMIN — COLLAGENASE SANTYL: 250 OINTMENT TOPICAL at 13:35

## 2020-08-20 RX ADMIN — SODIUM CHLORIDE: 9 INJECTION, SOLUTION INTRAVENOUS at 10:53

## 2020-08-20 RX ADMIN — PIPERACILLIN AND TAZOBACTAM 3.38 G: 3; .375 INJECTION, POWDER, LYOPHILIZED, FOR SOLUTION INTRAVENOUS at 08:43

## 2020-08-20 RX ADMIN — BACLOFEN 10 MG: 10 TABLET ORAL at 08:43

## 2020-08-20 RX ADMIN — SKIN PROTECTANT: 44 OINTMENT TOPICAL at 21:19

## 2020-08-20 RX ADMIN — FLUOXETINE HYDROCHLORIDE 20 MG: 10 TABLET ORAL at 08:43

## 2020-08-20 RX ADMIN — SKIN PROTECTANT: 44 OINTMENT TOPICAL at 13:35

## 2020-08-20 RX ADMIN — PIPERACILLIN AND TAZOBACTAM 3.38 G: 3; .375 INJECTION, POWDER, LYOPHILIZED, FOR SOLUTION INTRAVENOUS at 00:51

## 2020-08-20 RX ADMIN — PREGABALIN 300 MG: 150 CAPSULE ORAL at 08:43

## 2020-08-20 RX ADMIN — NAPROXEN 250 MG: 250 TABLET ORAL at 08:43

## 2020-08-20 RX ADMIN — FAMOTIDINE 20 MG: 20 TABLET, FILM COATED ORAL at 08:43

## 2020-08-20 RX ADMIN — NAPROXEN 250 MG: 250 TABLET ORAL at 11:21

## 2020-08-20 RX ADMIN — SODIUM CHLORIDE, PRESERVATIVE FREE 10 ML: 5 INJECTION INTRAVENOUS at 21:26

## 2020-08-20 RX ADMIN — PREGABALIN 300 MG: 150 CAPSULE ORAL at 21:19

## 2020-08-20 RX ADMIN — Medication 10 ML: at 08:43

## 2020-08-20 RX ADMIN — VANCOMYCIN HYDROCHLORIDE 1250 MG: 10 INJECTION, POWDER, LYOPHILIZED, FOR SOLUTION INTRAVENOUS at 05:46

## 2020-08-20 RX ADMIN — HYDROCORTISONE SODIUM SUCCINATE 100 MG: 100 INJECTION, POWDER, FOR SOLUTION INTRAMUSCULAR; INTRAVENOUS at 21:26

## 2020-08-20 RX ADMIN — MELATONIN 3 MG ORAL TABLET 6 MG: 3 TABLET ORAL at 00:50

## 2020-08-20 RX ADMIN — SODIUM CHLORIDE 500 ML: 9 INJECTION, SOLUTION INTRAVENOUS at 12:09

## 2020-08-20 RX ADMIN — WARFARIN SODIUM 2 MG: 2 TABLET ORAL at 18:15

## 2020-08-20 RX ADMIN — SODIUM CHLORIDE 500 ML: 9 INJECTION, SOLUTION INTRAVENOUS at 14:37

## 2020-08-20 RX ADMIN — SODIUM CHLORIDE: 9 INJECTION, SOLUTION INTRAVENOUS at 00:51

## 2020-08-20 RX ADMIN — WATER 1 G: 1 INJECTION INTRAMUSCULAR; INTRAVENOUS; SUBCUTANEOUS at 13:34

## 2020-08-20 RX ADMIN — PREGABALIN 300 MG: 150 CAPSULE ORAL at 00:50

## 2020-08-20 ASSESSMENT — PAIN DESCRIPTION - DESCRIPTORS: DESCRIPTORS: CONSTANT;DISCOMFORT

## 2020-08-20 ASSESSMENT — PAIN SCALES - GENERAL
PAINLEVEL_OUTOF10: 0
PAINLEVEL_OUTOF10: 4
PAINLEVEL_OUTOF10: 3
PAINLEVEL_OUTOF10: 0

## 2020-08-20 ASSESSMENT — PAIN DESCRIPTION - LOCATION: LOCATION: BUTTOCKS;COCCYX

## 2020-08-20 ASSESSMENT — ENCOUNTER SYMPTOMS
RHINORRHEA: 0
COUGH: 0
CONSTIPATION: 0
SHORTNESS OF BREATH: 1
ABDOMINAL PAIN: 0
BACK PAIN: 0
NAUSEA: 0
VOMITING: 0
DIARRHEA: 1

## 2020-08-20 ASSESSMENT — PAIN DESCRIPTION - PAIN TYPE: TYPE: CHRONIC PAIN

## 2020-08-20 ASSESSMENT — PAIN DESCRIPTION - FREQUENCY: FREQUENCY: CONTINUOUS
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
## 2020-08-20 NOTE — PROGRESS NOTES
Pharmacy Consultation Note  (Antibiotic Dosing and Monitoring)    Initial consult date: 8/19/20  Consulting physician: Ester Murillo  Drug: Vancomycin  Indication: sepsis    Age/  Gender Height Weight IBW Dosing weight  Allergy Information   24 y.o./male @FLOW(11:first:1)@ @FLOW[14:FIRST:1@     Ideal body weight: 79.9 kg (176 lb 2.4 oz)  Adjusted ideal body weight: 80.2 kg (176 lb 14.2 oz)  80.7 kg  Patient has no known allergies. @SUIX(87)@        Date  WBC BUN SCr CrCl  (mL/min) Drug/Dose Time   Given Level(s)   (Time) Comments   8/19/20     0.6  211   Vancomycin 1500 mg IV                                            No intake or output data in the 24 hours ending 08/19/20 1735    Historical Cultures:  Organism   Date Value Ref Range Status   05/20/2020 Candida albicans (A)  Final     No results for input(s): BC in the last 72 hours. Cultures:  available culture and sensitivity results were reviewed in EPIC    Assessment:  30 y.o. male has been initiated Vancomycin. Estimated CrCl = 211 mL/min  Goal trough level = 15-20 mcg/mL    Plan:   Will initiate vancomycin at a dose of 1500 mg once then 1250 mg q8hr  Monitor renal function   Clinical pharmacy to follow      FAIZAN Harris [facility] 8/19/2020 10:45 PM
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
## 2020-08-20 NOTE — TELEPHONE ENCOUNTER
Call to mother this morning noting that pt is hospitalized. LSW able to resolve [employer] enrollment; pt has been recommended for approval for waiver services for home aide services; LSW faxed appropriate physician approval on 8-19-20. LSW spoke with Chriss Gilbert at [facility] ([phone]); she reports pt has Gevena Denver and Nefew wound vac thru [employer] in [facility] and all necessary supplies are delivered by [facility] wound doctor. Also discussed mother's information that nutritional supplement was recommended which she reports they provided samples of Georges and if pt tolerated well; they would order thru DME; advised that LSW confirmed with Lisbet Kurtz at [employer] that [address] are in network. Mother and pt noted at last INR visit that he is in need of chux and incontinent garments which he prefers the tab type, not pull ups (size medium). IM nurse sent request for physician order. Mother dropped off FMLA papers on 8-19-20 and LSW left message on 8-19-20 that they were not the part for physician to complete, it was her approval from her job of eligibility; she will look thru papers and possibly bring to the hospital for completion or knows can bring to [place].

## 2020-08-20 NOTE — PROGRESS NOTES
Pharmacy Consultation Note  (Antibiotic Dosing and Monitoring)    Initial consult date: 8/19/20  Consulting physician: Dr. Trace Villanueva  Drug(s): Vancomycin  Indication: Sepsis      Plan:  Vancomycin has been discontinued   Clinical Pharmacy to sign-off  Physician to re-consult pharmacy if future dosing is needed    · Thank you for the consult,

## 2020-08-20 NOTE — CARE COORDINATION
Attempted to speak to the patient at the bedside. Patient sleeping and stated he did not want to talk right now. Call placed to the patient's mother Lucero Rodriguez to review home set up and transition of care planning. Detailed VM left with number for return call. Chart reviewed from admission in April. Patient remained in ICU and transitioned to LTAC at [facility] in [place] [address] prior to discharging to home. Per nursing report patient lives with his mother. Will follow-up with patient &/or mother as available.       Violet Kapoor.  P:  [phone]
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
## 2020-08-21 LAB
CORTISOL TOTAL: 44.68 MCG/DL (ref 2.68–18.4)
INR BLD: 3.3
METER GLUCOSE: 84 MG/DL (ref 74–99)
METER GLUCOSE: 95 MG/DL (ref 74–99)
ORGANISM: ABNORMAL
PROTHROMBIN TIME: 37.8 SEC (ref 9.3–12.4)
URINE CULTURE, ROUTINE: ABNORMAL

## 2020-08-21 PROCEDURE — 99231 SBSQ HOSP IP/OBS SF/LOW 25: CPT | Performed by: INTERNAL MEDICINE

## 2020-08-21 PROCEDURE — 6360000002 HC RX W HCPCS: Performed by: INTERNAL MEDICINE

## 2020-08-21 PROCEDURE — 82962 GLUCOSE BLOOD TEST: CPT

## 2020-08-21 PROCEDURE — 2580000003 HC RX 258: Performed by: INTERNAL MEDICINE

## 2020-08-21 PROCEDURE — 2580000003 HC RX 258: Performed by: STUDENT IN AN ORGANIZED HEALTH CARE EDUCATION/TRAINING PROGRAM

## 2020-08-21 PROCEDURE — 82533 TOTAL CORTISOL: CPT

## 2020-08-21 PROCEDURE — 85610 PROTHROMBIN TIME: CPT

## 2020-08-21 PROCEDURE — 36415 COLL VENOUS BLD VENIPUNCTURE: CPT

## 2020-08-21 PROCEDURE — 6370000000 HC RX 637 (ALT 250 FOR IP): Performed by: INTERNAL MEDICINE

## 2020-08-21 PROCEDURE — 2060000000 HC ICU INTERMEDIATE R&B

## 2020-08-21 RX ORDER — DEXTROSE MONOHYDRATE 50 MG/ML
INJECTION, SOLUTION INTRAVENOUS CONTINUOUS
Status: ACTIVE | OUTPATIENT
Start: 2020-08-21 | End: 2020-08-22

## 2020-08-21 RX ORDER — COSYNTROPIN 0.25 MG/ML
250 INJECTION, POWDER, FOR SOLUTION INTRAMUSCULAR; INTRAVENOUS ONCE
Status: DISCONTINUED | OUTPATIENT
Start: 2020-08-22 | End: 2020-08-21

## 2020-08-21 RX ADMIN — FAMOTIDINE 20 MG: 20 TABLET, FILM COATED ORAL at 08:51

## 2020-08-21 RX ADMIN — SKIN PROTECTANT: 44 OINTMENT TOPICAL at 21:30

## 2020-08-21 RX ADMIN — MELATONIN 3 MG ORAL TABLET 6 MG: 3 TABLET ORAL at 21:01

## 2020-08-21 RX ADMIN — SKIN PROTECTANT: 44 OINTMENT TOPICAL at 08:53

## 2020-08-21 RX ADMIN — WATER 1 G: 1 INJECTION INTRAMUSCULAR; INTRAVENOUS; SUBCUTANEOUS at 13:51

## 2020-08-21 RX ADMIN — PREGABALIN 300 MG: 150 CAPSULE ORAL at 21:01

## 2020-08-21 RX ADMIN — FLUOXETINE HYDROCHLORIDE 20 MG: 10 TABLET ORAL at 08:52

## 2020-08-21 RX ADMIN — SODIUM CHLORIDE: 9 INJECTION, SOLUTION INTRAVENOUS at 13:51

## 2020-08-21 RX ADMIN — PREGABALIN 300 MG: 150 CAPSULE ORAL at 08:54

## 2020-08-21 RX ADMIN — HYDROCORTISONE SODIUM SUCCINATE 100 MG: 100 INJECTION, POWDER, FOR SOLUTION INTRAMUSCULAR; INTRAVENOUS at 05:26

## 2020-08-21 RX ADMIN — HYDROCORTISONE SODIUM SUCCINATE 100 MG: 100 INJECTION, POWDER, FOR SOLUTION INTRAMUSCULAR; INTRAVENOUS at 21:02

## 2020-08-21 RX ADMIN — COSYNTROPIN 250 MCG: 0.25 INJECTION, POWDER, LYOPHILIZED, FOR SOLUTION INTRAMUSCULAR; INTRAVENOUS at 08:52

## 2020-08-21 RX ADMIN — SODIUM CHLORIDE, PRESERVATIVE FREE 10 ML: 5 INJECTION INTRAVENOUS at 21:02

## 2020-08-21 RX ADMIN — BACLOFEN 10 MG: 10 TABLET ORAL at 08:52

## 2020-08-21 RX ADMIN — DEXTROSE MONOHYDRATE: 50 INJECTION, SOLUTION INTRAVENOUS at 18:15

## 2020-08-21 RX ADMIN — NAPROXEN 250 MG: 250 TABLET ORAL at 13:48

## 2020-08-21 RX ADMIN — NAPROXEN 250 MG: 250 TABLET ORAL at 08:51

## 2020-08-21 RX ADMIN — NAPROXEN 250 MG: 250 TABLET ORAL at 17:23

## 2020-08-21 RX ADMIN — SODIUM CHLORIDE: 9 INJECTION, SOLUTION INTRAVENOUS at 01:50

## 2020-08-21 RX ADMIN — COLLAGENASE SANTYL: 250 OINTMENT TOPICAL at 08:53

## 2020-08-21 ASSESSMENT — PAIN DESCRIPTION - DESCRIPTORS: DESCRIPTORS: ACHING;DISCOMFORT;CONSTANT

## 2020-08-21 ASSESSMENT — PAIN DESCRIPTION - LOCATION: LOCATION: BUTTOCKS

## 2020-08-21 ASSESSMENT — PAIN SCALES - GENERAL
PAINLEVEL_OUTOF10: 0
PAINLEVEL_OUTOF10: 0
PAINLEVEL_OUTOF10: 8
PAINLEVEL_OUTOF10: 0
PAINLEVEL_OUTOF10: 8
PAINLEVEL_OUTOF10: 5
PAINLEVEL_OUTOF10: 0

## 2020-08-21 ASSESSMENT — PAIN DESCRIPTION - PAIN TYPE: TYPE: CHRONIC PAIN
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
## 2020-08-21 NOTE — CARE COORDINATION
Chart reviewed. Patient on IV Rocephin for UTI and started on IV Solucortef today. Spoke with Wound Care yesterday and patient will need HHC for Wound care and recommendations received. Met with the patient at the bedside to discuss transition of care planning needs. Patient lives with his mother in a two story home with 4 step entry and first floor set up. Patient has a WC, BSC, Shower bench and Hospital Bed at home. Patient goes to [facility] for primary care, Dr An Avalos, and uses [facility] for his medications. Patient has no history of HHC. [facility], [facility]. list reviewed with the patient and he would like 1.)[facility] or 2.) [facility]. Call placed to [facility], liaison with [facility]. They are unable to accept any new Medicaid patient's at this time. Call placed to [facility], liaison with [facility] with new referral.  Explained patient would likely be in house through Sunday or Monday depending on Steroid taper and ID plans. They can accept and will follow-up with the patient after discharge. Per Wound Care: home care for dressing changes coccyx with santyl and moist kerlix dressing followed by DSD/ABD after cleansing w/ NS daily. Will continue to follow.      Violet Kapoor.  P:  [phone]

## 2020-08-22 LAB
ANION GAP SERPL CALCULATED.3IONS-SCNC: 11 MMOL/L (ref 7–16)
BASOPHILS ABSOLUTE: 0.02 E9/L (ref 0–0.2)
BASOPHILS RELATIVE PERCENT: 0.2 % (ref 0–2)
BUN BLDV-MCNC: 8 MG/DL (ref 6–20)
CALCIUM SERPL-MCNC: 8.9 MG/DL (ref 8.6–10.2)
CHLORIDE BLD-SCNC: 104 MMOL/L (ref 98–107)
CO2: 25 MMOL/L (ref 22–29)
CORTISOL TOTAL: 37.95 MCG/DL (ref 2.68–18.4)
CREAT SERPL-MCNC: 0.7 MG/DL (ref 0.7–1.2)
EOSINOPHILS ABSOLUTE: 0.01 E9/L (ref 0.05–0.5)
EOSINOPHILS RELATIVE PERCENT: 0.1 % (ref 0–6)
GFR AFRICAN AMERICAN: >60
GFR NON-AFRICAN AMERICAN: >60 ML/MIN/1.73
GLUCOSE BLD-MCNC: 103 MG/DL (ref 74–99)
HCT VFR BLD CALC: 32 % (ref 37–54)
HEMOGLOBIN: 10.1 G/DL (ref 12.5–16.5)
IMMATURE GRANULOCYTES #: 0.05 E9/L
IMMATURE GRANULOCYTES %: 0.5 % (ref 0–5)
INR BLD: 3.8
LYMPHOCYTES ABSOLUTE: 2.63 E9/L (ref 1.5–4)
LYMPHOCYTES RELATIVE PERCENT: 24.5 % (ref 20–42)
MCH RBC QN AUTO: 25.4 PG (ref 26–35)
MCHC RBC AUTO-ENTMCNC: 31.6 % (ref 32–34.5)
MCV RBC AUTO: 80.6 FL (ref 80–99.9)
METER GLUCOSE: 114 MG/DL (ref 74–99)
MONOCYTES ABSOLUTE: 0.64 E9/L (ref 0.1–0.95)
MONOCYTES RELATIVE PERCENT: 6 % (ref 2–12)
NEUTROPHILS ABSOLUTE: 7.37 E9/L (ref 1.8–7.3)
NEUTROPHILS RELATIVE PERCENT: 68.7 % (ref 43–80)
ORGANISM: ABNORMAL
PDW BLD-RTO: 15.6 FL (ref 11.5–15)
PLATELET # BLD: 584 E9/L (ref 130–450)
PMV BLD AUTO: 9.8 FL (ref 7–12)
POTASSIUM SERPL-SCNC: 3.3 MMOL/L (ref 3.5–5)
PROTHROMBIN TIME: 44.2 SEC (ref 9.3–12.4)
RBC # BLD: 3.97 E12/L (ref 3.8–5.8)
SODIUM BLD-SCNC: 140 MMOL/L (ref 132–146)
WBC # BLD: 10.7 E9/L (ref 4.5–11.5)
WOUND/ABSCESS: ABNORMAL

## 2020-08-22 PROCEDURE — 82533 TOTAL CORTISOL: CPT

## 2020-08-22 PROCEDURE — 2580000003 HC RX 258: Performed by: STUDENT IN AN ORGANIZED HEALTH CARE EDUCATION/TRAINING PROGRAM

## 2020-08-22 PROCEDURE — 6370000000 HC RX 637 (ALT 250 FOR IP): Performed by: INTERNAL MEDICINE

## 2020-08-22 PROCEDURE — 85610 PROTHROMBIN TIME: CPT

## 2020-08-22 PROCEDURE — 82962 GLUCOSE BLOOD TEST: CPT

## 2020-08-22 PROCEDURE — 85025 COMPLETE CBC W/AUTO DIFF WBC: CPT

## 2020-08-22 PROCEDURE — 36415 COLL VENOUS BLD VENIPUNCTURE: CPT

## 2020-08-22 PROCEDURE — 99231 SBSQ HOSP IP/OBS SF/LOW 25: CPT | Performed by: INTERNAL MEDICINE

## 2020-08-22 PROCEDURE — 80048 BASIC METABOLIC PNL TOTAL CA: CPT

## 2020-08-22 PROCEDURE — 6360000002 HC RX W HCPCS: Performed by: INTERNAL MEDICINE

## 2020-08-22 PROCEDURE — 2060000000 HC ICU INTERMEDIATE R&B

## 2020-08-22 PROCEDURE — 2580000003 HC RX 258: Performed by: INTERNAL MEDICINE

## 2020-08-22 RX ORDER — CEFDINIR 300 MG/1
300 CAPSULE ORAL EVERY 12 HOURS SCHEDULED
Qty: 20 CAPSULE | Refills: 0 | Status: SHIPPED | OUTPATIENT
Start: 2020-08-22 | End: 2020-09-01

## 2020-08-22 RX ORDER — POTASSIUM CHLORIDE 20 MEQ/1
40 TABLET, EXTENDED RELEASE ORAL ONCE
Status: COMPLETED | OUTPATIENT
Start: 2020-08-22 | End: 2020-08-22

## 2020-08-22 RX ORDER — HYDROCORTISONE 5 MG/1
10 TABLET ORAL EVERY MORNING
Status: DISCONTINUED | OUTPATIENT
Start: 2020-08-23 | End: 2020-08-23 | Stop reason: HOSPADM

## 2020-08-22 RX ORDER — HYDROCORTISONE 5 MG/1
5 TABLET ORAL
Status: DISCONTINUED | OUTPATIENT
Start: 2020-08-23 | End: 2020-08-22

## 2020-08-22 RX ORDER — CEFDINIR 300 MG/1
300 CAPSULE ORAL EVERY 12 HOURS SCHEDULED
Status: DISCONTINUED | OUTPATIENT
Start: 2020-08-22 | End: 2020-08-23 | Stop reason: HOSPADM

## 2020-08-22 RX ORDER — HYDROCORTISONE 5 MG/1
5 TABLET ORAL EVERY EVENING
Status: DISCONTINUED | OUTPATIENT
Start: 2020-08-23 | End: 2020-08-23 | Stop reason: HOSPADM

## 2020-08-22 RX ADMIN — SKIN PROTECTANT: 44 OINTMENT TOPICAL at 20:50

## 2020-08-22 RX ADMIN — WATER 1 G: 1 INJECTION INTRAMUSCULAR; INTRAVENOUS; SUBCUTANEOUS at 13:21

## 2020-08-22 RX ADMIN — SODIUM CHLORIDE, PRESERVATIVE FREE 10 ML: 5 INJECTION INTRAVENOUS at 20:51

## 2020-08-22 RX ADMIN — BACLOFEN 10 MG: 10 TABLET ORAL at 08:15

## 2020-08-22 RX ADMIN — FAMOTIDINE 20 MG: 20 TABLET, FILM COATED ORAL at 08:15

## 2020-08-22 RX ADMIN — NAPROXEN 250 MG: 250 TABLET ORAL at 13:20

## 2020-08-22 RX ADMIN — PREGABALIN 300 MG: 150 CAPSULE ORAL at 20:51

## 2020-08-22 RX ADMIN — NAPROXEN 250 MG: 250 TABLET ORAL at 08:15

## 2020-08-22 RX ADMIN — HYDROCORTISONE SODIUM SUCCINATE 50 MG: 100 INJECTION, POWDER, FOR SOLUTION INTRAMUSCULAR; INTRAVENOUS at 20:51

## 2020-08-22 RX ADMIN — POTASSIUM CHLORIDE 40 MEQ: 1500 TABLET, EXTENDED RELEASE ORAL at 08:15

## 2020-08-22 RX ADMIN — CEFDINIR 300 MG: 300 CAPSULE ORAL at 20:51

## 2020-08-22 RX ADMIN — FLUOXETINE HYDROCHLORIDE 20 MG: 10 TABLET ORAL at 08:15

## 2020-08-22 RX ADMIN — MELATONIN 3 MG ORAL TABLET 6 MG: 3 TABLET ORAL at 20:56

## 2020-08-22 RX ADMIN — BISACODYL 10 MG: 5 TABLET, COATED ORAL at 16:57

## 2020-08-22 RX ADMIN — PREGABALIN 300 MG: 150 CAPSULE ORAL at 08:15

## 2020-08-22 RX ADMIN — HYDROCORTISONE SODIUM SUCCINATE 50 MG: 100 INJECTION, POWDER, FOR SOLUTION INTRAMUSCULAR; INTRAVENOUS at 13:21

## 2020-08-22 RX ADMIN — HYDROCORTISONE SODIUM SUCCINATE 50 MG: 100 INJECTION, POWDER, FOR SOLUTION INTRAMUSCULAR; INTRAVENOUS at 04:39

## 2020-08-22 RX ADMIN — NAPROXEN 250 MG: 250 TABLET ORAL at 16:57

## 2020-08-22 RX ADMIN — COLLAGENASE SANTYL: 250 OINTMENT TOPICAL at 08:17

## 2020-08-22 ASSESSMENT — PAIN DESCRIPTION - PAIN TYPE: TYPE: CHRONIC PAIN

## 2020-08-22 ASSESSMENT — PAIN DESCRIPTION - DESCRIPTORS: DESCRIPTORS: ACHING;DISCOMFORT;SORE

## 2020-08-22 ASSESSMENT — PAIN DESCRIPTION - LOCATION: LOCATION: BUTTOCKS

## 2020-08-22 ASSESSMENT — PAIN SCALES - GENERAL
PAINLEVEL_OUTOF10: 0
PAINLEVEL_OUTOF10: 0
PAINLEVEL_OUTOF10: 3
PAINLEVEL_OUTOF10: 8

## 2020-08-22 NOTE — PLAN OF CARE
Problem: Pain:  Description: Pain management should include both nonpharmacologic and pharmacologic interventions.   Goal: Pain level will decrease  Description: Pain level will decrease  Outcome: Met This Shift  Goal: Control of acute pain  Description: Control of acute pain  Outcome: Met This Shift  Goal: Control of chronic pain  Description: Control of chronic pain  Outcome: Met This Shift     Problem: Skin Integrity:  Goal: Will show no infection signs and symptoms  Description: Will show no infection signs and symptoms  Outcome: Met This Shift  Goal: Absence of new skin breakdown  Description: Absence of new skin breakdown  Outcome: Met This Shift     Problem: Falls - Risk of:  Goal: Will remain free from falls  Description: Will remain free from falls  Outcome: Met This Shift  Goal: Absence of physical injury  Description: Absence of physical injury  Outcome: Met This Shift

## 2020-08-22 NOTE — PLAN OF CARE
Problem: Pain:  Goal: Pain level will decrease  Description: Pain level will decrease  Outcome: Met This Shift  Goal: Control of acute pain  Description: Control of acute pain  Outcome: Met This Shift  Goal: Control of chronic pain  Description: Control of chronic pain  Outcome: Met This Shift     Problem: Skin Integrity:  Goal: Will show no infection signs and symptoms  Description: Will show no infection signs and symptoms  Outcome: Met This Shift  Goal: Absence of new skin breakdown  Description: Absence of new skin breakdown  Outcome: Met This Shift     Problem: Falls - Risk of:  Goal: Will remain free from falls  Description: Will remain free from falls  Outcome: Met This Shift  Goal: Absence of physical injury  Description: Absence of physical injury  Outcome: Met This Shift     Problem: Increased nutrient needs (NI-5.1)  Goal: Food and/or Nutrient Delivery  Description: Individualized approach for food/nutrient provision.   8/21/2020 1310 by Colonel Puckett, MS, RD, LD  Outcome: Met This Shift
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
## 2020-08-22 NOTE — PROGRESS NOTES
[facility] [address] [facility]  Internal Medicine Residency / [address]    Attending Physician Statement  I have discussed the case, including pertinent history and exam findings with the resident and the team.  I have seen and examined the patient and the key elements of the encounter have been performed by me. I agree with the assessment, plan and orders as documented by the resident. C/O Fecal stasis for several days  Requesting oral Dulcolax today as he uses it at home  Does not have a sliding board here for transfers  UTI clearing and will switch to oral meds  Remove Sanabria before Discharge and he will continue to do   intermittent caths at home q 12 hours    Remainder of medical problems as per resident note.       Ryanne Woods  Internal Medicine Residency Faculty
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
## 2020-08-23 VITALS
RESPIRATION RATE: 18 BRPM | TEMPERATURE: 97.8 F | WEIGHT: 178 LBS | DIASTOLIC BLOOD PRESSURE: 56 MMHG | HEIGHT: 73 IN | OXYGEN SATURATION: 100 % | HEART RATE: 66 BPM | BODY MASS INDEX: 23.59 KG/M2 | SYSTOLIC BLOOD PRESSURE: 100 MMHG

## 2020-08-23 LAB
ANION GAP SERPL CALCULATED.3IONS-SCNC: 10 MMOL/L (ref 7–16)
BUN BLDV-MCNC: 11 MG/DL (ref 6–20)
CALCIUM SERPL-MCNC: 8.9 MG/DL (ref 8.6–10.2)
CHLORIDE BLD-SCNC: 110 MMOL/L (ref 98–107)
CO2: 25 MMOL/L (ref 22–29)
CREAT SERPL-MCNC: 0.6 MG/DL (ref 0.7–1.2)
GFR AFRICAN AMERICAN: >60
GFR NON-AFRICAN AMERICAN: >60 ML/MIN/1.73
GLUCOSE BLD-MCNC: 83 MG/DL (ref 74–99)
INR BLD: 2.7
METER GLUCOSE: 108 MG/DL (ref 74–99)
POTASSIUM SERPL-SCNC: 3.5 MMOL/L (ref 3.5–5)
PROTHROMBIN TIME: 30.5 SEC (ref 9.3–12.4)
SODIUM BLD-SCNC: 145 MMOL/L (ref 132–146)

## 2020-08-23 PROCEDURE — 6370000000 HC RX 637 (ALT 250 FOR IP): Performed by: INTERNAL MEDICINE

## 2020-08-23 PROCEDURE — 85610 PROTHROMBIN TIME: CPT

## 2020-08-23 PROCEDURE — 82962 GLUCOSE BLOOD TEST: CPT

## 2020-08-23 PROCEDURE — 99231 SBSQ HOSP IP/OBS SF/LOW 25: CPT | Performed by: INTERNAL MEDICINE

## 2020-08-23 PROCEDURE — 36415 COLL VENOUS BLD VENIPUNCTURE: CPT

## 2020-08-23 PROCEDURE — 80048 BASIC METABOLIC PNL TOTAL CA: CPT

## 2020-08-23 RX ORDER — HYDROCORTISONE 10 MG/1
10 TABLET ORAL EVERY MORNING
Qty: 30 TABLET | Refills: 1 | Status: SHIPPED
Start: 2020-08-23 | End: 2020-10-01 | Stop reason: SDUPTHER

## 2020-08-23 RX ORDER — WARFARIN SODIUM 1 MG/1
1 TABLET ORAL DAILY
Qty: 30 TABLET | Refills: 3 | Status: SHIPPED | OUTPATIENT
Start: 2020-08-23 | End: 2020-09-14 | Stop reason: ALTCHOICE

## 2020-08-23 RX ORDER — WARFARIN SODIUM 1 MG/1
1 TABLET ORAL
Status: DISCONTINUED | OUTPATIENT
Start: 2020-08-23 | End: 2020-08-23 | Stop reason: HOSPADM

## 2020-08-23 RX ORDER — DOCUSATE SODIUM 100 MG/1
100 CAPSULE, LIQUID FILLED ORAL DAILY PRN
Qty: 30 CAPSULE | Refills: 0 | Status: SHIPPED
Start: 2020-08-23 | End: 2021-03-29

## 2020-08-23 RX ORDER — HYDROCORTISONE 5 MG/1
5 TABLET ORAL EVERY EVENING
Qty: 30 TABLET | Refills: 1 | Status: SHIPPED
Start: 2020-08-23 | End: 2020-10-01 | Stop reason: SDUPTHER

## 2020-08-23 RX ORDER — PETROLATUM 42 G/100G
OINTMENT TOPICAL
Qty: 1 TUBE | Refills: 1 | Status: SHIPPED
Start: 2020-08-23 | End: 2020-10-01 | Stop reason: SDUPTHER

## 2020-08-23 RX ADMIN — NAPROXEN 250 MG: 250 TABLET ORAL at 08:42

## 2020-08-23 RX ADMIN — CEFDINIR 300 MG: 300 CAPSULE ORAL at 08:42

## 2020-08-23 RX ADMIN — BACLOFEN 10 MG: 10 TABLET ORAL at 08:42

## 2020-08-23 RX ADMIN — PREGABALIN 300 MG: 150 CAPSULE ORAL at 08:42

## 2020-08-23 RX ADMIN — COLLAGENASE SANTYL: 250 OINTMENT TOPICAL at 08:43

## 2020-08-23 RX ADMIN — HYDROCORTISONE 10 MG: 5 TABLET ORAL at 08:42

## 2020-08-23 RX ADMIN — FLUOXETINE HYDROCHLORIDE 20 MG: 10 TABLET ORAL at 08:41

## 2020-08-23 RX ADMIN — FAMOTIDINE 20 MG: 20 TABLET, FILM COATED ORAL at 08:42

## 2020-08-23 ASSESSMENT — PAIN SCALES - GENERAL
PAINLEVEL_OUTOF10: 0
PAINLEVEL_OUTOF10: 0
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
## 2020-08-23 NOTE — PLAN OF CARE
Problem: Pain:  Goal: Pain level will decrease  Description: Pain level will decrease  8/23/2020 0100 by Jose Rubin RN  Outcome: Met This Shift  8/22/2020 1852 by Irwin Pedro RN  Outcome: Met This Shift  Goal: Control of acute pain  Description: Control of acute pain  8/23/2020 0100 by Jose Rubin RN  Outcome: Met This Shift  8/22/2020 1852 by Irwin Pedro RN  Outcome: Met This Shift  Goal: Control of chronic pain  Description: Control of chronic pain  8/23/2020 0100 by Jose Rubin RN  Outcome: Met This Shift  8/22/2020 1852 by Irwin Pedro RN  Outcome: Met This Shift     Problem: Skin Integrity:  Goal: Will show no infection signs and symptoms  Description: Will show no infection signs and symptoms  8/23/2020 0100 by Jose Rubin RN  Outcome: Met This Shift  8/22/2020 1852 by Irwin Pedro RN  Outcome: Met This Shift     Problem: Skin Integrity:  Goal: Absence of new skin breakdown  Description: Absence of new skin breakdown  8/23/2020 0100 by Jose Rubin RN  Outcome: Met This Shift  8/22/2020 1852 by Irwin Perdo RN  Outcome: Met This Shift     Problem: Falls - Risk of:  Goal: Will remain free from falls  Description: Will remain free from falls  8/23/2020 0100 by Jose Rubin RN  Outcome: Met This Shift  8/22/2020 1852 by Irwin Pedro RN  Outcome: Met This Shift  Goal: Absence of physical injury  Description: Absence of physical injury  8/23/2020 0100 by Jose Rubin RN  Outcome: Met This Shift  8/22/2020 1852 by Irwin Pedro RN  Outcome: Met This Shift

## 2020-08-23 NOTE — PROGRESS NOTES
[facility] [address] [facility]  Internal Medicine Residency / [address]    Attending Physician Statement  I have discussed the case, including pertinent history and exam findings with the resident and the team.  I have seen and examined the patient and the key elements of the encounter have been performed by me. I agree with the assessment, plan and orders as documented by the resident. A&O  VS stable  At baseline today  INR 2.7  Remove Sanabria and resume intermittent caths  Plan; Discharge planning            Review meds  Remainder of medical problems as per resident note.       Jalen Levine  Internal Medicine Residency Faculty
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
## 2020-08-24 LAB
BLOOD CULTURE, ROUTINE: NORMAL
CULTURE, BLOOD 2: NORMAL

## 2020-08-25 ENCOUNTER — TELEPHONE (OUTPATIENT)
Dept: INTERNAL MEDICINE | Age: 25
End: 2020-08-25

## 2020-08-25 NOTE — TELEPHONE ENCOUNTER
Mother dropped off [place] paperwork to [employer] desk; LSW spoke with mother after contact with Dr. Duyen Cota that paperwork will completed at [facility] on 8-31-20; mother states she thought appt was the 35th which LSW clarified it is Monday, 8-31

## 2020-08-29 ENCOUNTER — TELEPHONE (OUTPATIENT)
Dept: INTERNAL MEDICINE | Age: 25
End: 2020-08-29

## 2020-08-29 NOTE — TELEPHONE ENCOUNTER
[facility] Internal Medicine Residency Clinic    Pre-visit planning call to discuss patient care during COVID-19 pandemic. A. Left message about scheduled Face to Face appointment about virtual visits through [employer] or [employer]. me link to facilitate patient care continuity. B. Result of call: left message    C. COVID-19 screening: Left message instructed to call office if viral symptoms develop prior to Face to Face appointment. Advised patient if coming for Face to Face office visit to enter by the Internal Medicine entrance on [facility]. for screening of viral symptoms, temperature check and masking.     Matthias Toure MD  8/29/20

## 2020-08-31 ENCOUNTER — HOSPITAL ENCOUNTER (OUTPATIENT)
Age: 25
Discharge: HOME | End: 2020-09-02
Payer: MEDICAID

## 2020-08-31 ENCOUNTER — TELEPHONE (OUTPATIENT)
Dept: INTERNAL MEDICINE | Age: 25
End: 2020-08-31

## 2020-08-31 ENCOUNTER — HOSPITAL ENCOUNTER (OUTPATIENT)
Age: 25
Discharge: HOME | End: 2020-08-31
Payer: MEDICAID

## 2020-08-31 ENCOUNTER — SOCIAL WORK (OUTPATIENT)
Dept: INTERNAL MEDICINE | Age: 25
End: 2020-08-31

## 2020-08-31 ENCOUNTER — HOSPITAL ENCOUNTER (OUTPATIENT)
Dept: GENERAL RADIOLOGY | Age: 25
Discharge: HOME | End: 2020-09-02
Payer: MEDICAID

## 2020-08-31 ENCOUNTER — OFFICE VISIT (OUTPATIENT)
Dept: INTERNAL MEDICINE | Age: 25
End: 2020-08-31
Payer: MEDICAID

## 2020-08-31 VITALS
HEIGHT: 73 IN | HEART RATE: 140 BPM | BODY MASS INDEX: 23.59 KG/M2 | DIASTOLIC BLOOD PRESSURE: 60 MMHG | TEMPERATURE: 96.9 F | WEIGHT: 178 LBS | RESPIRATION RATE: 16 BRPM | SYSTOLIC BLOOD PRESSURE: 96 MMHG

## 2020-08-31 LAB
ANION GAP SERPL CALCULATED.3IONS-SCNC: 18 MMOL/L (ref 7–16)
BASOPHILS ABSOLUTE: 0.05 E9/L (ref 0–0.2)
BASOPHILS RELATIVE PERCENT: 0.4 % (ref 0–2)
BUN BLDV-MCNC: 8 MG/DL (ref 6–20)
CALCIUM SERPL-MCNC: 10.1 MG/DL (ref 8.6–10.2)
CHLORIDE BLD-SCNC: 101 MMOL/L (ref 98–107)
CO2: 23 MMOL/L (ref 22–29)
CREAT SERPL-MCNC: 0.8 MG/DL (ref 0.7–1.2)
EOSINOPHILS ABSOLUTE: 0.07 E9/L (ref 0.05–0.5)
EOSINOPHILS RELATIVE PERCENT: 0.6 % (ref 0–6)
GFR AFRICAN AMERICAN: >60
GFR NON-AFRICAN AMERICAN: >60 ML/MIN/1.73
GLUCOSE BLD-MCNC: 90 MG/DL (ref 74–99)
HCT VFR BLD CALC: 43.7 % (ref 37–54)
HEMOGLOBIN: 13.7 G/DL (ref 12.5–16.5)
IMMATURE GRANULOCYTES #: 0.03 E9/L
IMMATURE GRANULOCYTES %: 0.2 % (ref 0–5)
LYMPHOCYTES ABSOLUTE: 2.85 E9/L (ref 1.5–4)
LYMPHOCYTES RELATIVE PERCENT: 23.7 % (ref 20–42)
MCH RBC QN AUTO: 25.6 PG (ref 26–35)
MCHC RBC AUTO-ENTMCNC: 31.4 % (ref 32–34.5)
MCV RBC AUTO: 81.7 FL (ref 80–99.9)
MONOCYTES ABSOLUTE: 0.98 E9/L (ref 0.1–0.95)
MONOCYTES RELATIVE PERCENT: 8.1 % (ref 2–12)
NEUTROPHILS ABSOLUTE: 8.05 E9/L (ref 1.8–7.3)
NEUTROPHILS RELATIVE PERCENT: 67 % (ref 43–80)
PDW BLD-RTO: 18.3 FL (ref 11.5–15)
PLATELET # BLD: 547 E9/L (ref 130–450)
PMV BLD AUTO: 9.7 FL (ref 7–12)
POTASSIUM SERPL-SCNC: 4 MMOL/L (ref 3.5–5)
RBC # BLD: 5.35 E12/L (ref 3.8–5.8)
SODIUM BLD-SCNC: 142 MMOL/L (ref 132–146)
TSH SERPL DL<=0.05 MIU/L-ACNC: 2.39 UIU/ML (ref 0.27–4.2)
WBC # BLD: 12 E9/L (ref 4.5–11.5)

## 2020-08-31 PROCEDURE — 84443 ASSAY THYROID STIM HORMONE: CPT

## 2020-08-31 PROCEDURE — 1111F DSCHRG MED/CURRENT MED MERGE: CPT | Performed by: INTERNAL MEDICINE

## 2020-08-31 PROCEDURE — 1036F TOBACCO NON-USER: CPT | Performed by: INTERNAL MEDICINE

## 2020-08-31 PROCEDURE — G8420 CALC BMI NORM PARAMETERS: HCPCS | Performed by: INTERNAL MEDICINE

## 2020-08-31 PROCEDURE — 80048 BASIC METABOLIC PNL TOTAL CA: CPT

## 2020-08-31 PROCEDURE — 36415 COLL VENOUS BLD VENIPUNCTURE: CPT

## 2020-08-31 PROCEDURE — G8427 DOCREV CUR MEDS BY ELIG CLIN: HCPCS | Performed by: INTERNAL MEDICINE

## 2020-08-31 PROCEDURE — 99214 OFFICE O/P EST MOD 30 MIN: CPT | Performed by: INTERNAL MEDICINE

## 2020-08-31 PROCEDURE — 99203 OFFICE O/P NEW LOW 30 MIN: CPT | Performed by: INTERNAL MEDICINE

## 2020-08-31 PROCEDURE — 71046 X-RAY EXAM CHEST 2 VIEWS: CPT

## 2020-08-31 PROCEDURE — 93010 ELECTROCARDIOGRAM REPORT: CPT | Performed by: INTERNAL MEDICINE

## 2020-08-31 PROCEDURE — 85025 COMPLETE CBC W/AUTO DIFF WBC: CPT

## 2020-08-31 PROCEDURE — A4335 INCONTINENCE SUPPLY: HCPCS | Performed by: INTERNAL MEDICINE

## 2020-08-31 PROCEDURE — 93005 ELECTROCARDIOGRAM TRACING: CPT | Performed by: INTERNAL MEDICINE

## 2020-08-31 RX ORDER — COSYNTROPIN 0.25 MG/ML
0.25 INJECTION, POWDER, FOR SOLUTION INTRAMUSCULAR; INTRAVENOUS ONCE
Qty: 250 MCG | Refills: 0 | Status: CANCELLED | OUTPATIENT
Start: 2020-08-31 | End: 2020-08-31
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
## 2020-08-31 NOTE — PROGRESS NOTES
Initiated contact with pt who was acompanied to visit by his mother and father. Dr. Nirmal Doe completed requested [place] paperwork which Dr. Stewart Blue to mother; copy scanned in chart. Mother provided prescription for air mattress and ensue supplement which were ordered by [place] wound care doctor (Dr. Lizeth Bhatt). Dr. Nirmal Doe provided orders for chuxs and tab incontinent garments; order called to [facility] (aready in system) and should be shipped out in 3 days. Message left at [place] wound care of need to order ensure and air mattress (verified with [employer] that they do not carry either. MA verified with [facility] of receipt of orders. Mother reports no contact from [facility] for home services ; LSW found pt's casemanager is Hellen Chauhan ([phone]); [facility], coordinator, sending email requesting contact ASAP as pt is approved for services. Mother tearful re: pt's anger and disengagement of care; discussed consideration of behavioral health specialist with Dr. Nirmal Doe.  Pt advised to go ED for further tachycardia work up per Mina Do and [employer]

## 2020-08-31 NOTE — TELEPHONE ENCOUNTER
Received call from [facility]. They received a misc. Order for incontinence supplies. Told them to disregard. Please clarify order and send to DME.

## 2020-08-31 NOTE — PATIENT INSTRUCTIONS
Please obtain lab work and chest xray  If you feel light headed, fever, chills, confused,  somnolent  or cough, please go to the Emergency department

## 2020-09-01 PROBLEM — S27.322A CONTUSION OF BOTH LUNGS: Status: RESOLVED | Noted: 2020-04-22 | Resolved: 2020-09-01

## 2020-09-01 PROBLEM — S22.43XA CLOSED FRACTURE OF MULTIPLE RIBS OF BOTH SIDES: Status: RESOLVED | Noted: 2020-05-04 | Resolved: 2020-09-01

## 2020-09-01 PROBLEM — J94.2 HEMOTHORAX: Status: RESOLVED | Noted: 2020-04-22 | Resolved: 2020-09-01

## 2020-09-01 PROBLEM — A41.9 SEPSIS (HCC): Status: RESOLVED | Noted: 2020-08-19 | Resolved: 2020-09-01

## 2020-09-01 PROBLEM — J96.01 ACUTE RESPIRATORY FAILURE WITH HYPOXEMIA (HCC): Status: RESOLVED | Noted: 2020-04-22 | Resolved: 2020-09-01

## 2020-09-03 ENCOUNTER — TELEPHONE (OUTPATIENT)
Dept: INTERNAL MEDICINE | Age: 25
End: 2020-09-03

## 2020-09-03 NOTE — TELEPHONE ENCOUNTER
LSW initiated contact with Silvia garces, Artemio Sandoval. She reports she has made contact with pt to offer services for which he is presently declining, including home health aide services. Advised her that LSW has prescriptions for Ensure and air mattress which were written by wound doctor not within [facility] system. Dez offered to facilitate acquisition of these items and prescriptions were faxed and confirmed. Advised dez that LSW coordinated incontinent supplies thru [address]

## 2020-09-08 ENCOUNTER — TELEPHONE (OUTPATIENT)
Dept: INTERNAL MEDICINE | Age: 25
End: 2020-09-08

## 2020-09-09 NOTE — TELEPHONE ENCOUNTER
Referral placed to Mark Patterson to evaluate patient. She will outreach to patient to see if further intervention is necessary.      Will Sr MD  9/9/2020

## 2020-09-10 ENCOUNTER — TELEPHONE (OUTPATIENT)
Dept: INTERNAL MEDICINE | Age: 25
End: 2020-09-10
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
## 2020-09-14 ENCOUNTER — OFFICE VISIT (OUTPATIENT)
Dept: INTERNAL MEDICINE | Age: 25
End: 2020-09-14
Payer: MEDICAID

## 2020-09-14 VITALS
RESPIRATION RATE: 16 BRPM | WEIGHT: 178 LBS | HEART RATE: 118 BPM | TEMPERATURE: 97.2 F | HEIGHT: 73 IN | SYSTOLIC BLOOD PRESSURE: 114 MMHG | BODY MASS INDEX: 23.59 KG/M2 | DIASTOLIC BLOOD PRESSURE: 77 MMHG

## 2020-09-14 PROCEDURE — 99213 OFFICE O/P EST LOW 20 MIN: CPT | Performed by: INTERNAL MEDICINE

## 2020-09-14 RX ORDER — BACLOFEN 10 MG/1
10 TABLET ORAL DAILY
Qty: 90 TABLET | Refills: 0 | Status: SHIPPED
Start: 2020-09-14 | End: 2021-03-29

## 2020-09-14 RX ORDER — CIMETIDINE 300 MG/1
300 TABLET, FILM COATED ORAL 2 TIMES DAILY
COMMUNITY
End: 2021-03-29

## 2020-09-14 RX ORDER — PREGABALIN 300 MG/1
300 CAPSULE ORAL 2 TIMES DAILY
Qty: 180 CAPSULE | Refills: 1 | Status: SHIPPED | OUTPATIENT
Start: 2020-09-14 | End: 2021-03-29
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
## 2020-09-28 ENCOUNTER — HOSPITAL ENCOUNTER (OUTPATIENT)
Age: 25
Discharge: HOME | End: 2020-09-28
Payer: MEDICAID

## 2020-09-28 LAB
CORTISOL TOTAL: 7.01 MCG/DL (ref 2.68–18.4)
INR BLD: 1.2
PROTHROMBIN TIME: 13.6 SEC (ref 9.3–12.4)

## 2020-09-28 PROCEDURE — 82533 TOTAL CORTISOL: CPT

## 2020-09-28 PROCEDURE — 82024 ASSAY OF ACTH: CPT

## 2020-09-28 PROCEDURE — 36415 COLL VENOUS BLD VENIPUNCTURE: CPT

## 2020-09-28 PROCEDURE — 85610 PROTHROMBIN TIME: CPT

## 2020-09-30 LAB — ADRENOCORTICOTROPIC HORMONE: 5.7 PG/ML (ref 7.2–63.3)

## 2020-10-01 ENCOUNTER — OFFICE VISIT (OUTPATIENT)
Dept: INTERNAL MEDICINE | Age: 25
End: 2020-10-01
Payer: MEDICAID

## 2020-10-01 VITALS
RESPIRATION RATE: 16 BRPM | TEMPERATURE: 97.8 F | HEIGHT: 73 IN | DIASTOLIC BLOOD PRESSURE: 72 MMHG | SYSTOLIC BLOOD PRESSURE: 118 MMHG | BODY MASS INDEX: 23.86 KG/M2 | WEIGHT: 180 LBS | HEART RATE: 86 BPM

## 2020-10-01 PROBLEM — L85.3 DRY SKIN: Status: ACTIVE | Noted: 2020-10-01

## 2020-10-01 PROBLEM — R32 URINARY INCONTINENCE: Status: ACTIVE | Noted: 2020-10-01

## 2020-10-01 PROBLEM — R07.89 MUSCULOSKELETAL CHEST PAIN: Status: ACTIVE | Noted: 2020-10-01

## 2020-10-01 PROBLEM — E27.40 ADRENAL INSUFFICIENCY (HCC): Status: ACTIVE | Noted: 2020-10-01

## 2020-10-01 PROCEDURE — 99203 OFFICE O/P NEW LOW 30 MIN: CPT | Performed by: INTERNAL MEDICINE

## 2020-10-01 PROCEDURE — G8484 FLU IMMUNIZE NO ADMIN: HCPCS | Performed by: INTERNAL MEDICINE

## 2020-10-01 PROCEDURE — 1036F TOBACCO NON-USER: CPT | Performed by: INTERNAL MEDICINE

## 2020-10-01 PROCEDURE — G8427 DOCREV CUR MEDS BY ELIG CLIN: HCPCS | Performed by: INTERNAL MEDICINE

## 2020-10-01 PROCEDURE — 99213 OFFICE O/P EST LOW 20 MIN: CPT | Performed by: INTERNAL MEDICINE

## 2020-10-01 PROCEDURE — G8420 CALC BMI NORM PARAMETERS: HCPCS | Performed by: INTERNAL MEDICINE

## 2020-10-01 RX ORDER — DOCUSATE SODIUM 100 MG/1
100 CAPSULE, LIQUID FILLED ORAL DAILY PRN
Qty: 30 CAPSULE | Refills: 0 | Status: CANCELLED | OUTPATIENT
Start: 2020-10-01

## 2020-10-01 RX ORDER — MELOXICAM 7.5 MG/1
7.5 TABLET ORAL DAILY
Qty: 90 TABLET | Refills: 0 | Status: SHIPPED
Start: 2020-10-01 | End: 2021-03-29

## 2020-10-01 RX ORDER — DOCUSATE SODIUM AND SENNOSIDES 50; 8.6 MG/1; MG/1
1 TABLET ORAL 2 TIMES DAILY
COMMUNITY
Start: 2020-08-19 | End: 2020-10-01

## 2020-10-01 RX ORDER — HYDROCORTISONE 10 MG/1
10 TABLET ORAL EVERY MORNING
Qty: 90 TABLET | Refills: 0 | Status: SHIPPED
Start: 2020-10-01 | End: 2021-03-29

## 2020-10-01 RX ORDER — PETROLATUM 42 G/100G
OINTMENT TOPICAL
Qty: 1 TUBE | Refills: 1 | Status: SHIPPED | OUTPATIENT
Start: 2020-10-01

## 2020-10-01 RX ORDER — HYDROCORTISONE 5 MG/1
5 TABLET ORAL EVERY EVENING
Qty: 90 TABLET | Refills: 0 | Status: SHIPPED
Start: 2020-10-01 | End: 2021-03-29

## 2020-10-01 ASSESSMENT — ENCOUNTER SYMPTOMS
EYES NEGATIVE: 1
SHORTNESS OF BREATH: 0
WHEEZING: 0
GASTROINTESTINAL NEGATIVE: 1
APNEA: 0
COUGH: 0
BACK PAIN: 1
CHEST TIGHTNESS: 1

## 2020-10-01 NOTE — PROGRESS NOTES
Discharge instructions reviewed with patient per Mare Ser. Follow up appt scheduled and AVS given to patient.      Letter given for parking placard
cimetidine (TAGAMET) 300 MG tablet Take 300 mg by mouth 2 times daily      pregabalin (LYRICA) 300 MG capsule Take 1 capsule by mouth 2 times daily. 180 capsule 1    baclofen (LIORESAL) 10 MG tablet Take 1 tablet by mouth daily 90 tablet 0    docusate sodium (COLACE) 100 MG capsule Take 1 capsule by mouth daily as needed for Constipation 30 capsule 0    potassium chloride (KLOR-CON M) 10 MEQ extended release tablet Take 10 mEq by mouth daily      sennosides-docusate sodium (SENOKOT-S) 8.6-50 MG tablet Take 1 tablet by mouth 2 times daily 90 tablet 1    famotidine (PEPCID) 20 MG tablet Take 1 tablet by mouth 2 times daily 60 tablet 3    mupirocin (BACTROBAN) 2 % ointment Apply topically 3 times daily. 30 g 0    FLUoxetine (PROZAC) 20 MG tablet Take 1 tablet by mouth daily 90 tablet 1    lidocaine (XYLOCAINE) 5 % ointment Apply topically as needed. Do NOT apply to areas with open wounds's, cuts or scraps 2 Tube 1    sodium chloride, Inhalant, 3 % nebulizer solution Take 4 mLs by nebulization every 4 hours (while awake)         I have reviewed all pertinent PMHx, PSHx, FamHx, SocialHx, medications, and allergiesand updated history as appropriate. OBJECTIVE:    VS: /72   Pulse 86   Temp 97.8 °F (36.6 °C) (Temporal)   Resp 16   Ht 6' 1\" (1.854 m)   Wt 180 lb (81.6 kg)   BMI 23.75 kg/m²     Physical Exam  Vitals signs and nursing note reviewed. Constitutional:       Comments: Wheelchair bound   HENT:      Head: Normocephalic and atraumatic. Right Ear: External ear normal.      Left Ear: External ear normal.      Nose: Nose normal.      Mouth/Throat:      Pharynx: Oropharynx is clear. Eyes:      General: No scleral icterus. Right eye: No discharge. Left eye: No discharge. Conjunctiva/sclera: Conjunctivae normal.   Cardiovascular:      Rate and Rhythm: Normal rate and regular rhythm. Pulses: Normal pulses. Heart sounds: Normal heart sounds. No murmur.  No

## 2020-10-01 NOTE — PATIENT INSTRUCTIONS
1. Please continue taking your meds. 2. We will refer you to urology for assessment of your incontinence. 3. Please take the meloxicam for your chest and back pain. 4. Please follow up with your PCP.

## 2020-10-05 ENCOUNTER — TELEPHONE (OUTPATIENT)
Dept: INTERNAL MEDICINE | Age: 25
End: 2020-10-05

## 2020-10-05 NOTE — TELEPHONE ENCOUNTER
LSW made aware that pt's father was inquiring of status of air mattress and nurtritional supplements. LSW initiated call to pt's Constantinovladimir Carmine as those orders were faxed and confirmed on 9-3-20; casemanager states she did not receive. LSW refaxed the order and fax confirmation of 9-3-20 today which was confirmed.   Message left for pt to call LSW

## 2020-10-16 ENCOUNTER — TELEPHONE (OUTPATIENT)
Dept: INTERNAL MEDICINE | Age: 25
End: 2020-10-16

## 2020-10-16 NOTE — TELEPHONE ENCOUNTER
Left message to reschedule missed appt 10-16-20. Left message to contact Internal Medicine Clinic or [facility] to reschedule w/ phone numbers.

## 2020-11-09 ENCOUNTER — APPOINTMENT (OUTPATIENT)
Dept: GENERAL RADIOLOGY | Age: 25
End: 2020-11-09
Payer: COMMERCIAL

## 2020-11-09 ENCOUNTER — APPOINTMENT (OUTPATIENT)
Dept: CT IMAGING | Age: 25
End: 2020-11-09
Payer: COMMERCIAL

## 2020-11-09 ENCOUNTER — HOSPITAL ENCOUNTER (OUTPATIENT)
Age: 25
Setting detail: OBSERVATION
Discharge: HOME | End: 2020-11-10
Attending: EMERGENCY MEDICINE | Admitting: INTERNAL MEDICINE
Payer: COMMERCIAL

## 2020-11-09 ENCOUNTER — APPOINTMENT (OUTPATIENT)
Dept: ULTRASOUND IMAGING | Age: 25
End: 2020-11-09
Payer: COMMERCIAL

## 2020-11-09 PROBLEM — I82.412 ACUTE DEEP VEIN THROMBOSIS (DVT) OF LEFT FEMORAL VEIN (HCC): Status: ACTIVE | Noted: 2020-11-09

## 2020-11-09 LAB
ALBUMIN SERPL-MCNC: 3.2 G/DL (ref 3.5–5.2)
ALP BLD-CCNC: 96 U/L (ref 40–129)
ALT SERPL-CCNC: 17 U/L (ref 0–40)
ANION GAP SERPL CALCULATED.3IONS-SCNC: 8 MMOL/L (ref 7–16)
AST SERPL-CCNC: 23 U/L (ref 0–39)
BILIRUB SERPL-MCNC: <0.2 MG/DL (ref 0–1.2)
BUN BLDV-MCNC: 9 MG/DL (ref 6–20)
CALCIUM SERPL-MCNC: 10 MG/DL (ref 8.6–10.2)
CHLORIDE BLD-SCNC: 104 MMOL/L (ref 98–107)
CO2: 28 MMOL/L (ref 22–29)
CREAT SERPL-MCNC: 0.8 MG/DL (ref 0.7–1.2)
D DIMER: 2513 NG/ML DDU
GFR AFRICAN AMERICAN: >60
GFR NON-AFRICAN AMERICAN: >60 ML/MIN/1.73
GLUCOSE BLD-MCNC: 86 MG/DL (ref 74–99)
HCT VFR BLD CALC: 39.2 % (ref 37–54)
HEMOGLOBIN: 12 G/DL (ref 12.5–16.5)
MCH RBC QN AUTO: 24.8 PG (ref 26–35)
MCHC RBC AUTO-ENTMCNC: 30.6 % (ref 32–34.5)
MCV RBC AUTO: 81 FL (ref 80–99.9)
PDW BLD-RTO: 18 FL (ref 11.5–15)
PLATELET # BLD: 500 E9/L (ref 130–450)
PMV BLD AUTO: 9.6 FL (ref 7–12)
POTASSIUM SERPL-SCNC: 4.2 MMOL/L (ref 3.5–5)
RBC # BLD: 4.84 E12/L (ref 3.8–5.8)
SODIUM BLD-SCNC: 140 MMOL/L (ref 132–146)
TOTAL PROTEIN: 8.4 G/DL (ref 6.4–8.3)
TROPONIN: 0.02 NG/ML (ref 0–0.03)
WBC # BLD: 12.5 E9/L (ref 4.5–11.5)

## 2020-11-09 PROCEDURE — G0378 HOSPITAL OBSERVATION PER HR: HCPCS

## 2020-11-09 PROCEDURE — 93971 EXTREMITY STUDY: CPT

## 2020-11-09 PROCEDURE — 80053 COMPREHEN METABOLIC PANEL: CPT

## 2020-11-09 PROCEDURE — 6360000004 HC RX CONTRAST MEDICATION: Performed by: STUDENT IN AN ORGANIZED HEALTH CARE EDUCATION/TRAINING PROGRAM

## 2020-11-09 PROCEDURE — 85378 FIBRIN DEGRADE SEMIQUANT: CPT

## 2020-11-09 PROCEDURE — 93971 EXTREMITY STUDY: CPT | Performed by: RADIOLOGY

## 2020-11-09 PROCEDURE — 2580000003 HC RX 258: Performed by: EMERGENCY MEDICINE

## 2020-11-09 PROCEDURE — 71046 X-RAY EXAM CHEST 2 VIEWS: CPT

## 2020-11-09 PROCEDURE — 2580000003 HC RX 258: Performed by: FAMILY MEDICINE

## 2020-11-09 PROCEDURE — 2580000003 HC RX 258: Performed by: INTERNAL MEDICINE

## 2020-11-09 PROCEDURE — 6360000002 HC RX W HCPCS: Performed by: NURSE PRACTITIONER

## 2020-11-09 PROCEDURE — 93005 ELECTROCARDIOGRAM TRACING: CPT | Performed by: NURSE PRACTITIONER

## 2020-11-09 PROCEDURE — 2580000003 HC RX 258: Performed by: STUDENT IN AN ORGANIZED HEALTH CARE EDUCATION/TRAINING PROGRAM

## 2020-11-09 PROCEDURE — 96372 THER/PROPH/DIAG INJ SC/IM: CPT

## 2020-11-09 PROCEDURE — 99285 EMERGENCY DEPT VISIT HI MDM: CPT

## 2020-11-09 PROCEDURE — 85027 COMPLETE CBC AUTOMATED: CPT

## 2020-11-09 PROCEDURE — 6370000000 HC RX 637 (ALT 250 FOR IP): Performed by: FAMILY MEDICINE

## 2020-11-09 PROCEDURE — 71275 CT ANGIOGRAPHY CHEST: CPT

## 2020-11-09 PROCEDURE — 84484 ASSAY OF TROPONIN QUANT: CPT

## 2020-11-09 RX ORDER — SODIUM CHLORIDE 0.9 % (FLUSH) 0.9 %
10 SYRINGE (ML) INJECTION PRN
Status: DISCONTINUED | OUTPATIENT
Start: 2020-11-09 | End: 2020-11-09 | Stop reason: SDUPTHER

## 2020-11-09 RX ORDER — HYDROCORTISONE 5 MG/1
10 TABLET ORAL EVERY MORNING
Status: DISCONTINUED | OUTPATIENT
Start: 2020-11-10 | End: 2020-11-10 | Stop reason: HOSPADM

## 2020-11-09 RX ORDER — FLUOXETINE 10 MG/1
20 TABLET, FILM COATED ORAL DAILY
Status: DISCONTINUED | OUTPATIENT
Start: 2020-11-10 | End: 2020-11-10 | Stop reason: HOSPADM

## 2020-11-09 RX ORDER — PETROLATUM 42 G/100G
OINTMENT TOPICAL DAILY PRN
Status: DISCONTINUED | OUTPATIENT
Start: 2020-11-09 | End: 2020-11-10 | Stop reason: HOSPADM

## 2020-11-09 RX ORDER — MEGESTROL ACETATE 40 MG/1
40 TABLET ORAL DAILY
COMMUNITY
End: 2021-03-29

## 2020-11-09 RX ORDER — PROMETHAZINE HYDROCHLORIDE 25 MG/1
12.5 TABLET ORAL EVERY 6 HOURS PRN
Status: DISCONTINUED | OUTPATIENT
Start: 2020-11-09 | End: 2020-11-10 | Stop reason: HOSPADM

## 2020-11-09 RX ORDER — SODIUM CHLORIDE 0.9 % (FLUSH) 0.9 %
10 SYRINGE (ML) INJECTION PRN
Status: DISCONTINUED | OUTPATIENT
Start: 2020-11-09 | End: 2020-11-10 | Stop reason: HOSPADM

## 2020-11-09 RX ORDER — SENNA AND DOCUSATE SODIUM 50; 8.6 MG/1; MG/1
1 TABLET, FILM COATED ORAL 2 TIMES DAILY
Status: DISCONTINUED | OUTPATIENT
Start: 2020-11-09 | End: 2020-11-10 | Stop reason: HOSPADM

## 2020-11-09 RX ORDER — ONDANSETRON 2 MG/ML
4 INJECTION INTRAMUSCULAR; INTRAVENOUS EVERY 6 HOURS PRN
Status: DISCONTINUED | OUTPATIENT
Start: 2020-11-09 | End: 2020-11-10 | Stop reason: HOSPADM

## 2020-11-09 RX ORDER — LIDOCAINE 50 MG/G
OINTMENT TOPICAL PRN
Status: DISCONTINUED | OUTPATIENT
Start: 2020-11-09 | End: 2020-11-10 | Stop reason: HOSPADM

## 2020-11-09 RX ORDER — FAMOTIDINE 20 MG/1
20 TABLET, FILM COATED ORAL DAILY
Status: CANCELLED | OUTPATIENT
Start: 2020-11-09

## 2020-11-09 RX ORDER — DOCUSATE SODIUM 100 MG/1
100 CAPSULE, LIQUID FILLED ORAL DAILY PRN
Status: DISCONTINUED | OUTPATIENT
Start: 2020-11-09 | End: 2020-11-10 | Stop reason: HOSPADM

## 2020-11-09 RX ORDER — PREGABALIN 150 MG/1
300 CAPSULE ORAL 2 TIMES DAILY
Status: DISCONTINUED | OUTPATIENT
Start: 2020-11-09 | End: 2020-11-10 | Stop reason: HOSPADM

## 2020-11-09 RX ORDER — ACETAMINOPHEN 650 MG/1
650 SUPPOSITORY RECTAL EVERY 6 HOURS PRN
Status: DISCONTINUED | OUTPATIENT
Start: 2020-11-09 | End: 2020-11-10 | Stop reason: HOSPADM

## 2020-11-09 RX ORDER — POLYETHYLENE GLYCOL 3350 17 G/17G
17 POWDER, FOR SOLUTION ORAL DAILY PRN
Status: DISCONTINUED | OUTPATIENT
Start: 2020-11-09 | End: 2020-11-10 | Stop reason: HOSPADM

## 2020-11-09 RX ORDER — SODIUM CHLORIDE FOR INHALATION 3 %
4 VIAL, NEBULIZER (ML) INHALATION
Status: DISCONTINUED | OUTPATIENT
Start: 2020-11-10 | End: 2020-11-10 | Stop reason: HOSPADM

## 2020-11-09 RX ORDER — HYDROCORTISONE 5 MG/1
5 TABLET ORAL EVERY EVENING
Status: DISCONTINUED | OUTPATIENT
Start: 2020-11-09 | End: 2020-11-10 | Stop reason: HOSPADM

## 2020-11-09 RX ORDER — BACLOFEN 10 MG/1
10 TABLET ORAL DAILY
Status: DISCONTINUED | OUTPATIENT
Start: 2020-11-10 | End: 2020-11-10 | Stop reason: HOSPADM

## 2020-11-09 RX ORDER — POTASSIUM CHLORIDE 750 MG/1
10 TABLET, EXTENDED RELEASE ORAL DAILY
Status: DISCONTINUED | OUTPATIENT
Start: 2020-11-10 | End: 2020-11-10 | Stop reason: HOSPADM

## 2020-11-09 RX ORDER — FAMOTIDINE 20 MG/1
20 TABLET, FILM COATED ORAL 2 TIMES DAILY
Status: DISCONTINUED | OUTPATIENT
Start: 2020-11-09 | End: 2020-11-10 | Stop reason: HOSPADM

## 2020-11-09 RX ORDER — SODIUM CHLORIDE 9 MG/ML
INJECTION, SOLUTION INTRAVENOUS CONTINUOUS
Status: DISCONTINUED | OUTPATIENT
Start: 2020-11-09 | End: 2020-11-09

## 2020-11-09 RX ORDER — WARFARIN SODIUM 1 MG/1
1 TABLET ORAL
Status: ON HOLD | COMMUNITY
End: 2020-11-10 | Stop reason: HOSPADM

## 2020-11-09 RX ORDER — SODIUM CHLORIDE 0.9 % (FLUSH) 0.9 %
10 SYRINGE (ML) INJECTION EVERY 12 HOURS SCHEDULED
Status: DISCONTINUED | OUTPATIENT
Start: 2020-11-09 | End: 2020-11-10 | Stop reason: HOSPADM

## 2020-11-09 RX ORDER — SODIUM CHLORIDE 9 MG/ML
INJECTION, SOLUTION INTRAVENOUS CONTINUOUS
Status: DISCONTINUED | OUTPATIENT
Start: 2020-11-09 | End: 2020-11-10 | Stop reason: HOSPADM

## 2020-11-09 RX ORDER — ACETAMINOPHEN 325 MG/1
650 TABLET ORAL EVERY 6 HOURS PRN
Status: DISCONTINUED | OUTPATIENT
Start: 2020-11-09 | End: 2020-11-10 | Stop reason: HOSPADM

## 2020-11-09 RX ORDER — IPRATROPIUM BROMIDE AND ALBUTEROL SULFATE 2.5; .5 MG/3ML; MG/3ML
3 SOLUTION RESPIRATORY (INHALATION) EVERY 4 HOURS PRN
Status: DISCONTINUED | OUTPATIENT
Start: 2020-11-09 | End: 2020-11-10 | Stop reason: HOSPADM

## 2020-11-09 RX ADMIN — FAMOTIDINE 20 MG: 20 TABLET ORAL at 23:10

## 2020-11-09 RX ADMIN — DOCUSATE SODIUM 50 MG AND SENNOSIDES 8.6 MG 1 TABLET: 8.6; 5 TABLET, FILM COATED ORAL at 23:09

## 2020-11-09 RX ADMIN — SODIUM CHLORIDE: 9 INJECTION, SOLUTION INTRAVENOUS at 23:10

## 2020-11-09 RX ADMIN — SKIN PROTECTANT: 44 OINTMENT TOPICAL at 23:10

## 2020-11-09 RX ADMIN — HYDROCORTISONE 5 MG: 5 TABLET ORAL at 23:10

## 2020-11-09 RX ADMIN — Medication 10 ML: at 14:44

## 2020-11-09 RX ADMIN — IOPAMIDOL 75 ML: 755 INJECTION, SOLUTION INTRAVENOUS at 14:44

## 2020-11-09 RX ADMIN — MUPIROCIN: 20 OINTMENT TOPICAL at 23:10

## 2020-11-09 RX ADMIN — SODIUM CHLORIDE: 9 INJECTION, SOLUTION INTRAVENOUS at 14:30

## 2020-11-09 RX ADMIN — SODIUM CHLORIDE, PRESERVATIVE FREE 10 ML: 5 INJECTION INTRAVENOUS at 23:10

## 2020-11-09 RX ADMIN — ENOXAPARIN SODIUM 90 MG: 100 INJECTION SUBCUTANEOUS at 17:41

## 2020-11-09 RX ADMIN — PREGABALIN 300 MG: 150 CAPSULE ORAL at 23:10

## 2020-11-09 ASSESSMENT — PAIN DESCRIPTION - PAIN TYPE: TYPE: ACUTE PAIN

## 2020-11-09 ASSESSMENT — PAIN SCALES - GENERAL
PAINLEVEL_OUTOF10: 7
PAINLEVEL_OUTOF10: 7

## 2020-11-09 ASSESSMENT — PAIN DESCRIPTION - LOCATION: LOCATION: LEG

## 2020-11-09 ASSESSMENT — PAIN DESCRIPTION - ORIENTATION: ORIENTATION: LEFT

## 2020-11-09 NOTE — ED NOTES
Nutrition services contacted and dinner tray was ordered for patient.       Amish Kaufman RN  11/09/20 6748

## 2020-11-09 NOTE — ED NOTES
Bed: [place] [address].  Expected date:   Expected time:   Means of arrival:   Comments:  Maryuri Corona RN  11/09/20 2224
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
## 2020-11-10 VITALS
OXYGEN SATURATION: 100 % | WEIGHT: 189 LBS | HEART RATE: 52 BPM | HEIGHT: 73 IN | TEMPERATURE: 97.4 F | DIASTOLIC BLOOD PRESSURE: 74 MMHG | SYSTOLIC BLOOD PRESSURE: 134 MMHG | RESPIRATION RATE: 16 BRPM | BODY MASS INDEX: 25.05 KG/M2

## 2020-11-10 LAB
EKG ATRIAL RATE: 74 BPM
EKG P AXIS: 64 DEGREES
EKG P-R INTERVAL: 114 MS
EKG Q-T INTERVAL: 380 MS
EKG QRS DURATION: 76 MS
EKG QTC CALCULATION (BAZETT): 421 MS
EKG R AXIS: 70 DEGREES
EKG T AXIS: 59 DEGREES
EKG VENTRICULAR RATE: 74 BPM

## 2020-11-10 PROCEDURE — 96372 THER/PROPH/DIAG INJ SC/IM: CPT

## 2020-11-10 PROCEDURE — G0378 HOSPITAL OBSERVATION PER HR: HCPCS

## 2020-11-10 PROCEDURE — 6370000000 HC RX 637 (ALT 250 FOR IP): Performed by: FAMILY MEDICINE

## 2020-11-10 PROCEDURE — 93010 ELECTROCARDIOGRAM REPORT: CPT | Performed by: INTERNAL MEDICINE

## 2020-11-10 PROCEDURE — 6360000002 HC RX W HCPCS: Performed by: FAMILY MEDICINE

## 2020-11-10 PROCEDURE — 99231 SBSQ HOSP IP/OBS SF/LOW 25: CPT | Performed by: INTERNAL MEDICINE

## 2020-11-10 RX ADMIN — PREGABALIN 300 MG: 150 CAPSULE ORAL at 09:28

## 2020-11-10 RX ADMIN — FAMOTIDINE 20 MG: 20 TABLET ORAL at 09:28

## 2020-11-10 RX ADMIN — FLUOXETINE HYDROCHLORIDE 20 MG: 10 TABLET ORAL at 09:28

## 2020-11-10 RX ADMIN — POTASSIUM CHLORIDE 10 MEQ: 750 TABLET, EXTENDED RELEASE ORAL at 09:28

## 2020-11-10 RX ADMIN — HYDROCORTISONE 10 MG: 5 TABLET ORAL at 09:28

## 2020-11-10 RX ADMIN — BACLOFEN 10 MG: 10 TABLET ORAL at 09:28

## 2020-11-10 RX ADMIN — ENOXAPARIN SODIUM 90 MG: 100 INJECTION SUBCUTANEOUS at 05:17

## 2020-11-10 NOTE — PROGRESS NOTES
Message sent to internal med residents for possible downgrade.  Awaiting response/new orders    Message returned from residents stating they plan to discharge the patient today

## 2020-11-10 NOTE — PROGRESS NOTES
[facility]  Internal Medicine Residency / [address]    Attending Physician Statement  I have discussed the case, including pertinent history and exam findings with the resident and the team.  I have seen and examined the patient and the key elements of the encounter have been performed by me. I agree with the assessment, plan and orders as documented by the resident. Paraplegic and cared for at home with family support  He does his own intermittent caths  Bid  Sacral decubitus longstanding  On Lovenox for acute DVT  Will discharge home on Lovenox or Eliquis     Remainder of medical problems as per resident note.       Isha Allen MD FRCP([facility])  Internal Medicine Residency Faculty

## 2020-11-10 NOTE — CARE COORDINATION
Transition of care: Observation   The patient is a old paraplegic that lives at home and his family care for him. He lives in a 2 story home  With his his mother and father with 2-3 steps to get into the home. He has a wheelchair to get around and his pharmacy is [facility]. Plan is home once he is medically stable and his Mom will be the one to pick him up Her name is Michelle Cervantes @ [phone]. When he  goes home on eliquis  he will need a coupon .  I will follow
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
## 2020-11-10 NOTE — FLOWSHEET NOTE
Inpatient Wound Care (Initial consult) 8508A    Admit Date: 11/9/2020 11:37 AM    Reason for consult:  Sacrum    Significant history:  Presented to ER with chest pain    Findings:      11/10/20 0855   Skin Integrity   Skin Integrity   (dry, blanchable, old healed sites)   Location   (bilateral heels)   Skin Integrity Site 2   Skin Integrity Location 2   (dried pink area)   Location 2   (left 2nd toe)   Skin Integrity Site 3   Skin Integrity Location 3   (scattered scabbed areas)    Location 3 BLE   Wound 11/09/20 Sacrum   Date First Assessed/Time First Assessed: 11/09/20 2200   Present on Hospital Admission: Yes  Primary Wound Type: Pressure Injury  Location: Sacrum   Wound Image    Wound Etiology Pressure Stage  3   Wound Cleansed Cleansed with saline   Dressing/Treatment ABD; Alginate   Wound Length (cm) 5.3 cm   Wound Width (cm) 2.8 cm   Wound Depth (cm) 1.3 cm   Wound Surface Area (cm^2) 14.84 cm^2   Change in Wound Size % (l*w) 17.56   Wound Volume (cm^3) 19.29 cm^3   Wound Healing % 55   Undermining Starts ___ O'Clock 12   Undermining Ends___ O'Clock 12   Undermining Maxium Distance (cm) 1.8   Wound Assessment   (red,yellow)   Drainage Amount Small   Drainage Description Serosanguinous   Odor None   Juliane-wound Assessment Intact      **Informed Consent**    The patient has given verbal consent to have photos taken of wound and inserted into their chart as part of their permanent medical record for purposes of documentation, treatment management and/or medical review. All Images taken on 11/10/20 of patient name: Oziel Tran were transmitted and stored on [employer] located within [facility] by a registered [employer] Mobile Application Device. Impression:  Sacrum: stage 3 pressure    Plan: Opticell, ABD pad  Comfort glide  Heel protectors  Patient will need continued preventative care.       Fred Araya 11/10/2020 9:00 AM
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
## 2020-11-11 ENCOUNTER — TELEPHONE (OUTPATIENT)
Dept: INTERNAL MEDICINE | Age: 25
End: 2020-11-11

## 2020-11-20 ENCOUNTER — TELEPHONE (OUTPATIENT)
Dept: INTERNAL MEDICINE | Age: 25
End: 2020-11-20

## 2020-11-20 NOTE — LETTER
[facility]  [address] [place] [address]  Phone: [phone]  Fax: [phone] [facility] [address]        November 20, 2020     [place]  [address]      Dear Rosetta Miller: We are sorry that you missed your appointment with Dr Nikos Mejia on 11/20/2020. Your health and follow-up medical care are important to us. Please call our office as soon as possible so that we may reschedule your appointment. If you have already rescheduled your appointment, please disregard this letter.     Sincerely,        Kavin Izquierdo LPN

## 2020-11-20 NOTE — TELEPHONE ENCOUNTER
Patient no showed for his appt today. Left a voice mail to call and reschedule this appt. No Show letter mailed.

## 2020-12-01 ENCOUNTER — NURSE TRIAGE (OUTPATIENT)
Dept: OTHER | Facility: CLINIC | Age: 25
End: 2020-12-01

## 2020-12-01 ENCOUNTER — TELEPHONE (OUTPATIENT)
Dept: INTERNAL MEDICINE | Age: 25
End: 2020-12-01

## 2020-12-01 NOTE — TELEPHONE ENCOUNTER
Left a voice mail instructing this patient to go to the [facility] to get tested for covid due to his symptoms and them worsening  per . If he had any questions to call us here at the office at [phone].

## 2020-12-01 NOTE — TELEPHONE ENCOUNTER
Nurse triage call from [facility], needs seen for a cough, body aches, more than usual.  Pt is scheduled for VV on 12/1 at 10am w/ Dr Earl Tovar.

## 2020-12-01 NOTE — TELEPHONE ENCOUNTER
A friend of patient's called Briana Friedman at the pre-service center [facility] [place] with red flag complaint of coughing up blood. Brief description of triage: Patient's friend called to schedule appointment and told  that patient was coughing up blood. Writer asked to speak with patient directly and caller was upset but gave the phone to patient. Triaged for symptom of cough:  Patient denies any cough today or any bloody drainage or sputum. Attempted care advice, but patient not interested. Patient's answers are very brief and non-descriptive. Stated he was \"almost back to sleep\" when he was given the phone. When asked if he wanted to make an appointment, he stated he didn't call for one but agreeable to speak to  to attempt to make appointment. Triage indicates for patient to home care. Care advice provided, patient verbalizes understanding; denies any other questions or concerns; instructed to call back for any new or worsening symptoms. Writer provided warm transfer to Brittany Sargent at [facility] for appointment scheduling. Attention Provider: Thank you for allowing me to participate in the care of your patient. The patient was connected to triage in response to information provided to the [facility]. Please do not respond through this encounter as the response is not directed to a shared pool. Reason for Disposition   [1] Dry (non-productive) cough AND [2] < 3 weeks duration     (i.e., no sputum or minimal clear sputum)   Cough    Answer Assessment - Initial Assessment Questions  1. ONSET: \"When did the cough begin? \"       Couple days ago    2. SEVERITY: \"How bad is the cough today? \"       Hasn't coughed yet today    3. RESPIRATORY DISTRESS: \"Describe your breathing. \"       Denies    4. FEVER: \"Do you have a fever? \" If so, ask: \"What is your temperature, how was it measured, and when did it start? \"      Denies    5.  SPUTUM: \"Describe the color of your sputum\" (e.g., clear, white, yellow, green), \"Has there been any change recently? \"      No drainage with cough    6. HEMOPTYSIS: \"Are you coughing up any blood? \" If so ask: \"How much, flecks, streaks, tablespoons, etc.?\"      Denies    7. CARDIAC HISTORY: \"Do you have any history of heart disease? \" (e.g., heart attack, congestive heart failure)       Denies    8. LUNG HISTORY: \"Do you have any history of lung disease? \"  (e.g., pulmonary embolus, asthma, emphysema/COPD)      Denies    9. OTHER SYMPTOMS: \"Do you have any other symptoms? (e.g., runny nose, wheezing, chest pain)      Denies    10. PREGNANCY: \"Is there any chance you are pregnant? \" \"When was your last menstrual period? \"        N/A    11. TRAVEL: \"Have you traveled out of the country in the last month? \" (e.g., travel history, exposures)        No    Answer Assessment - Initial Assessment Questions  1. ONSET: \"When did the cough begin? \"       Couple days ago    2. SEVERITY: \"How bad is the cough today? \"       \"Haven't coughed today\"    3. RESPIRATORY DISTRESS: \"Describe your breathing. \"       Denies    4. FEVER: \"Do you have a fever? \" If so, ask: \"What is your temperature, how was it measured, and when did it start? \"      Denies    5. HEMOPTYSIS: \"Are you coughing up any blood? \" If so ask: \"How much? \" (flecks, streaks, tablespoons, etc.)      Denies    6. TREATMENT: \"What have you done so far to treat the cough? \" (e.g., meds, fluids, humidifier)      Patient not verbalizing much, states \"he was trying to go back to sleep\"    7. CARDIAC HISTORY: \"Do you have any history of heart disease? \" (e.g., heart attack, congestive heart failure)       Denies    8. LUNG HISTORY: \"Do you have any history of lung disease? \"  (e.g., pulmonary embolus, asthma, emphysema)      Denies    9. PE RISK FACTORS: \"Do you have a history of blood clots? \" (or: recent major surgery, recent prolonged travel, bedridden)         10.  OTHER SYMPTOMS: \"Do you have any other symptoms? (e.g., runny nose, wheezing, chest pain)        Denies    11. PREGNANCY: \"Is there any chance you are pregnant? \" \"When was your last menstrual period? \"        N/A    12. TRAVEL: \"Have you traveled out of the country in the last month? \" (e.g., travel history, exposures)        No    Protocols used: COUGH - CHRONIC-ADULT-AH, COUGH - ACUTE NON-PRODUCTIVE-ADULT-AH

## 2021-01-28 ENCOUNTER — TELEPHONE (OUTPATIENT)
Dept: INTERNAL MEDICINE | Age: 26
End: 2021-01-28

## 2021-02-24 ENCOUNTER — TELEPHONE (OUTPATIENT)
Dept: INTERNAL MEDICINE | Age: 26
End: 2021-02-24

## 2021-02-26 ENCOUNTER — APPOINTMENT (OUTPATIENT)
Dept: GENERAL RADIOLOGY | Age: 26
DRG: 351 | End: 2021-02-26
Payer: COMMERCIAL

## 2021-02-26 ENCOUNTER — APPOINTMENT (OUTPATIENT)
Dept: CT IMAGING | Age: 26
DRG: 351 | End: 2021-02-26
Payer: COMMERCIAL

## 2021-02-26 ENCOUNTER — HOSPITAL ENCOUNTER (INPATIENT)
Age: 26
LOS: 1 days | Discharge: HOME | DRG: 351 | End: 2021-03-02
Attending: EMERGENCY MEDICINE | Admitting: FAMILY MEDICINE
Payer: COMMERCIAL

## 2021-02-26 DIAGNOSIS — R07.9 CHEST PAIN, UNSPECIFIED TYPE: ICD-10-CM

## 2021-02-26 DIAGNOSIS — M62.82 NON-TRAUMATIC RHABDOMYOLYSIS: Primary | ICD-10-CM

## 2021-02-26 DIAGNOSIS — R74.8 ELEVATED CPK: ICD-10-CM

## 2021-02-26 LAB
ALBUMIN SERPL-MCNC: 4.2 G/DL (ref 3.5–5.2)
ALP BLD-CCNC: 134 U/L (ref 40–129)
ALT SERPL-CCNC: 10 U/L (ref 0–40)
ANION GAP SERPL CALCULATED.3IONS-SCNC: 13 MMOL/L (ref 7–16)
AST SERPL-CCNC: 16 U/L (ref 0–39)
BACTERIA: ABNORMAL /HPF
BASOPHILS ABSOLUTE: 0.05 E9/L (ref 0–0.2)
BASOPHILS RELATIVE PERCENT: 0.5 % (ref 0–2)
BILIRUB SERPL-MCNC: 0.5 MG/DL (ref 0–1.2)
BILIRUBIN URINE: NEGATIVE
BLOOD, URINE: ABNORMAL
BUN BLDV-MCNC: 9 MG/DL (ref 6–20)
CALCIUM SERPL-MCNC: 10.3 MG/DL (ref 8.6–10.2)
CHLORIDE BLD-SCNC: 102 MMOL/L (ref 98–107)
CK MB: 7 NG/ML (ref 0–7.7)
CLARITY: ABNORMAL
CO2: 25 MMOL/L (ref 22–29)
COLOR: YELLOW
CREAT SERPL-MCNC: 1 MG/DL (ref 0.7–1.2)
EKG ATRIAL RATE: 66 BPM
EKG P AXIS: 58 DEGREES
EKG P-R INTERVAL: 128 MS
EKG Q-T INTERVAL: 374 MS
EKG QRS DURATION: 94 MS
EKG QTC CALCULATION (BAZETT): 392 MS
EKG R AXIS: 67 DEGREES
EKG T AXIS: 79 DEGREES
EKG VENTRICULAR RATE: 66 BPM
EOSINOPHILS ABSOLUTE: 0.15 E9/L (ref 0.05–0.5)
EOSINOPHILS RELATIVE PERCENT: 1.4 % (ref 0–6)
GFR AFRICAN AMERICAN: >60
GFR NON-AFRICAN AMERICAN: >60 ML/MIN/1.73
GLUCOSE BLD-MCNC: 80 MG/DL (ref 74–99)
GLUCOSE URINE: NEGATIVE MG/DL
HCT VFR BLD CALC: 46.6 % (ref 37–54)
HEMOGLOBIN: 15.4 G/DL (ref 12.5–16.5)
IMMATURE GRANULOCYTES #: 0.02 E9/L
IMMATURE GRANULOCYTES %: 0.2 % (ref 0–5)
KETONES, URINE: ABNORMAL MG/DL
LEUKOCYTE ESTERASE, URINE: ABNORMAL
LYMPHOCYTES ABSOLUTE: 2.62 E9/L (ref 1.5–4)
LYMPHOCYTES RELATIVE PERCENT: 25 % (ref 20–42)
MCH RBC QN AUTO: 27.4 PG (ref 26–35)
MCHC RBC AUTO-ENTMCNC: 33 % (ref 32–34.5)
MCV RBC AUTO: 82.9 FL (ref 80–99.9)
MONOCYTES ABSOLUTE: 0.97 E9/L (ref 0.1–0.95)
MONOCYTES RELATIVE PERCENT: 9.2 % (ref 2–12)
NEUTROPHILS ABSOLUTE: 6.68 E9/L (ref 1.8–7.3)
NEUTROPHILS RELATIVE PERCENT: 63.7 % (ref 43–80)
NITRITE, URINE: POSITIVE
PDW BLD-RTO: 18.7 FL (ref 11.5–15)
PH UA: 7 (ref 5–9)
PLATELET # BLD: 340 E9/L (ref 130–450)
PMV BLD AUTO: 10 FL (ref 7–12)
POTASSIUM REFLEX MAGNESIUM: 3.7 MMOL/L (ref 3.5–5)
PROTEIN UA: 30 MG/DL
RBC # BLD: 5.62 E12/L (ref 3.8–5.8)
RBC UA: ABNORMAL /HPF (ref 0–2)
SODIUM BLD-SCNC: 140 MMOL/L (ref 132–146)
SPECIFIC GRAVITY UA: 1.01 (ref 1–1.03)
TOTAL CK: 1005 U/L (ref 20–200)
TOTAL PROTEIN: 8 G/DL (ref 6.4–8.3)
TROPONIN: 0.03 NG/ML (ref 0–0.03)
TROPONIN: 0.06 NG/ML (ref 0–0.03)
UROBILINOGEN, URINE: 0.2 E.U./DL
WBC # BLD: 10.5 E9/L (ref 4.5–11.5)
WBC UA: >20 /HPF (ref 0–5)

## 2021-02-26 PROCEDURE — 6370000000 HC RX 637 (ALT 250 FOR IP): Performed by: FAMILY MEDICINE

## 2021-02-26 PROCEDURE — 82553 CREATINE MB FRACTION: CPT

## 2021-02-26 PROCEDURE — 93010 ELECTROCARDIOGRAM REPORT: CPT | Performed by: INTERNAL MEDICINE

## 2021-02-26 PROCEDURE — APPSS60 APP SPLIT SHARED TIME 46-60 MINUTES: Performed by: PHYSICIAN ASSISTANT

## 2021-02-26 PROCEDURE — 82550 ASSAY OF CK (CPK): CPT

## 2021-02-26 PROCEDURE — 2580000003 HC RX 258: Performed by: RADIOLOGY

## 2021-02-26 PROCEDURE — 99233 SBSQ HOSP IP/OBS HIGH 50: CPT | Performed by: FAMILY MEDICINE

## 2021-02-26 PROCEDURE — 84484 ASSAY OF TROPONIN QUANT: CPT

## 2021-02-26 PROCEDURE — 80053 COMPREHEN METABOLIC PANEL: CPT

## 2021-02-26 PROCEDURE — 85025 COMPLETE CBC W/AUTO DIFF WBC: CPT

## 2021-02-26 PROCEDURE — 99204 OFFICE O/P NEW MOD 45 MIN: CPT | Performed by: INTERNAL MEDICINE

## 2021-02-26 PROCEDURE — 71275 CT ANGIOGRAPHY CHEST: CPT

## 2021-02-26 PROCEDURE — 87088 URINE BACTERIA CULTURE: CPT

## 2021-02-26 PROCEDURE — 96361 HYDRATE IV INFUSION ADD-ON: CPT

## 2021-02-26 PROCEDURE — 99283 EMERGENCY DEPT VISIT LOW MDM: CPT

## 2021-02-26 PROCEDURE — 96374 THER/PROPH/DIAG INJ IV PUSH: CPT

## 2021-02-26 PROCEDURE — 2580000003 HC RX 258: Performed by: EMERGENCY MEDICINE

## 2021-02-26 PROCEDURE — 2580000003 HC RX 258: Performed by: FAMILY MEDICINE

## 2021-02-26 PROCEDURE — 6360000004 HC RX CONTRAST MEDICATION: Performed by: RADIOLOGY

## 2021-02-26 PROCEDURE — 36415 COLL VENOUS BLD VENIPUNCTURE: CPT

## 2021-02-26 PROCEDURE — G0378 HOSPITAL OBSERVATION PER HR: HCPCS

## 2021-02-26 PROCEDURE — 81001 URINALYSIS AUTO W/SCOPE: CPT

## 2021-02-26 PROCEDURE — 6360000002 HC RX W HCPCS: Performed by: EMERGENCY MEDICINE

## 2021-02-26 PROCEDURE — 6370000000 HC RX 637 (ALT 250 FOR IP): Performed by: EMERGENCY MEDICINE

## 2021-02-26 PROCEDURE — 93005 ELECTROCARDIOGRAM TRACING: CPT | Performed by: EMERGENCY MEDICINE

## 2021-02-26 PROCEDURE — 71045 X-RAY EXAM CHEST 1 VIEW: CPT

## 2021-02-26 PROCEDURE — 96372 THER/PROPH/DIAG INJ SC/IM: CPT

## 2021-02-26 RX ORDER — PROMETHAZINE HYDROCHLORIDE 25 MG/1
12.5 TABLET ORAL EVERY 6 HOURS PRN
Status: DISCONTINUED | OUTPATIENT
Start: 2021-02-26 | End: 2021-03-02 | Stop reason: HOSPADM

## 2021-02-26 RX ORDER — ACETAMINOPHEN 650 MG/1
650 SUPPOSITORY RECTAL EVERY 6 HOURS PRN
Status: DISCONTINUED | OUTPATIENT
Start: 2021-02-26 | End: 2021-03-02 | Stop reason: HOSPADM

## 2021-02-26 RX ORDER — ONDANSETRON 2 MG/ML
4 INJECTION INTRAMUSCULAR; INTRAVENOUS EVERY 6 HOURS PRN
Status: DISCONTINUED | OUTPATIENT
Start: 2021-02-26 | End: 2021-03-02 | Stop reason: HOSPADM

## 2021-02-26 RX ORDER — MORPHINE SULFATE 4 MG/ML
4 INJECTION, SOLUTION INTRAMUSCULAR; INTRAVENOUS ONCE
Status: COMPLETED | OUTPATIENT
Start: 2021-02-26 | End: 2021-02-26

## 2021-02-26 RX ORDER — SODIUM CHLORIDE 0.9 % (FLUSH) 0.9 %
10 SYRINGE (ML) INJECTION PRN
Status: DISCONTINUED | OUTPATIENT
Start: 2021-02-26 | End: 2021-03-02 | Stop reason: HOSPADM

## 2021-02-26 RX ORDER — 0.9 % SODIUM CHLORIDE 0.9 %
1000 INTRAVENOUS SOLUTION INTRAVENOUS ONCE
Status: COMPLETED | OUTPATIENT
Start: 2021-02-26 | End: 2021-02-26

## 2021-02-26 RX ORDER — ORPHENADRINE CITRATE 30 MG/ML
60 INJECTION INTRAMUSCULAR; INTRAVENOUS EVERY 12 HOURS
Status: DISCONTINUED | OUTPATIENT
Start: 2021-02-26 | End: 2021-02-26

## 2021-02-26 RX ORDER — LIDOCAINE 4 G/G
1 PATCH TOPICAL DAILY
Status: DISCONTINUED | OUTPATIENT
Start: 2021-02-26 | End: 2021-03-02 | Stop reason: HOSPADM

## 2021-02-26 RX ORDER — SODIUM CHLORIDE 9 MG/ML
INJECTION, SOLUTION INTRAVENOUS CONTINUOUS
Status: DISCONTINUED | OUTPATIENT
Start: 2021-02-26 | End: 2021-03-02 | Stop reason: HOSPADM

## 2021-02-26 RX ORDER — ORPHENADRINE CITRATE 30 MG/ML
60 INJECTION INTRAMUSCULAR; INTRAVENOUS EVERY 12 HOURS PRN
Status: DISCONTINUED | OUTPATIENT
Start: 2021-02-26 | End: 2021-03-02 | Stop reason: HOSPADM

## 2021-02-26 RX ORDER — POLYETHYLENE GLYCOL 3350 17 G/17G
17 POWDER, FOR SOLUTION ORAL DAILY PRN
Status: DISCONTINUED | OUTPATIENT
Start: 2021-02-26 | End: 2021-03-02 | Stop reason: HOSPADM

## 2021-02-26 RX ORDER — ASPIRIN 81 MG/1
324 TABLET, CHEWABLE ORAL ONCE
Status: COMPLETED | OUTPATIENT
Start: 2021-02-26 | End: 2021-02-26

## 2021-02-26 RX ORDER — OXYCODONE HYDROCHLORIDE AND ACETAMINOPHEN 5; 325 MG/1; MG/1
1 TABLET ORAL ONCE
Status: COMPLETED | OUTPATIENT
Start: 2021-02-26 | End: 2021-02-26

## 2021-02-26 RX ORDER — ACETAMINOPHEN 325 MG/1
650 TABLET ORAL EVERY 6 HOURS PRN
Status: DISCONTINUED | OUTPATIENT
Start: 2021-02-26 | End: 2021-03-02 | Stop reason: HOSPADM

## 2021-02-26 RX ORDER — SODIUM CHLORIDE 0.9 % (FLUSH) 0.9 %
10 SYRINGE (ML) INJECTION EVERY 12 HOURS SCHEDULED
Status: DISCONTINUED | OUTPATIENT
Start: 2021-02-26 | End: 2021-03-02 | Stop reason: HOSPADM

## 2021-02-26 RX ORDER — ORPHENADRINE CITRATE 30 MG/ML
60 INJECTION INTRAMUSCULAR; INTRAVENOUS ONCE
Status: COMPLETED | OUTPATIENT
Start: 2021-02-26 | End: 2021-02-26

## 2021-02-26 RX ORDER — MORPHINE SULFATE 4 MG/ML
4 INJECTION, SOLUTION INTRAMUSCULAR; INTRAVENOUS ONCE
Status: DISCONTINUED | OUTPATIENT
Start: 2021-02-26 | End: 2021-02-26

## 2021-02-26 RX ORDER — SODIUM CHLORIDE 0.9 % (FLUSH) 0.9 %
10 SYRINGE (ML) INJECTION PRN
Status: COMPLETED | OUTPATIENT
Start: 2021-02-26 | End: 2021-02-26

## 2021-02-26 RX ADMIN — OXYCODONE HYDROCHLORIDE AND ACETAMINOPHEN 1 TABLET: 5; 325 TABLET ORAL at 17:43

## 2021-02-26 RX ADMIN — SODIUM CHLORIDE, PRESERVATIVE FREE 10 ML: 5 INJECTION INTRAVENOUS at 22:10

## 2021-02-26 RX ADMIN — ASPIRIN 324 MG: 81 TABLET, CHEWABLE ORAL at 16:15

## 2021-02-26 RX ADMIN — SODIUM CHLORIDE: 9 INJECTION, SOLUTION INTRAVENOUS at 22:11

## 2021-02-26 RX ADMIN — ORPHENADRINE CITRATE 60 MG: 60 INJECTION INTRAMUSCULAR; INTRAVENOUS at 12:30

## 2021-02-26 RX ADMIN — IOPAMIDOL 75 ML: 755 INJECTION, SOLUTION INTRAVENOUS at 13:51

## 2021-02-26 RX ADMIN — Medication 10 ML: at 13:47

## 2021-02-26 RX ADMIN — SODIUM CHLORIDE 1000 ML: 9 INJECTION, SOLUTION INTRAVENOUS at 16:46

## 2021-02-26 RX ADMIN — SODIUM CHLORIDE 1000 ML: 9 INJECTION, SOLUTION INTRAVENOUS at 12:28

## 2021-02-26 RX ADMIN — MORPHINE SULFATE 4 MG: 4 INJECTION, SOLUTION INTRAMUSCULAR; INTRAVENOUS at 12:32

## 2021-02-26 ASSESSMENT — PAIN DESCRIPTION - ORIENTATION: ORIENTATION: LOWER;UPPER

## 2021-02-26 ASSESSMENT — PAIN SCALES - GENERAL
PAINLEVEL_OUTOF10: 10
PAINLEVEL_OUTOF10: 8
PAINLEVEL_OUTOF10: 8

## 2021-02-26 ASSESSMENT — PAIN DESCRIPTION - LOCATION: LOCATION: BACK

## 2021-02-26 ASSESSMENT — PAIN DESCRIPTION - PROGRESSION: CLINICAL_PROGRESSION: NOT CHANGED

## 2021-02-26 ASSESSMENT — PAIN DESCRIPTION - PAIN TYPE: TYPE: ACUTE PAIN

## 2021-02-26 ASSESSMENT — PAIN DESCRIPTION - DESCRIPTORS: DESCRIPTORS: ACHING;SHARP;DISCOMFORT
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
## 2021-02-27 LAB
ALBUMIN SERPL-MCNC: 3.4 G/DL (ref 3.5–5.2)
ALP BLD-CCNC: 101 U/L (ref 40–129)
ALT SERPL-CCNC: 8 U/L (ref 0–40)
ANION GAP SERPL CALCULATED.3IONS-SCNC: 10 MMOL/L (ref 7–16)
AST SERPL-CCNC: 15 U/L (ref 0–39)
BILIRUB SERPL-MCNC: <0.2 MG/DL (ref 0–1.2)
BUN BLDV-MCNC: 11 MG/DL (ref 6–20)
CALCIUM SERPL-MCNC: 8.7 MG/DL (ref 8.6–10.2)
CHLORIDE BLD-SCNC: 107 MMOL/L (ref 98–107)
CO2: 23 MMOL/L (ref 22–29)
CREAT SERPL-MCNC: 1.1 MG/DL (ref 0.7–1.2)
GFR AFRICAN AMERICAN: >60
GFR NON-AFRICAN AMERICAN: >60 ML/MIN/1.73
GLUCOSE BLD-MCNC: 89 MG/DL (ref 74–99)
MAGNESIUM: 1.7 MG/DL (ref 1.6–2.6)
POTASSIUM REFLEX MAGNESIUM: 3.5 MMOL/L (ref 3.5–5)
SODIUM BLD-SCNC: 140 MMOL/L (ref 132–146)
TOTAL CK: 1154 U/L (ref 20–200)
TOTAL PROTEIN: 6.5 G/DL (ref 6.4–8.3)

## 2021-02-27 PROCEDURE — 80053 COMPREHEN METABOLIC PANEL: CPT

## 2021-02-27 PROCEDURE — 36415 COLL VENOUS BLD VENIPUNCTURE: CPT

## 2021-02-27 PROCEDURE — 96376 TX/PRO/DX INJ SAME DRUG ADON: CPT

## 2021-02-27 PROCEDURE — 96375 TX/PRO/DX INJ NEW DRUG ADDON: CPT

## 2021-02-27 PROCEDURE — 2580000003 HC RX 258: Performed by: FAMILY MEDICINE

## 2021-02-27 PROCEDURE — 6360000002 HC RX W HCPCS: Performed by: FAMILY MEDICINE

## 2021-02-27 PROCEDURE — 6370000000 HC RX 637 (ALT 250 FOR IP): Performed by: FAMILY MEDICINE

## 2021-02-27 PROCEDURE — 83735 ASSAY OF MAGNESIUM: CPT

## 2021-02-27 PROCEDURE — 6360000002 HC RX W HCPCS: Performed by: INTERNAL MEDICINE

## 2021-02-27 PROCEDURE — 82550 ASSAY OF CK (CPK): CPT

## 2021-02-27 PROCEDURE — G0378 HOSPITAL OBSERVATION PER HR: HCPCS

## 2021-02-27 PROCEDURE — 96372 THER/PROPH/DIAG INJ SC/IM: CPT

## 2021-02-27 PROCEDURE — 96361 HYDRATE IV INFUSION ADD-ON: CPT

## 2021-02-27 PROCEDURE — 99233 SBSQ HOSP IP/OBS HIGH 50: CPT | Performed by: FAMILY MEDICINE

## 2021-02-27 RX ORDER — MORPHINE SULFATE 4 MG/ML
4 INJECTION, SOLUTION INTRAMUSCULAR; INTRAVENOUS ONCE
Status: COMPLETED | OUTPATIENT
Start: 2021-02-27 | End: 2021-02-27

## 2021-02-27 RX ORDER — OXYCODONE HYDROCHLORIDE AND ACETAMINOPHEN 5; 325 MG/1; MG/1
1 TABLET ORAL EVERY 6 HOURS PRN
Status: DISCONTINUED | OUTPATIENT
Start: 2021-02-27 | End: 2021-02-27

## 2021-02-27 RX ORDER — OXYCODONE HYDROCHLORIDE 5 MG/1
10 TABLET ORAL EVERY 4 HOURS PRN
Status: DISCONTINUED | OUTPATIENT
Start: 2021-02-27 | End: 2021-03-02 | Stop reason: HOSPADM

## 2021-02-27 RX ADMIN — SODIUM CHLORIDE: 9 INJECTION, SOLUTION INTRAVENOUS at 06:03

## 2021-02-27 RX ADMIN — OXYCODONE HYDROCHLORIDE AND ACETAMINOPHEN 1 TABLET: 5; 325 TABLET ORAL at 14:25

## 2021-02-27 RX ADMIN — SODIUM CHLORIDE, PRESERVATIVE FREE 1000 MG: 5 INJECTION INTRAVENOUS at 18:27

## 2021-02-27 RX ADMIN — OXYCODONE HYDROCHLORIDE 10 MG: 5 TABLET ORAL at 21:48

## 2021-02-27 RX ADMIN — ORPHENADRINE CITRATE 60 MG: 30 INJECTION, SOLUTION INTRAMUSCULAR; INTRAVENOUS at 11:04

## 2021-02-27 RX ADMIN — SODIUM CHLORIDE: 9 INJECTION, SOLUTION INTRAVENOUS at 21:49

## 2021-02-27 RX ADMIN — MORPHINE SULFATE 4 MG: 4 INJECTION, SOLUTION INTRAMUSCULAR; INTRAVENOUS at 04:08

## 2021-02-27 RX ADMIN — ENOXAPARIN SODIUM 40 MG: 40 INJECTION SUBCUTANEOUS at 14:25

## 2021-02-27 RX ADMIN — OXYCODONE HYDROCHLORIDE 10 MG: 5 TABLET ORAL at 16:32

## 2021-02-27 ASSESSMENT — PAIN DESCRIPTION - ORIENTATION: ORIENTATION: LOWER;UPPER

## 2021-02-27 ASSESSMENT — PAIN DESCRIPTION - DESCRIPTORS
DESCRIPTORS: SHOOTING;SHARP;SPASM
DESCRIPTORS: SHOOTING;SPASM
DESCRIPTORS: ACHING;SHARP;DISCOMFORT

## 2021-02-27 ASSESSMENT — PAIN DESCRIPTION - FREQUENCY
FREQUENCY: CONTINUOUS
FREQUENCY: CONTINUOUS

## 2021-02-27 ASSESSMENT — PAIN DESCRIPTION - PAIN TYPE
TYPE: ACUTE PAIN

## 2021-02-27 ASSESSMENT — PAIN SCALES - GENERAL
PAINLEVEL_OUTOF10: 10
PAINLEVEL_OUTOF10: 10

## 2021-02-27 ASSESSMENT — PAIN DESCRIPTION - LOCATION: LOCATION: CHEST;BACK;ARM

## 2021-02-27 NOTE — PROGRESS NOTES
Inpatient  Consult placed to  Wound care to evaluate patient's  Pressure  Injury. waiting for  wound care nurse  to see the  Patient.
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
## 2021-02-28 LAB
ANION GAP SERPL CALCULATED.3IONS-SCNC: 5 MMOL/L (ref 7–16)
BUN BLDV-MCNC: 7 MG/DL (ref 6–20)
CALCIUM SERPL-MCNC: 8.7 MG/DL (ref 8.6–10.2)
CHLORIDE BLD-SCNC: 109 MMOL/L (ref 98–107)
CO2: 27 MMOL/L (ref 22–29)
CREAT SERPL-MCNC: 1.1 MG/DL (ref 0.7–1.2)
GFR AFRICAN AMERICAN: >60
GFR NON-AFRICAN AMERICAN: >60 ML/MIN/1.73
GLUCOSE BLD-MCNC: 89 MG/DL (ref 74–99)
POTASSIUM REFLEX MAGNESIUM: 3.7 MMOL/L (ref 3.5–5)
SODIUM BLD-SCNC: 141 MMOL/L (ref 132–146)
TOTAL CK: 851 U/L (ref 20–200)
TOTAL CK: 990 U/L (ref 20–200)
URINE CULTURE, ROUTINE: NORMAL

## 2021-02-28 PROCEDURE — G0378 HOSPITAL OBSERVATION PER HR: HCPCS

## 2021-02-28 PROCEDURE — 99233 SBSQ HOSP IP/OBS HIGH 50: CPT | Performed by: FAMILY MEDICINE

## 2021-02-28 PROCEDURE — 2580000003 HC RX 258: Performed by: FAMILY MEDICINE

## 2021-02-28 PROCEDURE — 96361 HYDRATE IV INFUSION ADD-ON: CPT

## 2021-02-28 PROCEDURE — 80048 BASIC METABOLIC PNL TOTAL CA: CPT

## 2021-02-28 PROCEDURE — 36415 COLL VENOUS BLD VENIPUNCTURE: CPT

## 2021-02-28 PROCEDURE — 96376 TX/PRO/DX INJ SAME DRUG ADON: CPT

## 2021-02-28 PROCEDURE — 6360000002 HC RX W HCPCS: Performed by: FAMILY MEDICINE

## 2021-02-28 PROCEDURE — 82550 ASSAY OF CK (CPK): CPT

## 2021-02-28 PROCEDURE — 96372 THER/PROPH/DIAG INJ SC/IM: CPT

## 2021-02-28 PROCEDURE — 6370000000 HC RX 637 (ALT 250 FOR IP): Performed by: FAMILY MEDICINE

## 2021-02-28 PROCEDURE — 96375 TX/PRO/DX INJ NEW DRUG ADDON: CPT

## 2021-02-28 RX ORDER — KETOROLAC TROMETHAMINE 30 MG/ML
30 INJECTION, SOLUTION INTRAMUSCULAR; INTRAVENOUS EVERY 6 HOURS PRN
Status: DISCONTINUED | OUTPATIENT
Start: 2021-02-28 | End: 2021-03-02 | Stop reason: HOSPADM

## 2021-02-28 RX ADMIN — OXYCODONE HYDROCHLORIDE 10 MG: 5 TABLET ORAL at 13:57

## 2021-02-28 RX ADMIN — OXYCODONE HYDROCHLORIDE 10 MG: 5 TABLET ORAL at 20:05

## 2021-02-28 RX ADMIN — SODIUM CHLORIDE: 9 INJECTION, SOLUTION INTRAVENOUS at 10:22

## 2021-02-28 RX ADMIN — ENOXAPARIN SODIUM 40 MG: 40 INJECTION SUBCUTANEOUS at 12:40

## 2021-02-28 RX ADMIN — KETOROLAC TROMETHAMINE 30 MG: 30 INJECTION, SOLUTION INTRAMUSCULAR; INTRAVENOUS at 14:35

## 2021-02-28 RX ADMIN — SODIUM CHLORIDE, PRESERVATIVE FREE 1000 MG: 5 INJECTION INTRAVENOUS at 17:30

## 2021-02-28 RX ADMIN — ORPHENADRINE CITRATE 60 MG: 30 INJECTION, SOLUTION INTRAMUSCULAR; INTRAVENOUS at 13:25

## 2021-02-28 RX ADMIN — OXYCODONE HYDROCHLORIDE 10 MG: 5 TABLET ORAL at 01:55

## 2021-02-28 RX ADMIN — SODIUM CHLORIDE: 9 INJECTION, SOLUTION INTRAVENOUS at 17:30

## 2021-02-28 RX ADMIN — OXYCODONE HYDROCHLORIDE 10 MG: 5 TABLET ORAL at 09:02

## 2021-02-28 RX ADMIN — ORPHENADRINE CITRATE 60 MG: 30 INJECTION, SOLUTION INTRAMUSCULAR; INTRAVENOUS at 01:16

## 2021-02-28 ASSESSMENT — PAIN SCALES - GENERAL
PAINLEVEL_OUTOF10: 8
PAINLEVEL_OUTOF10: 8
PAINLEVEL_OUTOF10: 10
PAINLEVEL_OUTOF10: 8

## 2021-02-28 ASSESSMENT — PAIN DESCRIPTION - LOCATION: LOCATION: BACK;CHEST

## 2021-02-28 ASSESSMENT — PAIN DESCRIPTION - FREQUENCY: FREQUENCY: CONTINUOUS

## 2021-02-28 ASSESSMENT — PAIN DESCRIPTION - PROGRESSION
CLINICAL_PROGRESSION: NOT CHANGED
CLINICAL_PROGRESSION: NOT CHANGED

## 2021-02-28 ASSESSMENT — PAIN DESCRIPTION - PAIN TYPE: TYPE: ACUTE PAIN

## 2021-02-28 ASSESSMENT — PAIN DESCRIPTION - DESCRIPTORS
DESCRIPTORS: SHARP;SHOOTING
DESCRIPTORS: SHOOTING;RADIATING

## 2021-02-28 ASSESSMENT — PAIN DESCRIPTION - ORIENTATION: ORIENTATION: RIGHT;LEFT;MID
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
## 2021-03-01 LAB
ANION GAP SERPL CALCULATED.3IONS-SCNC: 5 MMOL/L (ref 7–16)
BUN BLDV-MCNC: 5 MG/DL (ref 6–20)
CALCIUM SERPL-MCNC: 8.7 MG/DL (ref 8.6–10.2)
CHLORIDE BLD-SCNC: 110 MMOL/L (ref 98–107)
CO2: 27 MMOL/L (ref 22–29)
CREAT SERPL-MCNC: 1 MG/DL (ref 0.7–1.2)
GFR AFRICAN AMERICAN: >60
GFR NON-AFRICAN AMERICAN: >60 ML/MIN/1.73
GLUCOSE BLD-MCNC: 140 MG/DL (ref 74–99)
POTASSIUM REFLEX MAGNESIUM: 3.6 MMOL/L (ref 3.5–5)
SODIUM BLD-SCNC: 142 MMOL/L (ref 132–146)
TOTAL CK: 628 U/L (ref 20–200)

## 2021-03-01 PROCEDURE — G0378 HOSPITAL OBSERVATION PER HR: HCPCS

## 2021-03-01 PROCEDURE — 80048 BASIC METABOLIC PNL TOTAL CA: CPT

## 2021-03-01 PROCEDURE — 2580000003 HC RX 258: Performed by: STUDENT IN AN ORGANIZED HEALTH CARE EDUCATION/TRAINING PROGRAM

## 2021-03-01 PROCEDURE — 96376 TX/PRO/DX INJ SAME DRUG ADON: CPT

## 2021-03-01 PROCEDURE — 36415 COLL VENOUS BLD VENIPUNCTURE: CPT

## 2021-03-01 PROCEDURE — 99232 SBSQ HOSP IP/OBS MODERATE 35: CPT | Performed by: STUDENT IN AN ORGANIZED HEALTH CARE EDUCATION/TRAINING PROGRAM

## 2021-03-01 PROCEDURE — 6370000000 HC RX 637 (ALT 250 FOR IP): Performed by: FAMILY MEDICINE

## 2021-03-01 PROCEDURE — 96361 HYDRATE IV INFUSION ADD-ON: CPT

## 2021-03-01 PROCEDURE — 1200000000 HC SEMI PRIVATE

## 2021-03-01 PROCEDURE — 6360000002 HC RX W HCPCS: Performed by: FAMILY MEDICINE

## 2021-03-01 PROCEDURE — 2580000003 HC RX 258: Performed by: FAMILY MEDICINE

## 2021-03-01 PROCEDURE — 82550 ASSAY OF CK (CPK): CPT

## 2021-03-01 RX ADMIN — ORPHENADRINE CITRATE 60 MG: 30 INJECTION, SOLUTION INTRAMUSCULAR; INTRAVENOUS at 13:29

## 2021-03-01 RX ADMIN — SODIUM CHLORIDE: 9 INJECTION, SOLUTION INTRAVENOUS at 01:02

## 2021-03-01 RX ADMIN — OXYCODONE HYDROCHLORIDE 10 MG: 5 TABLET ORAL at 21:46

## 2021-03-01 RX ADMIN — OXYCODONE HYDROCHLORIDE 10 MG: 5 TABLET ORAL at 17:49

## 2021-03-01 RX ADMIN — SODIUM CHLORIDE: 9 INJECTION, SOLUTION INTRAVENOUS at 11:31

## 2021-03-01 RX ADMIN — ORPHENADRINE CITRATE 60 MG: 30 INJECTION, SOLUTION INTRAMUSCULAR; INTRAVENOUS at 01:33

## 2021-03-01 RX ADMIN — SODIUM CHLORIDE: 9 INJECTION, SOLUTION INTRAVENOUS at 21:30

## 2021-03-01 RX ADMIN — KETOROLAC TROMETHAMINE 30 MG: 30 INJECTION, SOLUTION INTRAMUSCULAR; INTRAVENOUS at 11:29

## 2021-03-01 RX ADMIN — SODIUM CHLORIDE, PRESERVATIVE FREE 10 ML: 5 INJECTION INTRAVENOUS at 21:45

## 2021-03-01 RX ADMIN — OXYCODONE HYDROCHLORIDE 10 MG: 5 TABLET ORAL at 01:01

## 2021-03-01 RX ADMIN — OXYCODONE HYDROCHLORIDE 10 MG: 5 TABLET ORAL at 08:41

## 2021-03-01 ASSESSMENT — PAIN DESCRIPTION - DIRECTION: RADIATING_TOWARDS: BACK

## 2021-03-01 ASSESSMENT — PAIN DESCRIPTION - FREQUENCY: FREQUENCY: CONTINUOUS

## 2021-03-01 ASSESSMENT — PAIN SCALES - GENERAL
PAINLEVEL_OUTOF10: 3
PAINLEVEL_OUTOF10: 8

## 2021-03-01 ASSESSMENT — PAIN DESCRIPTION - PAIN TYPE
TYPE: ACUTE PAIN;CHRONIC PAIN
TYPE: ACUTE PAIN

## 2021-03-01 ASSESSMENT — PAIN DESCRIPTION - LOCATION: LOCATION: BACK;CHEST

## 2021-03-01 ASSESSMENT — PAIN DESCRIPTION - PROGRESSION
CLINICAL_PROGRESSION: NOT CHANGED
CLINICAL_PROGRESSION: NOT CHANGED

## 2021-03-01 ASSESSMENT — PAIN DESCRIPTION - DESCRIPTORS: DESCRIPTORS: SHOOTING;RADIATING

## 2021-03-01 ASSESSMENT — PAIN - FUNCTIONAL ASSESSMENT: PAIN_FUNCTIONAL_ASSESSMENT: ACTIVITIES ARE NOT PREVENTED
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
## 2021-03-02 VITALS
WEIGHT: 208 LBS | SYSTOLIC BLOOD PRESSURE: 143 MMHG | DIASTOLIC BLOOD PRESSURE: 78 MMHG | RESPIRATION RATE: 18 BRPM | HEART RATE: 60 BPM | OXYGEN SATURATION: 99 % | TEMPERATURE: 98.5 F | HEIGHT: 73 IN | BODY MASS INDEX: 27.57 KG/M2

## 2021-03-02 LAB
ANION GAP SERPL CALCULATED.3IONS-SCNC: 3 MMOL/L (ref 7–16)
BUN BLDV-MCNC: 5 MG/DL (ref 6–20)
CALCIUM SERPL-MCNC: 8.9 MG/DL (ref 8.6–10.2)
CHLORIDE BLD-SCNC: 107 MMOL/L (ref 98–107)
CO2: 28 MMOL/L (ref 22–29)
CREAT SERPL-MCNC: 0.8 MG/DL (ref 0.7–1.2)
GFR AFRICAN AMERICAN: >60
GFR NON-AFRICAN AMERICAN: >60 ML/MIN/1.73
GLUCOSE BLD-MCNC: 95 MG/DL (ref 74–99)
MAGNESIUM: 1.6 MG/DL (ref 1.6–2.6)
POTASSIUM REFLEX MAGNESIUM: 3.4 MMOL/L (ref 3.5–5)
SODIUM BLD-SCNC: 138 MMOL/L (ref 132–146)
TOTAL CK: 493 U/L (ref 20–200)

## 2021-03-02 PROCEDURE — 36415 COLL VENOUS BLD VENIPUNCTURE: CPT

## 2021-03-02 PROCEDURE — 6370000000 HC RX 637 (ALT 250 FOR IP): Performed by: STUDENT IN AN ORGANIZED HEALTH CARE EDUCATION/TRAINING PROGRAM

## 2021-03-02 PROCEDURE — 83735 ASSAY OF MAGNESIUM: CPT

## 2021-03-02 PROCEDURE — 6370000000 HC RX 637 (ALT 250 FOR IP): Performed by: FAMILY MEDICINE

## 2021-03-02 PROCEDURE — 82550 ASSAY OF CK (CPK): CPT

## 2021-03-02 PROCEDURE — 6360000002 HC RX W HCPCS: Performed by: FAMILY MEDICINE

## 2021-03-02 PROCEDURE — 80048 BASIC METABOLIC PNL TOTAL CA: CPT

## 2021-03-02 PROCEDURE — 2580000003 HC RX 258: Performed by: FAMILY MEDICINE

## 2021-03-02 PROCEDURE — 99239 HOSP IP/OBS DSCHRG MGMT >30: CPT | Performed by: STUDENT IN AN ORGANIZED HEALTH CARE EDUCATION/TRAINING PROGRAM

## 2021-03-02 PROCEDURE — 2580000003 HC RX 258: Performed by: STUDENT IN AN ORGANIZED HEALTH CARE EDUCATION/TRAINING PROGRAM

## 2021-03-02 RX ORDER — POTASSIUM CHLORIDE 20 MEQ/1
40 TABLET, EXTENDED RELEASE ORAL ONCE
Status: COMPLETED | OUTPATIENT
Start: 2021-03-02 | End: 2021-03-02

## 2021-03-02 RX ADMIN — POTASSIUM CHLORIDE 40 MEQ: 1500 TABLET, EXTENDED RELEASE ORAL at 11:07

## 2021-03-02 RX ADMIN — SODIUM CHLORIDE, PRESERVATIVE FREE 10 ML: 5 INJECTION INTRAVENOUS at 09:03

## 2021-03-02 RX ADMIN — OXYCODONE HYDROCHLORIDE 10 MG: 5 TABLET ORAL at 09:02

## 2021-03-02 RX ADMIN — SODIUM CHLORIDE: 9 INJECTION, SOLUTION INTRAVENOUS at 09:02

## 2021-03-02 RX ADMIN — ORPHENADRINE CITRATE 60 MG: 30 INJECTION, SOLUTION INTRAMUSCULAR; INTRAVENOUS at 02:16

## 2021-03-02 RX ADMIN — SODIUM CHLORIDE, PRESERVATIVE FREE 10 ML: 5 INJECTION INTRAVENOUS at 02:12

## 2021-03-02 RX ADMIN — OXYCODONE HYDROCHLORIDE 10 MG: 5 TABLET ORAL at 01:54

## 2021-03-02 ASSESSMENT — PAIN SCALES - GENERAL
PAINLEVEL_OUTOF10: 3
PAINLEVEL_OUTOF10: 5

## 2021-03-02 ASSESSMENT — PAIN DESCRIPTION - PROGRESSION: CLINICAL_PROGRESSION: NOT CHANGED

## 2021-03-02 NOTE — CARE COORDINATION
3/2/2021  Social Work Discharge Planning:SW confirmed with Pts mother that she doesn't need [facility] for Pt at home. Mother and family take care of Pt and declined [facility]. Pt is on room air and is a  paraplegic. Has a WC.  Electronically signed by PARTHA Benavides on 3/2/2021 at 10:39 AM
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
## 2021-03-02 NOTE — PLAN OF CARE
Problem: Pain:  Goal: Pain level will decrease  Description: Pain level will decrease  Outcome: Ongoing  Goal: Control of acute pain  Description: Control of acute pain  Outcome: Met This Shift  Goal: Control of chronic pain  Description: Control of chronic pain  Outcome: Ongoing     Problem: Skin Integrity:  Goal: Will show no infection signs and symptoms  Description: Will show no infection signs and symptoms  Outcome: Met This Shift  Goal: Absence of new skin breakdown  Description: Absence of new skin breakdown  Outcome: Met This Shift     Problem: Falls - Risk of:  Goal: Will remain free from falls  Description: Will remain free from falls  Outcome: Met This Shift  Goal: Absence of physical injury  Description: Absence of physical injury  Outcome: Met This Shift     Problem: Increased nutrient needs (NI-5.1)  Goal: Food and/or Nutrient Delivery  Description: Individualized approach for food/nutrient provision.   3/1/2021 1621 by Tawana Berger, MS, RD, LD  Outcome: Met This Shift

## 2021-03-02 NOTE — PROGRESS NOTES
Pt refusing to be turned from supine position. Educated pt on importance of off loading and turning and repositioning. Educated on wound care and how turning would assist in healing. Pt continues to refuse to be turned. Pt iv fluids were off- talked with pt on importance of running iv fluids for his admitting diagnosis. Pt agreeable to try  The iv fluids again. Fluids restarted and will continue to monitor.

## 2021-03-02 NOTE — PLAN OF CARE
Problem: Pain:  Goal: Pain level will decrease  Description: Pain level will decrease  3/2/2021 1110 by Sharmaine Richey  Outcome: Completed  3/2/2021 0045 by Harvey Celeste RN  Outcome: Ongoing  Goal: Control of acute pain  Description: Control of acute pain  3/2/2021 1110 by Sharmaine Richey  Outcome: Completed  3/2/2021 0045 by Harvey Celeste RN  Outcome: Met This Shift  Goal: Control of chronic pain  Description: Control of chronic pain  3/2/2021 1110 by Sharmaine Richey  Outcome: Completed  3/2/2021 0045 by Harvey Celeste RN  Outcome: Ongoing     Problem: Skin Integrity:  Goal: Will show no infection signs and symptoms  Description: Will show no infection signs and symptoms  3/2/2021 1110 by Sharmaine Richey  Outcome: Completed  3/2/2021 0045 by Harvey Celeste RN  Outcome: Met This Shift  Goal: Absence of new skin breakdown  Description: Absence of new skin breakdown  3/2/2021 1110 by Sharmaine Richey  Outcome: Completed  3/2/2021 0045 by Harvey Celeste RN  Outcome: Met This Shift     Problem: Falls - Risk of:  Goal: Will remain free from falls  Description: Will remain free from falls  3/2/2021 1110 by Sharmaine Richey  Outcome: Completed  3/2/2021 0045 by Harvey Celeste RN  Outcome: Met This Shift  Goal: Absence of physical injury  Description: Absence of physical injury  3/2/2021 1110 by Sharmaine Richey  Outcome: Completed  3/2/2021 0045 by Harvey Celeste RN  Outcome: Met This Shift

## 2021-03-29 ENCOUNTER — HOSPITAL ENCOUNTER (EMERGENCY)
Age: 26
Discharge: HOME | End: 2021-03-29
Payer: COMMERCIAL

## 2021-03-29 VITALS
HEART RATE: 68 BPM | BODY MASS INDEX: 27.57 KG/M2 | OXYGEN SATURATION: 98 % | RESPIRATION RATE: 16 BRPM | DIASTOLIC BLOOD PRESSURE: 83 MMHG | WEIGHT: 208 LBS | SYSTOLIC BLOOD PRESSURE: 140 MMHG | HEIGHT: 73 IN | TEMPERATURE: 97.7 F

## 2021-03-29 DIAGNOSIS — G89.29 CHRONIC BILATERAL LOW BACK PAIN WITHOUT SCIATICA: Primary | ICD-10-CM

## 2021-03-29 DIAGNOSIS — M54.50 CHRONIC BILATERAL LOW BACK PAIN WITHOUT SCIATICA: Primary | ICD-10-CM

## 2021-03-29 PROCEDURE — 6360000002 HC RX W HCPCS: Performed by: NURSE PRACTITIONER

## 2021-03-29 PROCEDURE — 96372 THER/PROPH/DIAG INJ SC/IM: CPT

## 2021-03-29 PROCEDURE — 99285 EMERGENCY DEPT VISIT HI MDM: CPT

## 2021-03-29 PROCEDURE — 6370000000 HC RX 637 (ALT 250 FOR IP): Performed by: NURSE PRACTITIONER

## 2021-03-29 RX ORDER — ORPHENADRINE CITRATE 30 MG/ML
60 INJECTION INTRAMUSCULAR; INTRAVENOUS ONCE
Status: COMPLETED | OUTPATIENT
Start: 2021-03-29 | End: 2021-03-29

## 2021-03-29 RX ORDER — OXYCODONE HYDROCHLORIDE AND ACETAMINOPHEN 5; 325 MG/1; MG/1
1 TABLET ORAL EVERY 6 HOURS PRN
Qty: 6 TABLET | Refills: 0 | Status: SHIPPED | OUTPATIENT
Start: 2021-03-29 | End: 2021-03-31

## 2021-03-29 RX ORDER — APIXABAN 5 MG/1
TABLET, FILM COATED ORAL
COMMUNITY
Start: 2021-02-22

## 2021-03-29 RX ORDER — FENTANYL CITRATE 50 UG/ML
100 INJECTION, SOLUTION INTRAMUSCULAR; INTRAVENOUS ONCE
Status: COMPLETED | OUTPATIENT
Start: 2021-03-29 | End: 2021-03-29

## 2021-03-29 RX ORDER — ORPHENADRINE CITRATE 100 MG/1
100 TABLET, EXTENDED RELEASE ORAL 2 TIMES DAILY
Qty: 20 TABLET | Refills: 0 | Status: SHIPPED | OUTPATIENT
Start: 2021-03-29 | End: 2021-04-08

## 2021-03-29 RX ORDER — OXYCODONE HYDROCHLORIDE AND ACETAMINOPHEN 5; 325 MG/1; MG/1
2 TABLET ORAL ONCE
Status: COMPLETED | OUTPATIENT
Start: 2021-03-29 | End: 2021-03-29

## 2021-03-29 RX ADMIN — OXYCODONE HYDROCHLORIDE AND ACETAMINOPHEN 2 TABLET: 5; 325 TABLET ORAL at 00:25

## 2021-03-29 RX ADMIN — FENTANYL CITRATE 100 MCG: 50 INJECTION, SOLUTION INTRAMUSCULAR; INTRAVENOUS at 01:17

## 2021-03-29 RX ADMIN — ORPHENADRINE CITRATE 60 MG: 60 INJECTION INTRAMUSCULAR; INTRAVENOUS at 01:17

## 2021-03-29 ASSESSMENT — PAIN SCALES - GENERAL
PAINLEVEL_OUTOF10: 7
PAINLEVEL_OUTOF10: 8
PAINLEVEL_OUTOF10: 8

## 2021-03-29 ASSESSMENT — PAIN DESCRIPTION - ORIENTATION: ORIENTATION: LOWER;MID

## 2021-03-29 ASSESSMENT — PAIN DESCRIPTION - FREQUENCY: FREQUENCY: CONTINUOUS

## 2021-03-29 NOTE — ED NOTES
Bed: 20  Expected date:   Expected time:   Means of arrival:   Comments:  AMARILYS Craft, MITESH  03/29/21 0003
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
## 2021-03-29 NOTE — ED NOTES
RN called [facility] to set up transport for pt to go back home. Estimated time for  is at 0300.      Terri Mccarthy RN  03/29/21 1512

## 2021-05-13 ENCOUNTER — APPOINTMENT (OUTPATIENT)
Dept: GENERAL RADIOLOGY | Age: 26
End: 2021-05-13
Payer: COMMERCIAL

## 2021-05-13 ENCOUNTER — HOSPITAL ENCOUNTER (EMERGENCY)
Age: 26
Discharge: HOME | End: 2021-05-13
Attending: EMERGENCY MEDICINE
Payer: COMMERCIAL

## 2021-05-13 VITALS
SYSTOLIC BLOOD PRESSURE: 142 MMHG | RESPIRATION RATE: 18 BRPM | HEART RATE: 51 BPM | TEMPERATURE: 98.3 F | DIASTOLIC BLOOD PRESSURE: 85 MMHG | HEIGHT: 73 IN | WEIGHT: 208 LBS | BODY MASS INDEX: 27.57 KG/M2 | OXYGEN SATURATION: 100 %

## 2021-05-13 DIAGNOSIS — R07.89 RIGHT-SIDED CHEST WALL PAIN: Primary | ICD-10-CM

## 2021-05-13 LAB
ALBUMIN SERPL-MCNC: 4 G/DL (ref 3.5–5.2)
ALP BLD-CCNC: 103 U/L (ref 40–129)
ALT SERPL-CCNC: 10 U/L (ref 0–40)
ANION GAP SERPL CALCULATED.3IONS-SCNC: 9 MMOL/L (ref 7–16)
AST SERPL-CCNC: 15 U/L (ref 0–39)
BACTERIA: ABNORMAL /HPF
BASOPHILS ABSOLUTE: 0.05 E9/L (ref 0–0.2)
BASOPHILS RELATIVE PERCENT: 0.7 % (ref 0–2)
BILIRUB SERPL-MCNC: 0.4 MG/DL (ref 0–1.2)
BILIRUBIN DIRECT: <0.2 MG/DL (ref 0–0.3)
BILIRUBIN URINE: NEGATIVE
BILIRUBIN, INDIRECT: NORMAL MG/DL (ref 0–1)
BLOOD, URINE: ABNORMAL
BUN BLDV-MCNC: 7 MG/DL (ref 6–20)
CALCIUM SERPL-MCNC: 9.3 MG/DL (ref 8.6–10.2)
CHLORIDE BLD-SCNC: 105 MMOL/L (ref 98–107)
CLARITY: ABNORMAL
CO2: 27 MMOL/L (ref 22–29)
COLOR: YELLOW
CREAT SERPL-MCNC: 0.9 MG/DL (ref 0.7–1.2)
EOSINOPHILS ABSOLUTE: 0.12 E9/L (ref 0.05–0.5)
EOSINOPHILS RELATIVE PERCENT: 1.7 % (ref 0–6)
EPITHELIAL CELLS, UA: ABNORMAL /HPF
GFR AFRICAN AMERICAN: >60
GFR NON-AFRICAN AMERICAN: >60 ML/MIN/1.73
GLUCOSE BLD-MCNC: 78 MG/DL (ref 74–99)
GLUCOSE URINE: NEGATIVE MG/DL
HCT VFR BLD CALC: 48.9 % (ref 37–54)
HEMOGLOBIN: 15.9 G/DL (ref 12.5–16.5)
IMMATURE GRANULOCYTES #: 0.01 E9/L
IMMATURE GRANULOCYTES %: 0.1 % (ref 0–5)
KETONES, URINE: NEGATIVE MG/DL
LEUKOCYTE ESTERASE, URINE: ABNORMAL
LYMPHOCYTES ABSOLUTE: 2.61 E9/L (ref 1.5–4)
LYMPHOCYTES RELATIVE PERCENT: 36.8 % (ref 20–42)
MCH RBC QN AUTO: 28.3 PG (ref 26–35)
MCHC RBC AUTO-ENTMCNC: 32.5 % (ref 32–34.5)
MCV RBC AUTO: 87.2 FL (ref 80–99.9)
MONOCYTES ABSOLUTE: 0.63 E9/L (ref 0.1–0.95)
MONOCYTES RELATIVE PERCENT: 8.9 % (ref 2–12)
NEUTROPHILS ABSOLUTE: 3.68 E9/L (ref 1.8–7.3)
NEUTROPHILS RELATIVE PERCENT: 51.8 % (ref 43–80)
NITRITE, URINE: POSITIVE
PDW BLD-RTO: 18.3 FL (ref 11.5–15)
PH UA: 8 (ref 5–9)
PLATELET # BLD: 327 E9/L (ref 130–450)
PMV BLD AUTO: 10.8 FL (ref 7–12)
POTASSIUM SERPL-SCNC: 3.9 MMOL/L (ref 3.5–5)
PROTEIN UA: NEGATIVE MG/DL
RBC # BLD: 5.61 E12/L (ref 3.8–5.8)
RBC UA: ABNORMAL /HPF (ref 0–2)
SODIUM BLD-SCNC: 141 MMOL/L (ref 132–146)
SPECIFIC GRAVITY UA: 1.02 (ref 1–1.03)
TOTAL CK: 331 U/L (ref 20–200)
TOTAL PROTEIN: 7.2 G/DL (ref 6.4–8.3)
TROPONIN: 0.03 NG/ML (ref 0–0.03)
UROBILINOGEN, URINE: 1 E.U./DL
WBC # BLD: 7.1 E9/L (ref 4.5–11.5)
WBC UA: >20 /HPF (ref 0–5)

## 2021-05-13 PROCEDURE — 2580000003 HC RX 258: Performed by: EMERGENCY MEDICINE

## 2021-05-13 PROCEDURE — 81001 URINALYSIS AUTO W/SCOPE: CPT

## 2021-05-13 PROCEDURE — 80076 HEPATIC FUNCTION PANEL: CPT

## 2021-05-13 PROCEDURE — 80048 BASIC METABOLIC PNL TOTAL CA: CPT

## 2021-05-13 PROCEDURE — 84484 ASSAY OF TROPONIN QUANT: CPT

## 2021-05-13 PROCEDURE — 99284 EMERGENCY DEPT VISIT MOD MDM: CPT

## 2021-05-13 PROCEDURE — 93005 ELECTROCARDIOGRAM TRACING: CPT | Performed by: EMERGENCY MEDICINE

## 2021-05-13 PROCEDURE — 82550 ASSAY OF CK (CPK): CPT

## 2021-05-13 PROCEDURE — 96374 THER/PROPH/DIAG INJ IV PUSH: CPT

## 2021-05-13 PROCEDURE — 71045 X-RAY EXAM CHEST 1 VIEW: CPT

## 2021-05-13 PROCEDURE — 6360000002 HC RX W HCPCS: Performed by: EMERGENCY MEDICINE

## 2021-05-13 PROCEDURE — 85025 COMPLETE CBC W/AUTO DIFF WBC: CPT

## 2021-05-13 RX ORDER — KETOROLAC TROMETHAMINE 10 MG/1
10 TABLET, FILM COATED ORAL EVERY 8 HOURS PRN
Qty: 15 TABLET | Refills: 0 | Status: SHIPPED | OUTPATIENT
Start: 2021-05-13

## 2021-05-13 RX ORDER — SODIUM CHLORIDE 0.9 % (FLUSH) 0.9 %
10 SYRINGE (ML) INJECTION PRN
Status: DISCONTINUED | OUTPATIENT
Start: 2021-05-13 | End: 2021-05-14 | Stop reason: HOSPADM

## 2021-05-13 RX ORDER — KETOROLAC TROMETHAMINE 30 MG/ML
15 INJECTION, SOLUTION INTRAMUSCULAR; INTRAVENOUS ONCE
Status: COMPLETED | OUTPATIENT
Start: 2021-05-13 | End: 2021-05-13

## 2021-05-13 RX ORDER — 0.9 % SODIUM CHLORIDE 0.9 %
1000 INTRAVENOUS SOLUTION INTRAVENOUS ONCE
Status: COMPLETED | OUTPATIENT
Start: 2021-05-13 | End: 2021-05-13

## 2021-05-13 RX ORDER — HYDROCODONE BITARTRATE AND ACETAMINOPHEN 5; 325 MG/1; MG/1
1 TABLET ORAL EVERY 6 HOURS PRN
Qty: 6 TABLET | Refills: 0 | Status: SHIPPED | OUTPATIENT
Start: 2021-05-13 | End: 2021-05-16

## 2021-05-13 RX ADMIN — KETOROLAC TROMETHAMINE 15 MG: 30 INJECTION, SOLUTION INTRAMUSCULAR; INTRAVENOUS at 12:59

## 2021-05-13 RX ADMIN — SODIUM CHLORIDE 1000 ML: 9 INJECTION, SOLUTION INTRAVENOUS at 12:59

## 2021-05-13 ASSESSMENT — ENCOUNTER SYMPTOMS
BACK PAIN: 0
SORE THROAT: 0
VOMITING: 0
ABDOMINAL PAIN: 0
COUGH: 0
EYE PAIN: 0
WHEEZING: 0
EYE DISCHARGE: 0
SHORTNESS OF BREATH: 0
ORTHOPNEA: 0
SINUS PRESSURE: 0
NAUSEA: 0
EYE REDNESS: 0
HEARTBURN: 0
DIARRHEA: 0

## 2021-05-13 ASSESSMENT — PAIN DESCRIPTION - FREQUENCY: FREQUENCY: CONTINUOUS

## 2021-05-13 ASSESSMENT — PAIN SCALES - GENERAL: PAINLEVEL_OUTOF10: 10

## 2021-05-13 NOTE — ED NOTES
Bed: 08  Expected date:   Expected time:   Means of arrival:   Comments:  Marilyn Phillips, RN  05/13/21 5015
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
## 2021-05-13 NOTE — LETTER
Juanita Lozoya ...   [place] Department Medicaid  CERTIFICATION OF NECESSITY  FOR NON-EMERGENCY TRANSPORTATION   BY GROUND AMBULANCE    Individual Information   1. Name: Dilan Velazquez 2. [place] Medicaid Billing Number: [id number]   1. Address: [address] [place] [address]    Transportation Provider Information   4. Provider Name: [facility]   5. [place] Provider Number:  National Provider Identifier (NPI):    Certification  7. Criteria:  During transport, this individual requires:  [x] Medical treatment or continuous     supervision by an EMT. [] The administration or regulation of oxygen by another person. [] Supervised protective restraint. 8. Period Beginning Date: 21   9. Length  [x] Not more than 1 day(s)  [] One Year     Additional Information Relevant to Certification   10. Comments or Explanations, If Necessary or Appropriate   Pt seed in the ED and returning home, Pt is a Paraplegic and unable to sit in a standard wheelchair. Certifying Practitioner Information   11. Name of Practitioner: Juliane Bustamante DO   12. [place] Medicaid Provider Number, If Applicable:  [address] Provider Identifier (NPI):    Signature Information   14. Date of Signature: 21 15. Name of Person Signing: Poncho Reveal   79. Signature and Professional Designation:      [facility] [id number]  Rev. 2015        [address] Encounter Date/Time: 2021 [facility] Account: [de-identified]    MRN: [id number]    Patient: Dilan Velazquez    Contact Serial #: [id number]      ENCOUNTER          Patient Class: E Private Enc?   No Unit RM BD: [facility] ED    Hospital Service: ERS   Encounter DX:     ADM Provider:     Procedure:     ATT Provider: Juliane Bustamante DO   REF Provider:        Admission DX:  and codes:       PATIENT                 Name: Dilan Velazquez : 1995 (25 yrs)   Address: [address] [place] [address] Sex: Male   City: [facility]         Marital Status: Single   Employer: NOT EMPLOYED         Anabaptism: None   Primary Care Provider:           Primary Phone: [phone]   EMERGENCY CONTACT   Contact Name Legal Guardian? Relationship to Patient Home Phone Work Phone   1. Julian Jorden  2. Brent Shall    Parent  Brother/Sister [phone]              GUARANTOR            Guarantor: Flavio Felty     : 1995   Address: [address] [place] [address] Sex: Male   [place] [id number]     Relation to Patient: Self       Home Phone: [phone]   Guarantor ID: [id number]       Work Phone:     Guarantor Employer: NOT EMPLOYED         Status: NOT EMPLO*      COVERAGE        PRIMARY INSURANCE   Payor: Delores Gutierrez Plan: [facility] MEDICAID   Payor Address: CLAIMS DEPARTMENT; [address] [facility]*,  [address] [place] [address], [address] [place] [address]       Group Number: [id number] Insurance Type: INDEMNITY   Subscriber Name: Marc Perez : 1995   Subscriber ID: [id number] Pat. Rel. to Sub: Self   SECONDARY INSURANCE   Payor:   Plan:     Payor Address:  ,           Group Number:   Insurance Type:     Subscriber Name:   Subscriber :     Subscriber ID:   Pat.  Rel. to Sub:

## 2021-05-13 NOTE — CARE COORDINATION
Social Work/Transition of Care:    Pt in need of transportation home SW called [facility] ETA 90 to 120 minutes, SW completed Medical Necessity form and updated ED RN and patient.     Electronically signed by Jessica Wheeler on 8/10/7130 at 2:34 PM

## 2021-05-14 LAB
EKG ATRIAL RATE: 57 BPM
EKG P AXIS: 28 DEGREES
EKG P-R INTERVAL: 142 MS
EKG Q-T INTERVAL: 430 MS
EKG QRS DURATION: 84 MS
EKG QTC CALCULATION (BAZETT): 396 MS
EKG R AXIS: 61 DEGREES
EKG T AXIS: 74 DEGREES
EKG VENTRICULAR RATE: 51 BPM

## 2021-05-14 PROCEDURE — 93010 ELECTROCARDIOGRAM REPORT: CPT | Performed by: INTERNAL MEDICINE

## 2021-08-17 NOTE — PLAN OF CARE
Problem: Falls - Risk of:  Goal: Will remain free from falls  Description: Will remain free from falls  Outcome: Met This Shift  Goal: Absence of physical injury  Description: Absence of physical injury  Outcome: Met This Shift     Problem: Pain:  Goal: Pain level will decrease  Description: Pain level will decrease  Outcome: Met This Shift  Goal: Control of acute pain  Description: Control of acute pain  Outcome: Met This Shift  Goal: Control of chronic pain  Description: Control of chronic pain  Outcome: Met This Shift     Problem: Skin Integrity:  Goal: Will show no infection signs and symptoms  Description: Will show no infection signs and symptoms  Outcome: Met This Shift  Goal: Absence of new skin breakdown  Description: Absence of new skin breakdown  Outcome: Met This Shift
Problem: Increased nutrient needs (NI-5.1)  Goal: Food and/or Nutrient Delivery  Description: Individualized approach for food/nutrient provision.   Outcome: Met This Shift
- - -

## 2022-01-24 ENCOUNTER — APPOINTMENT (OUTPATIENT)
Dept: GENERAL RADIOLOGY | Age: 27
End: 2022-01-24
Payer: COMMERCIAL

## 2022-01-24 ENCOUNTER — APPOINTMENT (OUTPATIENT)
Dept: CT IMAGING | Age: 27
End: 2022-01-24
Payer: COMMERCIAL

## 2022-01-24 ENCOUNTER — HOSPITAL ENCOUNTER (EMERGENCY)
Age: 27
Discharge: HOME | End: 2022-01-28
Attending: EMERGENCY MEDICINE
Payer: COMMERCIAL

## 2022-01-24 VITALS
SYSTOLIC BLOOD PRESSURE: 122 MMHG | RESPIRATION RATE: 16 BRPM | OXYGEN SATURATION: 100 % | HEART RATE: 65 BPM | DIASTOLIC BLOOD PRESSURE: 72 MMHG | TEMPERATURE: 98.8 F

## 2022-01-24 DIAGNOSIS — R07.89 ATYPICAL CHEST PAIN: Primary | ICD-10-CM

## 2022-01-24 LAB
ALBUMIN SERPL-MCNC: 3.9 G/DL (ref 3.5–5.2)
ALP BLD-CCNC: 90 U/L (ref 40–129)
ALT SERPL-CCNC: 6 U/L (ref 0–40)
ANION GAP SERPL CALCULATED.3IONS-SCNC: 9 MMOL/L (ref 7–16)
AST SERPL-CCNC: 11 U/L (ref 0–39)
BASOPHILS ABSOLUTE: 0.03 E9/L (ref 0–0.2)
BASOPHILS RELATIVE PERCENT: 0.3 % (ref 0–2)
BILIRUB SERPL-MCNC: 0.4 MG/DL (ref 0–1.2)
BUN BLDV-MCNC: 7 MG/DL (ref 6–20)
CALCIUM SERPL-MCNC: 8.8 MG/DL (ref 8.6–10.2)
CHLORIDE BLD-SCNC: 105 MMOL/L (ref 98–107)
CO2: 27 MMOL/L (ref 22–29)
CREAT SERPL-MCNC: 1 MG/DL (ref 0.7–1.2)
D DIMER: 306 NG/ML DDU
EOSINOPHILS ABSOLUTE: 0.03 E9/L (ref 0.05–0.5)
EOSINOPHILS RELATIVE PERCENT: 0.3 % (ref 0–6)
GFR AFRICAN AMERICAN: >60
GFR NON-AFRICAN AMERICAN: >60 ML/MIN/1.73
GLUCOSE BLD-MCNC: 87 MG/DL (ref 74–99)
HCT VFR BLD CALC: 48.4 % (ref 37–54)
HEMOGLOBIN: 15.8 G/DL (ref 12.5–16.5)
IMMATURE GRANULOCYTES #: 0.03 E9/L
IMMATURE GRANULOCYTES %: 0.3 % (ref 0–5)
LIPASE: 24 U/L (ref 13–60)
LYMPHOCYTES ABSOLUTE: 2.47 E9/L (ref 1.5–4)
LYMPHOCYTES RELATIVE PERCENT: 22.7 % (ref 20–42)
MCH RBC QN AUTO: 30.2 PG (ref 26–35)
MCHC RBC AUTO-ENTMCNC: 32.6 % (ref 32–34.5)
MCV RBC AUTO: 92.4 FL (ref 80–99.9)
MONOCYTES ABSOLUTE: 0.62 E9/L (ref 0.1–0.95)
MONOCYTES RELATIVE PERCENT: 5.7 % (ref 2–12)
NEUTROPHILS ABSOLUTE: 7.72 E9/L (ref 1.8–7.3)
NEUTROPHILS RELATIVE PERCENT: 70.7 % (ref 43–80)
PDW BLD-RTO: 15.2 FL (ref 11.5–15)
PLATELET # BLD: 289 E9/L (ref 130–450)
PMV BLD AUTO: 10.5 FL (ref 7–12)
POTASSIUM REFLEX MAGNESIUM: 3.6 MMOL/L (ref 3.5–5)
RBC # BLD: 5.24 E12/L (ref 3.8–5.8)
REASON FOR REJECTION: NORMAL
REJECTED TEST: NORMAL
SARS-COV-2, NAAT: NOT DETECTED
SODIUM BLD-SCNC: 141 MMOL/L (ref 132–146)
TOTAL PROTEIN: 6.7 G/DL (ref 6.4–8.3)
TROPONIN, HIGH SENSITIVITY: 25 NG/L (ref 0–11)
TROPONIN, HIGH SENSITIVITY: 26 NG/L (ref 0–11)
WBC # BLD: 10.9 E9/L (ref 4.5–11.5)

## 2022-01-24 PROCEDURE — 6360000002 HC RX W HCPCS: Performed by: EMERGENCY MEDICINE

## 2022-01-24 PROCEDURE — 6360000004 HC RX CONTRAST MEDICATION: Performed by: RADIOLOGY

## 2022-01-24 PROCEDURE — 36415 COLL VENOUS BLD VENIPUNCTURE: CPT

## 2022-01-24 PROCEDURE — 71275 CT ANGIOGRAPHY CHEST: CPT

## 2022-01-24 PROCEDURE — 99285 EMERGENCY DEPT VISIT HI MDM: CPT

## 2022-01-24 PROCEDURE — 96372 THER/PROPH/DIAG INJ SC/IM: CPT

## 2022-01-24 PROCEDURE — 85378 FIBRIN DEGRADE SEMIQUANT: CPT

## 2022-01-24 PROCEDURE — 87635 SARS-COV-2 COVID-19 AMP PRB: CPT

## 2022-01-24 PROCEDURE — 80053 COMPREHEN METABOLIC PANEL: CPT

## 2022-01-24 PROCEDURE — 93005 ELECTROCARDIOGRAM TRACING: CPT

## 2022-01-24 PROCEDURE — 85025 COMPLETE CBC W/AUTO DIFF WBC: CPT

## 2022-01-24 PROCEDURE — 83690 ASSAY OF LIPASE: CPT

## 2022-01-24 PROCEDURE — 6370000000 HC RX 637 (ALT 250 FOR IP): Performed by: EMERGENCY MEDICINE

## 2022-01-24 PROCEDURE — 71045 X-RAY EXAM CHEST 1 VIEW: CPT

## 2022-01-24 PROCEDURE — 84484 ASSAY OF TROPONIN QUANT: CPT

## 2022-01-24 RX ORDER — ORPHENADRINE CITRATE 30 MG/ML
60 INJECTION INTRAMUSCULAR; INTRAVENOUS ONCE
Status: COMPLETED | OUTPATIENT
Start: 2022-01-24 | End: 2022-01-24

## 2022-01-24 RX ORDER — HYDROCODONE BITARTRATE AND ACETAMINOPHEN 5; 325 MG/1; MG/1
1 TABLET ORAL ONCE
Status: COMPLETED | OUTPATIENT
Start: 2022-01-24 | End: 2022-01-24

## 2022-01-24 RX ADMIN — ORPHENADRINE CITRATE 60 MG: 30 INJECTION INTRAMUSCULAR; INTRAVENOUS at 20:18

## 2022-01-24 RX ADMIN — IOPAMIDOL 75 ML: 755 INJECTION, SOLUTION INTRAVENOUS at 19:53

## 2022-01-24 RX ADMIN — HYDROCODONE BITARTRATE AND ACETAMINOPHEN 1 TABLET: 5; 325 TABLET ORAL at 23:26

## 2022-01-24 ASSESSMENT — PAIN SCALES - GENERAL
PAINLEVEL_OUTOF10: 8
PAINLEVEL_OUTOF10: 7

## 2022-01-24 ASSESSMENT — ENCOUNTER SYMPTOMS
COUGH: 0
CHOKING: 0
BLOOD IN STOOL: 0
COLOR CHANGE: 0
NAUSEA: 0
SORE THROAT: 0
CHEST TIGHTNESS: 0
VOMITING: 0

## 2022-01-24 ASSESSMENT — PAIN DESCRIPTION - LOCATION: LOCATION: BACK;CHEST

## 2022-01-24 NOTE — ED NOTES
Bed: 22  Expected date:   Expected time:   Means of arrival:   Comments:  EMS     Luke Maya RN  01/24/22 8281
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
## 2022-01-25 LAB
EKG ATRIAL RATE: 61 BPM
EKG P AXIS: 57 DEGREES
EKG P-R INTERVAL: 148 MS
EKG Q-T INTERVAL: 400 MS
EKG QRS DURATION: 86 MS
EKG QTC CALCULATION (BAZETT): 402 MS
EKG R AXIS: 71 DEGREES
EKG T AXIS: 83 DEGREES
EKG VENTRICULAR RATE: 61 BPM

## 2022-01-25 NOTE — ED NOTES
[facility] approximate  time 0400. Tisha Yañez, charge nurse made aware.      Oleh Lennox, RN  01/24/22 3271

## 2022-01-26 ASSESSMENT — ENCOUNTER SYMPTOMS
ABDOMINAL PAIN: 1
SHORTNESS OF BREATH: 1

## 2022-02-22 LAB
BACTERIA: ABNORMAL /HPF
BILIRUBIN URINE: NEGATIVE
BLOOD, URINE: NEGATIVE
CLARITY: CLEAR
COLOR: YELLOW
GLUCOSE URINE: NEGATIVE MG/DL
KETONES, URINE: NEGATIVE MG/DL
LEUKOCYTE ESTERASE, URINE: ABNORMAL
NITRITE, URINE: POSITIVE
PH UA: 6 (ref 5–9)
PROTEIN UA: NEGATIVE MG/DL
RBC UA: ABNORMAL /HPF (ref 0–2)
RENAL EPITHELIAL, UA: ABNORMAL /HPF
SPECIFIC GRAVITY UA: 1.01 (ref 1–1.03)
UROBILINOGEN, URINE: 0.2 E.U./DL
WBC UA: ABNORMAL /HPF (ref 0–5)

## 2022-02-24 LAB
ORGANISM: ABNORMAL
URINE CULTURE, ROUTINE: ABNORMAL

## 2022-07-23 ENCOUNTER — APPOINTMENT (OUTPATIENT)
Dept: GENERAL RADIOLOGY | Age: 27
End: 2022-07-23
Payer: MEDICAID

## 2022-07-23 ENCOUNTER — HOSPITAL ENCOUNTER (EMERGENCY)
Age: 27
Discharge: HOME | End: 2022-07-24
Attending: STUDENT IN AN ORGANIZED HEALTH CARE EDUCATION/TRAINING PROGRAM
Payer: MEDICAID

## 2022-07-23 VITALS
DIASTOLIC BLOOD PRESSURE: 76 MMHG | BODY MASS INDEX: 23.86 KG/M2 | OXYGEN SATURATION: 100 % | HEIGHT: 73 IN | TEMPERATURE: 98.5 F | SYSTOLIC BLOOD PRESSURE: 132 MMHG | HEART RATE: 92 BPM | RESPIRATION RATE: 16 BRPM | WEIGHT: 180 LBS

## 2022-07-23 DIAGNOSIS — G82.20 PARAPLEGIA FOLLOWING SPINAL CORD INJURY (HCC): ICD-10-CM

## 2022-07-23 DIAGNOSIS — M62.838 SPASM OF MUSCLE: Primary | ICD-10-CM

## 2022-07-23 LAB
ALBUMIN SERPL-MCNC: 4.1 G/DL (ref 3.5–5.2)
ALP BLD-CCNC: 94 U/L (ref 40–129)
ALT SERPL-CCNC: 57 U/L (ref 0–40)
ANION GAP SERPL CALCULATED.3IONS-SCNC: 15 MMOL/L (ref 7–16)
AST SERPL-CCNC: 31 U/L (ref 0–39)
BACTERIA: ABNORMAL /HPF
BASOPHILS ABSOLUTE: 0.03 E9/L (ref 0–0.2)
BASOPHILS RELATIVE PERCENT: 0.4 % (ref 0–2)
BILIRUB SERPL-MCNC: 0.4 MG/DL (ref 0–1.2)
BILIRUBIN URINE: NEGATIVE
BLOOD, URINE: NEGATIVE
BUN BLDV-MCNC: 8 MG/DL (ref 6–20)
CALCIUM SERPL-MCNC: 9.4 MG/DL (ref 8.6–10.2)
CHLORIDE BLD-SCNC: 103 MMOL/L (ref 98–107)
CLARITY: ABNORMAL
CO2: 20 MMOL/L (ref 22–29)
COLOR: YELLOW
CREAT SERPL-MCNC: 1.1 MG/DL (ref 0.7–1.2)
EOSINOPHILS ABSOLUTE: 0.08 E9/L (ref 0.05–0.5)
EOSINOPHILS RELATIVE PERCENT: 1.1 % (ref 0–6)
GFR AFRICAN AMERICAN: >60
GFR NON-AFRICAN AMERICAN: >60 ML/MIN/1.73
GLUCOSE BLD-MCNC: 89 MG/DL (ref 74–99)
GLUCOSE URINE: NEGATIVE MG/DL
HCT VFR BLD CALC: 42.4 % (ref 37–54)
HEMOGLOBIN: 14.5 G/DL (ref 12.5–16.5)
IMMATURE GRANULOCYTES #: 0.02 E9/L
IMMATURE GRANULOCYTES %: 0.3 % (ref 0–5)
KETONES, URINE: NEGATIVE MG/DL
LACTIC ACID: 1.4 MMOL/L (ref 0.5–2.2)
LACTIC ACID: 3.3 MMOL/L (ref 0.5–2.2)
LEUKOCYTE ESTERASE, URINE: NEGATIVE
LYMPHOCYTES ABSOLUTE: 1.24 E9/L (ref 1.5–4)
LYMPHOCYTES RELATIVE PERCENT: 17.3 % (ref 20–42)
MCH RBC QN AUTO: 30.5 PG (ref 26–35)
MCHC RBC AUTO-ENTMCNC: 34.2 % (ref 32–34.5)
MCV RBC AUTO: 89.3 FL (ref 80–99.9)
MONOCYTES ABSOLUTE: 0.55 E9/L (ref 0.1–0.95)
MONOCYTES RELATIVE PERCENT: 7.7 % (ref 2–12)
NEUTROPHILS ABSOLUTE: 5.23 E9/L (ref 1.8–7.3)
NEUTROPHILS RELATIVE PERCENT: 73.2 % (ref 43–80)
NITRITE, URINE: NEGATIVE
PDW BLD-RTO: 14.4 FL (ref 11.5–15)
PH UA: 8 (ref 5–9)
PLATELET # BLD: 321 E9/L (ref 130–450)
PMV BLD AUTO: 9.8 FL (ref 7–12)
POTASSIUM REFLEX MAGNESIUM: 4 MMOL/L (ref 3.5–5)
PROTEIN UA: NEGATIVE MG/DL
RBC # BLD: 4.75 E12/L (ref 3.8–5.8)
RBC UA: ABNORMAL /HPF (ref 0–2)
SODIUM BLD-SCNC: 138 MMOL/L (ref 132–146)
SPECIFIC GRAVITY UA: 1.01 (ref 1–1.03)
TOTAL CK: 314 U/L (ref 20–200)
TOTAL PROTEIN: 7 G/DL (ref 6.4–8.3)
UROBILINOGEN, URINE: 0.2 E.U./DL
WBC # BLD: 7.2 E9/L (ref 4.5–11.5)
WBC UA: ABNORMAL /HPF (ref 0–5)

## 2022-07-23 PROCEDURE — 82550 ASSAY OF CK (CPK): CPT

## 2022-07-23 PROCEDURE — 80053 COMPREHEN METABOLIC PANEL: CPT

## 2022-07-23 PROCEDURE — 83605 ASSAY OF LACTIC ACID: CPT

## 2022-07-23 PROCEDURE — 96374 THER/PROPH/DIAG INJ IV PUSH: CPT

## 2022-07-23 PROCEDURE — 6360000002 HC RX W HCPCS: Performed by: STUDENT IN AN ORGANIZED HEALTH CARE EDUCATION/TRAINING PROGRAM

## 2022-07-23 PROCEDURE — 6370000000 HC RX 637 (ALT 250 FOR IP)

## 2022-07-23 PROCEDURE — 72072 X-RAY EXAM THORAC SPINE 3VWS: CPT

## 2022-07-23 PROCEDURE — 2580000003 HC RX 258

## 2022-07-23 PROCEDURE — 85025 COMPLETE CBC W/AUTO DIFF WBC: CPT

## 2022-07-23 PROCEDURE — 99284 EMERGENCY DEPT VISIT MOD MDM: CPT

## 2022-07-23 PROCEDURE — 72100 X-RAY EXAM L-S SPINE 2/3 VWS: CPT

## 2022-07-23 PROCEDURE — 81001 URINALYSIS AUTO W/SCOPE: CPT

## 2022-07-23 PROCEDURE — 96372 THER/PROPH/DIAG INJ SC/IM: CPT

## 2022-07-23 PROCEDURE — 6360000002 HC RX W HCPCS

## 2022-07-23 RX ORDER — KETOROLAC TROMETHAMINE 30 MG/ML
15 INJECTION, SOLUTION INTRAMUSCULAR; INTRAVENOUS ONCE
Status: COMPLETED | OUTPATIENT
Start: 2022-07-23 | End: 2022-07-23

## 2022-07-23 RX ORDER — BACLOFEN 10 MG/1
10 TABLET ORAL 3 TIMES DAILY
Qty: 45 TABLET | Refills: 0 | Status: SHIPPED | OUTPATIENT
Start: 2022-07-23 | End: 2022-08-07

## 2022-07-23 RX ORDER — ORPHENADRINE CITRATE 30 MG/ML
60 INJECTION INTRAMUSCULAR; INTRAVENOUS ONCE
Status: COMPLETED | OUTPATIENT
Start: 2022-07-23 | End: 2022-07-23

## 2022-07-23 RX ORDER — 0.9 % SODIUM CHLORIDE 0.9 %
1000 INTRAVENOUS SOLUTION INTRAVENOUS ONCE
Status: COMPLETED | OUTPATIENT
Start: 2022-07-23 | End: 2022-07-23

## 2022-07-23 RX ORDER — CYCLOBENZAPRINE HCL 10 MG
10 TABLET ORAL ONCE
Status: COMPLETED | OUTPATIENT
Start: 2022-07-23 | End: 2022-07-23

## 2022-07-23 RX ADMIN — ORPHENADRINE CITRATE 60 MG: 30 INJECTION INTRAMUSCULAR; INTRAVENOUS at 21:20

## 2022-07-23 RX ADMIN — CYCLOBENZAPRINE 10 MG: 10 TABLET, FILM COATED ORAL at 18:29

## 2022-07-23 RX ADMIN — SODIUM CHLORIDE 1000 ML: 9 INJECTION, SOLUTION INTRAVENOUS at 19:11

## 2022-07-23 RX ADMIN — KETOROLAC TROMETHAMINE 15 MG: 30 INJECTION, SOLUTION INTRAMUSCULAR at 21:18

## 2022-07-23 ASSESSMENT — ENCOUNTER SYMPTOMS
ABDOMINAL PAIN: 0
EYE REDNESS: 0
SHORTNESS OF BREATH: 0
TROUBLE SWALLOWING: 0
NAUSEA: 0
CONSTIPATION: 0
BACK PAIN: 1
EYE ITCHING: 0
WHEEZING: 0
VOMITING: 0
ABDOMINAL DISTENTION: 0
CHEST TIGHTNESS: 0
DIARRHEA: 0
RHINORRHEA: 0

## 2022-07-23 ASSESSMENT — PAIN SCALES - GENERAL
PAINLEVEL_OUTOF10: 8

## 2022-07-23 ASSESSMENT — PAIN DESCRIPTION - LOCATION: LOCATION: BACK

## 2022-07-23 ASSESSMENT — PAIN DESCRIPTION - ORIENTATION: ORIENTATION: MID;LOWER

## 2022-07-23 ASSESSMENT — PAIN - FUNCTIONAL ASSESSMENT
PAIN_FUNCTIONAL_ASSESSMENT: 0-10
PAIN_FUNCTIONAL_ASSESSMENT: 0-10

## 2022-07-23 ASSESSMENT — PAIN DESCRIPTION - DESCRIPTORS: DESCRIPTORS: BURNING
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
## 2023-08-02 NOTE — TELEPHONE ENCOUNTER
Group Therapy Note    Date: 2023    Group Start Time:  9:50 AM  Group End Time: 10:40 AM  Group Topic: Process Group - Outpatient    ROJELIO Buckner        Group Therapy Note    Patients were given space to openly process thoughts, feelings, and experiences with the group. Patients were encouraged to identify emotions and self-disclose. Patients were encouraged to identify triggers and thought patterns. Patients were encouraged to offer feedback and support to peers. Attendees: 9       Patient's Goal:  Process emotions and experiences, discuss triggers and coping skills, give and receive feedback and support     Notes: The patient engaged actively in process group. The patient offered feedback and support to peers. The patient discussed the role of COVID-19 in mental health. The patient discussed the impact of limited financial education. The patient discussed the role of trauma in emotional reactions and identified grounding techniques. The patient will continue to practice identifying triggers and using coping skills. Status After Intervention:  Unchanged    Participation Level:  Active Listener and Interactive    Participation Quality: Appropriate, Attentive, Sharing, and Supportive      Speech:  normal      Thought Process/Content: Logical  Linear      Affective Functioning: Congruent      Mood: euthymic      Level of consciousness:  Alert, Oriented x4, and Attentive      Response to Learning: Able to verbalize current knowledge/experience, Able to verbalize/acknowledge new learning, and Progressing to goal      Endings: None Reported    Modes of Intervention: Support      Discipline Responsible: /Counselor      Signature:  Portia Apgar, MSW Pt is congested and productive cough with blood - sent call to nurse Peggy Dumont

## 2023-08-29 ENCOUNTER — HOSPITAL ENCOUNTER (INPATIENT)
Age: 28
LOS: 1 days | Discharge: HOME HEALTH | DRG: 351 | End: 2023-08-31
Attending: STUDENT IN AN ORGANIZED HEALTH CARE EDUCATION/TRAINING PROGRAM | Admitting: FAMILY MEDICINE
Payer: MEDICAID

## 2023-08-29 ENCOUNTER — APPOINTMENT (OUTPATIENT)
Dept: CT IMAGING | Age: 28
DRG: 351 | End: 2023-08-29
Payer: MEDICAID

## 2023-08-29 ENCOUNTER — APPOINTMENT (OUTPATIENT)
Dept: GENERAL RADIOLOGY | Age: 28
DRG: 351 | End: 2023-08-29
Payer: MEDICAID

## 2023-08-29 DIAGNOSIS — M62.82 NON-TRAUMATIC RHABDOMYOLYSIS: Primary | ICD-10-CM

## 2023-08-29 DIAGNOSIS — K59.00 CONSTIPATION, UNSPECIFIED CONSTIPATION TYPE: ICD-10-CM

## 2023-08-29 LAB
ALBUMIN SERPL-MCNC: 4.4 G/DL (ref 3.5–5.2)
ALP SERPL-CCNC: 99 U/L (ref 40–129)
ALT SERPL-CCNC: 14 U/L (ref 0–40)
ANION GAP SERPL CALCULATED.3IONS-SCNC: 16 MMOL/L (ref 7–16)
AST SERPL-CCNC: 28 U/L (ref 0–39)
BASOPHILS # BLD: 0.04 K/UL (ref 0–0.2)
BASOPHILS NFR BLD: 0 % (ref 0–2)
BILIRUB SERPL-MCNC: 0.3 MG/DL (ref 0–1.2)
BUN SERPL-MCNC: 10 MG/DL (ref 6–20)
CALCIUM SERPL-MCNC: 9.4 MG/DL (ref 8.6–10.2)
CHLORIDE SERPL-SCNC: 107 MMOL/L (ref 98–107)
CK SERPL-CCNC: 1086 U/L (ref 20–200)
CO2 SERPL-SCNC: 21 MMOL/L (ref 22–29)
CREAT SERPL-MCNC: 1 MG/DL (ref 0.7–1.2)
EOSINOPHIL # BLD: 0.01 K/UL (ref 0.05–0.5)
EOSINOPHILS RELATIVE PERCENT: 0 % (ref 0–6)
ERYTHROCYTE [DISTWIDTH] IN BLOOD BY AUTOMATED COUNT: 13.4 % (ref 11.5–15)
FLUAV RNA RESP QL NAA+PROBE: NOT DETECTED
FLUBV RNA RESP QL NAA+PROBE: NOT DETECTED
GFR SERPL CREATININE-BSD FRML MDRD: >60 ML/MIN/1.73M2
GLUCOSE SERPL-MCNC: 104 MG/DL (ref 74–99)
HCT VFR BLD AUTO: 47.5 % (ref 37–54)
HGB BLD-MCNC: 16.1 G/DL (ref 12.5–16.5)
IMM GRANULOCYTES # BLD AUTO: 0.03 K/UL (ref 0–0.58)
IMM GRANULOCYTES NFR BLD: 0 % (ref 0–5)
INR PPP: 1.2
LACTATE BLDV-SCNC: 5.9 MMOL/L (ref 0.5–1.9)
LIPASE SERPL-CCNC: 14 U/L (ref 13–60)
LYMPHOCYTES NFR BLD: 3.63 K/UL (ref 1.5–4)
LYMPHOCYTES RELATIVE PERCENT: 30 % (ref 20–42)
MCH RBC QN AUTO: 29.8 PG (ref 26–35)
MCHC RBC AUTO-ENTMCNC: 33.9 G/DL (ref 32–34.5)
MCV RBC AUTO: 88 FL (ref 80–99.9)
MONOCYTES NFR BLD: 0.97 K/UL (ref 0.1–0.95)
MONOCYTES NFR BLD: 8 % (ref 2–12)
NEUTROPHILS NFR BLD: 62 % (ref 43–80)
NEUTS SEG NFR BLD: 7.53 K/UL (ref 1.8–7.3)
PARTIAL THROMBOPLASTIN TIME: 32.4 SEC (ref 24.5–35.1)
PLATELET # BLD AUTO: 374 K/UL (ref 130–450)
PMV BLD AUTO: 10.5 FL (ref 7–12)
POTASSIUM SERPL-SCNC: 5.3 MMOL/L (ref 3.5–5)
PROCALCITONIN SERPL-MCNC: 0.05 NG/ML (ref 0–0.08)
PROT SERPL-MCNC: 7.9 G/DL (ref 6.4–8.3)
PROTHROMBIN TIME: 12.8 SEC (ref 9.3–12.4)
RBC # BLD AUTO: 5.4 M/UL (ref 3.8–5.8)
SARS-COV-2 RNA RESP QL NAA+PROBE: NOT DETECTED
SODIUM SERPL-SCNC: 144 MMOL/L (ref 132–146)
SOURCE: NORMAL
SPECIMEN DESCRIPTION: NORMAL
TROPONIN I SERPL HS-MCNC: 25 NG/L (ref 0–11)
TROPONIN I SERPL HS-MCNC: 26 NG/L (ref 0–11)
WBC OTHER # BLD: 12.2 K/UL (ref 4.5–11.5)

## 2023-08-29 PROCEDURE — 96375 TX/PRO/DX INJ NEW DRUG ADDON: CPT

## 2023-08-29 PROCEDURE — 84145 PROCALCITONIN (PCT): CPT

## 2023-08-29 PROCEDURE — 71275 CT ANGIOGRAPHY CHEST: CPT

## 2023-08-29 PROCEDURE — 6360000002 HC RX W HCPCS: Performed by: STUDENT IN AN ORGANIZED HEALTH CARE EDUCATION/TRAINING PROGRAM

## 2023-08-29 PROCEDURE — 84484 ASSAY OF TROPONIN QUANT: CPT

## 2023-08-29 PROCEDURE — 85730 THROMBOPLASTIN TIME PARTIAL: CPT

## 2023-08-29 PROCEDURE — 83605 ASSAY OF LACTIC ACID: CPT

## 2023-08-29 PROCEDURE — 87040 BLOOD CULTURE FOR BACTERIA: CPT

## 2023-08-29 PROCEDURE — 74177 CT ABD & PELVIS W/CONTRAST: CPT

## 2023-08-29 PROCEDURE — 99285 EMERGENCY DEPT VISIT HI MDM: CPT

## 2023-08-29 PROCEDURE — 2580000003 HC RX 258: Performed by: STUDENT IN AN ORGANIZED HEALTH CARE EDUCATION/TRAINING PROGRAM

## 2023-08-29 PROCEDURE — 87086 URINE CULTURE/COLONY COUNT: CPT

## 2023-08-29 PROCEDURE — 96374 THER/PROPH/DIAG INJ IV PUSH: CPT

## 2023-08-29 PROCEDURE — 80053 COMPREHEN METABOLIC PANEL: CPT

## 2023-08-29 PROCEDURE — 71045 X-RAY EXAM CHEST 1 VIEW: CPT

## 2023-08-29 PROCEDURE — 87636 SARSCOV2 & INF A&B AMP PRB: CPT

## 2023-08-29 PROCEDURE — 85025 COMPLETE CBC W/AUTO DIFF WBC: CPT

## 2023-08-29 PROCEDURE — 82550 ASSAY OF CK (CPK): CPT

## 2023-08-29 PROCEDURE — 83690 ASSAY OF LIPASE: CPT

## 2023-08-29 PROCEDURE — 85610 PROTHROMBIN TIME: CPT

## 2023-08-29 PROCEDURE — 6360000004 HC RX CONTRAST MEDICATION: Performed by: RADIOLOGY

## 2023-08-29 PROCEDURE — 80307 DRUG TEST PRSMV CHEM ANLYZR: CPT

## 2023-08-29 PROCEDURE — 81001 URINALYSIS AUTO W/SCOPE: CPT

## 2023-08-29 RX ORDER — 0.9 % SODIUM CHLORIDE 0.9 %
1000 INTRAVENOUS SOLUTION INTRAVENOUS ONCE
Status: COMPLETED | OUTPATIENT
Start: 2023-08-29 | End: 2023-08-29

## 2023-08-29 RX ORDER — FENTANYL CITRATE 50 UG/ML
50 INJECTION, SOLUTION INTRAMUSCULAR; INTRAVENOUS ONCE
Status: COMPLETED | OUTPATIENT
Start: 2023-08-29 | End: 2023-08-29

## 2023-08-29 RX ORDER — 0.9 % SODIUM CHLORIDE 0.9 %
1000 INTRAVENOUS SOLUTION INTRAVENOUS ONCE
Status: COMPLETED | OUTPATIENT
Start: 2023-08-29 | End: 2023-08-30

## 2023-08-29 RX ORDER — ORPHENADRINE CITRATE 30 MG/ML
60 INJECTION INTRAMUSCULAR; INTRAVENOUS ONCE
Status: COMPLETED | OUTPATIENT
Start: 2023-08-29 | End: 2023-08-29

## 2023-08-29 RX ADMIN — SODIUM CHLORIDE 1000 ML: 9 INJECTION, SOLUTION INTRAVENOUS at 22:36

## 2023-08-29 RX ADMIN — SODIUM CHLORIDE 1000 ML: 9 INJECTION, SOLUTION INTRAVENOUS at 21:11

## 2023-08-29 RX ADMIN — ORPHENADRINE CITRATE 60 MG: 60 INJECTION INTRAMUSCULAR; INTRAVENOUS at 23:23

## 2023-08-29 RX ADMIN — FENTANYL CITRATE 50 MCG: 50 INJECTION, SOLUTION INTRAMUSCULAR; INTRAVENOUS at 23:23

## 2023-08-29 RX ADMIN — IOPAMIDOL 75 ML: 755 INJECTION, SOLUTION INTRAVENOUS at 23:53

## 2023-08-29 ASSESSMENT — PAIN SCALES - GENERAL
PAINLEVEL_OUTOF10: 8
PAINLEVEL_OUTOF10: 8

## 2023-08-29 ASSESSMENT — PAIN DESCRIPTION - PAIN TYPE: TYPE: ACUTE PAIN

## 2023-08-29 ASSESSMENT — PAIN - FUNCTIONAL ASSESSMENT: PAIN_FUNCTIONAL_ASSESSMENT: 0-10

## 2023-08-29 ASSESSMENT — LIFESTYLE VARIABLES
HOW OFTEN DO YOU HAVE A DRINK CONTAINING ALCOHOL: NEVER
HOW MANY STANDARD DRINKS CONTAINING ALCOHOL DO YOU HAVE ON A TYPICAL DAY: PATIENT DOES NOT DRINK

## 2023-08-29 ASSESSMENT — PAIN DESCRIPTION - DESCRIPTORS: DESCRIPTORS: ACHING

## 2023-08-29 ASSESSMENT — PAIN DESCRIPTION - LOCATION
LOCATION: BACK
LOCATION: CHEST

## 2023-08-29 ASSESSMENT — PAIN DESCRIPTION - FREQUENCY: FREQUENCY: CONTINUOUS

## 2023-08-30 LAB
ALBUMIN SERPL-MCNC: 3.4 G/DL (ref 3.5–5.2)
ALP SERPL-CCNC: 82 U/L (ref 40–129)
ALT SERPL-CCNC: 12 U/L (ref 0–40)
AMPHET UR QL SCN: NEGATIVE
ANION GAP SERPL CALCULATED.3IONS-SCNC: 10 MMOL/L (ref 7–16)
AST SERPL-CCNC: 21 U/L (ref 0–39)
BACTERIA URNS QL MICRO: ABNORMAL
BARBITURATES UR QL SCN: NEGATIVE
BASOPHILS # BLD: 0.04 K/UL (ref 0–0.2)
BASOPHILS NFR BLD: 0 % (ref 0–2)
BENZODIAZ UR QL: NEGATIVE
BILIRUB SERPL-MCNC: 0.3 MG/DL (ref 0–1.2)
BILIRUB UR QL STRIP: NEGATIVE
BUN SERPL-MCNC: 9 MG/DL (ref 6–20)
BUPRENORPHINE UR QL: NEGATIVE
CALCIUM SERPL-MCNC: 8.6 MG/DL (ref 8.6–10.2)
CANNABINOIDS UR QL SCN: POSITIVE
CHLORIDE SERPL-SCNC: 110 MMOL/L (ref 98–107)
CK SERPL-CCNC: 863 U/L (ref 20–200)
CLARITY UR: CLEAR
CO2 SERPL-SCNC: 23 MMOL/L (ref 22–29)
COCAINE UR QL SCN: NEGATIVE
COLOR UR: YELLOW
CREAT SERPL-MCNC: 1 MG/DL (ref 0.7–1.2)
EOSINOPHIL # BLD: 0.01 K/UL (ref 0.05–0.5)
EOSINOPHILS RELATIVE PERCENT: 0 % (ref 0–6)
ERYTHROCYTE [DISTWIDTH] IN BLOOD BY AUTOMATED COUNT: 13.4 % (ref 11.5–15)
FENTANYL UR QL: POSITIVE
GFR SERPL CREATININE-BSD FRML MDRD: >60 ML/MIN/1.73M2
GLUCOSE SERPL-MCNC: 87 MG/DL (ref 74–99)
GLUCOSE UR STRIP-MCNC: NEGATIVE MG/DL
HCT VFR BLD AUTO: 34.8 % (ref 37–54)
HGB BLD-MCNC: 11.3 G/DL (ref 12.5–16.5)
HGB UR QL STRIP.AUTO: ABNORMAL
IMM GRANULOCYTES # BLD AUTO: 0.03 K/UL (ref 0–0.58)
IMM GRANULOCYTES NFR BLD: 0 % (ref 0–5)
KETONES UR STRIP-MCNC: NEGATIVE MG/DL
LACTATE BLDV-SCNC: 1.2 MMOL/L (ref 0.5–2.2)
LEUKOCYTE ESTERASE UR QL STRIP: NEGATIVE
LYMPHOCYTES NFR BLD: 3.12 K/UL (ref 1.5–4)
LYMPHOCYTES RELATIVE PERCENT: 28 % (ref 20–42)
MCH RBC QN AUTO: 29.2 PG (ref 26–35)
MCHC RBC AUTO-ENTMCNC: 32.5 G/DL (ref 32–34.5)
MCV RBC AUTO: 89.9 FL (ref 80–99.9)
METHADONE UR QL: NEGATIVE
MONOCYTES NFR BLD: 0.68 K/UL (ref 0.1–0.95)
MONOCYTES NFR BLD: 6 % (ref 2–12)
MUCOUS THREADS URNS QL MICRO: PRESENT
NEUTROPHILS NFR BLD: 66 % (ref 43–80)
NEUTS SEG NFR BLD: 7.46 K/UL (ref 1.8–7.3)
NITRITE UR QL STRIP: NEGATIVE
OPIATES UR QL SCN: NEGATIVE
OXYCODONE UR QL SCN: NEGATIVE
PCP UR QL SCN: NEGATIVE
PH UR STRIP: 6.5 [PH] (ref 5–9)
PLATELET # BLD AUTO: 242 K/UL (ref 130–450)
PMV BLD AUTO: 10.5 FL (ref 7–12)
POTASSIUM SERPL-SCNC: 4 MMOL/L (ref 3.5–5)
PROT SERPL-MCNC: 6.3 G/DL (ref 6.4–8.3)
PROT UR STRIP-MCNC: NEGATIVE MG/DL
RBC # BLD AUTO: 3.87 M/UL (ref 3.8–5.8)
RBC #/AREA URNS HPF: ABNORMAL /HPF
SODIUM SERPL-SCNC: 143 MMOL/L (ref 132–146)
SP GR UR STRIP: 1.01 (ref 1–1.03)
TEST INFORMATION: ABNORMAL
UROBILINOGEN UR STRIP-ACNC: 0.2 EU/DL (ref 0–1)
WBC #/AREA URNS HPF: ABNORMAL /HPF
WBC OTHER # BLD: 11.3 K/UL (ref 4.5–11.5)

## 2023-08-30 PROCEDURE — 1200000000 HC SEMI PRIVATE

## 2023-08-30 PROCEDURE — 2580000003 HC RX 258: Performed by: FAMILY MEDICINE

## 2023-08-30 PROCEDURE — 93005 ELECTROCARDIOGRAM TRACING: CPT | Performed by: INTERNAL MEDICINE

## 2023-08-30 PROCEDURE — 2580000003 HC RX 258: Performed by: STUDENT IN AN ORGANIZED HEALTH CARE EDUCATION/TRAINING PROGRAM

## 2023-08-30 PROCEDURE — 99223 1ST HOSP IP/OBS HIGH 75: CPT | Performed by: INTERNAL MEDICINE

## 2023-08-30 PROCEDURE — 6370000000 HC RX 637 (ALT 250 FOR IP): Performed by: FAMILY MEDICINE

## 2023-08-30 PROCEDURE — 80053 COMPREHEN METABOLIC PANEL: CPT

## 2023-08-30 PROCEDURE — 6360000002 HC RX W HCPCS: Performed by: FAMILY MEDICINE

## 2023-08-30 PROCEDURE — 82550 ASSAY OF CK (CPK): CPT

## 2023-08-30 PROCEDURE — 6360000002 HC RX W HCPCS: Performed by: STUDENT IN AN ORGANIZED HEALTH CARE EDUCATION/TRAINING PROGRAM

## 2023-08-30 PROCEDURE — 83605 ASSAY OF LACTIC ACID: CPT

## 2023-08-30 PROCEDURE — 85025 COMPLETE CBC W/AUTO DIFF WBC: CPT

## 2023-08-30 RX ORDER — ACETAMINOPHEN 650 MG/1
650 SUPPOSITORY RECTAL EVERY 6 HOURS PRN
Status: DISCONTINUED | OUTPATIENT
Start: 2023-08-30 | End: 2023-08-31 | Stop reason: HOSPADM

## 2023-08-30 RX ORDER — SENNA AND DOCUSATE SODIUM 50; 8.6 MG/1; MG/1
1 TABLET, FILM COATED ORAL 2 TIMES DAILY
Status: DISCONTINUED | OUTPATIENT
Start: 2023-08-30 | End: 2023-08-31 | Stop reason: HOSPADM

## 2023-08-30 RX ORDER — POLYETHYLENE GLYCOL 3350 17 G/17G
17 POWDER, FOR SOLUTION ORAL DAILY PRN
Status: DISCONTINUED | OUTPATIENT
Start: 2023-08-30 | End: 2023-08-31 | Stop reason: HOSPADM

## 2023-08-30 RX ORDER — MORPHINE SULFATE 4 MG/ML
4 INJECTION, SOLUTION INTRAMUSCULAR; INTRAVENOUS EVERY 4 HOURS PRN
Status: DISCONTINUED | OUTPATIENT
Start: 2023-08-30 | End: 2023-08-31 | Stop reason: HOSPADM

## 2023-08-30 RX ORDER — FENTANYL CITRATE 50 UG/ML
50 INJECTION, SOLUTION INTRAMUSCULAR; INTRAVENOUS
Status: DISCONTINUED | OUTPATIENT
Start: 2023-08-30 | End: 2023-08-30

## 2023-08-30 RX ORDER — GABAPENTIN 100 MG/1
200 CAPSULE ORAL 3 TIMES DAILY
COMMUNITY

## 2023-08-30 RX ORDER — SODIUM CHLORIDE FOR INHALATION 3 %
4 VIAL, NEBULIZER (ML) INHALATION
Status: DISCONTINUED | OUTPATIENT
Start: 2023-08-30 | End: 2023-08-30

## 2023-08-30 RX ORDER — 0.9 % SODIUM CHLORIDE 0.9 %
1000 INTRAVENOUS SOLUTION INTRAVENOUS ONCE
Status: COMPLETED | OUTPATIENT
Start: 2023-08-30 | End: 2023-08-30

## 2023-08-30 RX ORDER — ONDANSETRON 2 MG/ML
4 INJECTION INTRAMUSCULAR; INTRAVENOUS EVERY 6 HOURS PRN
Status: DISCONTINUED | OUTPATIENT
Start: 2023-08-30 | End: 2023-08-31 | Stop reason: HOSPADM

## 2023-08-30 RX ORDER — SODIUM CHLORIDE 9 MG/ML
INJECTION, SOLUTION INTRAVENOUS PRN
Status: DISCONTINUED | OUTPATIENT
Start: 2023-08-30 | End: 2023-08-31 | Stop reason: HOSPADM

## 2023-08-30 RX ORDER — TRAMADOL HYDROCHLORIDE 50 MG/1
50 TABLET ORAL EVERY 6 HOURS PRN
COMMUNITY

## 2023-08-30 RX ORDER — EPINEPHRINE IN SOD CHLOR,ISO 1 MG/10 ML
SYRINGE (ML) INTRAVENOUS
Status: DISCONTINUED
Start: 2023-08-30 | End: 2023-08-30

## 2023-08-30 RX ORDER — ACETAMINOPHEN 325 MG/1
650 TABLET ORAL EVERY 6 HOURS PRN
Status: DISCONTINUED | OUTPATIENT
Start: 2023-08-30 | End: 2023-08-31 | Stop reason: HOSPADM

## 2023-08-30 RX ORDER — PROMETHAZINE HYDROCHLORIDE 12.5 MG/1
12.5 TABLET ORAL EVERY 6 HOURS PRN
Status: DISCONTINUED | OUTPATIENT
Start: 2023-08-30 | End: 2023-08-31 | Stop reason: HOSPADM

## 2023-08-30 RX ORDER — SODIUM CHLORIDE 0.9 % (FLUSH) 0.9 %
10 SYRINGE (ML) INJECTION PRN
Status: DISCONTINUED | OUTPATIENT
Start: 2023-08-30 | End: 2023-08-31 | Stop reason: HOSPADM

## 2023-08-30 RX ORDER — MORPHINE SULFATE 4 MG/ML
4 INJECTION, SOLUTION INTRAMUSCULAR; INTRAVENOUS ONCE
Status: COMPLETED | OUTPATIENT
Start: 2023-08-30 | End: 2023-08-30

## 2023-08-30 RX ORDER — CHOLECALCIFEROL (VITAMIN D3) 125 MCG
2000 CAPSULE ORAL DAILY
COMMUNITY

## 2023-08-30 RX ORDER — ENOXAPARIN SODIUM 100 MG/ML
40 INJECTION SUBCUTANEOUS DAILY
Status: DISCONTINUED | OUTPATIENT
Start: 2023-08-30 | End: 2023-08-31 | Stop reason: HOSPADM

## 2023-08-30 RX ORDER — SODIUM CHLORIDE 9 MG/ML
INJECTION, SOLUTION INTRAVENOUS CONTINUOUS
Status: DISCONTINUED | OUTPATIENT
Start: 2023-08-30 | End: 2023-08-31 | Stop reason: HOSPADM

## 2023-08-30 RX ORDER — SODIUM CHLORIDE 0.9 % (FLUSH) 0.9 %
10 SYRINGE (ML) INJECTION EVERY 12 HOURS SCHEDULED
Status: DISCONTINUED | OUTPATIENT
Start: 2023-08-30 | End: 2023-08-31 | Stop reason: HOSPADM

## 2023-08-30 RX ORDER — IPRATROPIUM BROMIDE AND ALBUTEROL SULFATE 2.5; .5 MG/3ML; MG/3ML
1 SOLUTION RESPIRATORY (INHALATION) EVERY 4 HOURS PRN
Status: DISCONTINUED | OUTPATIENT
Start: 2023-08-30 | End: 2023-08-31 | Stop reason: HOSPADM

## 2023-08-30 RX ADMIN — SODIUM CHLORIDE: 9 INJECTION, SOLUTION INTRAVENOUS at 19:55

## 2023-08-30 RX ADMIN — SENNOSIDES AND DOCUSATE SODIUM 1 TABLET: 50; 8.6 TABLET ORAL at 22:20

## 2023-08-30 RX ADMIN — Medication 10 ML: at 08:49

## 2023-08-30 RX ADMIN — ENOXAPARIN SODIUM 40 MG: 100 INJECTION SUBCUTANEOUS at 08:09

## 2023-08-30 RX ADMIN — FENTANYL CITRATE 50 MCG: 50 INJECTION, SOLUTION INTRAMUSCULAR; INTRAVENOUS at 13:10

## 2023-08-30 RX ADMIN — SENNOSIDES AND DOCUSATE SODIUM 1 TABLET: 50; 8.6 TABLET ORAL at 08:07

## 2023-08-30 RX ADMIN — MORPHINE SULFATE 4 MG: 4 INJECTION, SOLUTION INTRAMUSCULAR; INTRAVENOUS at 17:06

## 2023-08-30 RX ADMIN — MORPHINE SULFATE 4 MG: 4 INJECTION, SOLUTION INTRAMUSCULAR; INTRAVENOUS at 02:48

## 2023-08-30 RX ADMIN — FENTANYL CITRATE 50 MCG: 50 INJECTION, SOLUTION INTRAMUSCULAR; INTRAVENOUS at 08:07

## 2023-08-30 RX ADMIN — SODIUM CHLORIDE 1000 ML: 9 INJECTION, SOLUTION INTRAVENOUS at 00:49

## 2023-08-30 RX ADMIN — SODIUM CHLORIDE: 9 INJECTION, SOLUTION INTRAVENOUS at 10:52

## 2023-08-30 RX ADMIN — MORPHINE SULFATE 4 MG: 4 INJECTION, SOLUTION INTRAMUSCULAR; INTRAVENOUS at 22:20

## 2023-08-30 RX ADMIN — Medication 10 ML: at 22:21

## 2023-08-30 ASSESSMENT — PAIN DESCRIPTION - ORIENTATION
ORIENTATION: MID
ORIENTATION: MID
ORIENTATION: LOWER
ORIENTATION: RIGHT;LOWER

## 2023-08-30 ASSESSMENT — PAIN DESCRIPTION - LOCATION
LOCATION: BACK
LOCATION: BACK;CHEST
LOCATION: CHEST;BACK
LOCATION: BACK;CHEST

## 2023-08-30 ASSESSMENT — PAIN SCALES - GENERAL
PAINLEVEL_OUTOF10: 8
PAINLEVEL_OUTOF10: 7
PAINLEVEL_OUTOF10: 8
PAINLEVEL_OUTOF10: 8

## 2023-08-30 ASSESSMENT — PAIN DESCRIPTION - DESCRIPTORS
DESCRIPTORS: SHARP;STABBING;DISCOMFORT;ACHING
DESCRIPTORS: THROBBING;SHARP
DESCRIPTORS: ACHING;TENDER;THROBBING

## 2023-08-30 NOTE — PROGRESS NOTES
Patient seen, chart reviewed, admitted earlier today with rhabdomyolysis. Currently lying down in bed in no acute distress. Complaining of generalized pain. CT chest did not show any acute findings. CPK trending down. Continue with IV fluids. We will continue to follow closely.
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
## 2023-08-30 NOTE — H&P
sensory/motor deficits. Cranial nerves: II-XII intact, grossly non-focal.  Psychiatric: Alert and oriented, thought content appropriate, normal insight    Reviewed EKG and CXR personally      CBC:   Recent Labs     08/29/23 2100   WBC 12.2*   RBC 5.40   HGB 16.1   HCT 47.5   MCV 88.0   RDW 13.4        BMP:   Recent Labs     08/29/23 2100      K 5.3*      CO2 21*   BUN 10   CREATININE 1.0     LFT:  Recent Labs     08/29/23 2100   PROT 7.9   ALKPHOS 99   ALT 14   AST 28   BILITOT 0.3   LIPASE 14     CE:  Recent Labs     08/29/23 2100   CKTOTAL 1,086*     PT/INR:   Recent Labs     08/29/23 2100   INR 1.2   APTT 32.4     BNP: No results for input(s): BNP in the last 72 hours. ESR: No results found for: SEDRATE  CRP: No results found for: CRP  D Dimer:   Lab Results   Component Value Date    DDIMER 306 01/24/2022      Folate and B12: No results found for: OJUKYRPV18, No results found for: FOLATE  Lactic Acid:   Lab Results   Component Value Date    LACTA 1.4 07/23/2022     Thyroid Studies:   Lab Results   Component Value Date    TSH 2.390 08/31/2020       Oupatient labs:  Lab Results   Component Value Date    TSH 2.390 08/31/2020    INR 1.2 08/29/2023    LABA1C 4.8 12/27/2018       Urinalysis:    Lab Results   Component Value Date/Time    NITRU NEGATIVE 08/29/2023 10:45 PM    WBCUA 0 TO 5 08/29/2023 10:45 PM    BACTERIA TRACE 08/29/2023 10:45 PM    RBCUA 51  08/29/2023 10:45 PM    BLOODU Negative 07/23/2022 06:13 PM    SPECGRAV 1.015 08/29/2023 10:45 PM    GLUCOSEU NEGATIVE 08/29/2023 10:45 PM       Imaging:  CT ABDOMEN PELVIS W IV CONTRAST Additional Contrast? None    Result Date: 8/30/2023  Findings suggestive of constipation with possible rectosigmoid impaction.      XR CHEST PORTABLE    Result Date: 8/29/2023  EXAMINATION: ONE XRAY VIEW OF THE CHEST 8/29/2023 9:22 pm COMPARISON: 11/09/2020 HISTORY: ORDERING SYSTEM PROVIDED HISTORY: SOB TECHNOLOGIST PROVIDED HISTORY: Reason for exam:->SOB
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
## 2023-08-30 NOTE — PROGRESS NOTES
4 Eyes Skin Assessment     NAME:  Taj Medel  YOB: 1995  MEDICAL RECORD NUMBER:  [id number]    The patient is being assessed for  Admission    I agree that at least one RN has performed a thorough Head to Toe Skin Assessment on the patient. ALL assessment sites listed below have been assessed. Areas assessed by both nurses:    Head, Face, Ears, Shoulders, Back, Chest, Arms, Elbows, Hands, Sacrum. Buttock, Coccyx, Ischium, and Legs. Feet and Heels    Old  surgical scar-left neck  Healed wound scar mid sacrum-1.9cm x1.5cm  Left great toe, Left toe index,left mid toe- old healed wound  Lt heel- scar wound  Right index toe- healed old wound. Right ball of the foot- healed dry wound        Does the Patient have a Wound? Yes wound(s) were present on assessment.  LDA wound assessment was Initiated and completed by RN       [place] Prevention initiated by RN: Yes  Wound Care Orders initiated by RN: Yes    Pressure Injury (Stage 3,4, Unstageable, DTI, NWPT, and Complex wounds) if present, place Wound referral order by RN under : No    New Ostomies, if present place, Ostomy referral order under : No     Nurse 1 eSignature: Electronically signed by Luiz Pineda RN on 8/30/23 at 4:01 PM EDT    **SHARE this note so that the co-signing nurse can place an eSignature**    Nurse 2 eSignature: Electronically signed by Derrick Interiano RN on 8/30/23 at 6:49 PM EDT
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
## 2023-08-31 VITALS
OXYGEN SATURATION: 99 % | HEIGHT: 74 IN | RESPIRATION RATE: 17 BRPM | TEMPERATURE: 97.2 F | BODY MASS INDEX: 23.74 KG/M2 | WEIGHT: 185 LBS | DIASTOLIC BLOOD PRESSURE: 85 MMHG | HEART RATE: 75 BPM | SYSTOLIC BLOOD PRESSURE: 140 MMHG

## 2023-08-31 LAB
CK SERPL-CCNC: 683 U/L (ref 20–200)
EKG ATRIAL RATE: 90 BPM
EKG P AXIS: 78 DEGREES
EKG P-R INTERVAL: 134 MS
EKG Q-T INTERVAL: 348 MS
EKG QRS DURATION: 84 MS
EKG QTC CALCULATION (BAZETT): 425 MS
EKG R AXIS: 71 DEGREES
EKG T AXIS: 66 DEGREES
EKG VENTRICULAR RATE: 90 BPM
MICROORGANISM SPEC CULT: NO GROWTH
SPECIMEN DESCRIPTION: NORMAL

## 2023-08-31 PROCEDURE — 6370000000 HC RX 637 (ALT 250 FOR IP): Performed by: FAMILY MEDICINE

## 2023-08-31 PROCEDURE — 36415 COLL VENOUS BLD VENIPUNCTURE: CPT

## 2023-08-31 PROCEDURE — 82550 ASSAY OF CK (CPK): CPT

## 2023-08-31 PROCEDURE — 6360000002 HC RX W HCPCS: Performed by: FAMILY MEDICINE

## 2023-08-31 PROCEDURE — 93010 ELECTROCARDIOGRAM REPORT: CPT | Performed by: INTERNAL MEDICINE

## 2023-08-31 RX ORDER — CYCLOBENZAPRINE HCL 10 MG
5 TABLET ORAL 3 TIMES DAILY PRN
Status: DISCONTINUED | OUTPATIENT
Start: 2023-08-31 | End: 2023-08-31 | Stop reason: HOSPADM

## 2023-08-31 RX ORDER — CYCLOBENZAPRINE HCL 5 MG
5 TABLET ORAL 3 TIMES DAILY PRN
Qty: 30 TABLET | Refills: 0 | Status: SHIPPED | OUTPATIENT
Start: 2023-08-31 | End: 2023-09-10

## 2023-08-31 RX ADMIN — ENOXAPARIN SODIUM 40 MG: 100 INJECTION SUBCUTANEOUS at 08:12

## 2023-08-31 RX ADMIN — MORPHINE SULFATE 4 MG: 4 INJECTION, SOLUTION INTRAMUSCULAR; INTRAVENOUS at 08:12

## 2023-08-31 RX ADMIN — MORPHINE SULFATE 4 MG: 4 INJECTION, SOLUTION INTRAMUSCULAR; INTRAVENOUS at 02:26

## 2023-08-31 RX ADMIN — CYCLOBENZAPRINE 5 MG: 10 TABLET, FILM COATED ORAL at 12:26

## 2023-08-31 RX ADMIN — CYCLOBENZAPRINE 5 MG: 10 TABLET, FILM COATED ORAL at 01:11

## 2023-08-31 RX ADMIN — MORPHINE SULFATE 4 MG: 4 INJECTION, SOLUTION INTRAMUSCULAR; INTRAVENOUS at 12:26

## 2023-08-31 RX ADMIN — SENNOSIDES AND DOCUSATE SODIUM 1 TABLET: 50; 8.6 TABLET ORAL at 08:12

## 2023-08-31 ASSESSMENT — PAIN SCALES - GENERAL
PAINLEVEL_OUTOF10: 8
PAINLEVEL_OUTOF10: 7
PAINLEVEL_OUTOF10: 7

## 2023-08-31 ASSESSMENT — PAIN DESCRIPTION - LOCATION
LOCATION: BACK
LOCATION: LEG

## 2023-08-31 ASSESSMENT — PAIN DESCRIPTION - ORIENTATION
ORIENTATION: LOWER
ORIENTATION: RIGHT;LEFT

## 2023-08-31 ASSESSMENT — PAIN DESCRIPTION - DESCRIPTORS
DESCRIPTORS: SPASM
DESCRIPTORS: SHARP;STABBING;DISCOMFORT

## 2023-08-31 NOTE — PLAN OF CARE
Problem: Pain  Goal: Verbalizes/displays adequate comfort level or baseline comfort level  8/31/2023 1420 by Sumaya Ramirez RN  Outcome: Completed  8/31/2023 0125 by Jorge Blanchard RN  Outcome: Progressing     Problem: Skin/Tissue Integrity  Goal: Absence of new skin breakdown  Description: 1. Monitor for areas of redness and/or skin breakdown  2. Assess vascular access sites hourly  3. Every 4-6 hours minimum:  Change oxygen saturation probe site  4. Every 4-6 hours:  If on nasal continuous positive airway pressure, respiratory therapy assess nares and determine need for appliance change or resting period.   8/31/2023 1420 by Sumaya Ramirez RN  Outcome: Completed  8/31/2023 0125 by Jorge Blanchard RN  Outcome: Progressing     Problem: Safety - Adult  Goal: Free from fall injury  8/31/2023 1420 by Sumaya Ramirez RN  Outcome: Completed  8/31/2023 0125 by Jorge Blanchard RN  Outcome: Progressing

## 2023-08-31 NOTE — PLAN OF CARE
Problem: Pain  Goal: Verbalizes/displays adequate comfort level or baseline comfort level  8/31/2023 0125 by Andrew Luu RN  Outcome: Progressing  8/30/2023 1452 by Mary Ann Rodriguez RN  Outcome: Progressing     Problem: Skin/Tissue Integrity  Goal: Absence of new skin breakdown  Description: 1. Monitor for areas of redness and/or skin breakdown  2. Assess vascular access sites hourly  3. Every 4-6 hours minimum:  Change oxygen saturation probe site  4. Every 4-6 hours:  If on nasal continuous positive airway pressure, respiratory therapy assess nares and determine need for appliance change or resting period.   8/31/2023 0125 by Andrew Luu RN  Outcome: Progressing  8/30/2023 1452 by Mary Ann Rodriguez RN  Outcome: Progressing     Problem: Safety - Adult  Goal: Free from fall injury  8/31/2023 0125 by Andrew Luu RN  Outcome: Progressing  8/30/2023 1452 by Mary Ann Rodriguez RN  Outcome: Progressing
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
## 2023-09-01 LAB
MICROORGANISM SPEC CULT: NORMAL
MICROORGANISM SPEC CULT: NORMAL
SERVICE CMNT-IMP: NORMAL
SERVICE CMNT-IMP: NORMAL
SPECIMEN DESCRIPTION: NORMAL
SPECIMEN DESCRIPTION: NORMAL

## 2023-09-03 LAB
EKG ATRIAL RATE: 326 BPM
EKG Q-T INTERVAL: 340 MS
EKG QRS DURATION: 84 MS
EKG QTC CALCULATION (BAZETT): 387 MS
EKG R AXIS: 67 DEGREES
EKG T AXIS: 66 DEGREES
EKG VENTRICULAR RATE: 78 BPM

## 2024-01-11 NOTE — ED NOTES
Bed: 25  Expected date:   Expected time:   Means of arrival:   Comments:  mata Agrawal RN  02/26/21 1053 minimum assist (75% patients effort)

## 2024-04-12 ENCOUNTER — APPOINTMENT (OUTPATIENT)
Dept: CT IMAGING | Age: 29
End: 2024-04-12
Attending: EMERGENCY MEDICINE
Payer: MEDICAID

## 2024-04-12 ENCOUNTER — HOSPITAL ENCOUNTER (EMERGENCY)
Age: 29
Discharge: HOME | End: 2024-04-12
Attending: EMERGENCY MEDICINE
Payer: MEDICAID

## 2024-04-12 VITALS
OXYGEN SATURATION: 98 % | WEIGHT: 170 LBS | HEART RATE: 54 BPM | HEIGHT: 74 IN | DIASTOLIC BLOOD PRESSURE: 80 MMHG | BODY MASS INDEX: 21.82 KG/M2 | SYSTOLIC BLOOD PRESSURE: 133 MMHG | TEMPERATURE: 98.4 F | RESPIRATION RATE: 16 BRPM

## 2024-04-12 DIAGNOSIS — G89.29 CHRONIC MIDLINE LOW BACK PAIN WITHOUT SCIATICA: ICD-10-CM

## 2024-04-12 DIAGNOSIS — M62.838 SPASM OF MUSCLE: ICD-10-CM

## 2024-04-12 DIAGNOSIS — R07.89 CHEST WALL PAIN: ICD-10-CM

## 2024-04-12 DIAGNOSIS — R10.13 ABDOMINAL PAIN, EPIGASTRIC: Primary | ICD-10-CM

## 2024-04-12 DIAGNOSIS — M54.50 CHRONIC MIDLINE LOW BACK PAIN WITHOUT SCIATICA: ICD-10-CM

## 2024-04-12 LAB
ALBUMIN SERPL-MCNC: 4.3 G/DL (ref 3.5–5.2)
ALP SERPL-CCNC: 107 U/L (ref 40–129)
ALT SERPL-CCNC: 100 U/L (ref 0–40)
ANION GAP SERPL CALCULATED.3IONS-SCNC: 20 MMOL/L (ref 7–16)
AST SERPL-CCNC: 85 U/L (ref 0–39)
BACTERIA URNS QL MICRO: ABNORMAL
BASOPHILS # BLD: 0.04 K/UL (ref 0–0.2)
BASOPHILS NFR BLD: 0 % (ref 0–2)
BILIRUB SERPL-MCNC: 0.2 MG/DL (ref 0–1.2)
BILIRUB UR QL STRIP: NEGATIVE
BUN SERPL-MCNC: 14 MG/DL (ref 6–20)
CALCIUM SERPL-MCNC: 9.6 MG/DL (ref 8.6–10.2)
CHLORIDE SERPL-SCNC: 105 MMOL/L (ref 98–107)
CK SERPL-CCNC: 755 U/L (ref 20–200)
CLARITY UR: CLEAR
CO2 SERPL-SCNC: 18 MMOL/L (ref 22–29)
COLOR UR: YELLOW
CREAT SERPL-MCNC: 1 MG/DL (ref 0.7–1.2)
CRYSTALS URNS MICRO: ABNORMAL /HPF
D DIMER: <200 NG/ML DDU (ref 0–232)
EKG ATRIAL RATE: 65 BPM
EKG P AXIS: 47 DEGREES
EKG P-R INTERVAL: 126 MS
EKG Q-T INTERVAL: 386 MS
EKG QRS DURATION: 86 MS
EKG QTC CALCULATION (BAZETT): 401 MS
EKG R AXIS: 74 DEGREES
EKG T AXIS: 72 DEGREES
EKG VENTRICULAR RATE: 65 BPM
EOSINOPHIL # BLD: 0.03 K/UL (ref 0.05–0.5)
EOSINOPHILS RELATIVE PERCENT: 0 % (ref 0–6)
EPI CELLS #/AREA URNS HPF: ABNORMAL /HPF
ERYTHROCYTE [DISTWIDTH] IN BLOOD BY AUTOMATED COUNT: 14.6 % (ref 11.5–15)
GFR SERPL CREATININE-BSD FRML MDRD: >90 ML/MIN/1.73M2
GLUCOSE SERPL-MCNC: 103 MG/DL (ref 74–99)
GLUCOSE UR STRIP-MCNC: NEGATIVE MG/DL
HCT VFR BLD AUTO: 44 % (ref 37–54)
HGB BLD-MCNC: 14.9 G/DL (ref 12.5–16.5)
HGB UR QL STRIP.AUTO: NEGATIVE
IMM GRANULOCYTES # BLD AUTO: 0.04 K/UL (ref 0–0.58)
IMM GRANULOCYTES NFR BLD: 0 % (ref 0–5)
KETONES UR STRIP-MCNC: NEGATIVE MG/DL
LACTATE BLDV-SCNC: 1.9 MMOL/L (ref 0.5–1.9)
LACTATE BLDV-SCNC: 5.7 MMOL/L (ref 0.5–2.2)
LEUKOCYTE ESTERASE UR QL STRIP: NEGATIVE
LYMPHOCYTES NFR BLD: 3.36 K/UL (ref 1.5–4)
LYMPHOCYTES RELATIVE PERCENT: 25 % (ref 20–42)
MCH RBC QN AUTO: 30.2 PG (ref 26–35)
MCHC RBC AUTO-ENTMCNC: 33.9 G/DL (ref 32–34.5)
MCV RBC AUTO: 89.1 FL (ref 80–99.9)
MONOCYTES NFR BLD: 0.79 K/UL (ref 0.1–0.95)
MONOCYTES NFR BLD: 6 % (ref 2–12)
NEUTROPHILS NFR BLD: 69 % (ref 43–80)
NEUTS SEG NFR BLD: 9.46 K/UL (ref 1.8–7.3)
NITRITE UR QL STRIP: POSITIVE
PH UR STRIP: >9 [PH] (ref 5–9)
PLATELET # BLD AUTO: 307 K/UL (ref 130–450)
PMV BLD AUTO: 10.8 FL (ref 7–12)
POTASSIUM SERPL-SCNC: 3.7 MMOL/L (ref 3.5–5)
PROT SERPL-MCNC: 7.5 G/DL (ref 6.4–8.3)
PROT UR STRIP-MCNC: NEGATIVE MG/DL
RBC # BLD AUTO: 4.94 M/UL (ref 3.8–5.8)
RBC #/AREA URNS HPF: ABNORMAL /HPF
SODIUM SERPL-SCNC: 143 MMOL/L (ref 132–146)
SP GR UR STRIP: <1.005 (ref 1–1.03)
TROPONIN I SERPL HS-MCNC: 15 NG/L (ref 0–11)
TROPONIN I SERPL HS-MCNC: 15 NG/L (ref 0–11)
UROBILINOGEN UR STRIP-ACNC: 0.2 EU/DL (ref 0–1)
WBC #/AREA URNS HPF: ABNORMAL /HPF
WBC OTHER # BLD: 13.7 K/UL (ref 4.5–11.5)

## 2024-04-12 PROCEDURE — 2580000003 HC RX 258: Performed by: EMERGENCY MEDICINE

## 2024-04-12 PROCEDURE — 6360000002 HC RX W HCPCS: Performed by: STUDENT IN AN ORGANIZED HEALTH CARE EDUCATION/TRAINING PROGRAM

## 2024-04-12 PROCEDURE — 6360000004 HC RX CONTRAST MEDICATION: Performed by: RADIOLOGY

## 2024-04-12 PROCEDURE — 85379 FIBRIN DEGRADATION QUANT: CPT

## 2024-04-12 PROCEDURE — 93005 ELECTROCARDIOGRAM TRACING: CPT | Performed by: EMERGENCY MEDICINE

## 2024-04-12 PROCEDURE — 81001 URINALYSIS AUTO W/SCOPE: CPT

## 2024-04-12 PROCEDURE — 83605 ASSAY OF LACTIC ACID: CPT

## 2024-04-12 PROCEDURE — 80053 COMPREHEN METABOLIC PANEL: CPT

## 2024-04-12 PROCEDURE — 99285 EMERGENCY DEPT VISIT HI MDM: CPT

## 2024-04-12 PROCEDURE — 6370000000 HC RX 637 (ALT 250 FOR IP): Performed by: STUDENT IN AN ORGANIZED HEALTH CARE EDUCATION/TRAINING PROGRAM

## 2024-04-12 PROCEDURE — 6360000002 HC RX W HCPCS: Performed by: EMERGENCY MEDICINE

## 2024-04-12 PROCEDURE — 93010 ELECTROCARDIOGRAM REPORT: CPT | Performed by: INTERNAL MEDICINE

## 2024-04-12 PROCEDURE — 85025 COMPLETE CBC W/AUTO DIFF WBC: CPT

## 2024-04-12 PROCEDURE — 96375 TX/PRO/DX INJ NEW DRUG ADDON: CPT

## 2024-04-12 PROCEDURE — 82550 ASSAY OF CK (CPK): CPT

## 2024-04-12 PROCEDURE — 71275 CT ANGIOGRAPHY CHEST: CPT

## 2024-04-12 PROCEDURE — 2500000003 HC RX 250 WO HCPCS: Performed by: EMERGENCY MEDICINE

## 2024-04-12 PROCEDURE — 74177 CT ABD & PELVIS W/CONTRAST: CPT

## 2024-04-12 PROCEDURE — 84484 ASSAY OF TROPONIN QUANT: CPT

## 2024-04-12 PROCEDURE — 96374 THER/PROPH/DIAG INJ IV PUSH: CPT

## 2024-04-12 RX ORDER — 0.9 % SODIUM CHLORIDE 0.9 %
1000 INTRAVENOUS SOLUTION INTRAVENOUS ONCE
Status: COMPLETED | OUTPATIENT
Start: 2024-04-12 | End: 2024-04-12

## 2024-04-12 RX ORDER — HYDROCODONE BITARTRATE AND ACETAMINOPHEN 5; 325 MG/1; MG/1
1 TABLET ORAL ONCE
Status: COMPLETED | OUTPATIENT
Start: 2024-04-12 | End: 2024-04-12

## 2024-04-12 RX ORDER — TIZANIDINE 4 MG/1
4 TABLET ORAL ONCE
Status: COMPLETED | OUTPATIENT
Start: 2024-04-12 | End: 2024-04-12

## 2024-04-12 RX ORDER — BISACODYL 5 MG/1
5 TABLET, DELAYED RELEASE ORAL DAILY PRN
Qty: 30 TABLET | Refills: 0 | Status: SHIPPED | OUTPATIENT
Start: 2024-04-12

## 2024-04-12 RX ORDER — ONDANSETRON 2 MG/ML
4 INJECTION INTRAMUSCULAR; INTRAVENOUS ONCE
Status: COMPLETED | OUTPATIENT
Start: 2024-04-12 | End: 2024-04-12

## 2024-04-12 RX ORDER — CEFDINIR 300 MG/1
300 CAPSULE ORAL 2 TIMES DAILY
Qty: 14 CAPSULE | Refills: 0 | Status: SHIPPED | OUTPATIENT
Start: 2024-04-12 | End: 2024-04-19

## 2024-04-12 RX ORDER — HYDROMORPHONE HYDROCHLORIDE 1 MG/ML
1 INJECTION, SOLUTION INTRAMUSCULAR; INTRAVENOUS; SUBCUTANEOUS ONCE
Status: COMPLETED | OUTPATIENT
Start: 2024-04-12 | End: 2024-04-12

## 2024-04-12 RX ORDER — MORPHINE SULFATE 4 MG/ML
4 INJECTION, SOLUTION INTRAMUSCULAR; INTRAVENOUS ONCE
Status: COMPLETED | OUTPATIENT
Start: 2024-04-12 | End: 2024-04-12

## 2024-04-12 RX ADMIN — MORPHINE SULFATE 4 MG: 4 INJECTION, SOLUTION INTRAMUSCULAR; INTRAVENOUS at 09:02

## 2024-04-12 RX ADMIN — ONDANSETRON 4 MG: 2 INJECTION INTRAMUSCULAR; INTRAVENOUS at 09:03

## 2024-04-12 RX ADMIN — TIZANIDINE 4 MG: 4 TABLET ORAL at 08:48

## 2024-04-12 RX ADMIN — SODIUM CHLORIDE 1000 ML: 9 INJECTION, SOLUTION INTRAVENOUS at 14:04

## 2024-04-12 RX ADMIN — IOPAMIDOL 75 ML: 755 INJECTION, SOLUTION INTRAVENOUS at 11:18

## 2024-04-12 RX ADMIN — HYDROMORPHONE HYDROCHLORIDE 1 MG: 1 INJECTION, SOLUTION INTRAMUSCULAR; INTRAVENOUS; SUBCUTANEOUS at 13:59

## 2024-04-12 RX ADMIN — HYDROCODONE BITARTRATE AND ACETAMINOPHEN 1 TABLET: 5; 325 TABLET ORAL at 08:47

## 2024-04-12 RX ADMIN — SODIUM CHLORIDE 1000 ML: 9 INJECTION, SOLUTION INTRAVENOUS at 10:33

## 2024-04-12 ASSESSMENT — PAIN DESCRIPTION - LOCATION: LOCATION: BACK;LEG

## 2024-04-12 ASSESSMENT — PAIN SCALES - GENERAL
PAINLEVEL_OUTOF10: 8
PAINLEVEL_OUTOF10: 8
PAINLEVEL_OUTOF10: 9

## 2024-04-12 ASSESSMENT — ENCOUNTER SYMPTOMS
CONSTIPATION: 1
SHORTNESS OF BREATH: 0
COUGH: 0
BLOOD IN STOOL: 0
VOMITING: 0
ABDOMINAL PAIN: 1
BACK PAIN: 1
ABDOMINAL DISTENTION: 0
WHEEZING: 0
RHINORRHEA: 0
DIARRHEA: 0

## 2024-04-12 ASSESSMENT — PAIN DESCRIPTION - DESCRIPTORS: DESCRIPTORS: ACHING;CRAMPING;SHOOTING

## 2024-04-12 ASSESSMENT — LIFESTYLE VARIABLES
HOW MANY STANDARD DRINKS CONTAINING ALCOHOL DO YOU HAVE ON A TYPICAL DAY: PATIENT DOES NOT DRINK
HOW OFTEN DO YOU HAVE A DRINK CONTAINING ALCOHOL: NEVER

## 2024-04-12 ASSESSMENT — PAIN - FUNCTIONAL ASSESSMENT: PAIN_FUNCTIONAL_ASSESSMENT: NONE - DENIES PAIN

## 2024-04-12 NOTE — DISCHARGE INSTRUCTIONS
Please take cefdinir 300 mg every twice a day for 7 days. Continue taking all of your antibiotics even if you feel better.    Take 1 tablet of bisacodyl per day as needed for constipation.      Please call your PCP for a follow up appointment within 1 week.
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
## 2024-04-12 NOTE — ED NOTES
Pt requesting to leave with family instead of [facility]. Pt educated on the risks of going home without a lift assist from [facility]. Pt still requesting to cancel [facility] transport. Family is at bedside saying they will be able to help assist into the house.

## 2024-06-03 ENCOUNTER — APPOINTMENT (OUTPATIENT)
Dept: CT IMAGING | Age: 29
End: 2024-06-03
Payer: MEDICAID

## 2024-06-03 ENCOUNTER — HOSPITAL ENCOUNTER (OUTPATIENT)
Age: 29
Setting detail: OBSERVATION
Discharge: HOME | End: 2024-06-07
Attending: STUDENT IN AN ORGANIZED HEALTH CARE EDUCATION/TRAINING PROGRAM | Admitting: INTERNAL MEDICINE
Payer: MEDICAID

## 2024-06-03 DIAGNOSIS — K52.9 COLITIS: Primary | ICD-10-CM

## 2024-06-03 DIAGNOSIS — R10.9 ABDOMINAL PAIN: ICD-10-CM

## 2024-06-03 LAB
ALBUMIN SERPL-MCNC: 4.3 G/DL (ref 3.5–5.2)
ALP SERPL-CCNC: 105 U/L (ref 40–129)
ALT SERPL-CCNC: 39 U/L (ref 0–40)
ANION GAP SERPL CALCULATED.3IONS-SCNC: 14 MMOL/L (ref 7–16)
AST SERPL-CCNC: 34 U/L (ref 0–39)
BASOPHILS # BLD: 0.05 K/UL (ref 0–0.2)
BASOPHILS NFR BLD: 0 % (ref 0–2)
BILIRUB SERPL-MCNC: 0.3 MG/DL (ref 0–1.2)
BUN SERPL-MCNC: 12 MG/DL (ref 6–20)
CALCIUM SERPL-MCNC: 9 MG/DL (ref 8.6–10.2)
CHLORIDE SERPL-SCNC: 106 MMOL/L (ref 98–107)
CK SERPL-CCNC: 689 U/L (ref 20–200)
CO2 SERPL-SCNC: 22 MMOL/L (ref 22–29)
CREAT SERPL-MCNC: 1.2 MG/DL (ref 0.7–1.2)
EKG ATRIAL RATE: 68 BPM
EKG Q-T INTERVAL: 338 MS
EKG QRS DURATION: 84 MS
EKG QTC CALCULATION (BAZETT): 365 MS
EKG R AXIS: 76 DEGREES
EKG T AXIS: 44 DEGREES
EKG VENTRICULAR RATE: 70 BPM
EOSINOPHIL # BLD: 0.1 K/UL (ref 0.05–0.5)
EOSINOPHILS RELATIVE PERCENT: 1 % (ref 0–6)
ERYTHROCYTE [DISTWIDTH] IN BLOOD BY AUTOMATED COUNT: 14.5 % (ref 11.5–15)
GFR, ESTIMATED: 82 ML/MIN/1.73M2
GLUCOSE SERPL-MCNC: 96 MG/DL (ref 74–99)
HCT VFR BLD AUTO: 45.7 % (ref 37–54)
HGB BLD-MCNC: 15.2 G/DL (ref 12.5–16.5)
IMM GRANULOCYTES # BLD AUTO: <0.03 K/UL (ref 0–0.58)
IMM GRANULOCYTES NFR BLD: 0 % (ref 0–5)
LACTATE BLDV-SCNC: 1.7 MMOL/L (ref 0.5–2.2)
LACTATE BLDV-SCNC: 4.1 MMOL/L (ref 0.5–2.2)
LIPASE SERPL-CCNC: 29 U/L (ref 13–60)
LYMPHOCYTES NFR BLD: 4.22 K/UL (ref 1.5–4)
LYMPHOCYTES RELATIVE PERCENT: 35 % (ref 20–42)
MCH RBC QN AUTO: 30.3 PG (ref 26–35)
MCHC RBC AUTO-ENTMCNC: 33.3 G/DL (ref 32–34.5)
MCV RBC AUTO: 91.2 FL (ref 80–99.9)
MONOCYTES NFR BLD: 0.97 K/UL (ref 0.1–0.95)
MONOCYTES NFR BLD: 8 % (ref 2–12)
NEUTROPHILS NFR BLD: 55 % (ref 43–80)
NEUTS SEG NFR BLD: 6.57 K/UL (ref 1.8–7.3)
PLATELET # BLD AUTO: 285 K/UL (ref 130–450)
PMV BLD AUTO: 11.1 FL (ref 7–12)
POTASSIUM SERPL-SCNC: 3.7 MMOL/L (ref 3.5–5)
PROT SERPL-MCNC: 7 G/DL (ref 6.4–8.3)
RBC # BLD AUTO: 5.01 M/UL (ref 3.8–5.8)
SODIUM SERPL-SCNC: 142 MMOL/L (ref 132–146)
TROPONIN I SERPL HS-MCNC: 13 NG/L (ref 0–11)
TROPONIN I SERPL HS-MCNC: 15 NG/L (ref 0–11)
WBC OTHER # BLD: 11.9 K/UL (ref 4.5–11.5)

## 2024-06-03 PROCEDURE — 82550 ASSAY OF CK (CPK): CPT

## 2024-06-03 PROCEDURE — 6360000002 HC RX W HCPCS: Performed by: STUDENT IN AN ORGANIZED HEALTH CARE EDUCATION/TRAINING PROGRAM

## 2024-06-03 PROCEDURE — APPSS45 APP SPLIT SHARED TIME 31-45 MINUTES

## 2024-06-03 PROCEDURE — 6370000000 HC RX 637 (ALT 250 FOR IP): Performed by: STUDENT IN AN ORGANIZED HEALTH CARE EDUCATION/TRAINING PROGRAM

## 2024-06-03 PROCEDURE — 96375 TX/PRO/DX INJ NEW DRUG ADDON: CPT

## 2024-06-03 PROCEDURE — 84484 ASSAY OF TROPONIN QUANT: CPT

## 2024-06-03 PROCEDURE — 93010 ELECTROCARDIOGRAM REPORT: CPT | Performed by: INTERNAL MEDICINE

## 2024-06-03 PROCEDURE — 2580000003 HC RX 258: Performed by: STUDENT IN AN ORGANIZED HEALTH CARE EDUCATION/TRAINING PROGRAM

## 2024-06-03 PROCEDURE — 96372 THER/PROPH/DIAG INJ SC/IM: CPT

## 2024-06-03 PROCEDURE — 93005 ELECTROCARDIOGRAM TRACING: CPT | Performed by: STUDENT IN AN ORGANIZED HEALTH CARE EDUCATION/TRAINING PROGRAM

## 2024-06-03 PROCEDURE — 6370000000 HC RX 637 (ALT 250 FOR IP): Performed by: NURSE PRACTITIONER

## 2024-06-03 PROCEDURE — G0378 HOSPITAL OBSERVATION PER HR: HCPCS

## 2024-06-03 PROCEDURE — 6360000004 HC RX CONTRAST MEDICATION: Performed by: RADIOLOGY

## 2024-06-03 PROCEDURE — 6370000000 HC RX 637 (ALT 250 FOR IP): Performed by: INTERNAL MEDICINE

## 2024-06-03 PROCEDURE — 6360000002 HC RX W HCPCS: Performed by: NURSE PRACTITIONER

## 2024-06-03 PROCEDURE — 96365 THER/PROPH/DIAG IV INF INIT: CPT

## 2024-06-03 PROCEDURE — 99223 1ST HOSP IP/OBS HIGH 75: CPT | Performed by: INTERNAL MEDICINE

## 2024-06-03 PROCEDURE — 74177 CT ABD & PELVIS W/CONTRAST: CPT

## 2024-06-03 PROCEDURE — 85025 COMPLETE CBC W/AUTO DIFF WBC: CPT

## 2024-06-03 PROCEDURE — 83605 ASSAY OF LACTIC ACID: CPT

## 2024-06-03 PROCEDURE — 96376 TX/PRO/DX INJ SAME DRUG ADON: CPT

## 2024-06-03 PROCEDURE — 99285 EMERGENCY DEPT VISIT HI MDM: CPT

## 2024-06-03 PROCEDURE — 96361 HYDRATE IV INFUSION ADD-ON: CPT

## 2024-06-03 PROCEDURE — 80053 COMPREHEN METABOLIC PANEL: CPT

## 2024-06-03 PROCEDURE — 96366 THER/PROPH/DIAG IV INF ADDON: CPT

## 2024-06-03 PROCEDURE — 83690 ASSAY OF LIPASE: CPT

## 2024-06-03 RX ORDER — BISACODYL 10 MG
10 SUPPOSITORY, RECTAL RECTAL DAILY PRN
Status: DISCONTINUED | OUTPATIENT
Start: 2024-06-03 | End: 2024-06-04

## 2024-06-03 RX ORDER — POTASSIUM CHLORIDE 7.45 MG/ML
10 INJECTION INTRAVENOUS PRN
Status: DISCONTINUED | OUTPATIENT
Start: 2024-06-03 | End: 2024-06-07 | Stop reason: HOSPADM

## 2024-06-03 RX ORDER — MORPHINE SULFATE 4 MG/ML
4 INJECTION, SOLUTION INTRAMUSCULAR; INTRAVENOUS ONCE
Status: COMPLETED | OUTPATIENT
Start: 2024-06-03 | End: 2024-06-03

## 2024-06-03 RX ORDER — SODIUM CHLORIDE 9 MG/ML
INJECTION, SOLUTION INTRAVENOUS PRN
Status: DISCONTINUED | OUTPATIENT
Start: 2024-06-03 | End: 2024-06-07 | Stop reason: HOSPADM

## 2024-06-03 RX ORDER — LACTULOSE 10 G/15ML
20 SOLUTION ORAL 3 TIMES DAILY
Status: DISCONTINUED | OUTPATIENT
Start: 2024-06-03 | End: 2024-06-05

## 2024-06-03 RX ORDER — METRONIDAZOLE 500 MG/100ML
500 INJECTION, SOLUTION INTRAVENOUS ONCE
Status: COMPLETED | OUTPATIENT
Start: 2024-06-03 | End: 2024-06-03

## 2024-06-03 RX ORDER — BACLOFEN 10 MG/1
10 TABLET ORAL 3 TIMES DAILY
Status: DISCONTINUED | OUTPATIENT
Start: 2024-06-03 | End: 2024-06-04

## 2024-06-03 RX ORDER — TIZANIDINE 2 MG/1
TABLET ORAL
Status: ON HOLD | COMMUNITY
Start: 2024-05-16 | End: 2024-06-07 | Stop reason: HOSPADM

## 2024-06-03 RX ORDER — MORPHINE SULFATE 2 MG/ML
2 INJECTION, SOLUTION INTRAMUSCULAR; INTRAVENOUS ONCE
Status: COMPLETED | OUTPATIENT
Start: 2024-06-03 | End: 2024-06-03

## 2024-06-03 RX ORDER — ONDANSETRON 2 MG/ML
4 INJECTION INTRAMUSCULAR; INTRAVENOUS ONCE
Status: COMPLETED | OUTPATIENT
Start: 2024-06-03 | End: 2024-06-03

## 2024-06-03 RX ORDER — SODIUM CHLORIDE 9 MG/ML
INJECTION, SOLUTION INTRAVENOUS CONTINUOUS
Status: ACTIVE | OUTPATIENT
Start: 2024-06-03 | End: 2024-06-05

## 2024-06-03 RX ORDER — BISACODYL 5 MG/1
5 TABLET, DELAYED RELEASE ORAL DAILY
Status: DISCONTINUED | OUTPATIENT
Start: 2024-06-03 | End: 2024-06-03

## 2024-06-03 RX ORDER — TRAMADOL HYDROCHLORIDE 50 MG/1
50 TABLET ORAL EVERY 6 HOURS PRN
Status: DISCONTINUED | OUTPATIENT
Start: 2024-06-03 | End: 2024-06-07 | Stop reason: HOSPADM

## 2024-06-03 RX ORDER — ONDANSETRON 2 MG/ML
4 INJECTION INTRAMUSCULAR; INTRAVENOUS EVERY 6 HOURS PRN
Status: DISCONTINUED | OUTPATIENT
Start: 2024-06-03 | End: 2024-06-07 | Stop reason: HOSPADM

## 2024-06-03 RX ORDER — 0.9 % SODIUM CHLORIDE 0.9 %
1000 INTRAVENOUS SOLUTION INTRAVENOUS ONCE
Status: COMPLETED | OUTPATIENT
Start: 2024-06-03 | End: 2024-06-03

## 2024-06-03 RX ORDER — ENOXAPARIN SODIUM 100 MG/ML
40 INJECTION SUBCUTANEOUS DAILY
Status: DISCONTINUED | OUTPATIENT
Start: 2024-06-03 | End: 2024-06-07 | Stop reason: HOSPADM

## 2024-06-03 RX ORDER — POLYETHYLENE GLYCOL 3350 17 G/17G
17 POWDER, FOR SOLUTION ORAL DAILY PRN
Status: DISCONTINUED | OUTPATIENT
Start: 2024-06-03 | End: 2024-06-07

## 2024-06-03 RX ORDER — SODIUM CHLORIDE 0.9 % (FLUSH) 0.9 %
5-40 SYRINGE (ML) INJECTION EVERY 12 HOURS SCHEDULED
Status: DISCONTINUED | OUTPATIENT
Start: 2024-06-03 | End: 2024-06-07 | Stop reason: HOSPADM

## 2024-06-03 RX ORDER — FENTANYL CITRATE 50 UG/ML
25 INJECTION, SOLUTION INTRAMUSCULAR; INTRAVENOUS ONCE
Status: COMPLETED | OUTPATIENT
Start: 2024-06-03 | End: 2024-06-03

## 2024-06-03 RX ORDER — SODIUM CHLORIDE 0.9 % (FLUSH) 0.9 %
5-40 SYRINGE (ML) INJECTION PRN
Status: DISCONTINUED | OUTPATIENT
Start: 2024-06-03 | End: 2024-06-07 | Stop reason: HOSPADM

## 2024-06-03 RX ORDER — FAMOTIDINE 20 MG/1
20 TABLET, FILM COATED ORAL 2 TIMES DAILY
Status: DISCONTINUED | OUTPATIENT
Start: 2024-06-03 | End: 2024-06-06

## 2024-06-03 RX ORDER — METRONIDAZOLE 500 MG/100ML
500 INJECTION, SOLUTION INTRAVENOUS EVERY 8 HOURS
Status: DISCONTINUED | OUTPATIENT
Start: 2024-06-03 | End: 2024-06-07

## 2024-06-03 RX ORDER — MAGNESIUM SULFATE IN WATER 40 MG/ML
2000 INJECTION, SOLUTION INTRAVENOUS PRN
Status: DISCONTINUED | OUTPATIENT
Start: 2024-06-03 | End: 2024-06-07 | Stop reason: HOSPADM

## 2024-06-03 RX ORDER — ACETAMINOPHEN 325 MG/1
650 TABLET ORAL EVERY 6 HOURS PRN
Status: DISCONTINUED | OUTPATIENT
Start: 2024-06-03 | End: 2024-06-07 | Stop reason: HOSPADM

## 2024-06-03 RX ORDER — POTASSIUM CHLORIDE 20 MEQ/1
40 TABLET, EXTENDED RELEASE ORAL PRN
Status: DISCONTINUED | OUTPATIENT
Start: 2024-06-03 | End: 2024-06-07 | Stop reason: HOSPADM

## 2024-06-03 RX ORDER — ACETAMINOPHEN 650 MG/1
650 SUPPOSITORY RECTAL EVERY 6 HOURS PRN
Status: DISCONTINUED | OUTPATIENT
Start: 2024-06-03 | End: 2024-06-07 | Stop reason: HOSPADM

## 2024-06-03 RX ORDER — ONDANSETRON 4 MG/1
4 TABLET, ORALLY DISINTEGRATING ORAL EVERY 8 HOURS PRN
Status: DISCONTINUED | OUTPATIENT
Start: 2024-06-03 | End: 2024-06-07 | Stop reason: HOSPADM

## 2024-06-03 RX ORDER — GABAPENTIN 100 MG/1
200 CAPSULE ORAL 3 TIMES DAILY
Status: DISCONTINUED | OUTPATIENT
Start: 2024-06-03 | End: 2024-06-07 | Stop reason: HOSPADM

## 2024-06-03 RX ADMIN — TRAMADOL HYDROCHLORIDE 50 MG: 50 TABLET, COATED ORAL at 18:58

## 2024-06-03 RX ADMIN — GABAPENTIN 200 MG: 100 CAPSULE ORAL at 14:39

## 2024-06-03 RX ADMIN — SODIUM CHLORIDE 1000 ML: 9 INJECTION, SOLUTION INTRAVENOUS at 02:45

## 2024-06-03 RX ADMIN — BACLOFEN 10 MG: 10 TABLET ORAL at 20:46

## 2024-06-03 RX ADMIN — ONDANSETRON 4 MG: 2 INJECTION INTRAMUSCULAR; INTRAVENOUS at 12:24

## 2024-06-03 RX ADMIN — ONDANSETRON 4 MG: 2 INJECTION INTRAMUSCULAR; INTRAVENOUS at 02:48

## 2024-06-03 RX ADMIN — FAMOTIDINE 20 MG: 20 TABLET ORAL at 20:46

## 2024-06-03 RX ADMIN — GABAPENTIN 200 MG: 100 CAPSULE ORAL at 20:46

## 2024-06-03 RX ADMIN — ENOXAPARIN SODIUM 40 MG: 100 INJECTION SUBCUTANEOUS at 08:51

## 2024-06-03 RX ADMIN — SODIUM CHLORIDE: 9 INJECTION, SOLUTION INTRAVENOUS at 06:28

## 2024-06-03 RX ADMIN — METRONIDAZOLE 500 MG: 500 INJECTION, SOLUTION INTRAVENOUS at 05:37

## 2024-06-03 RX ADMIN — METRONIDAZOLE 500 MG: 500 INJECTION, SOLUTION INTRAVENOUS at 20:51

## 2024-06-03 RX ADMIN — LACTULOSE 20 G: 20 SOLUTION ORAL at 20:47

## 2024-06-03 RX ADMIN — SODIUM CHLORIDE 1000 ML: 9 INJECTION, SOLUTION INTRAVENOUS at 04:13

## 2024-06-03 RX ADMIN — BACLOFEN 10 MG: 10 TABLET ORAL at 12:15

## 2024-06-03 RX ADMIN — LACTULOSE 20 G: 20 SOLUTION ORAL at 12:15

## 2024-06-03 RX ADMIN — FENTANYL CITRATE 25 MCG: 50 INJECTION, SOLUTION INTRAMUSCULAR; INTRAVENOUS at 02:48

## 2024-06-03 RX ADMIN — TRAMADOL HYDROCHLORIDE 50 MG: 50 TABLET, COATED ORAL at 12:15

## 2024-06-03 RX ADMIN — METRONIDAZOLE 500 MG: 500 INJECTION, SOLUTION INTRAVENOUS at 12:17

## 2024-06-03 RX ADMIN — FAMOTIDINE 20 MG: 20 TABLET ORAL at 08:51

## 2024-06-03 RX ADMIN — ONDANSETRON 4 MG: 2 INJECTION INTRAMUSCULAR; INTRAVENOUS at 06:27

## 2024-06-03 RX ADMIN — MORPHINE SULFATE 4 MG: 4 INJECTION, SOLUTION INTRAMUSCULAR; INTRAVENOUS at 06:21

## 2024-06-03 RX ADMIN — GABAPENTIN 200 MG: 100 CAPSULE ORAL at 08:51

## 2024-06-03 RX ADMIN — BISACODYL 5 MG: 5 TABLET, COATED ORAL at 08:51

## 2024-06-03 RX ADMIN — LACTULOSE 20 G: 20 SOLUTION ORAL at 08:51

## 2024-06-03 RX ADMIN — WATER 2000 MG: 1 INJECTION INTRAMUSCULAR; INTRAVENOUS; SUBCUTANEOUS at 05:36

## 2024-06-03 RX ADMIN — MORPHINE SULFATE 2 MG: 2 INJECTION, SOLUTION INTRAMUSCULAR; INTRAVENOUS at 20:54

## 2024-06-03 ASSESSMENT — PAIN DESCRIPTION - LOCATION
LOCATION: OTHER (COMMENT)
LOCATION: BACK;LEG
LOCATION: ABDOMEN
LOCATION: ABDOMEN;BACK
LOCATION: ABDOMEN;BACK

## 2024-06-03 ASSESSMENT — PAIN SCALES - GENERAL
PAINLEVEL_OUTOF10: 8
PAINLEVEL_OUTOF10: 5
PAINLEVEL_OUTOF10: 8

## 2024-06-03 ASSESSMENT — PAIN - FUNCTIONAL ASSESSMENT
PAIN_FUNCTIONAL_ASSESSMENT: 0-10
PAIN_FUNCTIONAL_ASSESSMENT: PREVENTS OR INTERFERES WITH MANY ACTIVE NOT PASSIVE ACTIVITIES
PAIN_FUNCTIONAL_ASSESSMENT: ACTIVITIES ARE NOT PREVENTED

## 2024-06-03 ASSESSMENT — PAIN DESCRIPTION - ORIENTATION: ORIENTATION: RIGHT;LEFT;LOWER

## 2024-06-03 ASSESSMENT — PAIN DESCRIPTION - DESCRIPTORS
DESCRIPTORS: ACHING;SHARP;SPASM
DESCRIPTORS: ACHING;SORE;TENDER
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
## 2024-06-03 NOTE — PROGRESS NOTES
Database initiated pharmacy and medications verified with the patient. He is A&O independent from home.

## 2024-06-03 NOTE — ED NOTES
ED to Inpatient Handoff Report    Notified Tamara that electronic handoff available and patient ready for transport to room 0501.    Safety Risks: Risk of falls    Patient in Restraints: no    Constant Observer or Patient : no    Telemetry Monitoring Ordered :No           Order to transfer to unit without monitor:N/A    Last MEWS: 0 Time completed: 0631    Deterioration Index Score:   Predictive Model Details          18 (Normal)  Factor Value    Calculated 6/3/2024 07:24 27% Respiratory rate 14    Deterioration Index Model 22% Age 28 years old     16% WBC count abnormal (11.9 k/uL)     15% Systolic 102     9% Sodium 142 mmol/L     8% Pulse 51     2% Potassium 3.7 mmol/L     1% Pulse oximetry 97 %     1% Hematocrit 45.7 %     0% Temperature 98.3 °F (36.8 °C)        Vitals:    06/03/24 0147 06/03/24 0247 06/03/24 0450 06/03/24 0631   BP: (!) 155/96 136/69 133/66 102/85   Pulse: 56 64 59 51   Resp: 17 11 14 14   Temp: 98.4 °F (36.9 °C)   98.3 °F (36.8 °C)   TempSrc: Oral   Oral   SpO2: 98% 95% 100% 97%   Weight: 72.6 kg (160 lb)            Opportunity for questions and clarification was provided.
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
## 2024-06-04 LAB
ALBUMIN SERPL-MCNC: 3.9 G/DL (ref 3.5–5.2)
ALP SERPL-CCNC: 87 U/L (ref 40–129)
ALT SERPL-CCNC: 37 U/L (ref 0–40)
ANION GAP SERPL CALCULATED.3IONS-SCNC: 12 MMOL/L (ref 7–16)
AST SERPL-CCNC: 28 U/L (ref 0–39)
BASOPHILS # BLD: 0.03 K/UL (ref 0–0.2)
BASOPHILS NFR BLD: 0 % (ref 0–2)
BILIRUB SERPL-MCNC: 0.5 MG/DL (ref 0–1.2)
BUN SERPL-MCNC: 7 MG/DL (ref 6–20)
CALCIUM SERPL-MCNC: 8.8 MG/DL (ref 8.6–10.2)
CHLORIDE SERPL-SCNC: 109 MMOL/L (ref 98–107)
CO2 SERPL-SCNC: 20 MMOL/L (ref 22–29)
CREAT SERPL-MCNC: 1.1 MG/DL (ref 0.7–1.2)
EKG ATRIAL RATE: 58 BPM
EKG P-R INTERVAL: 112 MS
EKG Q-T INTERVAL: 390 MS
EKG QRS DURATION: 88 MS
EKG QTC CALCULATION (BAZETT): 382 MS
EKG R AXIS: 68 DEGREES
EKG T AXIS: 70 DEGREES
EKG VENTRICULAR RATE: 58 BPM
EOSINOPHIL # BLD: 0.02 K/UL (ref 0.05–0.5)
EOSINOPHILS RELATIVE PERCENT: 0 % (ref 0–6)
ERYTHROCYTE [DISTWIDTH] IN BLOOD BY AUTOMATED COUNT: 14.3 % (ref 11.5–15)
GFR, ESTIMATED: >90 ML/MIN/1.73M2
GLUCOSE BLD-MCNC: 77 MG/DL (ref 74–99)
GLUCOSE BLD-MCNC: 84 MG/DL (ref 74–99)
GLUCOSE SERPL-MCNC: 68 MG/DL (ref 74–99)
HCT VFR BLD AUTO: 42.7 % (ref 37–54)
HGB BLD-MCNC: 14.2 G/DL (ref 12.5–16.5)
IMM GRANULOCYTES # BLD AUTO: <0.03 K/UL (ref 0–0.58)
IMM GRANULOCYTES NFR BLD: 0 % (ref 0–5)
LYMPHOCYTES NFR BLD: 2.79 K/UL (ref 1.5–4)
LYMPHOCYTES RELATIVE PERCENT: 27 % (ref 20–42)
MAGNESIUM SERPL-MCNC: 1.7 MG/DL (ref 1.6–2.6)
MCH RBC QN AUTO: 29.6 PG (ref 26–35)
MCHC RBC AUTO-ENTMCNC: 33.3 G/DL (ref 32–34.5)
MCV RBC AUTO: 89.1 FL (ref 80–99.9)
MONOCYTES NFR BLD: 0.75 K/UL (ref 0.1–0.95)
MONOCYTES NFR BLD: 7 % (ref 2–12)
NEUTROPHILS NFR BLD: 65 % (ref 43–80)
NEUTS SEG NFR BLD: 6.68 K/UL (ref 1.8–7.3)
PLATELET # BLD AUTO: 249 K/UL (ref 130–450)
PMV BLD AUTO: 11.2 FL (ref 7–12)
POTASSIUM SERPL-SCNC: 3.4 MMOL/L (ref 3.5–5)
PROT SERPL-MCNC: 6.1 G/DL (ref 6.4–8.3)
RBC # BLD AUTO: 4.79 M/UL (ref 3.8–5.8)
SODIUM SERPL-SCNC: 141 MMOL/L (ref 132–146)
WBC OTHER # BLD: 10.3 K/UL (ref 4.5–11.5)

## 2024-06-04 PROCEDURE — 96376 TX/PRO/DX INJ SAME DRUG ADON: CPT

## 2024-06-04 PROCEDURE — 6370000000 HC RX 637 (ALT 250 FOR IP): Performed by: NURSE PRACTITIONER

## 2024-06-04 PROCEDURE — 80053 COMPREHEN METABOLIC PANEL: CPT

## 2024-06-04 PROCEDURE — 96361 HYDRATE IV INFUSION ADD-ON: CPT

## 2024-06-04 PROCEDURE — 96366 THER/PROPH/DIAG IV INF ADDON: CPT

## 2024-06-04 PROCEDURE — 96372 THER/PROPH/DIAG INJ SC/IM: CPT

## 2024-06-04 PROCEDURE — 85025 COMPLETE CBC W/AUTO DIFF WBC: CPT

## 2024-06-04 PROCEDURE — 99233 SBSQ HOSP IP/OBS HIGH 50: CPT | Performed by: INTERNAL MEDICINE

## 2024-06-04 PROCEDURE — 93010 ELECTROCARDIOGRAM REPORT: CPT | Performed by: INTERNAL MEDICINE

## 2024-06-04 PROCEDURE — 2580000003 HC RX 258: Performed by: STUDENT IN AN ORGANIZED HEALTH CARE EDUCATION/TRAINING PROGRAM

## 2024-06-04 PROCEDURE — 6360000002 HC RX W HCPCS: Performed by: STUDENT IN AN ORGANIZED HEALTH CARE EDUCATION/TRAINING PROGRAM

## 2024-06-04 PROCEDURE — 82962 GLUCOSE BLOOD TEST: CPT

## 2024-06-04 PROCEDURE — G0378 HOSPITAL OBSERVATION PER HR: HCPCS

## 2024-06-04 PROCEDURE — 83735 ASSAY OF MAGNESIUM: CPT

## 2024-06-04 PROCEDURE — 6370000000 HC RX 637 (ALT 250 FOR IP): Performed by: STUDENT IN AN ORGANIZED HEALTH CARE EDUCATION/TRAINING PROGRAM

## 2024-06-04 PROCEDURE — 6370000000 HC RX 637 (ALT 250 FOR IP)

## 2024-06-04 PROCEDURE — 6370000000 HC RX 637 (ALT 250 FOR IP): Performed by: INTERNAL MEDICINE

## 2024-06-04 RX ORDER — BISACODYL 10 MG
10 SUPPOSITORY, RECTAL RECTAL DAILY
Status: COMPLETED | OUTPATIENT
Start: 2024-06-04 | End: 2024-06-04

## 2024-06-04 RX ORDER — POTASSIUM CHLORIDE 20 MEQ/1
40 TABLET, EXTENDED RELEASE ORAL ONCE
Status: DISCONTINUED | OUTPATIENT
Start: 2024-06-04 | End: 2024-06-05 | Stop reason: SDUPTHER

## 2024-06-04 RX ORDER — DICYCLOMINE HYDROCHLORIDE 10 MG/1
20 CAPSULE ORAL
Status: DISCONTINUED | OUTPATIENT
Start: 2024-06-04 | End: 2024-06-07 | Stop reason: HOSPADM

## 2024-06-04 RX ORDER — BACLOFEN 10 MG/1
20 TABLET ORAL 3 TIMES DAILY
Status: DISCONTINUED | OUTPATIENT
Start: 2024-06-04 | End: 2024-06-05

## 2024-06-04 RX ADMIN — WATER 2000 MG: 1 INJECTION INTRAMUSCULAR; INTRAVENOUS; SUBCUTANEOUS at 04:48

## 2024-06-04 RX ADMIN — DICYCLOMINE HYDROCHLORIDE 20 MG: 10 CAPSULE ORAL at 18:31

## 2024-06-04 RX ADMIN — METRONIDAZOLE 500 MG: 500 INJECTION, SOLUTION INTRAVENOUS at 04:57

## 2024-06-04 RX ADMIN — METRONIDAZOLE 500 MG: 500 INJECTION, SOLUTION INTRAVENOUS at 21:05

## 2024-06-04 RX ADMIN — FAMOTIDINE 20 MG: 20 TABLET ORAL at 08:35

## 2024-06-04 RX ADMIN — METRONIDAZOLE 500 MG: 500 INJECTION, SOLUTION INTRAVENOUS at 13:52

## 2024-06-04 RX ADMIN — TRAMADOL HYDROCHLORIDE 50 MG: 50 TABLET, COATED ORAL at 18:48

## 2024-06-04 RX ADMIN — TRAMADOL HYDROCHLORIDE 50 MG: 50 TABLET, COATED ORAL at 08:35

## 2024-06-04 RX ADMIN — TRAMADOL HYDROCHLORIDE 50 MG: 50 TABLET, COATED ORAL at 00:58

## 2024-06-04 RX ADMIN — DICYCLOMINE HYDROCHLORIDE 20 MG: 10 CAPSULE ORAL at 13:53

## 2024-06-04 RX ADMIN — FAMOTIDINE 20 MG: 20 TABLET ORAL at 21:03

## 2024-06-04 RX ADMIN — LACTULOSE 20 G: 20 SOLUTION ORAL at 21:02

## 2024-06-04 RX ADMIN — POTASSIUM CHLORIDE 40 MEQ: 1500 TABLET, EXTENDED RELEASE ORAL at 08:35

## 2024-06-04 RX ADMIN — ENOXAPARIN SODIUM 40 MG: 100 INJECTION SUBCUTANEOUS at 08:35

## 2024-06-04 RX ADMIN — BACLOFEN 20 MG: 10 TABLET ORAL at 21:02

## 2024-06-04 RX ADMIN — BACLOFEN 20 MG: 10 TABLET ORAL at 13:52

## 2024-06-04 RX ADMIN — GABAPENTIN 200 MG: 100 CAPSULE ORAL at 21:03

## 2024-06-04 RX ADMIN — BACLOFEN 10 MG: 10 TABLET ORAL at 08:34

## 2024-06-04 RX ADMIN — GABAPENTIN 200 MG: 100 CAPSULE ORAL at 08:35

## 2024-06-04 RX ADMIN — GABAPENTIN 200 MG: 100 CAPSULE ORAL at 13:53

## 2024-06-04 RX ADMIN — DICYCLOMINE HYDROCHLORIDE 20 MG: 10 CAPSULE ORAL at 21:02

## 2024-06-04 RX ADMIN — SODIUM CHLORIDE: 9 INJECTION, SOLUTION INTRAVENOUS at 11:43

## 2024-06-04 RX ADMIN — BISACODYL 10 MG: 10 SUPPOSITORY RECTAL at 13:59

## 2024-06-04 ASSESSMENT — PAIN DESCRIPTION - LOCATION
LOCATION: BACK
LOCATION: BACK;ABDOMEN
LOCATION: ABDOMEN;BACK
LOCATION: ABDOMEN;BACK
LOCATION: ABDOMEN;LEG
LOCATION: ABDOMEN;LEG

## 2024-06-04 ASSESSMENT — ENCOUNTER SYMPTOMS
DIARRHEA: 0
NAUSEA: 1
CONSTIPATION: 0
VOMITING: 1
ABDOMINAL PAIN: 1
ABDOMINAL DISTENTION: 0

## 2024-06-04 ASSESSMENT — PAIN SCALES - GENERAL
PAINLEVEL_OUTOF10: 8
PAINLEVEL_OUTOF10: 10
PAINLEVEL_OUTOF10: 10
PAINLEVEL_OUTOF10: 8

## 2024-06-04 ASSESSMENT — PAIN DESCRIPTION - DESCRIPTORS
DESCRIPTORS: ACHING;SHARP
DESCRIPTORS: SPASM
DESCRIPTORS: ACHING;SPASM
DESCRIPTORS: ACHING
DESCRIPTORS: SPASM

## 2024-06-04 ASSESSMENT — PAIN DESCRIPTION - ORIENTATION
ORIENTATION: RIGHT;LEFT
ORIENTATION: LOWER
ORIENTATION: RIGHT;LEFT
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
## 2024-06-04 NOTE — PROGRESS NOTES
[facility] Quality Flow/Interdisciplinary Rounds Progress Note        Quality Flow Rounds held on June 4, 2024    Disciplines Attending:  Bedside Nurse, , , and Nursing Unit Leadership    Wesly Haro was admitted on 6/3/2024  1:44 AM    Anticipated Discharge Date:       Disposition:    Neville Score:  Neville Scale Score: 17    Readmission Risk              Risk of Unplanned Readmission:  0           Discussed patient goal for the day, patient clinical progression, and barriers to discharge.  The following Goal(s) of the Day/Commitment(s) have been identified:  Diagnostics - Report Results and Labs - Report Results      Olya Carballo RN  June 4, 2024
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
## 2024-06-04 NOTE — PLAN OF CARE
Problem: Pain  Goal: Verbalizes/displays adequate comfort level or baseline comfort level  6/3/2024 2144 by Estefani Dumont, RN  Outcome: Progressing     Problem: Skin/Tissue Integrity  Goal: Absence of new skin breakdown  Description: 1.  Monitor for areas of redness and/or skin breakdown  2.  Assess vascular access sites hourly  3.  Every 4-6 hours minimum:  Change oxygen saturation probe site  4.  Every 4-6 hours:  If on nasal continuous positive airway pressure, respiratory therapy assess nares and determine need for appliance change or resting period.  6/3/2024 2144 by Estefani Dumont, RN  Outcome: Progressing     Problem: Safety - Adult  Goal: Free from fall injury  Outcome: Progressing

## 2024-06-04 NOTE — PLAN OF CARE
Problem: Pain  Goal: Verbalizes/displays adequate comfort level or baseline comfort level  Outcome: Progressing     Problem: Skin/Tissue Integrity  Goal: Absence of new skin breakdown  Description: 1.  Monitor for areas of redness and/or skin breakdown  2.  Assess vascular access sites hourly  3.  Every 4-6 hours minimum:  Change oxygen saturation probe site  4.  Every 4-6 hours:  If on nasal continuous positive airway pressure, respiratory therapy assess nares and determine need for appliance change or resting period.  Outcome: Progressing     Problem: Safety - Adult  Goal: Free from fall injury  Outcome: Progressing     Problem: ABCDS Injury Assessment  Goal: Absence of physical injury  Outcome: Progressing      Pt aware to get

## 2024-06-04 NOTE — PROGRESS NOTES
Progress  Note  Chief Complaint   Patient presents with    Abdominal Cramping     Started about 4 days ago, has not has a bowel movement in 2 days. Is paraplegic.     Muscle Pain     Historical Issues:  Current Facility-Administered Medications   Medication Dose Route Frequency Provider Last Rate Last Admin    potassium chloride (KLOR-CON M) extended release tablet 40 mEq  40 mEq Oral Once Darwin Bryan MD        baclofen (LIORESAL) tablet 20 mg  20 mg Oral TID Darwin Bryan MD        bisacodyl (DULCOLAX) suppository 10 mg  10 mg Rectal Daily Darwin Bryan MD        iopamidol (ISOVUE-370) 76 % injection 75 mL  75 mL IntraVENous ONCE PRN Nelson Morris MD        sodium chloride flush 0.9 % injection 5-40 mL  5-40 mL IntraVENous 2 times per day Bethel Arellano MD        sodium chloride flush 0.9 % injection 5-40 mL  5-40 mL IntraVENous PRN Bethel Arellano MD        0.9 % sodium chloride infusion   IntraVENous PRN Bethel Arellano MD        potassium chloride (KLOR-CON M) extended release tablet 40 mEq  40 mEq Oral PRN Bethel Arellano MD   40 mEq at 06/04/24 0835    Or    potassium bicarb-citric acid (EFFER-K) effervescent tablet 40 mEq  40 mEq Oral PRN Bethel Arellano MD        Or    potassium chloride 10 mEq/100 mL IVPB (Peripheral Line)  10 mEq IntraVENous PRN Bethel Arellano MD        magnesium sulfate 2000 mg in 50 mL IVPB premix  2,000 mg IntraVENous PRN Bethel Arellano MD        enoxaparin (LOVENOX) injection 40 mg  40 mg SubCUTAneous Daily Bethel Arellano MD   40 mg at 06/04/24 0835    ondansetron (ZOFRAN-ODT) disintegrating tablet 4 mg  4 mg Oral Q8H PRN Bethel Arellano MD        Or    ondansetron (ZOFRAN) injection 4 mg  4 mg IntraVENous Q6H PRN Bethel Arellano MD   4 mg at 06/03/24 1224    polyethylene glycol (GLYCOLAX) packet 17 g  17 g Oral Daily PRN Bethel Arellano MD        acetaminophen (TYLENOL) tablet 650 mg  650 mg Oral Q6H PRN Bethel Arellano MD        Or    acetaminophen (TYLENOL) suppository 650 mg  650  mg Rectal Q6H PRN Bethel Arellano MD        0.9 % sodium chloride infusion   IntraVENous Continuous Bethel Arellano MD 75 mL/hr at 06/04/24 1143 New Bag at 06/04/24 1143    famotidine (PEPCID) tablet 20 mg  20 mg Oral BID Bethel Arellano MD   20 mg at 06/04/24 0835    gabapentin (NEURONTIN) capsule 200 mg  200 mg Oral TID Bethel Arellano MD   200 mg at 06/04/24 0835    traMADol (ULTRAM) tablet 50 mg  50 mg Oral Q6H PRN Bethel Arellano MD   50 mg at 06/04/24 0835    cefTRIAXone (ROCEPHIN) 2,000 mg in sterile water 20 mL IV syringe  2,000 mg IntraVENous Q24H Bethel Arellano MD   2,000 mg at 06/04/24 0448    metroNIDAZOLE (FLAGYL) 500 mg in 0.9% NaCl 100 mL IVPB premix  500 mg IntraVENous Q8H Bethel Arellano MD   Stopped at 06/04/24 0643    lactulose (CHRONULAC) 10 GM/15ML solution 20 g  20 g Oral TID Nicole Flores APRN - CNP   20 g at 06/03/24 2047     Recent Complaints:  Review of Systems  Vitals:    06/04/24 0655   BP: 131/67   Pulse: (!) 47   Resp: 16   Temp: 98.2 °F (36.8 °C)   SpO2: 98%     Physical Exam  HENT:      Head: Normocephalic.   Cardiovascular:      Rate and Rhythm: Normal rate and regular rhythm.      Heart sounds: No murmur heard.  Pulmonary:      Effort: Pulmonary effort is normal.      Breath sounds: Normal breath sounds.   Abdominal:      Tenderness: There is abdominal tenderness. There is guarding.   Neurological:      Mental Status: He is alert.      Comments: Uncontrolled spasms         Recent Labs     06/03/24  0238 06/04/24  0110    141   K 3.7 3.4*    109*   CO2 22 20*   BUN 12 7   CREATININE 1.2 1.1   GLUCOSE 96 68*   CALCIUM 9.0 8.8     Recent Labs     06/03/24  0238 06/04/24  0110   WBC 11.9* 10.3   RBC 5.01 4.79   HGB 15.2 14.2   HCT 45.7 42.7   MCV 91.2 89.1   MCH 30.3 29.6   MCHC 33.3 33.3   RDW 14.5 14.3    249   MPV 11.1 11.2           Paraplegia  Possible Autonomic Dysreflexia  Abdominal pain  Constipation    Plan:  Increase baclofen  Bowel regimen  Surgery

## 2024-06-05 ENCOUNTER — PREP FOR PROCEDURE (OUTPATIENT)
Dept: SURGERY | Age: 29
End: 2024-06-05

## 2024-06-05 ENCOUNTER — APPOINTMENT (OUTPATIENT)
Dept: ULTRASOUND IMAGING | Age: 29
End: 2024-06-05
Payer: MEDICAID

## 2024-06-05 ENCOUNTER — APPOINTMENT (OUTPATIENT)
Dept: CT IMAGING | Age: 29
End: 2024-06-05
Payer: MEDICAID

## 2024-06-05 PROBLEM — R10.9 ABDOMINAL PAIN: Status: ACTIVE | Noted: 2024-06-05

## 2024-06-05 PROCEDURE — 6370000000 HC RX 637 (ALT 250 FOR IP)

## 2024-06-05 PROCEDURE — 96376 TX/PRO/DX INJ SAME DRUG ADON: CPT

## 2024-06-05 PROCEDURE — 6370000000 HC RX 637 (ALT 250 FOR IP): Performed by: STUDENT IN AN ORGANIZED HEALTH CARE EDUCATION/TRAINING PROGRAM

## 2024-06-05 PROCEDURE — 6370000000 HC RX 637 (ALT 250 FOR IP): Performed by: SURGERY

## 2024-06-05 PROCEDURE — 96372 THER/PROPH/DIAG INJ SC/IM: CPT

## 2024-06-05 PROCEDURE — 74177 CT ABD & PELVIS W/CONTRAST: CPT

## 2024-06-05 PROCEDURE — 96361 HYDRATE IV INFUSION ADD-ON: CPT

## 2024-06-05 PROCEDURE — 99232 SBSQ HOSP IP/OBS MODERATE 35: CPT | Performed by: INTERNAL MEDICINE

## 2024-06-05 PROCEDURE — 6360000002 HC RX W HCPCS: Performed by: SURGERY

## 2024-06-05 PROCEDURE — G0378 HOSPITAL OBSERVATION PER HR: HCPCS

## 2024-06-05 PROCEDURE — 76705 ECHO EXAM OF ABDOMEN: CPT

## 2024-06-05 PROCEDURE — 6360000002 HC RX W HCPCS: Performed by: STUDENT IN AN ORGANIZED HEALTH CARE EDUCATION/TRAINING PROGRAM

## 2024-06-05 PROCEDURE — 6360000004 HC RX CONTRAST MEDICATION: Performed by: RADIOLOGY

## 2024-06-05 PROCEDURE — 6370000000 HC RX 637 (ALT 250 FOR IP): Performed by: INTERNAL MEDICINE

## 2024-06-05 PROCEDURE — 2580000003 HC RX 258: Performed by: STUDENT IN AN ORGANIZED HEALTH CARE EDUCATION/TRAINING PROGRAM

## 2024-06-05 PROCEDURE — 96366 THER/PROPH/DIAG IV INF ADDON: CPT

## 2024-06-05 RX ORDER — BISACODYL 10 MG
10 SUPPOSITORY, RECTAL RECTAL DAILY
Status: DISPENSED | OUTPATIENT
Start: 2024-06-06 | End: 2024-06-07

## 2024-06-05 RX ORDER — MORPHINE SULFATE 2 MG/ML
2 INJECTION, SOLUTION INTRAMUSCULAR; INTRAVENOUS EVERY 4 HOURS PRN
Status: DISCONTINUED | OUTPATIENT
Start: 2024-06-05 | End: 2024-06-07 | Stop reason: HOSPADM

## 2024-06-05 RX ORDER — PROCHLORPERAZINE EDISYLATE 5 MG/ML
10 INJECTION INTRAMUSCULAR; INTRAVENOUS EVERY 6 HOURS PRN
Status: DISCONTINUED | OUTPATIENT
Start: 2024-06-05 | End: 2024-06-07 | Stop reason: HOSPADM

## 2024-06-05 RX ORDER — POTASSIUM CHLORIDE 20 MEQ/1
40 TABLET, EXTENDED RELEASE ORAL ONCE
Status: COMPLETED | OUTPATIENT
Start: 2024-06-05 | End: 2024-06-05

## 2024-06-05 RX ORDER — BACLOFEN 10 MG/1
20 TABLET ORAL 4 TIMES DAILY
Status: DISCONTINUED | OUTPATIENT
Start: 2024-06-05 | End: 2024-06-06

## 2024-06-05 RX ORDER — SENNOSIDES A AND B 8.6 MG/1
2 TABLET, FILM COATED ORAL NIGHTLY
Status: DISCONTINUED | OUTPATIENT
Start: 2024-06-05 | End: 2024-06-07 | Stop reason: HOSPADM

## 2024-06-05 RX ORDER — SCOLOPAMINE TRANSDERMAL SYSTEM 1 MG/1
1 PATCH, EXTENDED RELEASE TRANSDERMAL
Status: DISCONTINUED | OUTPATIENT
Start: 2024-06-05 | End: 2024-06-07 | Stop reason: HOSPADM

## 2024-06-05 RX ADMIN — IOPAMIDOL 75 ML: 755 INJECTION, SOLUTION INTRAVENOUS at 09:22

## 2024-06-05 RX ADMIN — BACLOFEN 20 MG: 10 TABLET ORAL at 22:02

## 2024-06-05 RX ADMIN — ONDANSETRON 4 MG: 2 INJECTION INTRAMUSCULAR; INTRAVENOUS at 20:09

## 2024-06-05 RX ADMIN — POTASSIUM CHLORIDE 40 MEQ: 1500 TABLET, EXTENDED RELEASE ORAL at 15:49

## 2024-06-05 RX ADMIN — SODIUM CHLORIDE, PRESERVATIVE FREE 10 ML: 5 INJECTION INTRAVENOUS at 07:37

## 2024-06-05 RX ADMIN — GABAPENTIN 200 MG: 100 CAPSULE ORAL at 15:49

## 2024-06-05 RX ADMIN — FAMOTIDINE 20 MG: 20 TABLET ORAL at 22:02

## 2024-06-05 RX ADMIN — WATER 2000 MG: 1 INJECTION INTRAMUSCULAR; INTRAVENOUS; SUBCUTANEOUS at 05:40

## 2024-06-05 RX ADMIN — METRONIDAZOLE 500 MG: 500 INJECTION, SOLUTION INTRAVENOUS at 22:07

## 2024-06-05 RX ADMIN — BACLOFEN 20 MG: 10 TABLET ORAL at 15:44

## 2024-06-05 RX ADMIN — METRONIDAZOLE 500 MG: 500 INJECTION, SOLUTION INTRAVENOUS at 05:45

## 2024-06-05 RX ADMIN — ALUMINUM HYDROXIDE, MAGNESIUM HYDROXIDE, AND SIMETHICONE: 1200; 120; 1200 SUSPENSION ORAL at 15:45

## 2024-06-05 RX ADMIN — ENOXAPARIN SODIUM 40 MG: 100 INJECTION SUBCUTANEOUS at 15:44

## 2024-06-05 RX ADMIN — ONDANSETRON 4 MG: 2 INJECTION INTRAMUSCULAR; INTRAVENOUS at 07:36

## 2024-06-05 RX ADMIN — GABAPENTIN 200 MG: 100 CAPSULE ORAL at 22:02

## 2024-06-05 RX ADMIN — DICYCLOMINE HYDROCHLORIDE 20 MG: 10 CAPSULE ORAL at 18:43

## 2024-06-05 RX ADMIN — DICYCLOMINE HYDROCHLORIDE 20 MG: 10 CAPSULE ORAL at 05:45

## 2024-06-05 RX ADMIN — IOPAMIDOL 18 ML: 755 INJECTION, SOLUTION INTRAVENOUS at 09:22

## 2024-06-05 RX ADMIN — MORPHINE SULFATE 2 MG: 2 INJECTION, SOLUTION INTRAMUSCULAR; INTRAVENOUS at 09:41

## 2024-06-05 RX ADMIN — MORPHINE SULFATE 2 MG: 2 INJECTION, SOLUTION INTRAMUSCULAR; INTRAVENOUS at 22:14

## 2024-06-05 RX ADMIN — SENNOSIDES 17.2 MG: 8.6 TABLET, COATED ORAL at 22:02

## 2024-06-05 RX ADMIN — ACETAMINOPHEN 650 MG: 325 TABLET ORAL at 22:02

## 2024-06-05 RX ADMIN — METRONIDAZOLE 500 MG: 500 INJECTION, SOLUTION INTRAVENOUS at 13:11

## 2024-06-05 ASSESSMENT — PAIN DESCRIPTION - DESCRIPTORS
DESCRIPTORS: DISCOMFORT
DESCRIPTORS: ACHING;DISCOMFORT
DESCRIPTORS: SHARP;ACHING;CRAMPING
DESCRIPTORS: ACHING;CRAMPING;TENDER
DESCRIPTORS: CRAMPING

## 2024-06-05 ASSESSMENT — PAIN - FUNCTIONAL ASSESSMENT
PAIN_FUNCTIONAL_ASSESSMENT: PREVENTS OR INTERFERES SOME ACTIVE ACTIVITIES AND ADLS
PAIN_FUNCTIONAL_ASSESSMENT: PREVENTS OR INTERFERES SOME ACTIVE ACTIVITIES AND ADLS
PAIN_FUNCTIONAL_ASSESSMENT: ACTIVITIES ARE NOT PREVENTED

## 2024-06-05 ASSESSMENT — PAIN DESCRIPTION - LOCATION
LOCATION: ABDOMEN
LOCATION: ABDOMEN;BACK
LOCATION: ABDOMEN

## 2024-06-05 ASSESSMENT — PAIN DESCRIPTION - ONSET: ONSET: ON-GOING

## 2024-06-05 ASSESSMENT — PAIN DESCRIPTION - ORIENTATION
ORIENTATION: RIGHT;LEFT
ORIENTATION: MID
ORIENTATION: MID
ORIENTATION: RIGHT;LEFT
ORIENTATION: RIGHT;LEFT;MID;UPPER

## 2024-06-05 ASSESSMENT — PAIN SCALES - GENERAL
PAINLEVEL_OUTOF10: 8
PAINLEVEL_OUTOF10: 7

## 2024-06-05 ASSESSMENT — PAIN DESCRIPTION - PAIN TYPE: TYPE: ACUTE PAIN

## 2024-06-05 ASSESSMENT — PAIN DESCRIPTION - FREQUENCY: FREQUENCY: CONTINUOUS

## 2024-06-05 NOTE — PLAN OF CARE
Problem: Pain  Goal: Verbalizes/displays adequate comfort level or baseline comfort level  6/4/2024 2146 by Estefani Dumont, RN  Outcome: Progressing     Problem: Safety - Adult  Goal: Free from fall injury  6/4/2024 2146 by Estefani Dumont, RN  Outcome: Progressing

## 2024-06-05 NOTE — PLAN OF CARE
Problem: Pain  Goal: Verbalizes/displays adequate comfort level or baseline comfort level  6/5/2024 0757 by Alejandro Donis RN  Outcome: Progressing  6/4/2024 2146 by Estefani Dumont RN  Outcome: Progressing     Problem: Skin/Tissue Integrity  Goal: Absence of new skin breakdown  Description: 1.  Monitor for areas of redness and/or skin breakdown  2.  Assess vascular access sites hourly  3.  Every 4-6 hours minimum:  Change oxygen saturation probe site  4.  Every 4-6 hours:  If on nasal continuous positive airway pressure, respiratory therapy assess nares and determine need for appliance change or resting period.  Outcome: Progressing     Problem: Safety - Adult  Goal: Free from fall injury  6/5/2024 0757 by Alejandro Donis RN  Outcome: Progressing  6/4/2024 2146 by Estefani Dumont RN  Outcome: Progressing     Problem: ABCDS Injury Assessment  Goal: Absence of physical injury  Outcome: Progressing

## 2024-06-05 NOTE — PROGRESS NOTES
Progress  Note  Chief Complaint   Patient presents with    Abdominal Cramping     Started about 4 days ago, has not has a bowel movement in 2 days. Is paraplegic.     Muscle Pain     Historical Issues:  Current Facility-Administered Medications   Medication Dose Route Frequency Provider Last Rate Last Admin    prochlorperazine (COMPAZINE) injection 10 mg  10 mg IntraVENous Q6H PRN Wendy Roberts E, DO        scopolamine (TRANSDERM-SCOP) transdermal patch 1 patch  1 patch TransDERmal Q72H Wendy Roberts E, DO   1 patch at 06/05/24 0736    [START ON 6/6/2024] bisacodyl (DULCOLAX) suppository 10 mg  10 mg Rectal Daily Toshia Yates DO        senna (SENOKOT) tablet 17.2 mg  2 tablet Oral Nightly Toshia Yates DO        morphine (PF) injection 2 mg  2 mg IntraVENous Q4H PRN Yared Ellis MD   2 mg at 06/05/24 0941    aluminum & magnesium hydroxide-simethicone (MAALOX) 30 mL, lidocaine viscous hcl (XYLOCAINE) 5 mL (GI COCKTAIL)   Oral TID Yared Ellis MD        potassium chloride (KLOR-CON M) extended release tablet 40 mEq  40 mEq Oral Once Darwin Bryan MD        baclofen (LIORESAL) tablet 20 mg  20 mg Oral 4x Daily Darwin Bryan MD        dicyclomine (BENTYL) capsule 20 mg  20 mg Oral 4x Daily AC & HS Toshia Yates DO   20 mg at 06/05/24 0545    sodium chloride flush 0.9 % injection 5-40 mL  5-40 mL IntraVENous 2 times per day Bethel Arellano MD   10 mL at 06/05/24 0737    sodium chloride flush 0.9 % injection 5-40 mL  5-40 mL IntraVENous PRN Bethel Arellano MD        0.9 % sodium chloride infusion   IntraVENous PRN Bethel Arellano MD        potassium chloride (KLOR-CON M) extended release tablet 40 mEq  40 mEq Oral PRN Bethel Arellano MD   40 mEq at 06/04/24 0835    Or    potassium bicarb-citric acid (EFFER-K) effervescent tablet 40 mEq  40 mEq Oral PRN Bethel Arellano MD        Or    potassium chloride 10 mEq/100 mL IVPB (Peripheral Line)  10 mEq IntraVENous PRN Bethel Arellano MD        magnesium  68*   CALCIUM 9.0 8.8     Recent Labs     06/03/24  0238 06/04/24  0110   WBC 11.9* 10.3   RBC 5.01 4.79   HGB 15.2 14.2   HCT 45.7 42.7   MCV 91.2 89.1   MCH 30.3 29.6   MCHC 33.3 33.3   RDW 14.5 14.3    249   MPV 11.1 11.2           Paraplegia  Constipation severe, Risk of autonomic dysreflexia    Plan:  Bowel regimen  Baclofen  Replace K      Case Discussed with:      Electronically signed by Darwin Bryan MD on 6/5/2024 at 11:59 AM

## 2024-06-05 NOTE — PROGRESS NOTES
GENERAL SURGERY  DAILY PROGRESS NOTE  6/5/2024  Chief Complaint   Patient presents with    Abdominal Cramping     Started about 4 days ago, has not has a bowel movement in 2 days. Is paraplegic.     Muscle Pain         Subjective:  Still having N/V on CLD. Having bowel function, reports diarrhea since having bowel reg here.     I/O last 3 completed shifts:  In: 2250 [P.O.:600; I.V.:1650]  Out: -   No intake/output data recorded.      Objective:  /75   Pulse 53   Temp 98.6 °F (37 °C) (Oral)   Resp 16   Ht 1.854 m (6' 1\")   Wt 73 kg (160 lb 15 oz)   SpO2 99%   BMI 21.23 kg/m²     General appearance:  lying in bed,  A&Ox4  Lungs: symmetric chest rise, no respiratory distress  Heart: normal rate per peripheral pulse  Abdomen: soft, non-distended, no guarding or rigidity; tender superficially to entire abdomen, increased tone, prior PEG site noted  Skin: warm and dry, no cyanosis  Extremities: atraumatic, paraplegia    Lab Results   Component Value Date    WBC 10.3 06/04/2024    HGB 14.2 06/04/2024    HCT 42.7 06/04/2024    MCV 89.1 06/04/2024     06/04/2024     Lab Results   Component Value Date     06/04/2024    K 3.4 (L) 06/04/2024     (H) 06/04/2024    CO2 20 (L) 06/04/2024    BUN 7 06/04/2024    CREATININE 1.1 06/04/2024    GLUCOSE 68 (L) 06/04/2024    CALCIUM 8.8 06/04/2024    BILITOT 0.5 06/04/2024    ALKPHOS 87 06/04/2024    AST 28 06/04/2024    ALT 37 06/04/2024    LABGLOM >90 06/04/2024    GFRAA >60 07/23/2022         Assessment/Plan:  28 y.o. male with acute onset nausea and vomiting    - continue Bentyl  - will add scopolamine patch  - CT a/p with PO contrast ordered for better visualization as prior scan was limited secondary to motion artifact  - stay on CLD for now  - continue daily scheduled bowel regimen    Electronically signed by Toshia Yates DO on 6/5/2024 at 7:20 AM     Attending Physician Statement:    Chief Complaint:   Chief Complaint   Patient presents with

## 2024-06-05 NOTE — PROGRESS NOTES
[facility] Quality Flow/Interdisciplinary Rounds Progress Note        Quality Flow Rounds held on June 5, 2024    Disciplines Attending:  Bedside Nurse, , , and Nursing Unit Leadership    Wesly Haro was admitted on 6/3/2024  1:44 AM    Anticipated Discharge Date:       Disposition:    Neville Score:  Neville Scale Score: 15    Readmission Risk              Risk of Unplanned Readmission:  0           Discussed patient goal for the day, patient clinical progression, and barriers to discharge.  The following Goal(s) of the Day/Commitment(s) have been identified:  Diagnostics - Report Results and Labs - Report Results      Olya Carballo RN  June 5, 2024

## 2024-06-06 ENCOUNTER — ANESTHESIA (OUTPATIENT)
Dept: ENDOSCOPY | Age: 29
End: 2024-06-06
Payer: MEDICAID

## 2024-06-06 ENCOUNTER — ANESTHESIA EVENT (OUTPATIENT)
Dept: ENDOSCOPY | Age: 29
End: 2024-06-06
Payer: MEDICAID

## 2024-06-06 LAB
ALBUMIN SERPL-MCNC: 3.8 G/DL (ref 3.5–5.2)
ALP SERPL-CCNC: 75 U/L (ref 40–129)
ALT SERPL-CCNC: 32 U/L (ref 0–40)
ANION GAP SERPL CALCULATED.3IONS-SCNC: 9 MMOL/L (ref 7–16)
AST SERPL-CCNC: 30 U/L (ref 0–39)
BILIRUB SERPL-MCNC: 0.3 MG/DL (ref 0–1.2)
BUN SERPL-MCNC: 4 MG/DL (ref 6–20)
CALCIUM SERPL-MCNC: 8.5 MG/DL (ref 8.6–10.2)
CHLORIDE SERPL-SCNC: 105 MMOL/L (ref 98–107)
CO2 SERPL-SCNC: 25 MMOL/L (ref 22–29)
CREAT SERPL-MCNC: 1.1 MG/DL (ref 0.7–1.2)
GFR, ESTIMATED: >90 ML/MIN/1.73M2
GLUCOSE SERPL-MCNC: 80 MG/DL (ref 74–99)
POTASSIUM SERPL-SCNC: 3.8 MMOL/L (ref 3.5–5)
PROT SERPL-MCNC: 6.1 G/DL (ref 6.4–8.3)
SODIUM SERPL-SCNC: 139 MMOL/L (ref 132–146)

## 2024-06-06 PROCEDURE — 6360000002 HC RX W HCPCS: Performed by: STUDENT IN AN ORGANIZED HEALTH CARE EDUCATION/TRAINING PROGRAM

## 2024-06-06 PROCEDURE — G0378 HOSPITAL OBSERVATION PER HR: HCPCS

## 2024-06-06 PROCEDURE — 2580000003 HC RX 258: Performed by: NURSE ANESTHETIST, CERTIFIED REGISTERED

## 2024-06-06 PROCEDURE — 7100000010 HC PHASE II RECOVERY - FIRST 15 MIN: Performed by: SURGERY

## 2024-06-06 PROCEDURE — 7100000011 HC PHASE II RECOVERY - ADDTL 15 MIN: Performed by: SURGERY

## 2024-06-06 PROCEDURE — 6360000002 HC RX W HCPCS: Performed by: NURSE ANESTHETIST, CERTIFIED REGISTERED

## 2024-06-06 PROCEDURE — 2580000003 HC RX 258: Performed by: STUDENT IN AN ORGANIZED HEALTH CARE EDUCATION/TRAINING PROGRAM

## 2024-06-06 PROCEDURE — 6370000000 HC RX 637 (ALT 250 FOR IP)

## 2024-06-06 PROCEDURE — 6360000002 HC RX W HCPCS: Performed by: SURGERY

## 2024-06-06 PROCEDURE — 2709999900 HC NON-CHARGEABLE SUPPLY: Performed by: SURGERY

## 2024-06-06 PROCEDURE — 99232 SBSQ HOSP IP/OBS MODERATE 35: CPT | Performed by: INTERNAL MEDICINE

## 2024-06-06 PROCEDURE — 3609012400 HC EGD TRANSORAL BIOPSY SINGLE/MULTIPLE: Performed by: SURGERY

## 2024-06-06 PROCEDURE — 96372 THER/PROPH/DIAG INJ SC/IM: CPT

## 2024-06-06 PROCEDURE — 6370000000 HC RX 637 (ALT 250 FOR IP): Performed by: STUDENT IN AN ORGANIZED HEALTH CARE EDUCATION/TRAINING PROGRAM

## 2024-06-06 PROCEDURE — 6370000000 HC RX 637 (ALT 250 FOR IP): Performed by: INTERNAL MEDICINE

## 2024-06-06 PROCEDURE — 80053 COMPREHEN METABOLIC PANEL: CPT

## 2024-06-06 PROCEDURE — 3700000000 HC ANESTHESIA ATTENDED CARE: Performed by: SURGERY

## 2024-06-06 PROCEDURE — 6370000000 HC RX 637 (ALT 250 FOR IP): Performed by: SURGERY

## 2024-06-06 RX ORDER — BACLOFEN 10 MG/1
40 TABLET ORAL 3 TIMES DAILY
Status: DISCONTINUED | OUTPATIENT
Start: 2024-06-06 | End: 2024-06-07 | Stop reason: HOSPADM

## 2024-06-06 RX ORDER — PANTOPRAZOLE SODIUM 40 MG/1
40 TABLET, DELAYED RELEASE ORAL
Status: DISCONTINUED | OUTPATIENT
Start: 2024-06-06 | End: 2024-06-07 | Stop reason: HOSPADM

## 2024-06-06 RX ORDER — METOCLOPRAMIDE HYDROCHLORIDE 5 MG/ML
10 INJECTION INTRAMUSCULAR; INTRAVENOUS EVERY 8 HOURS
Status: DISCONTINUED | OUTPATIENT
Start: 2024-06-06 | End: 2024-06-07 | Stop reason: HOSPADM

## 2024-06-06 RX ORDER — PROPOFOL 10 MG/ML
INJECTION, EMULSION INTRAVENOUS PRN
Status: DISCONTINUED | OUTPATIENT
Start: 2024-06-06 | End: 2024-06-06 | Stop reason: SDUPTHER

## 2024-06-06 RX ORDER — SODIUM CHLORIDE 9 MG/ML
INJECTION, SOLUTION INTRAVENOUS CONTINUOUS PRN
Status: DISCONTINUED | OUTPATIENT
Start: 2024-06-06 | End: 2024-06-06 | Stop reason: SDUPTHER

## 2024-06-06 RX ADMIN — BACLOFEN 20 MG: 10 TABLET ORAL at 08:12

## 2024-06-06 RX ADMIN — GABAPENTIN 200 MG: 100 CAPSULE ORAL at 08:12

## 2024-06-06 RX ADMIN — BACLOFEN 40 MG: 10 TABLET ORAL at 22:09

## 2024-06-06 RX ADMIN — MORPHINE SULFATE 2 MG: 2 INJECTION, SOLUTION INTRAMUSCULAR; INTRAVENOUS at 03:08

## 2024-06-06 RX ADMIN — SODIUM CHLORIDE, PRESERVATIVE FREE 10 ML: 5 INJECTION INTRAVENOUS at 22:12

## 2024-06-06 RX ADMIN — METRONIDAZOLE 500 MG: 500 INJECTION, SOLUTION INTRAVENOUS at 06:08

## 2024-06-06 RX ADMIN — ALUMINUM HYDROXIDE, MAGNESIUM HYDROXIDE, AND SIMETHICONE: 1200; 120; 1200 SUSPENSION ORAL at 14:04

## 2024-06-06 RX ADMIN — SODIUM CHLORIDE, PRESERVATIVE FREE 10 ML: 5 INJECTION INTRAVENOUS at 08:13

## 2024-06-06 RX ADMIN — DICYCLOMINE HYDROCHLORIDE 20 MG: 10 CAPSULE ORAL at 15:44

## 2024-06-06 RX ADMIN — PANTOPRAZOLE SODIUM 40 MG: 40 TABLET, DELAYED RELEASE ORAL at 15:44

## 2024-06-06 RX ADMIN — MORPHINE SULFATE 2 MG: 2 INJECTION, SOLUTION INTRAMUSCULAR; INTRAVENOUS at 17:17

## 2024-06-06 RX ADMIN — DICYCLOMINE HYDROCHLORIDE 20 MG: 10 CAPSULE ORAL at 22:07

## 2024-06-06 RX ADMIN — ENOXAPARIN SODIUM 40 MG: 100 INJECTION SUBCUTANEOUS at 08:12

## 2024-06-06 RX ADMIN — METRONIDAZOLE 500 MG: 500 INJECTION, SOLUTION INTRAVENOUS at 22:16

## 2024-06-06 RX ADMIN — SENNOSIDES 17.2 MG: 8.6 TABLET, COATED ORAL at 22:09

## 2024-06-06 RX ADMIN — MORPHINE SULFATE 2 MG: 2 INJECTION, SOLUTION INTRAMUSCULAR; INTRAVENOUS at 12:32

## 2024-06-06 RX ADMIN — PROPOFOL 150 MG: 10 INJECTION, EMULSION INTRAVENOUS at 11:25

## 2024-06-06 RX ADMIN — ALUMINUM HYDROXIDE, MAGNESIUM HYDROXIDE, AND SIMETHICONE: 1200; 120; 1200 SUSPENSION ORAL at 22:08

## 2024-06-06 RX ADMIN — DICYCLOMINE HYDROCHLORIDE 20 MG: 10 CAPSULE ORAL at 12:32

## 2024-06-06 RX ADMIN — WATER 2000 MG: 1 INJECTION INTRAMUSCULAR; INTRAVENOUS; SUBCUTANEOUS at 06:08

## 2024-06-06 RX ADMIN — METRONIDAZOLE 500 MG: 500 INJECTION, SOLUTION INTRAVENOUS at 15:46

## 2024-06-06 RX ADMIN — BACLOFEN 40 MG: 10 TABLET ORAL at 14:03

## 2024-06-06 RX ADMIN — FAMOTIDINE 20 MG: 20 TABLET ORAL at 08:12

## 2024-06-06 RX ADMIN — MORPHINE SULFATE 2 MG: 2 INJECTION, SOLUTION INTRAMUSCULAR; INTRAVENOUS at 08:12

## 2024-06-06 RX ADMIN — GABAPENTIN 200 MG: 100 CAPSULE ORAL at 14:03

## 2024-06-06 RX ADMIN — SODIUM CHLORIDE, PRESERVATIVE FREE 10 ML: 5 INJECTION INTRAVENOUS at 03:08

## 2024-06-06 RX ADMIN — DICYCLOMINE HYDROCHLORIDE 20 MG: 10 CAPSULE ORAL at 08:12

## 2024-06-06 RX ADMIN — MORPHINE SULFATE 2 MG: 2 INJECTION, SOLUTION INTRAMUSCULAR; INTRAVENOUS at 22:08

## 2024-06-06 RX ADMIN — METOCLOPRAMIDE 10 MG: 5 INJECTION, SOLUTION INTRAMUSCULAR; INTRAVENOUS at 22:07

## 2024-06-06 RX ADMIN — SODIUM CHLORIDE: 9 INJECTION, SOLUTION INTRAVENOUS at 11:22

## 2024-06-06 RX ADMIN — GABAPENTIN 200 MG: 100 CAPSULE ORAL at 22:07

## 2024-06-06 ASSESSMENT — PAIN SCALES - GENERAL
PAINLEVEL_OUTOF10: 9
PAINLEVEL_OUTOF10: 7
PAINLEVEL_OUTOF10: 0
PAINLEVEL_OUTOF10: 8
PAINLEVEL_OUTOF10: 7
PAINLEVEL_OUTOF10: 4
PAINLEVEL_OUTOF10: 8
PAINLEVEL_OUTOF10: 0

## 2024-06-06 ASSESSMENT — PAIN DESCRIPTION - ORIENTATION
ORIENTATION: RIGHT;LEFT;MID
ORIENTATION: RIGHT;LEFT;LOWER;UPPER
ORIENTATION: RIGHT;LEFT;LOWER
ORIENTATION: RIGHT;LEFT;MID

## 2024-06-06 ASSESSMENT — PAIN - FUNCTIONAL ASSESSMENT
PAIN_FUNCTIONAL_ASSESSMENT: PREVENTS OR INTERFERES SOME ACTIVE ACTIVITIES AND ADLS
PAIN_FUNCTIONAL_ASSESSMENT: ACTIVITIES ARE NOT PREVENTED
PAIN_FUNCTIONAL_ASSESSMENT: NONE - DENIES PAIN
PAIN_FUNCTIONAL_ASSESSMENT: ACTIVITIES ARE NOT PREVENTED
PAIN_FUNCTIONAL_ASSESSMENT: ACTIVITIES ARE NOT PREVENTED
PAIN_FUNCTIONAL_ASSESSMENT: NONE - DENIES PAIN
PAIN_FUNCTIONAL_ASSESSMENT: ACTIVITIES ARE NOT PREVENTED
PAIN_FUNCTIONAL_ASSESSMENT: NONE - DENIES PAIN

## 2024-06-06 ASSESSMENT — PAIN DESCRIPTION - ONSET: ONSET: ON-GOING

## 2024-06-06 ASSESSMENT — PAIN DESCRIPTION - DESCRIPTORS
DESCRIPTORS: ACHING;DISCOMFORT
DESCRIPTORS: ACHING;STABBING
DESCRIPTORS: ACHING;DULL;DISCOMFORT
DESCRIPTORS: ACHING;SHARP
DESCRIPTORS: ACHING;DULL;DISCOMFORT

## 2024-06-06 ASSESSMENT — PAIN DESCRIPTION - LOCATION
LOCATION: ABDOMEN;BACK
LOCATION: ABDOMEN

## 2024-06-06 ASSESSMENT — PAIN DESCRIPTION - FREQUENCY: FREQUENCY: INTERMITTENT

## 2024-06-06 ASSESSMENT — PAIN DESCRIPTION - DIRECTION: RADIATING_TOWARDS: BACK
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
## 2024-06-06 NOTE — PLAN OF CARE
Problem: Pain  Goal: Verbalizes/displays adequate comfort level or baseline comfort level  6/6/2024 0136 by Davis Patel RN  Outcome: Progressing  6/6/2024 0135 by Davis Patel RN  Outcome: Progressing     Problem: Skin/Tissue Integrity  Goal: Absence of new skin breakdown  Description: 1.  Monitor for areas of redness and/or skin breakdown  2.  Assess vascular access sites hourly  3.  Every 4-6 hours minimum:  Change oxygen saturation probe site  4.  Every 4-6 hours:  If on nasal continuous positive airway pressure, respiratory therapy assess nares and determine need for appliance change or resting period.  6/6/2024 0136 by Davis Patel RN  Outcome: Progressing  6/6/2024 0135 by Davis Patel RN  Outcome: Progressing     Problem: Safety - Adult  Goal: Free from fall injury  6/6/2024 0136 by Davis Patel RN  Outcome: Progressing  6/6/2024 0135 by Davis Patel RN  Outcome: Progressing     Problem: ABCDS Injury Assessment  Goal: Absence of physical injury  6/6/2024 0136 by Davis Patel RN  Outcome: Progressing  6/6/2024 0135 by Davis Patel RN  Outcome: Progressing     Problem: Chronic Conditions and Co-morbidities  Goal: Patient's chronic conditions and co-morbidity symptoms are monitored and maintained or improved  Outcome: Progressing

## 2024-06-06 NOTE — CARE COORDINATION
Updated plan of care. Patient is from home with his mother. Patient states he has physicians who come to his home through [facility]. Patient states his discharge plan is home with no needs. Patient states sister will provide transport at time of discharge. Patient ordered an EGD today.   Electronically signed by Terese Centeno RN on 6/6/2024 at 9:45 AM

## 2024-06-06 NOTE — PROGRESS NOTES
GENERAL SURGERY  DAILY PROGRESS NOTE  6/6/2024  Chief Complaint   Patient presents with    Abdominal Cramping     Started about 4 days ago, has not has a bowel movement in 2 days. Is paraplegic.     Muscle Pain         Subjective:  GI cocktail did not help him. Plan for EGD today, needs to remain NPO.     I/O last 3 completed shifts:  In: 1185 [P.O.:360; I.V.:825]  Out: -   No intake/output data recorded.      Objective:  /72   Pulse 52   Temp 98.9 °F (37.2 °C) (Oral)   Resp 14   Ht 1.854 m (6' 1\")   Wt 73 kg (160 lb 15 oz)   SpO2 97%   BMI 21.23 kg/m²     General appearance:  lying in bed,  A&Ox4  Lungs: symmetric chest rise, no respiratory distress  Heart: normal rate per peripheral pulse  Abdomen: soft, non-distended, no guarding or rigidity; tender superficially to entire abdomen, increased tone, prior PEG site noted  Skin: warm and dry, no cyanosis  Extremities: atraumatic, paraplegia    Lab Results   Component Value Date    WBC 10.3 06/04/2024    HGB 14.2 06/04/2024    HCT 42.7 06/04/2024    MCV 89.1 06/04/2024     06/04/2024     Lab Results   Component Value Date     06/04/2024    K 3.4 (L) 06/04/2024     (H) 06/04/2024    CO2 20 (L) 06/04/2024    BUN 7 06/04/2024    CREATININE 1.1 06/04/2024    GLUCOSE 68 (L) 06/04/2024    CALCIUM 8.8 06/04/2024    BILITOT 0.5 06/04/2024    ALKPHOS 87 06/04/2024    AST 28 06/04/2024    ALT 37 06/04/2024    LABGLOM >90 06/04/2024    GFRAA >60 07/23/2022         Assessment/Plan:  28 y.o. male with acute onset nausea and vomiting    - continue Bentyl  - repeat CT a/p and RUQ US yesterday reviewed  - NPO, IVF  - continue daily scheduled bowel regimen  - EGD today, consent signed and placed in chart    Electronically signed by Toshia Yates DO on 6/6/2024 at 7:27 AM     Attending Physician Statement:    Chief Complaint:   Chief Complaint   Patient presents with    Abdominal Cramping     Started about 4 days ago, has not has a bowel movement in 2  days. Is paraplegic.     Muscle Pain       I have examined the patient and performed the key aspects of physical exam, reviewed the record (including all new notes, pertinent radiology images which are independently reviewed and laboratory findings), and discussed the case with the surgical team.  I agree with the assessment and plan with the following additions, corrections, and changes. 14pt review of symptoms completed and negative except as mentioned.    Has not had any further vomiting although he is not had any food today.  Still complains of abdominal pain.  EGD fairly unrelieved bleeding, just a small erosion/ulcer which was minimal.  Repeat CT yesterday which is some fluid in the colon, possible mild enteritis but again nothing overly significant.  Gallbladder ultrasound with just some sludge in the gallbladder but otherwise normal-appearing.  Will add Reglan.  Change Pepcid to PPI.  Trial diet.    Yared Ellis MD  06/06/24  11:34 AM    NOTE: This report, in part or full, may have been transcribed using voice recognition software. Every effort was made to ensure accuracy; however, inadvertent computerized transcription errors may be present. Please excuse any transcriptional grammatical or spelling errors that may have escaped my editorial review.

## 2024-06-06 NOTE — PROGRESS NOTES
Progress  Note  Chief Complaint   Patient presents with    Abdominal Cramping     Started about 4 days ago, has not has a bowel movement in 2 days. Is paraplegic.     Muscle Pain     Historical Issues:Paraplegia  Current Facility-Administered Medications   Medication Dose Route Frequency Provider Last Rate Last Admin    baclofen (LIORESAL) tablet 40 mg  40 mg Oral TID Darwin Bryan MD        prochlorperazine (COMPAZINE) injection 10 mg  10 mg IntraVENous Q6H PRN KaReema guidryil E, DO        scopolamine (TRANSDERM-SCOP) transdermal patch 1 patch  1 patch TransDERmal Q72H Kaerc, Wendy E, DO   1 patch at 06/05/24 0736    bisacodyl (DULCOLAX) suppository 10 mg  10 mg Rectal Daily Toshia Yates DO        senna (SENOKOT) tablet 17.2 mg  2 tablet Oral Nightly Toshia Yates DO   17.2 mg at 06/05/24 2202    morphine (PF) injection 2 mg  2 mg IntraVENous Q4H PRN Yared Ellis MD   2 mg at 06/06/24 0812    aluminum & magnesium hydroxide-simethicone (MAALOX) 30 mL, lidocaine viscous hcl (XYLOCAINE) 5 mL (GI COCKTAIL)   Oral TID Yared Ellis MD   Given at 06/05/24 1545    dicyclomine (BENTYL) capsule 20 mg  20 mg Oral 4x Daily AC & HS Toshia Yates DO   20 mg at 06/06/24 0812    sodium chloride flush 0.9 % injection 5-40 mL  5-40 mL IntraVENous 2 times per day Bethel Arellano MD   10 mL at 06/06/24 0813    sodium chloride flush 0.9 % injection 5-40 mL  5-40 mL IntraVENous PRN Bethel Arellano MD   10 mL at 06/06/24 0308    0.9 % sodium chloride infusion   IntraVENous PRN Bethel Arellano MD        potassium chloride (KLOR-CON M) extended release tablet 40 mEq  40 mEq Oral PRN Bethel Arellano MD   40 mEq at 06/04/24 0835    Or    potassium bicarb-citric acid (EFFER-K) effervescent tablet 40 mEq  40 mEq Oral PRN Bethel Arellano MD        Or    potassium chloride 10 mEq/100 mL IVPB (Peripheral Line)  10 mEq IntraVENous PRN Bethel Arellano MD        magnesium sulfate 2000 mg in 50 mL IVPB premix  2,000 mg IntraVENous

## 2024-06-06 NOTE — PATIENT CARE CONFERENCE
[facility] Quality Flow/Interdisciplinary Rounds Progress Note        Quality Flow Rounds held on June 6, 2024    Disciplines Attending:  Bedside Nurse, , , and Nursing Unit Leadership    Wesly Haro was admitted on 6/3/2024  1:44 AM    Anticipated Discharge Date:       Disposition:    Neville Score:  Neville Scale Score: 12    Readmission Risk              Risk of Unplanned Readmission:  0           Discussed patient goal for the day, patient clinical progression, and barriers to discharge.  The following Goal(s) of the Day/Commitment(s) have been identified:  Diagnostics - Report Results and Labs - Report Results      Lindy Schmidt RN  June 6, 2024

## 2024-06-06 NOTE — ANESTHESIA POSTPROCEDURE EVALUATION
Department of Anesthesiology  Postprocedure Note    Patient: Wesly Haro  MRN: [id number]  YOB: 1995  Date of evaluation: 6/6/2024    Procedure Summary       Date: 06/06/24 Room / Location: [place] [address] / [facility]    Anesthesia Start: 1122 Anesthesia Stop:     Procedure: ESOPHAGOGASTRODUODENOSCOPY BIOPSY Diagnosis:       Abdominal pain      (Abdominal pain [R10.9])    Surgeons: Yared Ellis MD Responsible Provider: Dwain Luu MD    Anesthesia Type: MAC ASA Status: 3            Anesthesia Type: No value filed.    Salina Phase I: Salina Score: 10    Salina Phase II:      Anesthesia Post Evaluation    Patient location during evaluation: PACU  Patient participation: complete - patient participated  Level of consciousness: awake and alert  Airway patency: patent  Nausea & Vomiting: no nausea and no vomiting  Cardiovascular status: hemodynamically stable  Respiratory status: acceptable  Hydration status: euvolemic  Pain management: adequate        No notable events documented.

## 2024-06-07 VITALS
DIASTOLIC BLOOD PRESSURE: 56 MMHG | WEIGHT: 165.57 LBS | SYSTOLIC BLOOD PRESSURE: 108 MMHG | OXYGEN SATURATION: 97 % | HEART RATE: 67 BPM | BODY MASS INDEX: 21.94 KG/M2 | HEIGHT: 73 IN | TEMPERATURE: 97.5 F | RESPIRATION RATE: 16 BRPM

## 2024-06-07 PROBLEM — G90.4 AUTONOMIC DYSREFLEXIA: Status: RESOLVED | Noted: 2024-06-07 | Resolved: 2024-06-07

## 2024-06-07 PROBLEM — K52.9 COLITIS: Status: RESOLVED | Noted: 2024-06-03 | Resolved: 2024-06-07

## 2024-06-07 PROBLEM — G90.4 AUTONOMIC DYSREFLEXIA: Status: ACTIVE | Noted: 2024-06-07

## 2024-06-07 LAB
BASOPHILS # BLD: 0.04 K/UL (ref 0–0.2)
BASOPHILS NFR BLD: 0 % (ref 0–2)
EOSINOPHIL # BLD: 0.03 K/UL (ref 0.05–0.5)
EOSINOPHILS RELATIVE PERCENT: 0 % (ref 0–6)
ERYTHROCYTE [DISTWIDTH] IN BLOOD BY AUTOMATED COUNT: 14.2 % (ref 11.5–15)
HCT VFR BLD AUTO: 46.6 % (ref 37–54)
HGB BLD-MCNC: 15.6 G/DL (ref 12.5–16.5)
IMM GRANULOCYTES # BLD AUTO: 0.03 K/UL (ref 0–0.58)
IMM GRANULOCYTES NFR BLD: 0 % (ref 0–5)
LYMPHOCYTES NFR BLD: 3.03 K/UL (ref 1.5–4)
LYMPHOCYTES RELATIVE PERCENT: 30 % (ref 20–42)
MCH RBC QN AUTO: 29.9 PG (ref 26–35)
MCHC RBC AUTO-ENTMCNC: 33.5 G/DL (ref 32–34.5)
MCV RBC AUTO: 89.3 FL (ref 80–99.9)
MONOCYTES NFR BLD: 0.86 K/UL (ref 0.1–0.95)
MONOCYTES NFR BLD: 8 % (ref 2–12)
NEUTROPHILS NFR BLD: 61 % (ref 43–80)
NEUTS SEG NFR BLD: 6.21 K/UL (ref 1.8–7.3)
PLATELET # BLD AUTO: 233 K/UL (ref 130–450)
PMV BLD AUTO: 10.7 FL (ref 7–12)
RBC # BLD AUTO: 5.22 M/UL (ref 3.8–5.8)
WBC OTHER # BLD: 10.2 K/UL (ref 4.5–11.5)

## 2024-06-07 PROCEDURE — 85025 COMPLETE CBC W/AUTO DIFF WBC: CPT

## 2024-06-07 PROCEDURE — 96366 THER/PROPH/DIAG IV INF ADDON: CPT

## 2024-06-07 PROCEDURE — 6360000002 HC RX W HCPCS: Performed by: SURGERY

## 2024-06-07 PROCEDURE — 99239 HOSP IP/OBS DSCHRG MGMT >30: CPT | Performed by: INTERNAL MEDICINE

## 2024-06-07 PROCEDURE — 6370000000 HC RX 637 (ALT 250 FOR IP): Performed by: SURGERY

## 2024-06-07 PROCEDURE — 6370000000 HC RX 637 (ALT 250 FOR IP)

## 2024-06-07 PROCEDURE — 6360000002 HC RX W HCPCS: Performed by: STUDENT IN AN ORGANIZED HEALTH CARE EDUCATION/TRAINING PROGRAM

## 2024-06-07 PROCEDURE — 96375 TX/PRO/DX INJ NEW DRUG ADDON: CPT

## 2024-06-07 PROCEDURE — 6370000000 HC RX 637 (ALT 250 FOR IP): Performed by: STUDENT IN AN ORGANIZED HEALTH CARE EDUCATION/TRAINING PROGRAM

## 2024-06-07 PROCEDURE — 2580000003 HC RX 258: Performed by: STUDENT IN AN ORGANIZED HEALTH CARE EDUCATION/TRAINING PROGRAM

## 2024-06-07 PROCEDURE — 96376 TX/PRO/DX INJ SAME DRUG ADON: CPT

## 2024-06-07 PROCEDURE — G0378 HOSPITAL OBSERVATION PER HR: HCPCS

## 2024-06-07 PROCEDURE — 6370000000 HC RX 637 (ALT 250 FOR IP): Performed by: INTERNAL MEDICINE

## 2024-06-07 RX ORDER — PANTOPRAZOLE SODIUM 40 MG/1
40 TABLET, DELAYED RELEASE ORAL
Qty: 30 TABLET | Refills: 3 | Status: SHIPPED | OUTPATIENT
Start: 2024-06-08

## 2024-06-07 RX ORDER — LACTULOSE 10 G/15ML
30 SOLUTION ORAL DAILY
Qty: 1350 ML | Refills: 0 | Status: SHIPPED | OUTPATIENT
Start: 2024-06-08 | End: 2024-07-08

## 2024-06-07 RX ORDER — BACLOFEN 20 MG/1
20 TABLET ORAL 3 TIMES DAILY
Qty: 90 TABLET | Refills: 0 | Status: SHIPPED | OUTPATIENT
Start: 2024-06-07 | End: 2024-07-07

## 2024-06-07 RX ORDER — DICYCLOMINE HYDROCHLORIDE 10 MG/1
20 CAPSULE ORAL
Qty: 120 CAPSULE | Refills: 3 | Status: SHIPPED | OUTPATIENT
Start: 2024-06-07

## 2024-06-07 RX ORDER — LACTULOSE 10 G/15ML
30 SOLUTION ORAL DAILY
Status: DISCONTINUED | OUTPATIENT
Start: 2024-06-07 | End: 2024-06-07 | Stop reason: HOSPADM

## 2024-06-07 RX ADMIN — ALUMINUM HYDROXIDE, MAGNESIUM HYDROXIDE, AND SIMETHICONE: 1200; 120; 1200 SUSPENSION ORAL at 08:47

## 2024-06-07 RX ADMIN — SODIUM CHLORIDE, PRESERVATIVE FREE 10 ML: 5 INJECTION INTRAVENOUS at 08:48

## 2024-06-07 RX ADMIN — METOCLOPRAMIDE 10 MG: 5 INJECTION, SOLUTION INTRAMUSCULAR; INTRAVENOUS at 11:04

## 2024-06-07 RX ADMIN — DICYCLOMINE HYDROCHLORIDE 20 MG: 10 CAPSULE ORAL at 11:04

## 2024-06-07 RX ADMIN — ONDANSETRON 4 MG: 2 INJECTION INTRAMUSCULAR; INTRAVENOUS at 06:34

## 2024-06-07 RX ADMIN — WATER 2000 MG: 1 INJECTION INTRAMUSCULAR; INTRAVENOUS; SUBCUTANEOUS at 05:27

## 2024-06-07 RX ADMIN — PANTOPRAZOLE SODIUM 40 MG: 40 TABLET, DELAYED RELEASE ORAL at 06:23

## 2024-06-07 RX ADMIN — GABAPENTIN 200 MG: 100 CAPSULE ORAL at 08:47

## 2024-06-07 RX ADMIN — BACLOFEN 40 MG: 10 TABLET ORAL at 14:58

## 2024-06-07 RX ADMIN — LACTULOSE 30 G: 20 SOLUTION ORAL at 12:52

## 2024-06-07 RX ADMIN — GABAPENTIN 200 MG: 100 CAPSULE ORAL at 14:58

## 2024-06-07 RX ADMIN — BACLOFEN 40 MG: 10 TABLET ORAL at 09:45

## 2024-06-07 RX ADMIN — DICYCLOMINE HYDROCHLORIDE 20 MG: 10 CAPSULE ORAL at 06:23

## 2024-06-07 RX ADMIN — METOCLOPRAMIDE 10 MG: 5 INJECTION, SOLUTION INTRAMUSCULAR; INTRAVENOUS at 06:24

## 2024-06-07 RX ADMIN — ALUMINUM HYDROXIDE, MAGNESIUM HYDROXIDE, AND SIMETHICONE: 1200; 120; 1200 SUSPENSION ORAL at 14:58

## 2024-06-07 RX ADMIN — METRONIDAZOLE 500 MG: 500 INJECTION, SOLUTION INTRAVENOUS at 14:57

## 2024-06-07 RX ADMIN — MORPHINE SULFATE 2 MG: 2 INJECTION, SOLUTION INTRAMUSCULAR; INTRAVENOUS at 06:23

## 2024-06-07 RX ADMIN — METRONIDAZOLE 500 MG: 500 INJECTION, SOLUTION INTRAVENOUS at 06:28

## 2024-06-07 RX ADMIN — PANTOPRAZOLE SODIUM 40 MG: 40 TABLET, DELAYED RELEASE ORAL at 14:58

## 2024-06-07 ASSESSMENT — PAIN - FUNCTIONAL ASSESSMENT: PAIN_FUNCTIONAL_ASSESSMENT: ACTIVITIES ARE NOT PREVENTED

## 2024-06-07 ASSESSMENT — PAIN SCALES - GENERAL: PAINLEVEL_OUTOF10: 7

## 2024-06-07 ASSESSMENT — PAIN DESCRIPTION - LOCATION: LOCATION: ABDOMEN;BACK

## 2024-06-07 ASSESSMENT — PAIN DESCRIPTION - DESCRIPTORS: DESCRIPTORS: ACHING;DISCOMFORT;STABBING

## 2024-06-07 NOTE — PLAN OF CARE
Problem: Pain  Goal: Verbalizes/displays adequate comfort level or baseline comfort level  Outcome: Progressing     Problem: Skin/Tissue Integrity  Goal: Absence of new skin breakdown  Description: 1.  Monitor for areas of redness and/or skin breakdown  2.  Assess vascular access sites hourly  3.  Every 4-6 hours minimum:  Change oxygen saturation probe site  4.  Every 4-6 hours:  If on nasal continuous positive airway pressure, respiratory therapy assess nares and determine need for appliance change or resting period.  Outcome: Progressing     Problem: Safety - Adult  Goal: Free from fall injury  Outcome: Progressing     Problem: ABCDS Injury Assessment  Goal: Absence of physical injury  Outcome: Progressing     Problem: ABCDS Injury Assessment  Goal: Absence of physical injury  Outcome: Progressing     Problem: Chronic Conditions and Co-morbidities  Goal: Patient's chronic conditions and co-morbidity symptoms are monitored and maintained or improved  Outcome: Progressing

## 2024-06-07 NOTE — PROGRESS NOTES
GENERAL SURGERY  DAILY PROGRESS NOTE  6/7/2024    CHIEF COMPLAINT:  Chief Complaint   Patient presents with    Abdominal Cramping     Started about 4 days ago, has not has a bowel movement in 2 days. Is paraplegic.     Muscle Pain       SUBJECTIVE:  Tolerated dinner last night. Denies any nausea or vomiting.     OBJECTIVE:  /63   Pulse 55   Temp 97.7 °F (36.5 °C) (Oral)   Resp 16   Ht 1.854 m (6' 1\")   Wt 75.1 kg (165 lb 9.1 oz)   SpO2 97%   BMI 21.84 kg/m²     GENERAL:  NAD. A&Ox3.  HEENT: normocephalic   LUNGS:  on room air. No acute respiratory distress  CARDIOVASCULAR: hemodynamically stable  SKIN: warm and dry  ABDOMEN:  Soft, non-distended, mild tenderness. No guarding, rigidity, rebound.  EXT: ROM intact all 4 extremities, no obvious deformities    ASSESSMENT/PLAN:  28 y.o. male with nausea and vomiting, s/p EGD with small ulcer     Plan  -continue protonix   -continue diet as tolerated  -continue bentyl as OP   -ok for dc from surgery standpoint   -pathology pending     Will DW Dr. Caleb Santos MD  Surgery Resident PGY-3  6/7/2024  7:37 AM       Attending Physician Statement:    Chief Complaint:   Chief Complaint   Patient presents with    Abdominal Cramping     Started about 4 days ago, has not has a bowel movement in 2 days. Is paraplegic.     Muscle Pain       I have examined the patient and performed the key aspects of physical exam, reviewed the record (including all new notes, pertinent radiology images which are independently reviewed and laboratory findings), and discussed the case with the surgical team.  I agree with the assessment and plan with the following additions, corrections, and changes. 14pt review of symptoms completed and negative except as mentioned.    EGD largely benign.  Small ulcer/erosion which is unlikely to be contributing to his symptoms.  Says he feels better today.  No further nausea or vomiting.  No diarrhea.  Moved his bowels yesterday.  Ultrasound

## 2024-06-07 NOTE — PATIENT CARE CONFERENCE
[facility] Quality Flow/Interdisciplinary Rounds Progress Note        Quality Flow Rounds held on June 7, 2024    Disciplines Attending:  Bedside Nurse, , , and Nursing Unit Leadership    Wesly Haro was admitted on 6/3/2024  1:44 AM    Anticipated Discharge Date:       Disposition:    Neville Score:  Neville Scale Score: 12    Readmission Risk              Risk of Unplanned Readmission:  0           Discussed patient goal for the day, patient clinical progression, and barriers to discharge.  The following Goal(s) of the Day/Commitment(s) have been identified:  Discharge - Obtain Order      Lindy Schmidt RN  June 7, 2024

## 2024-06-07 NOTE — PROGRESS NOTES
CLINICAL PHARMACY NOTE: MEDS TO BEDS    Total # of Prescriptions Filled: 4   The following medications were delivered to the patient:  Lactulose   Baclofen 20 mg  Pantoprazole 40 mg  Dicyclomine 10 mg    Additional Documentation:

## 2024-06-07 NOTE — DISCHARGE SUMMARY
Discharge Summary    Date: 6/7/2024  Patient Name: Wesly Haro    YOB: 1995     Age: 28 y.o.    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  He had not had a bowel movement for several days.  There was concern that he could be developing autonomic dysreflexia.  His blood pressure did not get worse during his stay we were able to get his bowel function returning to a normal habitus.  It appears that he was not on any bowel regimen when he came in.    Endoscopy was performed.  It did show a nonbleeding ulcer.  He will be prescribed a PPI on discharge for this.    He has done better with lactulose and the other products.  He will have a prescription for lactulose and was asked to continue his Dulcolax tablets.  I told him he needs to really focus on having at least 2 good bowel movements per day to prevent any from becoming worse.    1 point we were concerned about his gallbladder.  He was to have a HIDA scan.  However I did walk in the room and see that he was eating a double cheeseburger for [employer] with no problems.  The HIDA scan will be discontinued and we will get him discharged.    Consultants:  IP CONSULT TO GENERAL SURGERY  IP CONSULT TO IV TEAM    Surgeries/procedures Performed:  EGD     Treatments:            Discharge Plan/Disposition:  Home    Hospital/Incidental Findings Requiring Follow Up:    Patient Instructions:    Diet: Regular Diet    Activity:No Lifting, Driving or Strenuous Excercise  For number of days (if applicable):      Other Instructions:    Provider

## 2024-06-07 NOTE — CARE COORDINATION
Updated plan of care. Patient is from home with his mother. Patient states he has physicians who come to his home through [facility]. Patient states his discharge plan is home with no needs. Patient states sister will provide transport at time of discharge. Surgery consulted, ok for discharge from their POV. Await primary decision.   Electronically signed by Terese Centeno RN on 6/7/2024 at 7:54 AM

## 2024-06-18 LAB — SURGICAL PATHOLOGY REPORT: NORMAL

## 2024-07-09 ENCOUNTER — APPOINTMENT (OUTPATIENT)
Dept: CT IMAGING | Age: 29
End: 2024-07-09
Payer: MEDICAID

## 2024-07-09 ENCOUNTER — HOSPITAL ENCOUNTER (EMERGENCY)
Age: 29
Discharge: HOME | End: 2024-07-09
Payer: MEDICAID

## 2024-07-09 VITALS
BODY MASS INDEX: 21.77 KG/M2 | TEMPERATURE: 98.5 F | HEART RATE: 88 BPM | SYSTOLIC BLOOD PRESSURE: 107 MMHG | RESPIRATION RATE: 14 BRPM | WEIGHT: 165 LBS | OXYGEN SATURATION: 97 % | DIASTOLIC BLOOD PRESSURE: 83 MMHG

## 2024-07-09 DIAGNOSIS — K59.00 CONSTIPATION, UNSPECIFIED CONSTIPATION TYPE: Primary | ICD-10-CM

## 2024-07-09 DIAGNOSIS — N32.89 BLADDER WALL THICKENING: ICD-10-CM

## 2024-07-09 LAB
ALBUMIN SERPL-MCNC: 4.2 G/DL (ref 3.5–5.2)
ALP SERPL-CCNC: 111 U/L (ref 40–129)
ALT SERPL-CCNC: 34 U/L (ref 0–40)
ANION GAP SERPL CALCULATED.3IONS-SCNC: 12 MMOL/L (ref 7–16)
AST SERPL-CCNC: 28 U/L (ref 0–39)
BASOPHILS # BLD: 0.06 K/UL (ref 0–0.2)
BASOPHILS NFR BLD: 1 % (ref 0–2)
BILIRUB SERPL-MCNC: 0.4 MG/DL (ref 0–1.2)
BILIRUB UR QL STRIP: NEGATIVE
BUN SERPL-MCNC: 9 MG/DL (ref 6–20)
CALCIUM SERPL-MCNC: 9.4 MG/DL (ref 8.6–10.2)
CHLORIDE SERPL-SCNC: 104 MMOL/L (ref 98–107)
CLARITY UR: CLEAR
CO2 SERPL-SCNC: 29 MMOL/L (ref 22–29)
COLOR UR: YELLOW
CREAT SERPL-MCNC: 1.2 MG/DL (ref 0.7–1.2)
EOSINOPHIL # BLD: 0.05 K/UL (ref 0.05–0.5)
EOSINOPHILS RELATIVE PERCENT: 1 % (ref 0–6)
ERYTHROCYTE [DISTWIDTH] IN BLOOD BY AUTOMATED COUNT: 14.6 % (ref 11.5–15)
GFR, ESTIMATED: 83 ML/MIN/1.73M2
GLUCOSE SERPL-MCNC: 91 MG/DL (ref 74–99)
GLUCOSE UR STRIP-MCNC: NEGATIVE MG/DL
HCT VFR BLD AUTO: 47.5 % (ref 37–54)
HGB BLD-MCNC: 15.5 G/DL (ref 12.5–16.5)
HGB UR QL STRIP.AUTO: ABNORMAL
IMM GRANULOCYTES # BLD AUTO: <0.03 K/UL (ref 0–0.58)
IMM GRANULOCYTES NFR BLD: 0 % (ref 0–5)
KETONES UR STRIP-MCNC: NEGATIVE MG/DL
LACTATE BLDV-SCNC: 1.5 MMOL/L (ref 0.5–2.2)
LEUKOCYTE ESTERASE UR QL STRIP: NEGATIVE
LIPASE SERPL-CCNC: 28 U/L (ref 13–60)
LYMPHOCYTES NFR BLD: 2.87 K/UL (ref 1.5–4)
LYMPHOCYTES RELATIVE PERCENT: 28 % (ref 20–42)
MCH RBC QN AUTO: 30.1 PG (ref 26–35)
MCHC RBC AUTO-ENTMCNC: 32.6 G/DL (ref 32–34.5)
MCV RBC AUTO: 92.2 FL (ref 80–99.9)
MONOCYTES NFR BLD: 0.68 K/UL (ref 0.1–0.95)
MONOCYTES NFR BLD: 7 % (ref 2–12)
NEUTROPHILS NFR BLD: 65 % (ref 43–80)
NEUTS SEG NFR BLD: 6.73 K/UL (ref 1.8–7.3)
NITRITE UR QL STRIP: NEGATIVE
PH UR STRIP: 6 [PH] (ref 5–9)
PLATELET # BLD AUTO: 297 K/UL (ref 130–450)
PMV BLD AUTO: 10.1 FL (ref 7–12)
POTASSIUM SERPL-SCNC: 3.9 MMOL/L (ref 3.5–5)
PROT SERPL-MCNC: 7.6 G/DL (ref 6.4–8.3)
PROT UR STRIP-MCNC: ABNORMAL MG/DL
RBC # BLD AUTO: 5.15 M/UL (ref 3.8–5.8)
RBC #/AREA URNS HPF: ABNORMAL /HPF
SODIUM SERPL-SCNC: 145 MMOL/L (ref 132–146)
SP GR UR STRIP: 1.01 (ref 1–1.03)
UROBILINOGEN UR STRIP-ACNC: 0.2 EU/DL (ref 0–1)
WBC #/AREA URNS HPF: ABNORMAL /HPF
WBC OTHER # BLD: 10.4 K/UL (ref 4.5–11.5)

## 2024-07-09 PROCEDURE — 96374 THER/PROPH/DIAG INJ IV PUSH: CPT

## 2024-07-09 PROCEDURE — 80053 COMPREHEN METABOLIC PANEL: CPT

## 2024-07-09 PROCEDURE — 6360000002 HC RX W HCPCS: Performed by: PHYSICIAN ASSISTANT

## 2024-07-09 PROCEDURE — 83690 ASSAY OF LIPASE: CPT

## 2024-07-09 PROCEDURE — 85025 COMPLETE CBC W/AUTO DIFF WBC: CPT

## 2024-07-09 PROCEDURE — 74176 CT ABD & PELVIS W/O CONTRAST: CPT

## 2024-07-09 PROCEDURE — 83605 ASSAY OF LACTIC ACID: CPT

## 2024-07-09 PROCEDURE — 87491 CHLMYD TRACH DNA AMP PROBE: CPT

## 2024-07-09 PROCEDURE — 81001 URINALYSIS AUTO W/SCOPE: CPT

## 2024-07-09 PROCEDURE — 87591 N.GONORRHOEAE DNA AMP PROB: CPT

## 2024-07-09 PROCEDURE — 99284 EMERGENCY DEPT VISIT MOD MDM: CPT

## 2024-07-09 PROCEDURE — 87086 URINE CULTURE/COLONY COUNT: CPT

## 2024-07-09 RX ORDER — POLYETHYLENE GLYCOL 3350 17 G/17G
17 POWDER, FOR SOLUTION ORAL DAILY PRN
Qty: 7 EACH | Refills: 0 | Status: SHIPPED | OUTPATIENT
Start: 2024-07-09 | End: 2024-07-09

## 2024-07-09 RX ORDER — POLYETHYLENE GLYCOL 3350 17 G/17G
17 POWDER, FOR SOLUTION ORAL DAILY PRN
Qty: 30 PACKET | Refills: 0 | Status: SHIPPED | OUTPATIENT
Start: 2024-07-09 | End: 2024-07-09

## 2024-07-09 RX ORDER — KETOROLAC TROMETHAMINE 30 MG/ML
30 INJECTION, SOLUTION INTRAMUSCULAR; INTRAVENOUS ONCE
Status: COMPLETED | OUTPATIENT
Start: 2024-07-09 | End: 2024-07-09

## 2024-07-09 RX ORDER — CEPHALEXIN 500 MG/1
500 CAPSULE ORAL 3 TIMES DAILY
Qty: 21 CAPSULE | Refills: 0 | Status: SHIPPED | OUTPATIENT
Start: 2024-07-09 | End: 2024-07-16

## 2024-07-09 RX ORDER — AMOXICILLIN 250 MG
1 CAPSULE ORAL DAILY PRN
Qty: 7 TABLET | Refills: 0 | Status: SHIPPED | OUTPATIENT
Start: 2024-07-09 | End: 2024-07-16

## 2024-07-09 RX ADMIN — KETOROLAC TROMETHAMINE 30 MG: 30 INJECTION INTRAMUSCULAR; INTRAVENOUS at 14:55

## 2024-07-09 ASSESSMENT — PAIN DESCRIPTION - ORIENTATION: ORIENTATION: RIGHT

## 2024-07-09 ASSESSMENT — PAIN DESCRIPTION - LOCATION: LOCATION: FLANK

## 2024-07-09 ASSESSMENT — PAIN - FUNCTIONAL ASSESSMENT: PAIN_FUNCTIONAL_ASSESSMENT: NONE - DENIES PAIN

## 2024-07-09 ASSESSMENT — PAIN SCALES - GENERAL: PAINLEVEL_OUTOF10: 8

## 2024-07-09 NOTE — DISCHARGE INSTR - COC
Continuity of Care Form    Patient Name: Wesly Haro   :  1995  MRN:  [id number]    Admit date:  2024  Discharge date:  ***    Code Status Order: Prior   Advance Directives:     Admitting Physician:  No admitting provider for patient encounter.  PCP: No primary care provider on file.    Discharging Nurse: ***  Discharging Hospital Unit/Room#:   Discharging Unit Phone Number: ***    Emergency Contact:   Extended Emergency Contact Information  Primary Emergency Contact: Alex Christian   [facility]  Home Phone: [phone]  Mobile Phone: [phone]  Relation: Parent  Secondary Emergency Contact: Candelaria Haro   [facility]  Home Phone: [phone]  Mobile Phone: [phone]  Relation: Brother/Sister    Past Surgical History:  Past Surgical History:   Procedure Laterality Date    BRONCHOSCOPY N/A 2020    BRONCHOSCOPY THERAPUTIC ASPIRATION INITIAL performed by Jerardo Tejada MD at [facility] ENDOSCOPY    BRONCHOSCOPY N/A 2020    BRONCHOSCOPY THERAPUTIC ASPIRATION INITIAL  (bedside) performed by Jason Ramsey MD at [facility] ENDOSCOPY    BRONCHOSCOPY N/A 2020    BRONCHOSCOPY THERAPUTIC ASPIRATION INITIAL  (Bedside) performed by Jason Ramsey MD at [facility] ENDOSCOPY    BRONCHOSCOPY N/A 2020    BRONCHOSCOPY THERAPUTIC ASPIRATION INITIAL  (Bedside) performed by Doyle Lloyd MD at [facility] ENDOSCOPY    BRONCHOSCOPY  2020    BRONCHOSCOPY DIAGNOSTIC OR CELL WASH ONLY performed by Salvatore Harris MD at [facility] OR    BRONCHOSCOPY N/A 2020    BRONCHOSCOPY THERAPUTIC ASPIRATION INITIAL performed by Tito Sherman MD at [facility] ENDOSCOPY    BRONCHOSCOPY N/A 2020    BRONCHOSCOPY THERAPUTIC ASPIRATION INITIAL performed by Bob Lepe MD at [facility] ENDOSCOPY    BRONCHOSCOPY N/A 5/15/2020    BRONCHOSCOPY THERAPUTIC ASPIRATION INITIAL  (Bedside) performed by Jason Ramsey MD at [facility] ENDOSCOPY    BRONCHOSCOPY N/A 2020    BRONCHOSCOPY DIAGNOSTIC OR CELL WASH ONLY performed

## 2024-07-09 NOTE — ED PROVIDER NOTES
His smoking use included cigars. He started smoking about 9 years ago. He has a 9.2 pack-year smoking history. He has never used smokeless tobacco. He reports that he does not drink alcohol and does not use drugs.    Family History: family history is not on file.     Allergies: Patient has no known allergies.    Physical Exam   Oxygen Saturation Interpretation: Normal on room air analysis.        ED Triage Vitals [07/09/24 0849]   BP Temp Temp Source Pulse Respirations SpO2 Height Weight - Scale   107/83 98.5 °F (36.9 °C) Oral 88 14 97 % -- 74.8 kg (165 lb)       Physical Exam:   Constitutional:  Alert, development consistent with age.  Neck:  Supple. No meningeal signs.   Respiratory:   Breath sounds: Bilateral normal.  Lung sounds: normal.   CV:  Regular rate and rhythm, normal heart sounds, without pathological murmurs, ectopy, gallops, or rubs.  GI: Mild tenderness to  palpation of lower abdominal quadrants  Back: CVA tenderness bilaterally with spasm.  No evidence of abscess, redness, swelling or rash noted to the entire back.  Full examination completed.  Integument:  Normal turgor.  Warm, dry, without visible rash.  Neurological:  Oriented.  Motor functions intact.       Lab / Imaging Results   (All laboratory and radiology results have been personally reviewed by myself)    Labs:  Results for orders placed or performed during the hospital encounter of 07/09/24   Urinalysis with Microscopic   Result Value Ref Range    Color, UA Yellow Yellow    Turbidity UA Clear Clear    Glucose, Ur NEGATIVE NEGATIVE mg/dL    Bilirubin, Urine NEGATIVE NEGATIVE    Ketones, Urine NEGATIVE NEGATIVE mg/dL    Specific Gravity, UA 1.015 1.005 - 1.030    Urine Hgb LARGE (A) NEGATIVE    pH, Urine 6.0 5.0 - 9.0    Protein, UA TRACE (A) NEGATIVE mg/dL    Urobilinogen, Urine 0.2 0.0 - 1.0 EU/dL    Nitrite, Urine NEGATIVE NEGATIVE    Leukocyte Esterase, Urine NEGATIVE NEGATIVE    WBC, UA 0 TO 5 0 TO 5 /HPF    RBC, UA 3 to 5 (A) 0 TO 2

## 2024-07-09 NOTE — PROGRESS NOTES
CLINICAL PHARMACY NOTE: MEDS TO BEDS    Total # of Prescriptions Filled: 3   The following medications were delivered to the patient:  Senokot 8.6/50 mg   Keflex 500 mg   Miralax powder     Additional Documentation:

## 2024-07-09 NOTE — DISCHARGE INSTRUCTIONS
CT ABDOMEN PELVIS WO CONTRAST Additional Contrast? None   Final Result   1. Hepatomegaly versus Riedel's lobe.   2. Constipation.   3. Bladder wall thickening.  Finding in part may be secondary to decompressed   appearance of bladder.  However clinical concern warrants, correlation with   urinalysis to evaluate for infectious or inflammatory etiologies may be   obtained.              You are found to be constipated.  You have bladder wall thickening also.  Will be started on antibiotics called Keflex 3 times a day for 7 days please take this in full.  Your CT is completely unchanged except for showing bladder wall thickening and severe constipation.  You were given a solution that will help you go to the bathroom as well as a stool softener.  Please drink plenty of fluids.  I hope you feel better soon and follow-up with your family doctor

## 2024-07-10 LAB
MICROORGANISM SPEC CULT: NO GROWTH
SERVICE CMNT-IMP: NORMAL
SPECIMEN DESCRIPTION: NORMAL

## 2024-07-11 LAB
CHLAMYDIA DNA UR QL NAA+PROBE: NEGATIVE
N GONORRHOEA DNA UR QL NAA+PROBE: NEGATIVE
SPECIMEN DESCRIPTION: NORMAL

## 2024-11-19 NOTE — TELEPHONE ENCOUNTER
[place] [id number]                            OPERATIVE REPORT      PATIENT NAME: ALVARO GUERRERO       : 1963  MED REC NO: [id number]                       ROOM: [facility] [place] NO: [id number]                       ADMIT DATE: 2024  PROVIDER: Elizabeth R Blakemore, MD    DATE OF SERVICE:  2024    PREOPERATIVE DIAGNOSES:  Symptomatic macromastia.    POSTOPERATIVE DIAGNOSES:  Symptomatic macromastia.    PROCEDURES PERFORMED:  Bilateral breast reduction.    SURGEON:  Elizabeth R Blakemore, MD    ASSISTANT:  Mehran AYALA    ANESTHESIA:  General.    ESTIMATED BLOOD LOSS:  25 mL.    SPECIMENS REMOVED:  Left breast tissue 758 g and right breast tissue 748 g.    INTRAOPERATIVE FINDINGS:  see below     COMPLICATIONS:  None.    IMPLANTS:  none    INDICATIONS:  The patient presents with symptomatic macromastia, desiring breast reduction.  Please see preop consultation notes for details of our discussion.    DESCRIPTION OF PROCEDURE:  After informed consent was obtained from the patient, she was marked in the holding area in the upright position.  She was then taken to the operating room, placed in the supine position, and prepped and draped in the usual fashion.  I began on the left breast where an 8 cm pedicle was centered on the breast mound.  This was then de-epithelialized with a #10 blade.  Bovie cautery was used to dissect through the medial breast tissue down to the level of chest wall.  A #10 blade was used to further incise the skin and Bovie cautery was used to complete the medial wedge resection.  Same technique was used for the lateral wedge, and then superiorly, skin flaps were retracted towards the ceiling.  2 to 3 cm thick skin flaps were dissected up to the level of pectoralis fascia.  Next, the pedicle was retracted towards the ceiling and the superior portion was transected.  Additional resection and shaving  Mother contacted LSW requesting for FMLA paperwork to be completed; reviewed chart that pt missed INR appt; rescheduled with  for 8-18-20 at 1:00PM and she will bring paperwork in at that time no

## 2024-11-30 ENCOUNTER — HOSPITAL ENCOUNTER (OUTPATIENT)
Age: 29
Setting detail: OBSERVATION
Discharge: HOME | End: 2024-12-01
Attending: EMERGENCY MEDICINE | Admitting: STUDENT IN AN ORGANIZED HEALTH CARE EDUCATION/TRAINING PROGRAM
Payer: MEDICAID

## 2024-11-30 ENCOUNTER — APPOINTMENT (OUTPATIENT)
Dept: GENERAL RADIOLOGY | Age: 29
End: 2024-11-30
Payer: MEDICAID

## 2024-11-30 ENCOUNTER — APPOINTMENT (OUTPATIENT)
Dept: ULTRASOUND IMAGING | Age: 29
End: 2024-11-30
Payer: MEDICAID

## 2024-11-30 ENCOUNTER — APPOINTMENT (OUTPATIENT)
Dept: CT IMAGING | Age: 29
End: 2024-11-30
Payer: MEDICAID

## 2024-11-30 DIAGNOSIS — M54.42 ACUTE LEFT-SIDED LOW BACK PAIN WITH LEFT-SIDED SCIATICA: Primary | ICD-10-CM

## 2024-11-30 PROBLEM — M54.9 BACK PAIN DUE TO INJURY: Status: ACTIVE | Noted: 2024-11-30

## 2024-11-30 LAB
ALBUMIN SERPL-MCNC: 4.1 G/DL (ref 3.5–5.2)
ALP SERPL-CCNC: 86 U/L (ref 40–129)
ALT SERPL-CCNC: 40 U/L (ref 0–40)
ANION GAP SERPL CALCULATED.3IONS-SCNC: 11 MMOL/L (ref 7–16)
AST SERPL-CCNC: 23 U/L (ref 0–39)
BASOPHILS # BLD: 0.04 K/UL (ref 0–0.2)
BASOPHILS NFR BLD: 1 % (ref 0–2)
BILIRUB SERPL-MCNC: 0.3 MG/DL (ref 0–1.2)
BILIRUB UR QL STRIP: NEGATIVE
BUN SERPL-MCNC: 15 MG/DL (ref 6–20)
CALCIUM SERPL-MCNC: 9.3 MG/DL (ref 8.6–10.2)
CHLORIDE SERPL-SCNC: 101 MMOL/L (ref 98–107)
CLARITY UR: CLEAR
CO2 SERPL-SCNC: 27 MMOL/L (ref 22–29)
COLOR UR: YELLOW
CREAT SERPL-MCNC: 1 MG/DL (ref 0.7–1.2)
EOSINOPHIL # BLD: 0.03 K/UL (ref 0.05–0.5)
EOSINOPHILS RELATIVE PERCENT: 0 % (ref 0–6)
ERYTHROCYTE [DISTWIDTH] IN BLOOD BY AUTOMATED COUNT: 14.9 % (ref 11.5–15)
GFR, ESTIMATED: >90 ML/MIN/1.73M2
GLUCOSE SERPL-MCNC: 73 MG/DL (ref 74–99)
GLUCOSE UR STRIP-MCNC: NEGATIVE MG/DL
HCT VFR BLD AUTO: 43.6 % (ref 37–54)
HGB BLD-MCNC: 14.3 G/DL (ref 12.5–16.5)
HGB UR QL STRIP.AUTO: NEGATIVE
IMM GRANULOCYTES # BLD AUTO: <0.03 K/UL (ref 0–0.58)
IMM GRANULOCYTES NFR BLD: 0 % (ref 0–5)
KETONES UR STRIP-MCNC: NEGATIVE MG/DL
LACTATE BLDV-SCNC: 1.7 MMOL/L (ref 0.5–2.2)
LEUKOCYTE ESTERASE UR QL STRIP: NEGATIVE
LYMPHOCYTES NFR BLD: 3.06 K/UL (ref 1.5–4)
LYMPHOCYTES RELATIVE PERCENT: 38 % (ref 20–42)
MCH RBC QN AUTO: 30.6 PG (ref 26–35)
MCHC RBC AUTO-ENTMCNC: 32.8 G/DL (ref 32–34.5)
MCV RBC AUTO: 93.2 FL (ref 80–99.9)
MONOCYTES NFR BLD: 0.53 K/UL (ref 0.1–0.95)
MONOCYTES NFR BLD: 7 % (ref 2–12)
NEUTROPHILS NFR BLD: 55 % (ref 43–80)
NEUTS SEG NFR BLD: 4.46 K/UL (ref 1.8–7.3)
NITRITE UR QL STRIP: NEGATIVE
PH UR STRIP: 6 [PH] (ref 5–9)
PLATELET # BLD AUTO: 282 K/UL (ref 130–450)
PMV BLD AUTO: 9.8 FL (ref 7–12)
POTASSIUM SERPL-SCNC: 3.9 MMOL/L (ref 3.5–5)
PROT SERPL-MCNC: 6.8 G/DL (ref 6.4–8.3)
PROT UR STRIP-MCNC: NEGATIVE MG/DL
RBC # BLD AUTO: 4.68 M/UL (ref 3.8–5.8)
RBC #/AREA URNS HPF: NORMAL /HPF
SODIUM SERPL-SCNC: 139 MMOL/L (ref 132–146)
SP GR UR STRIP: 1.02 (ref 1–1.03)
UROBILINOGEN UR STRIP-ACNC: 1 EU/DL (ref 0–1)
WBC #/AREA URNS HPF: NORMAL /HPF
WBC OTHER # BLD: 8.1 K/UL (ref 4.5–11.5)

## 2024-11-30 PROCEDURE — 99285 EMERGENCY DEPT VISIT HI MDM: CPT

## 2024-11-30 PROCEDURE — 83605 ASSAY OF LACTIC ACID: CPT

## 2024-11-30 PROCEDURE — 93971 EXTREMITY STUDY: CPT

## 2024-11-30 PROCEDURE — 87086 URINE CULTURE/COLONY COUNT: CPT

## 2024-11-30 PROCEDURE — 96376 TX/PRO/DX INJ SAME DRUG ADON: CPT

## 2024-11-30 PROCEDURE — 6360000002 HC RX W HCPCS

## 2024-11-30 PROCEDURE — 2580000003 HC RX 258: Performed by: STUDENT IN AN ORGANIZED HEALTH CARE EDUCATION/TRAINING PROGRAM

## 2024-11-30 PROCEDURE — 81001 URINALYSIS AUTO W/SCOPE: CPT

## 2024-11-30 PROCEDURE — 6370000000 HC RX 637 (ALT 250 FOR IP): Performed by: STUDENT IN AN ORGANIZED HEALTH CARE EDUCATION/TRAINING PROGRAM

## 2024-11-30 PROCEDURE — 72128 CT CHEST SPINE W/O DYE: CPT

## 2024-11-30 PROCEDURE — 85025 COMPLETE CBC W/AUTO DIFF WBC: CPT

## 2024-11-30 PROCEDURE — 80053 COMPREHEN METABOLIC PANEL: CPT

## 2024-11-30 PROCEDURE — 6370000000 HC RX 637 (ALT 250 FOR IP): Performed by: EMERGENCY MEDICINE

## 2024-11-30 PROCEDURE — 6360000002 HC RX W HCPCS: Performed by: EMERGENCY MEDICINE

## 2024-11-30 PROCEDURE — 71275 CT ANGIOGRAPHY CHEST: CPT

## 2024-11-30 PROCEDURE — 73552 X-RAY EXAM OF FEMUR 2/>: CPT

## 2024-11-30 PROCEDURE — 96372 THER/PROPH/DIAG INJ SC/IM: CPT

## 2024-11-30 PROCEDURE — 96374 THER/PROPH/DIAG INJ IV PUSH: CPT

## 2024-11-30 PROCEDURE — 6360000002 HC RX W HCPCS: Performed by: STUDENT IN AN ORGANIZED HEALTH CARE EDUCATION/TRAINING PROGRAM

## 2024-11-30 PROCEDURE — 6360000004 HC RX CONTRAST MEDICATION: Performed by: RADIOLOGY

## 2024-11-30 PROCEDURE — G0378 HOSPITAL OBSERVATION PER HR: HCPCS

## 2024-11-30 PROCEDURE — 72131 CT LUMBAR SPINE W/O DYE: CPT

## 2024-11-30 PROCEDURE — 74177 CT ABD & PELVIS W/CONTRAST: CPT

## 2024-11-30 PROCEDURE — 96375 TX/PRO/DX INJ NEW DRUG ADDON: CPT

## 2024-11-30 PROCEDURE — 99222 1ST HOSP IP/OBS MODERATE 55: CPT | Performed by: STUDENT IN AN ORGANIZED HEALTH CARE EDUCATION/TRAINING PROGRAM

## 2024-11-30 RX ORDER — KETOROLAC TROMETHAMINE 15 MG/ML
15 INJECTION, SOLUTION INTRAMUSCULAR; INTRAVENOUS EVERY 8 HOURS PRN
Status: DISCONTINUED | OUTPATIENT
Start: 2024-11-30 | End: 2024-12-01 | Stop reason: HOSPADM

## 2024-11-30 RX ORDER — SODIUM CHLORIDE 0.9 % (FLUSH) 0.9 %
5-40 SYRINGE (ML) INJECTION PRN
Status: DISCONTINUED | OUTPATIENT
Start: 2024-11-30 | End: 2024-12-01 | Stop reason: HOSPADM

## 2024-11-30 RX ORDER — TRAMADOL HYDROCHLORIDE 50 MG/1
50 TABLET ORAL EVERY 6 HOURS PRN
Status: DISCONTINUED | OUTPATIENT
Start: 2024-11-30 | End: 2024-12-01 | Stop reason: HOSPADM

## 2024-11-30 RX ORDER — FENTANYL CITRATE 50 UG/ML
25 INJECTION, SOLUTION INTRAMUSCULAR; INTRAVENOUS ONCE
Status: COMPLETED | OUTPATIENT
Start: 2024-11-30 | End: 2024-11-30

## 2024-11-30 RX ORDER — VITAMIN B COMPLEX
2000 TABLET ORAL DAILY
Status: DISCONTINUED | OUTPATIENT
Start: 2024-11-30 | End: 2024-12-01 | Stop reason: HOSPADM

## 2024-11-30 RX ORDER — ONDANSETRON 2 MG/ML
4 INJECTION INTRAMUSCULAR; INTRAVENOUS EVERY 6 HOURS PRN
Status: DISCONTINUED | OUTPATIENT
Start: 2024-11-30 | End: 2024-12-01 | Stop reason: HOSPADM

## 2024-11-30 RX ORDER — POTASSIUM CHLORIDE 7.45 MG/ML
10 INJECTION INTRAVENOUS PRN
Status: DISCONTINUED | OUTPATIENT
Start: 2024-11-30 | End: 2024-11-30 | Stop reason: RX

## 2024-11-30 RX ORDER — ENOXAPARIN SODIUM 100 MG/ML
40 INJECTION SUBCUTANEOUS DAILY
Status: DISCONTINUED | OUTPATIENT
Start: 2024-11-30 | End: 2024-12-01 | Stop reason: HOSPADM

## 2024-11-30 RX ORDER — DICYCLOMINE HYDROCHLORIDE 10 MG/1
20 CAPSULE ORAL
Status: DISCONTINUED | OUTPATIENT
Start: 2024-11-30 | End: 2024-12-01 | Stop reason: HOSPADM

## 2024-11-30 RX ORDER — IOPAMIDOL 755 MG/ML
75 INJECTION, SOLUTION INTRAVASCULAR
Status: COMPLETED | OUTPATIENT
Start: 2024-11-30 | End: 2024-11-30

## 2024-11-30 RX ORDER — POLYETHYLENE GLYCOL 3350 17 G/17G
17 POWDER, FOR SOLUTION ORAL DAILY PRN
Status: DISCONTINUED | OUTPATIENT
Start: 2024-11-30 | End: 2024-12-01 | Stop reason: HOSPADM

## 2024-11-30 RX ORDER — ONDANSETRON 4 MG/1
4 TABLET, ORALLY DISINTEGRATING ORAL EVERY 8 HOURS PRN
Status: DISCONTINUED | OUTPATIENT
Start: 2024-11-30 | End: 2024-12-01 | Stop reason: HOSPADM

## 2024-11-30 RX ORDER — ACETAMINOPHEN 325 MG/1
650 TABLET ORAL EVERY 6 HOURS PRN
Status: DISCONTINUED | OUTPATIENT
Start: 2024-11-30 | End: 2024-12-01 | Stop reason: HOSPADM

## 2024-11-30 RX ORDER — OXYCODONE AND ACETAMINOPHEN 5; 325 MG/1; MG/1
1 TABLET ORAL ONCE
Status: COMPLETED | OUTPATIENT
Start: 2024-11-30 | End: 2024-11-30

## 2024-11-30 RX ORDER — SODIUM CHLORIDE 9 MG/ML
INJECTION, SOLUTION INTRAVENOUS PRN
Status: DISCONTINUED | OUTPATIENT
Start: 2024-11-30 | End: 2024-12-01 | Stop reason: HOSPADM

## 2024-11-30 RX ORDER — SODIUM CHLORIDE 0.9 % (FLUSH) 0.9 %
5-40 SYRINGE (ML) INJECTION EVERY 12 HOURS SCHEDULED
Status: DISCONTINUED | OUTPATIENT
Start: 2024-11-30 | End: 2024-12-01 | Stop reason: HOSPADM

## 2024-11-30 RX ORDER — FENTANYL CITRATE 50 UG/ML
50 INJECTION, SOLUTION INTRAMUSCULAR; INTRAVENOUS ONCE
Status: COMPLETED | OUTPATIENT
Start: 2024-11-30 | End: 2024-11-30

## 2024-11-30 RX ORDER — KETOROLAC TROMETHAMINE 15 MG/ML
15 INJECTION, SOLUTION INTRAMUSCULAR; INTRAVENOUS ONCE
Status: COMPLETED | OUTPATIENT
Start: 2024-11-30 | End: 2024-11-30

## 2024-11-30 RX ORDER — POTASSIUM CHLORIDE 1500 MG/1
40 TABLET, EXTENDED RELEASE ORAL PRN
Status: DISCONTINUED | OUTPATIENT
Start: 2024-11-30 | End: 2024-12-01 | Stop reason: HOSPADM

## 2024-11-30 RX ORDER — GABAPENTIN 100 MG/1
200 CAPSULE ORAL 3 TIMES DAILY
Status: DISCONTINUED | OUTPATIENT
Start: 2024-11-30 | End: 2024-12-01 | Stop reason: HOSPADM

## 2024-11-30 RX ORDER — ACETAMINOPHEN 650 MG/1
650 SUPPOSITORY RECTAL EVERY 6 HOURS PRN
Status: DISCONTINUED | OUTPATIENT
Start: 2024-11-30 | End: 2024-12-01 | Stop reason: HOSPADM

## 2024-11-30 RX ORDER — PANTOPRAZOLE SODIUM 40 MG/1
40 TABLET, DELAYED RELEASE ORAL
Status: DISCONTINUED | OUTPATIENT
Start: 2024-11-30 | End: 2024-12-01 | Stop reason: HOSPADM

## 2024-11-30 RX ORDER — BISACODYL 5 MG/1
5 TABLET, DELAYED RELEASE ORAL DAILY PRN
Status: DISCONTINUED | OUTPATIENT
Start: 2024-11-30 | End: 2024-12-01 | Stop reason: HOSPADM

## 2024-11-30 RX ORDER — MAGNESIUM SULFATE IN WATER 40 MG/ML
2000 INJECTION, SOLUTION INTRAVENOUS PRN
Status: DISCONTINUED | OUTPATIENT
Start: 2024-11-30 | End: 2024-12-01 | Stop reason: HOSPADM

## 2024-11-30 RX ADMIN — PANTOPRAZOLE SODIUM 40 MG: 40 TABLET, DELAYED RELEASE ORAL at 19:06

## 2024-11-30 RX ADMIN — DICYCLOMINE HYDROCHLORIDE 20 MG: 10 CAPSULE ORAL at 22:06

## 2024-11-30 RX ADMIN — FENTANYL CITRATE 50 MCG: 50 INJECTION INTRAMUSCULAR; INTRAVENOUS at 15:17

## 2024-11-30 RX ADMIN — TRAMADOL HYDROCHLORIDE 50 MG: 50 TABLET, COATED ORAL at 22:07

## 2024-11-30 RX ADMIN — OXYCODONE HYDROCHLORIDE AND ACETAMINOPHEN 1 TABLET: 5; 325 TABLET ORAL at 14:09

## 2024-11-30 RX ADMIN — Medication 2000 UNITS: at 19:06

## 2024-11-30 RX ADMIN — IOPAMIDOL 75 ML: 755 INJECTION, SOLUTION INTRAVENOUS at 09:48

## 2024-11-30 RX ADMIN — SODIUM CHLORIDE, PRESERVATIVE FREE 10 ML: 5 INJECTION INTRAVENOUS at 19:07

## 2024-11-30 RX ADMIN — KETOROLAC TROMETHAMINE 15 MG: 15 INJECTION, SOLUTION INTRAMUSCULAR; INTRAVENOUS at 19:06

## 2024-11-30 RX ADMIN — FENTANYL CITRATE 25 MCG: 50 INJECTION INTRAMUSCULAR; INTRAVENOUS at 09:21

## 2024-11-30 RX ADMIN — ENOXAPARIN SODIUM 40 MG: 100 INJECTION SUBCUTANEOUS at 22:08

## 2024-11-30 RX ADMIN — GABAPENTIN 200 MG: 100 CAPSULE ORAL at 22:07

## 2024-11-30 RX ADMIN — KETOROLAC TROMETHAMINE 15 MG: 15 INJECTION, SOLUTION INTRAMUSCULAR; INTRAVENOUS at 09:10

## 2024-11-30 ASSESSMENT — PAIN DESCRIPTION - ORIENTATION
ORIENTATION: LEFT
ORIENTATION: RIGHT
ORIENTATION: LEFT;LOWER
ORIENTATION: LEFT;LOWER

## 2024-11-30 ASSESSMENT — PAIN SCALES - GENERAL
PAINLEVEL_OUTOF10: 8
PAINLEVEL_OUTOF10: 10
PAINLEVEL_OUTOF10: 0
PAINLEVEL_OUTOF10: 0
PAINLEVEL_OUTOF10: 8
PAINLEVEL_OUTOF10: 10
PAINLEVEL_OUTOF10: 8
PAINLEVEL_OUTOF10: 8
PAINLEVEL_OUTOF10: 0

## 2024-11-30 ASSESSMENT — PAIN DESCRIPTION - LOCATION
LOCATION: BACK
LOCATION: HIP
LOCATION: BACK
LOCATION: HIP
LOCATION: ABDOMEN
LOCATION: BACK;HIP

## 2024-11-30 ASSESSMENT — PAIN DESCRIPTION - PAIN TYPE: TYPE: ACUTE PAIN

## 2024-11-30 ASSESSMENT — PAIN - FUNCTIONAL ASSESSMENT
PAIN_FUNCTIONAL_ASSESSMENT: PREVENTS OR INTERFERES SOME ACTIVE ACTIVITIES AND ADLS
PAIN_FUNCTIONAL_ASSESSMENT: ACTIVITIES ARE NOT PREVENTED
PAIN_FUNCTIONAL_ASSESSMENT: 0-10

## 2024-11-30 ASSESSMENT — PAIN DESCRIPTION - DESCRIPTORS
DESCRIPTORS: ACHING
DESCRIPTORS: ACHING;DISCOMFORT;SORE
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
## 2024-11-30 NOTE — ED NOTES
Name: Wesly Haro  : 1995  MRN: [id number]    Date: 2024    Benefits of immediately proceeding with Radiology exam outweigh the risks and therefore the following is being waived:      [] Pregnancy test    [] Protocol for Iodine allergy    [] MRI questionnaire    [x] BUN/Creatinine        DO Inés Bobo Joseph A, DO  24 0915

## 2024-11-30 NOTE — CONSULTS
I was called regarding this patient and venous duplex showing LLE DVT  Question being posed is whether requires anticoagulation  Patient paraplegic with prior hx of DVT  I reviewed imaging. Preserved flow lumen with appropriate phasicity. There is evidence of highly echogenic strands in flow lumen that is consistent with \"chronic DVT\" which is really just intraluminal scarring of the vein  Anticoagulation is not indicated from my standpoint  Thanks for the consult    Zainab Kulkarni MD
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
## 2024-11-30 NOTE — ED NOTES
ED to Inpatient Handoff Report    Notified MITESH Mckeon that electronic handoff available and patient ready for transport to room 742.    Safety Risks: Risk of falls    Patient in Restraints: no    Constant Observer or Patient : no    Telemetry Monitoring Ordered :No           Order to transfer to unit without monitor:N/A      Deterioration Index Score:   Predictive Model Details          14 (Normal)  Factor Value    Calculated 11/30/2024 16:16 59% Age 29 years old    Deterioration Index Model 22% Pulse oximetry 100 %     11% Respiratory rate 17     2% Pulse 82     2% WBC count 8.1 k/uL     2% Temperature 97.5 °F (36.4 °C)     1% Systolic 113     0% Hematocrit 43.6 %     0% Potassium 3.9 mmol/L     0% Sodium 139 mmol/L        Vitals:    11/30/24 1325 11/30/24 1409 11/30/24 1411 11/30/24 1519   BP: (!) 141/86  (!) 103/53 (!) 113/95   Pulse: 95  80 82   Resp: 21 16 17    Temp:       TempSrc:       SpO2: 98%  99% 100%   Weight:       Height:             Opportunity for questions and clarification was provided.

## 2024-11-30 NOTE — DISCHARGE INSTRUCTIONS
Call and schedule appointment with neurosurgery, Dr. Alvarado  Call and schedule appoint with the [facility] PCP referral line as well.  Return to the ER if your symptoms should worsen.

## 2024-12-01 VITALS
OXYGEN SATURATION: 100 % | RESPIRATION RATE: 18 BRPM | HEART RATE: 98 BPM | HEIGHT: 73 IN | DIASTOLIC BLOOD PRESSURE: 79 MMHG | TEMPERATURE: 98.4 F | SYSTOLIC BLOOD PRESSURE: 92 MMHG | WEIGHT: 160 LBS | BODY MASS INDEX: 21.2 KG/M2

## 2024-12-01 LAB
MICROORGANISM SPEC CULT: NO GROWTH
SERVICE CMNT-IMP: NORMAL
SPECIMEN DESCRIPTION: NORMAL

## 2024-12-01 PROCEDURE — 6370000000 HC RX 637 (ALT 250 FOR IP): Performed by: STUDENT IN AN ORGANIZED HEALTH CARE EDUCATION/TRAINING PROGRAM

## 2024-12-01 PROCEDURE — G0378 HOSPITAL OBSERVATION PER HR: HCPCS

## 2024-12-01 PROCEDURE — 99239 HOSP IP/OBS DSCHRG MGMT >30: CPT | Performed by: INTERNAL MEDICINE

## 2024-12-01 PROCEDURE — 6370000000 HC RX 637 (ALT 250 FOR IP): Performed by: ORTHOPAEDIC SURGERY

## 2024-12-01 PROCEDURE — 96376 TX/PRO/DX INJ SAME DRUG ADON: CPT

## 2024-12-01 PROCEDURE — 2580000003 HC RX 258: Performed by: STUDENT IN AN ORGANIZED HEALTH CARE EDUCATION/TRAINING PROGRAM

## 2024-12-01 PROCEDURE — 6360000002 HC RX W HCPCS: Performed by: STUDENT IN AN ORGANIZED HEALTH CARE EDUCATION/TRAINING PROGRAM

## 2024-12-01 RX ORDER — BACLOFEN 10 MG/1
10 TABLET ORAL 3 TIMES DAILY
Status: DISCONTINUED | OUTPATIENT
Start: 2024-12-01 | End: 2024-12-01 | Stop reason: HOSPADM

## 2024-12-01 RX ORDER — BACLOFEN 10 MG/1
10 TABLET ORAL 3 TIMES DAILY
Qty: 21 TABLET | Refills: 0 | Status: SHIPPED | OUTPATIENT
Start: 2024-12-01 | End: 2024-12-08

## 2024-12-01 RX ORDER — OXYCODONE AND ACETAMINOPHEN 5; 325 MG/1; MG/1
1 TABLET ORAL EVERY 4 HOURS PRN
Qty: 30 TABLET | Refills: 0 | Status: SHIPPED | OUTPATIENT
Start: 2024-12-01 | End: 2024-12-06

## 2024-12-01 RX ADMIN — TRAMADOL HYDROCHLORIDE 50 MG: 50 TABLET, COATED ORAL at 05:18

## 2024-12-01 RX ADMIN — DICYCLOMINE HYDROCHLORIDE 20 MG: 10 CAPSULE ORAL at 10:58

## 2024-12-01 RX ADMIN — KETOROLAC TROMETHAMINE 15 MG: 15 INJECTION, SOLUTION INTRAMUSCULAR; INTRAVENOUS at 04:33

## 2024-12-01 RX ADMIN — GABAPENTIN 200 MG: 100 CAPSULE ORAL at 10:58

## 2024-12-01 RX ADMIN — DICYCLOMINE HYDROCHLORIDE 20 MG: 10 CAPSULE ORAL at 05:18

## 2024-12-01 RX ADMIN — Medication 2000 UNITS: at 10:59

## 2024-12-01 RX ADMIN — SODIUM CHLORIDE, PRESERVATIVE FREE 10 ML: 5 INJECTION INTRAVENOUS at 04:34

## 2024-12-01 RX ADMIN — PANTOPRAZOLE SODIUM 40 MG: 40 TABLET, DELAYED RELEASE ORAL at 16:03

## 2024-12-01 RX ADMIN — GABAPENTIN 200 MG: 100 CAPSULE ORAL at 16:03

## 2024-12-01 RX ADMIN — DICYCLOMINE HYDROCHLORIDE 20 MG: 10 CAPSULE ORAL at 16:03

## 2024-12-01 RX ADMIN — PANTOPRAZOLE SODIUM 40 MG: 40 TABLET, DELAYED RELEASE ORAL at 05:18

## 2024-12-01 RX ADMIN — BACLOFEN 10 MG: 10 TABLET ORAL at 12:09

## 2024-12-01 ASSESSMENT — PAIN SCALES - GENERAL
PAINLEVEL_OUTOF10: 8
PAINLEVEL_OUTOF10: 6
PAINLEVEL_OUTOF10: 8

## 2024-12-01 ASSESSMENT — PAIN - FUNCTIONAL ASSESSMENT
PAIN_FUNCTIONAL_ASSESSMENT: PREVENTS OR INTERFERES SOME ACTIVE ACTIVITIES AND ADLS
PAIN_FUNCTIONAL_ASSESSMENT: PREVENTS OR INTERFERES SOME ACTIVE ACTIVITIES AND ADLS

## 2024-12-01 ASSESSMENT — PAIN DESCRIPTION - LOCATION
LOCATION: LEG

## 2024-12-01 ASSESSMENT — PAIN DESCRIPTION - DESCRIPTORS
DESCRIPTORS: SPASM
DESCRIPTORS: ACHING;CRAMPING;SPASM

## 2024-12-01 ASSESSMENT — PAIN DESCRIPTION - ORIENTATION
ORIENTATION: RIGHT;LEFT
ORIENTATION: RIGHT;LEFT
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
## 2024-12-01 NOTE — PROGRESS NOTES
4 Eyes Skin Assessment     NAME:  Wesly Haro  YOB: 1995  MEDICAL RECORD NUMBER:  [id number]    The patient is being assessed for  Admission    I agree that at least one RN has performed a thorough Head to Toe Skin Assessment on the patient. ALL assessment sites listed below have been assessed.      Areas assessed by both nurses:    Sacrum. Buttock, Coccyx, Ischium        Does the Patient have a Wound? Yes wound(s) were present on assessment. LDA wound assessment was Initiated and completed by RN       Neville Prevention initiated by RN: Yes  Wound Care Orders initiated by RN: Yes    Pressure Injury (Stage 3,4, Unstageable, DTI, NWPT, and Complex wounds) if present, place Wound referral order by RN under : No    New Ostomies, if present place, Ostomy referral order under : No     Nurse 1 eSignature: Electronically signed by Liza Robledo RN on 11/30/24 at 8:04 PM EST    **SHARE this note so that the co-signing nurse can place an eSignature**    Nurse 2 eSignature: {Esignature:[id number]}

## 2024-12-01 NOTE — PLAN OF CARE
Problem: Discharge Planning  Goal: Discharge to home or other facility with appropriate resources  Outcome: Progressing  Flowsheets (Taken 11/30/2024 2130)  Discharge to home or other facility with appropriate resources: Identify barriers to discharge with patient and caregiver     Problem: Pain  Goal: Verbalizes/displays adequate comfort level or baseline comfort level  Outcome: Progressing     Problem: Skin/Tissue Integrity  Goal: Absence of new skin breakdown  Description: 1.  Monitor for areas of redness and/or skin breakdown  2.  Assess vascular access sites hourly  3.  Every 4-6 hours minimum:  Change oxygen saturation probe site  4.  Every 4-6 hours:  If on nasal continuous positive airway pressure, respiratory therapy assess nares and determine need for appliance change or resting period.  Outcome: Progressing     Problem: Safety - Adult  Goal: Free from fall injury  Outcome: Progressing
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
## 2024-12-01 NOTE — PROGRESS NOTES
4 Eyes Skin Assessment     NAME:  Wesly Haro  YOB: 1995  MEDICAL RECORD NUMBER:  [id number]    The patient is being assessed for  Admission    I agree that at least one RN has performed a thorough Head to Toe Skin Assessment on the patient. ALL assessment sites listed below have been assessed.      Areas assessed by both nurses:    Head, Face, Ears, Shoulders, Back, Chest, Arms, Elbows, Hands, Sacrum. Buttock, Coccyx, Ischium, and Legs. Feet and Heels        Does the Patient have a Wound? No noted wound(s)       Neville Prevention initiated by RN: Yes  Wound Care Orders initiated by RN: No    Pressure Injury (Stage 3,4, Unstageable, DTI, NWPT, and Complex wounds) if present, place Wound referral order by RN under : No    New Ostomies, if present place, Ostomy referral order under : No     Nurse 1 eSignature: Electronically signed by Alissa Vaca RN on 12/1/24 at 12:42 AM EST    **SHARE this note so that the co-signing nurse can place an eSignature**    Nurse 2 eSignature: {Esignature:[id number]}
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
## 2025-01-03 NOTE — PROCEDURES
[address] [place] [address]  BRONCHOSCOPY PROCEDURE NOTE     Procedure Date: 5/21/2020     Pre-op Diagnosis: respiratory failure after trauma     Post-op Diagnosis: same, and mucus plugging      Procedure:  Flexible bronchoscopy, therapeutic     Attending: Shelia Moeller MD      Assistant: None      Specimen: None      Complications: None     Condition: Critical     Procedure: At the bedside in the ICU, the patient was sedated with fentanyl and versed. Heart rate, blood pressure, respiratory rate, and oxygen saturation were monitored. The bronchoscope was inserted via the tracheostomy. There were thick opaquemucus plugs throughout the right lower and right upper lobar bronchi ( but somewhat thinner than yesterday) . These were suctioned until clear. A small amount of saline irrigation was used to thin secretions. The left mainstem and segmental bronchi were clear The scope was slowly withdrawn and passed into the left mainstem and segmental bronchi. The bronchoscope was completely withdrawn. The patient tolerated the procedure well with no readily apparent complications.     Shelia Moeller MD no

## 2025-02-04 ENCOUNTER — HOSPITAL ENCOUNTER (EMERGENCY)
Age: 30
Discharge: HOME | End: 2025-02-04
Attending: STUDENT IN AN ORGANIZED HEALTH CARE EDUCATION/TRAINING PROGRAM
Payer: MEDICAID

## 2025-02-04 ENCOUNTER — APPOINTMENT (OUTPATIENT)
Dept: CT IMAGING | Age: 30
End: 2025-02-04
Payer: MEDICAID

## 2025-02-04 VITALS
OXYGEN SATURATION: 100 % | SYSTOLIC BLOOD PRESSURE: 115 MMHG | HEIGHT: 73 IN | RESPIRATION RATE: 16 BRPM | HEART RATE: 64 BPM | WEIGHT: 141.8 LBS | TEMPERATURE: 98.4 F | BODY MASS INDEX: 18.79 KG/M2 | DIASTOLIC BLOOD PRESSURE: 64 MMHG

## 2025-02-04 DIAGNOSIS — R10.9 FLANK PAIN: Primary | ICD-10-CM

## 2025-02-04 LAB
ALBUMIN SERPL-MCNC: 4 G/DL (ref 3.5–5.2)
ALP SERPL-CCNC: 105 U/L (ref 40–129)
ALT SERPL-CCNC: 42 U/L (ref 0–40)
ANION GAP SERPL CALCULATED.3IONS-SCNC: 9 MMOL/L (ref 7–16)
AST SERPL-CCNC: 34 U/L (ref 0–39)
BASOPHILS # BLD: 0.03 K/UL (ref 0–0.2)
BASOPHILS NFR BLD: 0 % (ref 0–2)
BILIRUB SERPL-MCNC: 0.2 MG/DL (ref 0–1.2)
BILIRUB UR QL STRIP: NEGATIVE
BUN SERPL-MCNC: 12 MG/DL (ref 6–20)
CALCIUM SERPL-MCNC: 9.2 MG/DL (ref 8.6–10.2)
CHLORIDE SERPL-SCNC: 100 MMOL/L (ref 98–107)
CLARITY UR: CLEAR
CO2 SERPL-SCNC: 29 MMOL/L (ref 22–29)
COLOR UR: YELLOW
CREAT SERPL-MCNC: 1.1 MG/DL (ref 0.7–1.2)
EOSINOPHIL # BLD: 0.06 K/UL (ref 0.05–0.5)
EOSINOPHILS RELATIVE PERCENT: 1 % (ref 0–6)
ERYTHROCYTE [DISTWIDTH] IN BLOOD BY AUTOMATED COUNT: 13.1 % (ref 11.5–15)
GFR, ESTIMATED: 89 ML/MIN/1.73M2
GLUCOSE SERPL-MCNC: 77 MG/DL (ref 74–99)
GLUCOSE UR STRIP-MCNC: NEGATIVE MG/DL
HCT VFR BLD AUTO: 47.8 % (ref 37–54)
HGB BLD-MCNC: 15.3 G/DL (ref 12.5–16.5)
HGB UR QL STRIP.AUTO: ABNORMAL
IMM GRANULOCYTES # BLD AUTO: <0.03 K/UL (ref 0–0.58)
IMM GRANULOCYTES NFR BLD: 0 % (ref 0–5)
KETONES UR STRIP-MCNC: NEGATIVE MG/DL
LACTATE BLDV-SCNC: 1 MMOL/L (ref 0.5–2.2)
LEUKOCYTE ESTERASE UR QL STRIP: NEGATIVE
LYMPHOCYTES NFR BLD: 2.68 K/UL (ref 1.5–4)
LYMPHOCYTES RELATIVE PERCENT: 34 % (ref 20–42)
MCH RBC QN AUTO: 30.2 PG (ref 26–35)
MCHC RBC AUTO-ENTMCNC: 32 G/DL (ref 32–34.5)
MCV RBC AUTO: 94.5 FL (ref 80–99.9)
MONOCYTES NFR BLD: 0.5 K/UL (ref 0.1–0.95)
MONOCYTES NFR BLD: 6 % (ref 2–12)
NEUTROPHILS NFR BLD: 58 % (ref 43–80)
NEUTS SEG NFR BLD: 4.55 K/UL (ref 1.8–7.3)
NITRITE UR QL STRIP: NEGATIVE
PH UR STRIP: 7.5 [PH] (ref 5–8)
PLATELET # BLD AUTO: 290 K/UL (ref 130–450)
PMV BLD AUTO: 10.5 FL (ref 7–12)
POTASSIUM SERPL-SCNC: 4.1 MMOL/L (ref 3.5–5)
PROT SERPL-MCNC: 7.5 G/DL (ref 6.4–8.3)
PROT UR STRIP-MCNC: NEGATIVE MG/DL
RBC # BLD AUTO: 5.06 M/UL (ref 3.8–5.8)
RBC #/AREA URNS HPF: ABNORMAL /HPF
SODIUM SERPL-SCNC: 138 MMOL/L (ref 132–146)
SP GR UR STRIP: 1.02 (ref 1–1.03)
UROBILINOGEN UR STRIP-ACNC: 0.2 EU/DL (ref 0–1)
WBC #/AREA URNS HPF: ABNORMAL /HPF
WBC OTHER # BLD: 7.8 K/UL (ref 4.5–11.5)

## 2025-02-04 PROCEDURE — 99284 EMERGENCY DEPT VISIT MOD MDM: CPT

## 2025-02-04 PROCEDURE — 96374 THER/PROPH/DIAG INJ IV PUSH: CPT

## 2025-02-04 PROCEDURE — 87086 URINE CULTURE/COLONY COUNT: CPT

## 2025-02-04 PROCEDURE — 81001 URINALYSIS AUTO W/SCOPE: CPT

## 2025-02-04 PROCEDURE — 93005 ELECTROCARDIOGRAM TRACING: CPT | Performed by: STUDENT IN AN ORGANIZED HEALTH CARE EDUCATION/TRAINING PROGRAM

## 2025-02-04 PROCEDURE — 74176 CT ABD & PELVIS W/O CONTRAST: CPT

## 2025-02-04 PROCEDURE — 2580000003 HC RX 258: Performed by: STUDENT IN AN ORGANIZED HEALTH CARE EDUCATION/TRAINING PROGRAM

## 2025-02-04 PROCEDURE — 96361 HYDRATE IV INFUSION ADD-ON: CPT

## 2025-02-04 PROCEDURE — 6370000000 HC RX 637 (ALT 250 FOR IP): Performed by: STUDENT IN AN ORGANIZED HEALTH CARE EDUCATION/TRAINING PROGRAM

## 2025-02-04 PROCEDURE — 96375 TX/PRO/DX INJ NEW DRUG ADDON: CPT

## 2025-02-04 PROCEDURE — 6360000002 HC RX W HCPCS: Performed by: STUDENT IN AN ORGANIZED HEALTH CARE EDUCATION/TRAINING PROGRAM

## 2025-02-04 PROCEDURE — 83605 ASSAY OF LACTIC ACID: CPT

## 2025-02-04 PROCEDURE — 85025 COMPLETE CBC W/AUTO DIFF WBC: CPT

## 2025-02-04 PROCEDURE — 80053 COMPREHEN METABOLIC PANEL: CPT

## 2025-02-04 RX ORDER — KETOROLAC TROMETHAMINE 15 MG/ML
15 INJECTION, SOLUTION INTRAMUSCULAR; INTRAVENOUS ONCE
Status: COMPLETED | OUTPATIENT
Start: 2025-02-04 | End: 2025-02-04

## 2025-02-04 RX ORDER — 0.9 % SODIUM CHLORIDE 0.9 %
1000 INTRAVENOUS SOLUTION INTRAVENOUS ONCE
Status: COMPLETED | OUTPATIENT
Start: 2025-02-04 | End: 2025-02-04

## 2025-02-04 RX ORDER — METHOCARBAMOL 500 MG/1
500 TABLET, FILM COATED ORAL 4 TIMES DAILY
Qty: 40 TABLET | Refills: 0 | Status: SHIPPED | OUTPATIENT
Start: 2025-02-04 | End: 2025-02-14

## 2025-02-04 RX ORDER — OXYCODONE HYDROCHLORIDE 5 MG/1
5 TABLET ORAL ONCE
Status: COMPLETED | OUTPATIENT
Start: 2025-02-04 | End: 2025-02-04

## 2025-02-04 RX ORDER — FENTANYL CITRATE 50 UG/ML
50 INJECTION, SOLUTION INTRAMUSCULAR; INTRAVENOUS ONCE
Status: COMPLETED | OUTPATIENT
Start: 2025-02-04 | End: 2025-02-04

## 2025-02-04 RX ADMIN — KETOROLAC TROMETHAMINE 15 MG: 15 INJECTION, SOLUTION INTRAMUSCULAR; INTRAVENOUS at 17:38

## 2025-02-04 RX ADMIN — FENTANYL CITRATE 50 MCG: 50 INJECTION INTRAMUSCULAR; INTRAVENOUS at 17:37

## 2025-02-04 RX ADMIN — SODIUM CHLORIDE 1000 ML: 9 INJECTION, SOLUTION INTRAVENOUS at 22:00

## 2025-02-04 RX ADMIN — OXYCODONE 5 MG: 5 TABLET ORAL at 21:06

## 2025-02-04 ASSESSMENT — PAIN SCALES - GENERAL
PAINLEVEL_OUTOF10: 10
PAINLEVEL_OUTOF10: 8
PAINLEVEL_OUTOF10: 10
PAINLEVEL_OUTOF10: 10
PAINLEVEL_OUTOF10: 6

## 2025-02-04 ASSESSMENT — PAIN DESCRIPTION - LOCATION
LOCATION: BACK
LOCATION: FLANK

## 2025-02-04 ASSESSMENT — PAIN DESCRIPTION - ORIENTATION
ORIENTATION: LEFT
ORIENTATION: LOWER

## 2025-02-04 ASSESSMENT — PAIN - FUNCTIONAL ASSESSMENT: PAIN_FUNCTIONAL_ASSESSMENT: 0-10

## 2025-02-04 NOTE — ED PROVIDER NOTES
RBC 5.06 3.80 - 5.80 m/uL    Hemoglobin 15.3 12.5 - 16.5 g/dL    Hematocrit 47.8 37.0 - 54.0 %    MCV 94.5 80.0 - 99.9 fL    MCH 30.2 26.0 - 35.0 pg    MCHC 32.0 32.0 - 34.5 g/dL    RDW 13.1 11.5 - 15.0 %    Platelets 290 130 - 450 k/uL    MPV 10.5 7.0 - 12.0 fL    Neutrophils % 58 43.0 - 80.0 %    Lymphocytes % 34 20.0 - 42.0 %    Monocytes % 6 2.0 - 12.0 %    Eosinophils % 1 0 - 6 %    Basophils % 0 0.0 - 2.0 %    Immature Granulocytes % 0 0.0 - 5.0 %    Neutrophils Absolute 4.55 1.80 - 7.30 k/uL    Lymphocytes Absolute 2.68 1.50 - 4.00 k/uL    Monocytes Absolute 0.50 0.10 - 0.95 k/uL    Eosinophils Absolute 0.06 0.05 - 0.50 k/uL    Basophils Absolute 0.03 0.00 - 0.20 k/uL    Immature Granulocytes Absolute <0.03 0.00 - 0.58 k/uL   Comprehensive Metabolic Panel w/ Reflex to MG   Result Value Ref Range    Sodium 138 132 - 146 mmol/L    Potassium 4.1 3.5 - 5.0 mmol/L    Chloride 100 98 - 107 mmol/L    CO2 29 22 - 29 mmol/L    Anion Gap 9 7 - 16 mmol/L    Glucose 77 74 - 99 mg/dL    BUN 12 6 - 20 mg/dL    Creatinine 1.1 0.70 - 1.20 mg/dL    Est, Glo Filt Rate 89 >60 mL/min/1.73m2    Calcium 9.2 8.6 - 10.2 mg/dL    Total Protein 7.5 6.4 - 8.3 g/dL    Albumin 4.0 3.5 - 5.2 g/dL    Total Bilirubin 0.2 0.0 - 1.2 mg/dL    Alkaline Phosphatase 105 40 - 129 U/L    ALT 42 (H) 0 - 40 U/L    AST 34 0 - 39 U/L   Lactic Acid   Result Value Ref Range    Lactic Acid 1.0 0.5 - 2.2 mmol/L   Urinalysis with Microscopic   Result Value Ref Range    Color, UA Yellow Yellow    Turbidity UA Clear Clear    Glucose, Ur NEGATIVE NEGATIVE mg/dL    Bilirubin, Urine NEGATIVE NEGATIVE    Ketones, Urine NEGATIVE NEGATIVE mg/dL    Specific Gravity, UA 1.020 1.005 - 1.030    Urine Hgb TRACE (A) NEGATIVE    pH, Urine 7.5 5.0 - 8.0    Protein, UA NEGATIVE NEGATIVE mg/dL    Urobilinogen, Urine 0.2 0.0 - 1.0 EU/dL    Nitrite, Urine NEGATIVE NEGATIVE    Leukocyte Esterase, Urine NEGATIVE NEGATIVE    WBC, UA 0 TO 5 0 TO 5 /HPF    RBC, UA 6 TO 9 (A) 0

## 2025-02-05 ASSESSMENT — ENCOUNTER SYMPTOMS
VOMITING: 0
COUGH: 0
CHEST TIGHTNESS: 0
ABDOMINAL PAIN: 0
BACK PAIN: 0
DIARRHEA: 0
ABDOMINAL DISTENTION: 0
PHOTOPHOBIA: 0
NAUSEA: 0
SHORTNESS OF BREATH: 0

## 2025-02-05 NOTE — ED NOTES
Discharge instructions discussed and reviewed with the patient.  He verbalized understanding and has no questions or concerns at time of discharge. Peripheral IV removed and patient helped into wheelchair.

## 2025-02-06 LAB
EKG ATRIAL RATE: 56 BPM
EKG P AXIS: 71 DEGREES
EKG P-R INTERVAL: 130 MS
EKG Q-T INTERVAL: 408 MS
EKG QRS DURATION: 90 MS
EKG QTC CALCULATION (BAZETT): 393 MS
EKG R AXIS: 83 DEGREES
EKG T AXIS: 83 DEGREES
EKG VENTRICULAR RATE: 56 BPM
MICROORGANISM SPEC CULT: NO GROWTH
SERVICE CMNT-IMP: NORMAL
SPECIMEN DESCRIPTION: NORMAL

## 2025-02-06 PROCEDURE — 93010 ELECTROCARDIOGRAM REPORT: CPT | Performed by: INTERNAL MEDICINE

## (undated) DEVICE — Z DISCONTINUED PER MEDLINE NO SUB DRAIN SURG L18IN DIA3/8IN LTX PENROSE UNIF WALL THICKNESS

## (undated) DEVICE — SPONGE GZ W4XL4IN RAYON POLY CVR W/NONWOVEN FAB STRL 2/PK

## (undated) DEVICE — SET INSTR TRACH

## (undated) DEVICE — Device

## (undated) DEVICE — SYRINGE MED 50ML LUERLOCK TIP

## (undated) DEVICE — SOLUTION IV IRRIG POUR BRL 0.9% SODIUM CHL 2F7124

## (undated) DEVICE — TUBING SUCT 12FR MAL ALUM SHFT FN CAP VENT UNIV CONN W/ OBT

## (undated) DEVICE — SPONGE,PEANUT,XRAY,ST,SM,3/8",5/CARD: Brand: MEDLINE INDUSTRIES, INC.

## (undated) DEVICE — 3M™ IOBAN™ 2 ANTIMICROBIAL INCISE DRAPE 6650EZ: Brand: IOBAN™ 2

## (undated) DEVICE — SURGICAL PROCEDURE PACK BRONCH

## (undated) DEVICE — KIT SURG W7XL11IN 2 PKT UNTREATED NA

## (undated) DEVICE — GAUZE,SPONGE,POST-OP,4X3,STRL,LF: Brand: MEDLINE

## (undated) DEVICE — MAGNETIC INSTR DRAPE 20X16: Brand: MEDLINE INDUSTRIES, INC.

## (undated) DEVICE — DRAIN SURG SGL COLL PT TB FOR ATS BG OASIS

## (undated) DEVICE — SYRINGE MED 10ML TRNSLUC BRL PLUNG BLK MRK POLYPR CTRL

## (undated) DEVICE — KIT,ANTI FOG,W/SPONGE & FLUID,SOFT PACK: Brand: MEDLINE

## (undated) DEVICE — ADAPTER TBNG DIA15MM SWVL FBROPT BRONCHSCP TERM 2 AXIS PEEP

## (undated) DEVICE — PACK,UNIV, II AURORA: Brand: MEDLINE

## (undated) DEVICE — SURGICAL PROCEDURE PACK BASIC

## (undated) DEVICE — GRADUATE TRIANG MEASURE 1000ML BLK PRNT

## (undated) DEVICE — GUARD TEETH AD PR NYL

## (undated) DEVICE — CLOTH SURG PREP PREOPERATIVE CHLORHEXIDINE GLUC 2% READYPREP

## (undated) DEVICE — GRADUATE

## (undated) DEVICE — WARMER SCP LAP

## (undated) DEVICE — TROCAR: Brand: KII FIOS FIRST ENTRY

## (undated) DEVICE — SET CARDIAC CHEST I

## (undated) DEVICE — STANDARD HYPODERMIC NEEDLE,ALUMINUM HUB: Brand: MONOJECT

## (undated) DEVICE — BITEBLOCK 54FR W/ DENT RIM BLOX

## (undated) DEVICE — LABEL MED 4 IN SURG PANEL W/ PEN STRL

## (undated) DEVICE — SOLUTION IV IRRIG 500ML 0.9% SODIUM CHL 2F7123

## (undated) DEVICE — GLOVE ORANGE PI 7 1/2   MSG9075

## (undated) DEVICE — Z DUP USE 2257490 ADHESIVE SKIN CLSRE 036ML TPCL 2CTL CNCRLTE HIGH VSCSTY DRMB

## (undated) DEVICE — INTENDED FOR TISSUE SEPARATION, AND OTHER PROCEDURES THAT REQUIRE A SHARP SURGICAL BLADE TO PUNCTURE OR CUT.: Brand: BARD-PARKER ® STAINLESS STEEL BLADES

## (undated) DEVICE — VALVE SUCTION AIR H2O SET ORCA POD + DISP

## (undated) DEVICE — MARKER,SKIN,PREP-RESISTANT,NON-STERILE: Brand: MEDLINE

## (undated) DEVICE — SET INSTRUMENTS VATS THORACOSCOPY

## (undated) DEVICE — CLEANER,CAUTERY TIP,2X2",STERILE: Brand: MEDLINE

## (undated) DEVICE — UNIVERSAL PACK: Brand: CONVERTORS

## (undated) DEVICE — GLOVE SURG SZ 75 L12IN FNGR THK94MIL TRNSLUC YEL LTX

## (undated) DEVICE — SURGICAL PROCEDURE PACK EENT CUST

## (undated) DEVICE — GLOVE ORANGE PI 8   MSG9080

## (undated) DEVICE — GLOVE ORANGE PI 7   MSG9070

## (undated) DEVICE — KIT SURG PREP POVIDONE IOD PRESATURATED PAINT WET FOR UNIV

## (undated) DEVICE — SET INST MINOR PROCEDURE

## (undated) DEVICE — E-Z CLEAN, NON-STICK, PTFE COATED, ELECTROSURGICAL BLADE ELECTRODE, MODIFIED EXTENDED INSULATION, 2.5 INCH (6.35 CM): Brand: MEGADYNE

## (undated) DEVICE — JELLY,LUBE,STERILE,FLIP TOP,TUBE,4-OZ: Brand: MEDLINE

## (undated) DEVICE — CODMAN® SURGICAL PATTIES 1/2" X 1/2" (1.27CM X 1.27CM): Brand: CODMAN®

## (undated) DEVICE — BLOCK BITE 60FR RUBBER ADLT DENTAL

## (undated) DEVICE — SPECIMEN CUP W/LID: Brand: DEROYAL

## (undated) DEVICE — NEEDLE SPNL 20GA L3.5IN YEL HUB S STL REG WALL FIT STYL W/

## (undated) DEVICE — 3M™ MEDIPORE™ + PAD 3564: Brand: 3M™ MEDIPORE™

## (undated) DEVICE — 1.5L THIN WALL CAN: Brand: CRD

## (undated) DEVICE — CAMERA CARDIAC STRYKER 1488 HD

## (undated) DEVICE — Z DISCONTINUED BY MEDLINE USE 2716051 TUBE TRACH SZ 6 L74MM OD10.8MM ID6.4MM CUF W/ DISP INNR

## (undated) DEVICE — CATHETER ETER SUCT 14FR RED POLYPR STR OPN W VLV

## (undated) DEVICE — CATHETER THOR 32FR L23IN PVC 6 EYELET STR ATRAUM

## (undated) DEVICE — FORCEPS BX OVL CUP FEN DISPOSABLE CAP L 160CM RAD JAW 4

## (undated) DEVICE — SOLUTION IV IRRIG WATER 1000ML POUR BRL 2F7114

## (undated) DEVICE — TUBING, SUCTION, 3/16" X 12', STRAIGHT: Brand: MEDLINE

## (undated) DEVICE — SYRINGE 20ML LL S/C 50

## (undated) DEVICE — TRAP,MUCUS SPECIMEN,40CC: Brand: MEDLINE

## (undated) DEVICE — AGENT HEMSTAT W4XL8IN OXIDIZED REGENERATED CELOS ABSRB

## (undated) DEVICE — GLOVE SURG SZ 65 THK91MIL LTX FREE SYN POLYISOPRENE

## (undated) DEVICE — SPONGE,LAP,4"X18",XR,ST,5/PK,40PK/CS: Brand: MEDLINE INDUSTRIES, INC.

## (undated) DEVICE — APPLICATOR MEDICATED 26 CC SOLUTION HI LT ORNG CHLORAPREP

## (undated) DEVICE — GARMENT,MEDLINE,DVT,INT,CALF,MED, GEN2: Brand: MEDLINE

## (undated) DEVICE — BLADE CLIPPER GEN PURP NS

## (undated) DEVICE — PATIENT RETURN ELECTRODE, SINGLE-USE, CONTACT QUALITY MONITORING, ADULT, WITH 9FT CORD, FOR PATIENTS WEIGING OVER 33LBS. (15KG): Brand: MEGADYNE

## (undated) DEVICE — HOLDER TRACH TB W1IN FIT UPTO 19.5IN NK 2 PC ADJ BLU

## (undated) DEVICE — TOWEL,OR,DSP,ST,BLUE,STD,6/PK,12PK/CS: Brand: MEDLINE

## (undated) DEVICE — GLOVE SURG SZ 7.5 L11.73IN FNGR THK9.8MIL STRW LTX POLYMER